# Patient Record
Sex: FEMALE | Race: WHITE | URBAN - METROPOLITAN AREA
[De-identification: names, ages, dates, MRNs, and addresses within clinical notes are randomized per-mention and may not be internally consistent; named-entity substitution may affect disease eponyms.]

---

## 2018-01-21 ENCOUNTER — INPATIENT (INPATIENT)
Facility: HOSPITAL | Age: 73
LOS: 8 days | Discharge: HOME HEALTH | End: 2018-01-30
Attending: THORACIC SURGERY (CARDIOTHORACIC VASCULAR SURGERY)

## 2018-01-21 DIAGNOSIS — E87.70 FLUID OVERLOAD, UNSPECIFIED: ICD-10-CM

## 2018-01-21 DIAGNOSIS — D62 ACUTE POSTHEMORRHAGIC ANEMIA: ICD-10-CM

## 2018-01-21 DIAGNOSIS — Z53.8 PROCEDURE AND TREATMENT NOT CARRIED OUT FOR OTHER REASONS: ICD-10-CM

## 2018-01-21 DIAGNOSIS — E83.42 HYPOMAGNESEMIA: ICD-10-CM

## 2018-01-21 DIAGNOSIS — T36.8X5A ADVERSE EFFECT OF OTHER SYSTEMIC ANTIBIOTICS, INITIAL ENCOUNTER: ICD-10-CM

## 2018-01-21 DIAGNOSIS — D72.829 ELEVATED WHITE BLOOD CELL COUNT, UNSPECIFIED: ICD-10-CM

## 2018-02-02 ENCOUNTER — OUTPATIENT (OUTPATIENT)
Dept: OUTPATIENT SERVICES | Facility: HOSPITAL | Age: 73
LOS: 1 days | Discharge: HOME | End: 2018-02-02

## 2018-02-02 DIAGNOSIS — I10 ESSENTIAL (PRIMARY) HYPERTENSION: ICD-10-CM

## 2018-02-02 DIAGNOSIS — A49.9 BACTERIAL INFECTION, UNSPECIFIED: ICD-10-CM

## 2018-02-02 DIAGNOSIS — K21.9 GASTRO-ESOPHAGEAL REFLUX DISEASE WITHOUT ESOPHAGITIS: ICD-10-CM

## 2018-02-02 DIAGNOSIS — Z53.32 THORACOSCOPIC SURGICAL PROCEDURE CONVERTED TO OPEN PROCEDURE: ICD-10-CM

## 2018-02-02 DIAGNOSIS — A41.9 SEPSIS, UNSPECIFIED ORGANISM: ICD-10-CM

## 2018-02-02 DIAGNOSIS — J86.9 PYOTHORAX WITHOUT FISTULA: ICD-10-CM

## 2018-02-02 DIAGNOSIS — J90 PLEURAL EFFUSION, NOT ELSEWHERE CLASSIFIED: ICD-10-CM

## 2018-02-02 DIAGNOSIS — R19.7 DIARRHEA, UNSPECIFIED: ICD-10-CM

## 2018-02-02 DIAGNOSIS — M19.90 UNSPECIFIED OSTEOARTHRITIS, UNSPECIFIED SITE: ICD-10-CM

## 2018-02-02 DIAGNOSIS — J15.9 UNSPECIFIED BACTERIAL PNEUMONIA: ICD-10-CM

## 2018-02-02 DIAGNOSIS — E87.6 HYPOKALEMIA: ICD-10-CM

## 2018-02-04 DIAGNOSIS — J90 PLEURAL EFFUSION, NOT ELSEWHERE CLASSIFIED: ICD-10-CM

## 2018-02-06 ENCOUNTER — OUTPATIENT (OUTPATIENT)
Dept: OUTPATIENT SERVICES | Facility: HOSPITAL | Age: 73
LOS: 1 days | Discharge: HOME | End: 2018-02-06

## 2018-02-06 DIAGNOSIS — J90 PLEURAL EFFUSION, NOT ELSEWHERE CLASSIFIED: ICD-10-CM

## 2018-02-13 ENCOUNTER — OUTPATIENT (OUTPATIENT)
Dept: OUTPATIENT SERVICES | Facility: HOSPITAL | Age: 73
LOS: 1 days | Discharge: HOME | End: 2018-02-13

## 2018-02-13 DIAGNOSIS — D64.9 ANEMIA, UNSPECIFIED: ICD-10-CM

## 2018-02-13 PROBLEM — Z00.00 ENCOUNTER FOR PREVENTIVE HEALTH EXAMINATION: Status: ACTIVE | Noted: 2018-02-13

## 2018-03-01 ENCOUNTER — APPOINTMENT (OUTPATIENT)
Dept: CARDIOLOGY | Facility: CLINIC | Age: 73
End: 2018-03-01

## 2018-03-01 VITALS — DIASTOLIC BLOOD PRESSURE: 84 MMHG | HEART RATE: 80 BPM | RESPIRATION RATE: 18 BRPM | SYSTOLIC BLOOD PRESSURE: 136 MMHG

## 2018-03-01 VITALS — BODY MASS INDEX: 37.09 KG/M2 | WEIGHT: 184 LBS | HEIGHT: 59 IN

## 2018-03-01 DIAGNOSIS — Z82.49 FAMILY HISTORY OF ISCHEMIC HEART DISEASE AND OTHER DISEASES OF THE CIRCULATORY SYSTEM: ICD-10-CM

## 2018-03-01 DIAGNOSIS — F15.90 OTHER STIMULANT USE, UNSPECIFIED, UNCOMPLICATED: ICD-10-CM

## 2018-03-01 DIAGNOSIS — K21.9 GASTRO-ESOPHAGEAL REFLUX DISEASE W/OUT ESOPHAGITIS: ICD-10-CM

## 2018-03-01 DIAGNOSIS — Z78.9 OTHER SPECIFIED HEALTH STATUS: ICD-10-CM

## 2018-03-01 DIAGNOSIS — I10 ESSENTIAL (PRIMARY) HYPERTENSION: ICD-10-CM

## 2018-03-01 DIAGNOSIS — I31.3 PERICARDIAL EFFUSION (NONINFLAMMATORY): ICD-10-CM

## 2018-03-01 RX ORDER — LOSARTAN POTASSIUM AND HYDROCHLOROTHIAZIDE 25; 100 MG/1; MG/1
100-25 TABLET ORAL DAILY
Qty: 90 | Refills: 3 | Status: ACTIVE | COMMUNITY
Start: 1900-01-01 | End: 1900-01-01

## 2018-03-01 RX ORDER — OMEPRAZOLE 40 MG/1
40 CAPSULE, DELAYED RELEASE ORAL
Qty: 90 | Refills: 0 | Status: ACTIVE | COMMUNITY
Start: 1900-01-01 | End: 1900-01-01

## 2018-03-02 ENCOUNTER — MEDICATION RENEWAL (OUTPATIENT)
Age: 73
End: 2018-03-02

## 2018-03-02 RX ORDER — METOPROLOL TARTRATE 50 MG/1
50 TABLET, FILM COATED ORAL
Qty: 180 | Refills: 3 | Status: ACTIVE | COMMUNITY
Start: 2018-03-02 | End: 1900-01-01

## 2018-04-02 ENCOUNTER — OUTPATIENT (OUTPATIENT)
Dept: OUTPATIENT SERVICES | Facility: HOSPITAL | Age: 73
LOS: 1 days | Discharge: HOME | End: 2018-04-02

## 2018-04-02 DIAGNOSIS — R07.9 CHEST PAIN, UNSPECIFIED: ICD-10-CM

## 2018-04-02 DIAGNOSIS — J86.9 PYOTHORAX WITHOUT FISTULA: ICD-10-CM

## 2018-05-04 ENCOUNTER — APPOINTMENT (OUTPATIENT)
Dept: CARDIOLOGY | Facility: CLINIC | Age: 73
End: 2018-05-04

## 2018-05-29 ENCOUNTER — RX RENEWAL (OUTPATIENT)
Age: 73
End: 2018-05-29

## 2018-06-01 ENCOUNTER — RX RENEWAL (OUTPATIENT)
Age: 73
End: 2018-06-01

## 2018-06-27 ENCOUNTER — APPOINTMENT (OUTPATIENT)
Dept: CARDIOLOGY | Facility: CLINIC | Age: 73
End: 2018-06-27

## 2018-07-18 VITALS
OXYGEN SATURATION: 97 % | TEMPERATURE: 98 F | RESPIRATION RATE: 16 BRPM | HEIGHT: 59 IN | DIASTOLIC BLOOD PRESSURE: 74 MMHG | HEART RATE: 72 BPM | SYSTOLIC BLOOD PRESSURE: 165 MMHG | WEIGHT: 190.04 LBS

## 2018-07-18 RX ORDER — CHLORHEXIDINE GLUCONATE 213 G/1000ML
1 SOLUTION TOPICAL ONCE
Qty: 0 | Refills: 0 | Status: DISCONTINUED | OUTPATIENT
Start: 2018-07-19 | End: 2018-07-19

## 2018-07-18 NOTE — H&P ADULT - HISTORY OF PRESENT ILLNESS
73 y.o Female with PMHx of HTN, GERD, who presented to her cardiologist  Dr. Perkins c/o progressively worsening  dyspnea on exertion upon ambulation of 1/2 block over the last several weeks, resolving within minutes of rest.   Denies CP,  recent PND/orthopnea, Le edema, palpitations, dizziness,  or syncope.  Pt subsequently was referred for a Nuclear Stress test on   which was reportedly "abnormal" (results pending).      In light of pt's risk factors, above CCS Anginal Class 3 Equivalent Symptoms, and an abnormal Stress test, pt is now referred to [facility] for recommended Cardiac Cath with possible intervention if clinically indicated to  r/o suspected underlying CAD. 73 y.o Female with PMHx of HTN, GIB ~ 6-7 yrs ago while on low dose ASA s/p 4 units PRBC's  w/ negative subsequent GI workup (ASA never reintroduced since), GERD, who presented to her cardiologist  Dr. Perkins c/o progressively worsening  dyspnea on exertion upon ambulation of 1/2 block over the last several weeks, resolving within minutes of rest.   Denies CP,  recent PND/orthopnea, LE edema, palpitations, dizziness,  or syncope.  Pt subsequently was referred for a Coronary CTA study on 06/29/18 which revealed  a total calcium score of 2277, moderate stenosis secondary to mixed plaque  of the proximal to mid LAD, RCA, and LCx, LVEF of 85%.  Extracardiac findings: Stable focal cystic bronchiectasis with mucoid impactions within the proximal linear segment of the lung.  Stable moderately sized hiatal hernia.      In light of pt's risk factors, above CCS Anginal Class 3 Equivalent Symptoms, and an abnormal CTA study, pt is now referred to [facility] for recommended Cardiac Cath with possible intervention if clinically indicated to  r/o suspected underlying CAD.

## 2018-07-18 NOTE — H&P ADULT - ASSESSMENT
73 y.o Female with PMHx of HTN, GERD, who presents for recommended LHC with possible intervention if clinically indicated secondary to CCS Anginal Class 3 Equivalent Symptoms in the setting of an abnormal stress test.      ASA: III and Mallampati: III    -Precath/Consented  - IVF Hydration NS @ 75cc/hr.  -Loaded with ASA 325mg PO X 1 and Plavix 600mg PO X 1 73 y.o Female with PMHx of HTN, GIB ~ 6-7 yrs ago while on low dose ASA s/p 4 units PRBC's  w/ negative subsequent GI workup (ASA never reintroduced since), GERD, who presents for recommended LHC with possible intervention if clinically indicated secondary to CCS Anginal Class 3 Equivalent Symptoms in the setting of an abnormal stress test.      ASA: III and Mallampati: III    -Precath/Consented  - IVF Hydration NS @ 75cc/hr.  -GIB ~ 6-7 yrs ago while on low dose ASA s/p 4 units PRBC's  w/ negative subsequent GI workup.  ASA has never  been reintroduced since.  H/H remains____ 73 y.o Female with PMHx of HTN, GIB ~ 6-7 yrs ago while on low dose ASA s/p 4 units PRBC's  w/ negative subsequent GI workup (ASA never reintroduced since), GERD, who presents for recommended LHC with possible intervention if clinically indicated secondary to CCS Anginal Class 3 Equivalent Symptoms in the setting of an abnormal stress test.      ASA: III and Mallampati: III    -Precath/Consented  - IVF Hydration NS @ 75cc/hr.  -GIB ~ 6-7 yrs ago while on low dose ASA s/p 4 units PRBC's  w/ negative subsequent GI workup.  ASA has never  been reintroduced since.  H/H 12.7/41.6 remains stable today.  Pt reports no further signs or symptoms of  bleeding. AS d./w Stephanie, will proceed with loading pt with ASA 325mg PO X 1 and Plavix 600mg PO X 1.

## 2018-07-19 ENCOUNTER — OUTPATIENT (OUTPATIENT)
Dept: OUTPATIENT SERVICES | Facility: HOSPITAL | Age: 73
LOS: 1 days | Discharge: NURSING, REHAB OR LONG-TERM CARE | End: 2018-07-19
Payer: MEDICARE

## 2018-07-19 LAB
ALBUMIN SERPL ELPH-MCNC: 4.5 G/DL — SIGNIFICANT CHANGE UP (ref 3.3–5)
ALP SERPL-CCNC: 118 U/L — SIGNIFICANT CHANGE UP (ref 40–120)
ALT FLD-CCNC: 19 U/L — SIGNIFICANT CHANGE UP (ref 10–45)
ANION GAP SERPL CALC-SCNC: 15 MMOL/L — SIGNIFICANT CHANGE UP (ref 5–17)
APTT BLD: 28 SEC — SIGNIFICANT CHANGE UP (ref 27.5–37.4)
AST SERPL-CCNC: 21 U/L — SIGNIFICANT CHANGE UP (ref 10–40)
BASOPHILS NFR BLD AUTO: 0.3 % — SIGNIFICANT CHANGE UP (ref 0–2)
BILIRUB SERPL-MCNC: 0.5 MG/DL — SIGNIFICANT CHANGE UP (ref 0.2–1.2)
BUN SERPL-MCNC: 23 MG/DL — SIGNIFICANT CHANGE UP (ref 7–23)
CALCIUM SERPL-MCNC: 10 MG/DL — SIGNIFICANT CHANGE UP (ref 8.4–10.5)
CHLORIDE SERPL-SCNC: 97 MMOL/L — SIGNIFICANT CHANGE UP (ref 96–108)
CHOLEST SERPL-MCNC: 213 MG/DL — HIGH (ref 10–199)
CK MB CFR SERPL CALC: 2.7 NG/ML — SIGNIFICANT CHANGE UP (ref 0–6.7)
CK SERPL-CCNC: 107 U/L — SIGNIFICANT CHANGE UP (ref 25–170)
CO2 SERPL-SCNC: 27 MMOL/L — SIGNIFICANT CHANGE UP (ref 22–31)
CREAT SERPL-MCNC: 1.04 MG/DL — SIGNIFICANT CHANGE UP (ref 0.5–1.3)
CRP SERPL-MCNC: 0.68 MG/DL — HIGH (ref 0–0.4)
EOSINOPHIL NFR BLD AUTO: 2.5 % — SIGNIFICANT CHANGE UP (ref 0–6)
GLUCOSE SERPL-MCNC: 113 MG/DL — HIGH (ref 70–99)
HBA1C BLD-MCNC: 5.6 % — SIGNIFICANT CHANGE UP (ref 4–5.6)
HCT VFR BLD CALC: 41.6 % — SIGNIFICANT CHANGE UP (ref 34.5–45)
HDLC SERPL-MCNC: 71 MG/DL — SIGNIFICANT CHANGE UP (ref 40–125)
HGB BLD-MCNC: 12.7 G/DL — SIGNIFICANT CHANGE UP (ref 11.5–15.5)
INR BLD: 1.04 — SIGNIFICANT CHANGE UP (ref 0.88–1.16)
LIPID PNL WITH DIRECT LDL SERPL: 104 MG/DL — SIGNIFICANT CHANGE UP
LYMPHOCYTES # BLD AUTO: 26.3 % — SIGNIFICANT CHANGE UP (ref 13–44)
MCHC RBC-ENTMCNC: 24.7 PG — LOW (ref 27–34)
MCHC RBC-ENTMCNC: 30.5 G/DL — LOW (ref 32–36)
MCV RBC AUTO: 80.8 FL — SIGNIFICANT CHANGE UP (ref 80–100)
MONOCYTES NFR BLD AUTO: 4.4 % — SIGNIFICANT CHANGE UP (ref 2–14)
NEUTROPHILS NFR BLD AUTO: 66.5 % — SIGNIFICANT CHANGE UP (ref 43–77)
PLATELET # BLD AUTO: 328 K/UL — SIGNIFICANT CHANGE UP (ref 150–400)
POTASSIUM SERPL-MCNC: 3.7 MMOL/L — SIGNIFICANT CHANGE UP (ref 3.5–5.3)
POTASSIUM SERPL-SCNC: 3.7 MMOL/L — SIGNIFICANT CHANGE UP (ref 3.5–5.3)
PROT SERPL-MCNC: 8.3 G/DL — SIGNIFICANT CHANGE UP (ref 6–8.3)
PROTHROM AB SERPL-ACNC: 11.6 SEC — SIGNIFICANT CHANGE UP (ref 9.8–12.7)
RBC # BLD: 5.15 M/UL — SIGNIFICANT CHANGE UP (ref 3.8–5.2)
RBC # FLD: 15.5 % — SIGNIFICANT CHANGE UP (ref 10.3–16.9)
SODIUM SERPL-SCNC: 139 MMOL/L — SIGNIFICANT CHANGE UP (ref 135–145)
TOTAL CHOLESTEROL/HDL RATIO MEASUREMENT: 3 RATIO — LOW (ref 3.3–7.1)
TRIGL SERPL-MCNC: 192 MG/DL — HIGH (ref 10–149)
WBC # BLD: 11.7 K/UL — HIGH (ref 3.8–10.5)
WBC # FLD AUTO: 11.7 K/UL — HIGH (ref 3.8–10.5)

## 2018-07-19 PROCEDURE — 93458 L HRT ARTERY/VENTRICLE ANGIO: CPT | Mod: 26

## 2018-07-19 PROCEDURE — 82553 CREATINE MB FRACTION: CPT

## 2018-07-19 PROCEDURE — 93005 ELECTROCARDIOGRAM TRACING: CPT

## 2018-07-19 PROCEDURE — 80053 COMPREHEN METABOLIC PANEL: CPT

## 2018-07-19 PROCEDURE — 85610 PROTHROMBIN TIME: CPT

## 2018-07-19 PROCEDURE — 85025 COMPLETE CBC W/AUTO DIFF WBC: CPT

## 2018-07-19 PROCEDURE — 82550 ASSAY OF CK (CPK): CPT

## 2018-07-19 PROCEDURE — 83036 HEMOGLOBIN GLYCOSYLATED A1C: CPT

## 2018-07-19 PROCEDURE — 93010 ELECTROCARDIOGRAM REPORT: CPT

## 2018-07-19 PROCEDURE — C1769: CPT

## 2018-07-19 PROCEDURE — C1887: CPT

## 2018-07-19 PROCEDURE — 86140 C-REACTIVE PROTEIN: CPT

## 2018-07-19 PROCEDURE — 80061 LIPID PANEL: CPT

## 2018-07-19 PROCEDURE — 85730 THROMBOPLASTIN TIME PARTIAL: CPT

## 2018-07-19 PROCEDURE — 93458 L HRT ARTERY/VENTRICLE ANGIO: CPT

## 2018-07-19 RX ORDER — SODIUM CHLORIDE 9 MG/ML
500 INJECTION INTRAMUSCULAR; INTRAVENOUS; SUBCUTANEOUS
Qty: 0 | Refills: 0 | Status: DISCONTINUED | OUTPATIENT
Start: 2018-07-19 | End: 2018-07-19

## 2018-07-19 RX ORDER — CLOPIDOGREL BISULFATE 75 MG/1
600 TABLET, FILM COATED ORAL ONCE
Qty: 0 | Refills: 0 | Status: COMPLETED | OUTPATIENT
Start: 2018-07-19 | End: 2018-07-19

## 2018-07-19 RX ORDER — ASPIRIN/CALCIUM CARB/MAGNESIUM 324 MG
325 TABLET ORAL ONCE
Qty: 0 | Refills: 0 | Status: COMPLETED | OUTPATIENT
Start: 2018-07-19 | End: 2018-07-19

## 2018-07-19 RX ADMIN — SODIUM CHLORIDE 75 MILLILITER(S): 9 INJECTION INTRAMUSCULAR; INTRAVENOUS; SUBCUTANEOUS at 08:03

## 2018-07-19 RX ADMIN — Medication 325 MILLIGRAM(S): at 08:03

## 2018-07-19 RX ADMIN — CLOPIDOGREL BISULFATE 600 MILLIGRAM(S): 75 TABLET, FILM COATED ORAL at 08:03

## 2018-07-19 NOTE — PROGRESS NOTE ADULT - SUBJECTIVE AND OBJECTIVE BOX
Interventional Cardiology PA SDA Discharge Note    Patient without complaints. Ambulated and voided without difficulties    Afebrile, VSS    Ext:    		  		Right   Radial :   NO hematoma,  NO   bleeding, dressing; C/D/I      Pulses:    intact RAD to baseline     A/P:      73 y.o Female with PMHx of HTN, GIB ~ 6-7 yrs ago while on low dose ASA s/p 4 units PRBC's  w/ negative subsequent GI workup (ASA never reintroduced since), GERD, who presented to her cardiologist  Dr. Perkins c/o CCS Anginal Class 3 Equivalent Symptoms, and an abnormal CTA study, pt is now referred to [facility] for recommended Cardiac Cath with possible intervention if clinically indicated to  r/o suspected underlying CAD.  She is now s/p diagnostic cardiac catheterization revealing non obstructed CAD.  patient deemed stable for discharge home as per. Dr. Brown.       1.	Stable for discharge as per attending  __Stephanie_______ after bed rest, pt voids, groin/wrist stable and 30 minutes of ambulation.  2.	Follow-up with PMD/Cardiologist _Gregorio__________ in 1-2 weeks  3.	Discharged forms signed and copies in chart

## 2019-03-08 ENCOUNTER — RX RENEWAL (OUTPATIENT)
Age: 74
End: 2019-03-08

## 2020-02-27 PROBLEM — K21.9 GASTRO-ESOPHAGEAL REFLUX DISEASE WITHOUT ESOPHAGITIS: Chronic | Status: ACTIVE | Noted: 2018-07-18

## 2020-02-27 PROBLEM — I10 ESSENTIAL (PRIMARY) HYPERTENSION: Chronic | Status: ACTIVE | Noted: 2018-07-18

## 2020-03-11 ENCOUNTER — APPOINTMENT (OUTPATIENT)
Dept: CARDIOTHORACIC SURGERY | Facility: CLINIC | Age: 75
End: 2020-03-11
Payer: MEDICARE

## 2020-03-11 VITALS
RESPIRATION RATE: 13 BRPM | HEART RATE: 89 BPM | DIASTOLIC BLOOD PRESSURE: 106 MMHG | HEIGHT: 59 IN | TEMPERATURE: 97.6 F | WEIGHT: 190 LBS | SYSTOLIC BLOOD PRESSURE: 200 MMHG | OXYGEN SATURATION: 95 % | BODY MASS INDEX: 38.3 KG/M2

## 2020-03-11 DIAGNOSIS — J86.9 PYOTHORAX W/OUT FISTULA: ICD-10-CM

## 2020-03-11 DIAGNOSIS — Z86.79 PERSONAL HISTORY OF OTHER DISEASES OF THE CIRCULATORY SYSTEM: ICD-10-CM

## 2020-03-11 DIAGNOSIS — Z87.19 PERSONAL HISTORY OF OTHER DISEASES OF THE DIGESTIVE SYSTEM: ICD-10-CM

## 2020-03-11 PROCEDURE — 99204 OFFICE O/P NEW MOD 45 MIN: CPT

## 2020-03-13 RX ORDER — ERGOCALCIFEROL (VITAMIN D2) 1250 MCG
50000 CAPSULE ORAL
Refills: 0 | Status: ACTIVE | COMMUNITY

## 2020-03-13 RX ORDER — METHOTREXATE 2.5 MG/1
2.5 TABLET ORAL
Refills: 0 | Status: ACTIVE | COMMUNITY

## 2020-03-13 RX ORDER — FOLIC ACID 1 MG/1
1 TABLET ORAL
Refills: 0 | Status: ACTIVE | COMMUNITY

## 2020-03-13 NOTE — DATA REVIEWED
[FreeTextEntry1] : MRI Abdomen w/ w/o - 1/7/20 (done at [facility])\par Impression:\par 1. Limited study\par 2. Large Hiatal Hernia\par 3. Severe hepatic steatosis\par 4. Adenomatous left adrenal gland\par 5. Additional findings
no chest pain, no cough, and no shortness of breath.

## 2020-03-13 NOTE — CONSULT LETTER
[Dear  ___] : Dear  [unfilled], [Consult Letter:] : I had the pleasure of evaluating your patient, [unfilled]. [Consult Closing:] : Thank you very much for allowing me to participate in the care of this patient.  If you have any questions, please do not hesitate to contact me. [Sincerely,] : Sincerely, [DrGage  ___] : Dr. BOSTON [FreeTextEntry2] : Ryann Perkins [FreeTextEntry1] : This is a 74 year old female with apparently a previous hiatal hernia repair and now with recurrence.  On review of her imaging I appreciate a few metallic objects along the greater curve that may represent clips.  I appreciate no evidence on imaging of a wrap, nor do I appreciate any post surgical appearing changes at the hiatus or mediastinum.  There is no obvious residual sack in the mediastinum.  The family on questioning note that they were told her stomach had been "clipped" or "stapled" in place but no mention of a wrap.  My overall impression is that this patient may have just had a gastropexy.  However they note that this was an elective repair and generally would not perform isolated gastropexy in that situation.\par \par We discussed the conduct of a hiatal hernia repair, including the use of mesh, Danilo gastroplasty, and gastropexy.  We discussed the risks and benefits of this procedure as well, in particular on reoperative cases.  We will proceed with further testing including esophagram and CT chest, cardiac clearance, and will attempt to obtain her MRI disk and any operative notes from her previous surgery.  Surgical planning to follow after that.  I would prefer, given apparent previous foregut procedure, to perform an EGD for the purposes of identifying anatomy prior to the OR. [FreeTextEntry3] : Bennie Quiles M.D.\par Chief, Division of Thoracic Surgery\par Department of Cardiothoracic Surgery\par

## 2020-03-13 NOTE — HISTORY OF PRESENT ILLNESS
[Hypertension] : Hypertension [FreeTextEntry1] : Ms. DARIEN BARRERA 74 year F PMH HTN, empyema s/p decortication in 2108, s/p hiatal hernia repair at [facility] 7 years ago arrives today for a consultation for hiatal hernia. She reports having had CP in 2020 for which she had an MRI abdomen w/ w/o ordered by her PMD and a large hiatal hernia was found.  She reports not following up with her cardiologist in 2 years. Her symptoms include reflux, CP, occasional vomiting, belching, bloating and noting that certain foods "get stuck" when eating. She endorses drinking fluids after her meals only. \par She lives in the same house as her sister, but independently in a separate apartment and has no children or pets and is a retired  for the hospital.\par \par ECOG/WHO/Zubrod - 1 - Restricted in physically strenuous activity but ambulatory and able to carry out work of a light or sedentary nature, e.g., light house work, office work\par \par Her healthcare team is as follows:\par PMD: Ryann Perkins [phone]\par Cardio: Horace Giron\par GI: Timothy Varela\par \par \par  [Diabetes Mellitus] : no Diabetes Melllitus [Dyslipidemia] : no dyslipidemia [Dialysis] : no dialysis [Infectious Endocarditis] : no infectious endocarditis [Home Oxygen] : no home oxygen use [Inhaled Medication Therapy] : no inhaled medication therapy [Sleep Apnea] : no sleep apnea [Liver Disease] : no liver disease [Immunocompromise Present] : not immunocompromised [Peripheral Artery Disease] : no peripheral artery disease [Unresponsive Neurologic State] : not in a unresponsive neurologic state [Syncope] : no syncope [Cerebrovascular Disease] : no cerebrovascular disease [Prior CVA] : with no prior CVA [CVD TIA] : no CVD Tia [Prior Myocardial Infarction] : No prior myocardial infarction [Prior Heart Failure] : no prior heart failure

## 2020-03-13 NOTE — ASSESSMENT
[FreeTextEntry1] : This is a 74 year old female with apparently a previous hiatal hernia repair and now with recurrence.  On review of her imaging I appreciate a few metallic objects along the greater curve that may represent clips.  I appreciate no evidence on imaging of a wrap, nor do I appreciate any post surgical appearing changes at the hiatus or mediastinum.  There is no obvious residual sack in the mediastinum.  The family on questioning note that they were told her stomach had been "clipped" or "stapled" in place but no mention of a wrap.  My overall impression is that this patient may have just had a gastropexy.  However they note that this was an elective repair and generally would not perform isolated gastropexy in that situation.\par \par We discussed the conduct of a hiatal hernia repair, including the use of mesh, Danilo gastroplasty, and gastropexy.  We discussed the risks and benefits of this procedure as well, in particular on reoperative cases.  We will proceed with further testing including esophagram and CT chest, cardiac clearance, and will attempt to obtain her MRI disk and any operative notes from her previous surgery.  Surgical planning to follow after that.  I would prefer, given apparent previous foregut procedure, to perform an EGD for the purposes of identifying anatomy prior to the OR.\par I personally performed the services described in the documentation, reviewed the documentation recorded by the scribe in my presence and it accurately and completely records my words and actions.\par

## 2020-09-24 ENCOUNTER — APPOINTMENT (OUTPATIENT)
Dept: SURGERY | Facility: CLINIC | Age: 75
End: 2020-09-24

## 2020-09-30 ENCOUNTER — INPATIENT (INPATIENT)
Facility: HOSPITAL | Age: 75
LOS: 1 days | Discharge: HOME HEALTH | End: 2020-10-02
Attending: INTERNAL MEDICINE | Admitting: INTERNAL MEDICINE
Payer: MEDICARE

## 2020-09-30 VITALS
OXYGEN SATURATION: 96 % | HEIGHT: 59 IN | SYSTOLIC BLOOD PRESSURE: 206 MMHG | TEMPERATURE: 98 F | DIASTOLIC BLOOD PRESSURE: 83 MMHG | HEART RATE: 87 BPM | RESPIRATION RATE: 22 BRPM | WEIGHT: 203.71 LBS

## 2020-09-30 DIAGNOSIS — Z98.890 OTHER SPECIFIED POSTPROCEDURAL STATES: Chronic | ICD-10-CM

## 2020-09-30 DIAGNOSIS — Z87.09 PERSONAL HISTORY OF OTHER DISEASES OF THE RESPIRATORY SYSTEM: Chronic | ICD-10-CM

## 2020-09-30 LAB
ALBUMIN SERPL ELPH-MCNC: 4.4 G/DL — SIGNIFICANT CHANGE UP (ref 3.5–5.2)
ALP SERPL-CCNC: 109 U/L — SIGNIFICANT CHANGE UP (ref 30–115)
ALT FLD-CCNC: 109 U/L — HIGH (ref 0–41)
ANION GAP SERPL CALC-SCNC: 13 MMOL/L — SIGNIFICANT CHANGE UP (ref 7–14)
AST SERPL-CCNC: 90 U/L — HIGH (ref 0–41)
BASOPHILS # BLD AUTO: 0.04 K/UL — SIGNIFICANT CHANGE UP (ref 0–0.2)
BASOPHILS NFR BLD AUTO: 0.5 % — SIGNIFICANT CHANGE UP (ref 0–1)
BILIRUB SERPL-MCNC: 0.6 MG/DL — SIGNIFICANT CHANGE UP (ref 0.2–1.2)
BUN SERPL-MCNC: 15 MG/DL — SIGNIFICANT CHANGE UP (ref 10–20)
CALCIUM SERPL-MCNC: 9.5 MG/DL — SIGNIFICANT CHANGE UP (ref 8.5–10.1)
CHLORIDE SERPL-SCNC: 103 MMOL/L — SIGNIFICANT CHANGE UP (ref 98–110)
CO2 SERPL-SCNC: 25 MMOL/L — SIGNIFICANT CHANGE UP (ref 17–32)
CREAT SERPL-MCNC: 1 MG/DL — SIGNIFICANT CHANGE UP (ref 0.7–1.5)
EOSINOPHIL # BLD AUTO: 0.11 K/UL — SIGNIFICANT CHANGE UP (ref 0–0.7)
EOSINOPHIL NFR BLD AUTO: 1.4 % — SIGNIFICANT CHANGE UP (ref 0–8)
GLUCOSE SERPL-MCNC: 103 MG/DL — HIGH (ref 70–99)
HCT VFR BLD CALC: 37.2 % — SIGNIFICANT CHANGE UP (ref 37–47)
HGB BLD-MCNC: 11.8 G/DL — LOW (ref 12–16)
IMM GRANULOCYTES NFR BLD AUTO: 0.4 % — HIGH (ref 0.1–0.3)
LYMPHOCYTES # BLD AUTO: 2.08 K/UL — SIGNIFICANT CHANGE UP (ref 1.2–3.4)
LYMPHOCYTES # BLD AUTO: 27 % — SIGNIFICANT CHANGE UP (ref 20.5–51.1)
MCHC RBC-ENTMCNC: 28 PG — SIGNIFICANT CHANGE UP (ref 27–31)
MCHC RBC-ENTMCNC: 31.7 G/DL — LOW (ref 32–37)
MCV RBC AUTO: 88.4 FL — SIGNIFICANT CHANGE UP (ref 81–99)
MONOCYTES # BLD AUTO: 0.47 K/UL — SIGNIFICANT CHANGE UP (ref 0.1–0.6)
MONOCYTES NFR BLD AUTO: 6.1 % — SIGNIFICANT CHANGE UP (ref 1.7–9.3)
NEUTROPHILS # BLD AUTO: 4.98 K/UL — SIGNIFICANT CHANGE UP (ref 1.4–6.5)
NEUTROPHILS NFR BLD AUTO: 64.6 % — SIGNIFICANT CHANGE UP (ref 42.2–75.2)
NRBC # BLD: 0 /100 WBCS — SIGNIFICANT CHANGE UP (ref 0–0)
NT-PROBNP SERPL-SCNC: 1200 PG/ML — HIGH (ref 0–300)
PLATELET # BLD AUTO: 245 K/UL — SIGNIFICANT CHANGE UP (ref 130–400)
POTASSIUM SERPL-MCNC: 4.3 MMOL/L — SIGNIFICANT CHANGE UP (ref 3.5–5)
POTASSIUM SERPL-SCNC: 4.3 MMOL/L — SIGNIFICANT CHANGE UP (ref 3.5–5)
PROT SERPL-MCNC: 6.6 G/DL — SIGNIFICANT CHANGE UP (ref 6–8)
RBC # BLD: 4.21 M/UL — SIGNIFICANT CHANGE UP (ref 4.2–5.4)
RBC # FLD: 13.2 % — SIGNIFICANT CHANGE UP (ref 11.5–14.5)
SODIUM SERPL-SCNC: 141 MMOL/L — SIGNIFICANT CHANGE UP (ref 135–146)
TROPONIN T SERPL-MCNC: <0.01 NG/ML — SIGNIFICANT CHANGE UP
TROPONIN T SERPL-MCNC: <0.01 NG/ML — SIGNIFICANT CHANGE UP
WBC # BLD: 7.71 K/UL — SIGNIFICANT CHANGE UP (ref 4.8–10.8)
WBC # FLD AUTO: 7.71 K/UL — SIGNIFICANT CHANGE UP (ref 4.8–10.8)

## 2020-09-30 PROCEDURE — 93010 ELECTROCARDIOGRAM REPORT: CPT

## 2020-09-30 PROCEDURE — 99223 1ST HOSP IP/OBS HIGH 75: CPT

## 2020-09-30 PROCEDURE — 71045 X-RAY EXAM CHEST 1 VIEW: CPT | Mod: 26

## 2020-09-30 PROCEDURE — 99285 EMERGENCY DEPT VISIT HI MDM: CPT

## 2020-09-30 PROCEDURE — 93970 EXTREMITY STUDY: CPT | Mod: 26

## 2020-09-30 RX ORDER — RANOLAZINE 500 MG/1
1 TABLET, FILM COATED, EXTENDED RELEASE ORAL
Qty: 0 | Refills: 0 | DISCHARGE

## 2020-09-30 RX ORDER — METOPROLOL TARTRATE 50 MG
100 TABLET ORAL DAILY
Refills: 0 | Status: DISCONTINUED | OUTPATIENT
Start: 2020-09-30 | End: 2020-10-02

## 2020-09-30 RX ORDER — METOPROLOL TARTRATE 50 MG
1 TABLET ORAL
Qty: 0 | Refills: 0 | DISCHARGE

## 2020-09-30 RX ORDER — FUROSEMIDE 40 MG
40 TABLET ORAL DAILY
Refills: 0 | Status: DISCONTINUED | OUTPATIENT
Start: 2020-09-30 | End: 2020-10-01

## 2020-09-30 RX ORDER — LABETALOL HCL 100 MG
10 TABLET ORAL ONCE
Refills: 0 | Status: COMPLETED | OUTPATIENT
Start: 2020-09-30 | End: 2020-09-30

## 2020-09-30 RX ORDER — PANTOPRAZOLE SODIUM 20 MG/1
40 TABLET, DELAYED RELEASE ORAL
Refills: 0 | Status: DISCONTINUED | OUTPATIENT
Start: 2020-09-30 | End: 2020-10-02

## 2020-09-30 RX ORDER — LOSARTAN/HYDROCHLOROTHIAZIDE 100MG-25MG
1 TABLET ORAL
Qty: 0 | Refills: 0 | DISCHARGE

## 2020-09-30 RX ORDER — FUROSEMIDE 40 MG
40 TABLET ORAL ONCE
Refills: 0 | Status: COMPLETED | OUTPATIENT
Start: 2020-09-30 | End: 2020-09-30

## 2020-09-30 RX ADMIN — Medication 40 MILLIGRAM(S): at 14:10

## 2020-09-30 RX ADMIN — Medication 0.1 MILLIGRAM(S): at 21:49

## 2020-09-30 NOTE — H&P ADULT - ATTENDING COMMENTS
I saw and evaluated the patient on 09/30/2020. I have reviewed and agree with the findings and plan of care as documented above in the resident’s note (unless indicated differently below). Any necessary changes were made in the body of the text.    Acute on chronic CHF exacerbation (likely preserved ejection fraction)  Hypertensive urgency    Plan:  I&O  Daily weights  Diuresis (goal net negative 1 to 1.5 L daily)  TTE  Venous duplex b/l LE's  If no DVT, will benefit from compression therapy  Medications as dosed  Supportive care I saw and evaluated the patient on 09/30/2020. I have reviewed and agree with the findings and plan of care as documented above in the resident’s note (unless indicated differently below). Any necessary changes were made in the body of the text.    74 yo F pt p/w b/l LE swelling and discomfort x 3 weeks. States that symptoms began after she saw her Cardiologist for a concern of elevated BP (SBP > 220 mmHg) - meds were changed (nifedipine 30 mg bid was stopped and clonidine 0.1 mg tid was started; metoprolol succinate dose was increased to 100 mg bid from 50 bid). Since then, the swelling has been progressively worsening. Reports a hx of chronic swelling but says that it has never been this severe in the past and the swelling now extends to above her knees b/l. Associated w/ difficulty ambulating and dyspnea on exertion (pt attributes the dyspnea to her worsening swelling - feels that the extra effort leaves her fatigued). Was given furosemide 40 mg daily by her Cardiologist (pt took this for a few days but did not see an improvement). ROS negative for chest pain, SOB at rest, coughing, palpitations, orthopnea or PND.     ROS:  Constitutional: no fever; +generalized weakness  Eyes: no new visual changes  ENT: no dysphagia; no odynophagia  CVS: no PND; no orthopnea; no chest pain; +b/l LE swelling (acute on chronic)  Resp: no SOB at rest; +JOHNSON; no coughing  GI: no vomiting; no diarrhea; no abd pain  : no dysuria; no hematuria  MSK: +b/l LE aches/pains/stiffness  Skin: no rash  Neuro: no headache  All other systems reviewed and are negative    PMHx, home medications, SurHx, FHx and Social history as above in the corresponding sections of the note - reviewed and edited where appropriate    Exam:  Vitals: BP = 138/61; P = 55; T = 98.4; RR = 18; SpO2 > 95 on room air  General: appears stated age; cooperative  Eyes: anicteric sclera; moist conjunctiva; PERRL; EOMI  HENT: NC/AT; clear oropharynx; MMM; nL hard/soft palate  Neck: supple w/ FROM; trachea midline  Lungs: no tachypnea, accessory muscle usage, wheezing, rhonci or rales  CVS: RRR; S1 and S2 w/o MRGs  Abd: BS+; soft; non-tender to palpation x 4; no masses or HSM  Ext: +b/l LE edema (pitting up to above the knees b/l - > pitting on the right); peripheral pulses palpable distally b/l  Skin: normal temp, turgor and texture; no rashes, ulcers or nodules  Neuro: CN II-XII intact; str and sensation grossly intact  Psych: appropriate affect; alert and oriented to person, place and situation    Labs significant for H&H 11.8/37.2, AST/ALT 90/109, proBNP 1,200  CXR (images reviewed): trace left effusion/atelectasis  EKG: sinus joyce; qTC 431    Assessment:  (1) Acute on chronic CHF exacerbation (given BP, suspect w/ preserved EF)  (2) Hypertensive urgency  (3) Dyslipidemia  (4) Abnormal LFT's - likely ABBOTT  (5) Coronary artery disease - non-obstructive: stable  (6) GERD w/ hx of a large hiatal hernia - stable  (7) ?Hx of RA: surescripts reveals prescriptions for methotrexate and leflunomide    Plan:  (1) I&O, daily wt, diuresis (goal net neg 1.5 L daily), TTE  (2) Venous duplex b/l LE's; if neg, compression tx (stockings/ACE wrap), LE elevation  (3) C/w metoprolol succinate 100 mg bid (home dose), clonidine 0.1 mg tid  (4) Check LFT's, A1C  (5) Medications as dosed  (6) Supportive care  (7) Dispo: anticipate d/c in 48 hrs (pending aforementioned)    Full code

## 2020-09-30 NOTE — ED PROVIDER NOTE - PHYSICAL EXAMINATION
VITALS:  I have reviewed the initial vital signs.  GENERAL: Well-developed, well-nourished, in no acute distress. Nontoxic.  HEENT: Sclera clear. No conjunctival pallor. EOMI, PERRLA. MMM.  NECK: supple w FROM. No JVD.   CARDIO: RRR, nl S1 and S2. No murmurs, rubs, or gallops. 2+ radial and pedal pulses bilaterally.  PULM: Normal effort. No tachypnea or retractions. No stridor. CTA b/l without wheezes, rales, or rhonchi.  MSK: Normal, steady gait. 2+ pitting edema to b/l lower extremities up to knees. No erythema, warmth, or calf ttp.  GI: Normal bowel sounds. Abdomen soft and non-distended. Nontender.  SKIN: Warm, dry. Capillary refill <2 seconds. No pallor. No rash.   NEURO: A&Ox3. Speech clear. No focal deficits.  PSYCH: Calm and cooperative.

## 2020-09-30 NOTE — H&P ADULT - NSICDXPASTMEDICALHX_GEN_ALL_CORE_FT
PAST MEDICAL HISTORY:  GERD (gastroesophageal reflux disease)     Hypertension      PAST MEDICAL HISTORY:  GERD (gastroesophageal reflux disease)     Hiatal hernia with GERD     Hypertension      PAST MEDICAL HISTORY:  Empyema lung     GERD (gastroesophageal reflux disease)     Hiatal hernia with GERD     Hypertension

## 2020-09-30 NOTE — H&P ADULT - NSHPLABSRESULTS_GEN_ALL_CORE
LABS:                        11.8   7.71  )-----------( 245      ( 30 Sep 2020 12:55 )             37.2     09-30    141  |  103  |  15  ----------------------------<  103<H>  4.3   |  25  |  1.0    Ca    9.5      30 Sep 2020 12:55    TPro  6.6  /  Alb  4.4  /  TBili  0.6  /  DBili  x   /  AST  90<H>  /  ALT  109<H>  /  AlkPhos  109  09-30      Troponin T, Serum: <0.01 ng/mL (09-30-20 @ 12:55)    CARDIAC MARKERS ( 30 Sep 2020 12:55 )  x     / <0.01 ng/mL / x     / x     / x            Troponin trend:    Serum Pro-Brain Natriuretic Peptide: 1200 pg/mL (09-30-20 @ 12:55)    RADIOLOGY:  -CXR:   < from: Xray Chest 1 View-PORTABLE IMMEDIATE (09.30.20 @ 13:43) >    Impression:    No large focal opacity. Trace left effusion/atelectasis.    -TTE:  -CCTA:  Coronary CTA study on 06/29/18 which revealed  a total calcium score of 2277, moderate stenosis secondary to mixed plaque  of the proximal to mid LAD, RCA, and LCx, LVEF of 85%.  Extracardiac findings: Stable focal cystic bronchiectasis with mucoid impactions within the proximal linear segment of the lung.  Stable moderately sized hiatal hernia.      -STRESS TEST: abnl  -CATHETERIZATION:    ECG:   < from: 12 Lead ECG (07.19.18 @ 07:21) >      Diagnosis Line Normal sinus rhythm  Nonspecific ST abnormality    TELEMETRY EVENTS: LABS:                        11.8   7.71  )-----------( 245      ( 30 Sep 2020 12:55 )             37.2     09-30    141  |  103  |  15  ----------------------------<  103<H>  4.3   |  25  |  1.0    Ca    9.5      30 Sep 2020 12:55    TPro  6.6  /  Alb  4.4  /  TBili  0.6  /  DBili  x   /  AST  90<H>  /  ALT  109<H>  /  AlkPhos  109  09-30      Troponin T, Serum: <0.01 ng/mL (09-30-20 @ 12:55)    CARDIAC MARKERS ( 30 Sep 2020 12:55 )  x     / <0.01 ng/mL / x     / x     / x            Troponin trend:    Serum Pro-Brain Natriuretic Peptide: 1200 pg/mL (09-30-20 @ 12:55)    RADIOLOGY:  -CXR:   < from: Xray Chest 1 View-PORTABLE IMMEDIATE (09.30.20 @ 13:43) >    Impression:    No large focal opacity. Trace left effusion/atelectasis.    -TTE:  Last TTE from u/k date, EF 65%, small pericardial effusion, mild MR, mild TR  -CCTA:  Coronary CTA study on 06/29/18 which revealed  a total calcium score of 2277, moderate stenosis secondary to mixed plaque  of the proximal to mid LAD, RCA, and LCx, LVEF of 85%.  Extracardiac findings: Stable focal cystic bronchiectasis with mucoid impactions within the proximal linear segment of the lung.  Stable moderately sized hiatal hernia.      -STRESS TEST: abnl  -CATHETERIZATION:    ECG:   < from: 12 Lead ECG (07.19.18 @ 07:21) >      Diagnosis Line Normal sinus rhythm  Nonspecific ST abnormality    TELEMETRY EVENTS:

## 2020-09-30 NOTE — H&P ADULT - NSICDXFAMILYHX_GEN_ALL_CORE_FT
FAMILY HISTORY:  No pertinent family history in first degree relatives     FAMILY HISTORY:  Family history of cardiac pacemaker, 3 siblings     FAMILY HISTORY:  Family history of cardiac pacemaker, 3 siblings  Family history of hypertrophic cardiomyopathy, sister

## 2020-09-30 NOTE — H&P ADULT - NSHPSOCIALHISTORY_GEN_ALL_CORE
Denies TOB, ETOH, OR ILLICIT DRUG USE Denies smoking hx or ILLICIT DRUG USE  social drinker   lives home with sister

## 2020-09-30 NOTE — H&P ADULT - ASSESSMENT
75 F w/non-obstructive CAD, CHF, HTN, GERD p/w c/o LE edema secondary to acute on chronic CHF exacerbation    # Acute on chronic CHF exacberation   - BNP elevated   - CXR unremarkable  - strict Is and Os   - daily wt  - Keep Mg>2, K>4    # HTN, uncontrolled   - /83 mmHg  - c/w home BP meds   - c/w diuresis     # non-obstructive CAD   - c/w ASA and plavix   - BB, statin     # GERD  - c/w ptx       DVT px:  hep SQ   GI ppx: ptx 40 mg   Full Code   Dispo: diurese and d/c within 48 hours 75 F w/non-obstructive CAD, HTN, GERD p/w c/o LE edema secondary to acute on chronic CHF exacerbation    # LE edema with associated mild dyspnea on exertion  - BNP elevated   - CXR unremarkable  - strict Is and Os   - daily wt  - Keep Mg>2, K>4  - check TTE   - Check ECG   - Check trops     # HTN, uncontrolled   - /83 mmHg  - Give IV dose labetolol 10 mg   - c/w home BP meds   - c/w diuresis     # non-obstructive CAD   - not on ASA or plavix or statin  - BB  - check lipid profile   - hba1c     # hiatal hernia with GERD  - c/w ptx       DVT px:  hep SQ   GI ppx: ptx 40 mg   Full Code   Dispo: diurese and d/c within 48 hours 75 F w/non-obstructive CAD, HTN, GERD p/w c/o LE edema secondary to acute on chronic CHF exacerbation    # LE edema with associated mild dyspnea on exertion  - BNP elevated   - CXR unremarkable  - strict Is and Os   - daily wt  - Keep Mg>2, K>4  - check TTE   - Check ECG   - Check trops   - Check LE duplex   - s/p IV lasix 40 mg in ED   - c/w IV lasix 40 mg qd, if no improvement increase to IV 60 mg qd     # HTN, uncontrolled   - /83 mmHg  - Give IV dose labetolol 10 mg   - c/w home BP meds   - c/w diuresis     # non-obstructive CAD   - not on ASA or plavix or statin  - BB  - check lipid profile   - hba1c     # Severe hepatic steatosis    # large hiatal hernia with GERD s/p esophagogastric fundoplasty fundoplication  - c/w ptx     # cystic bronchiectasis   - stable     # h/o empyema s/p decortication  - stable     DVT px:  hep SQ   GI ppx: ptx 40 mg   Full Code   Dispo: diurese and d/c within 48 hours

## 2020-09-30 NOTE — H&P ADULT - NSICDXPASTSURGICALHX_GEN_ALL_CORE_FT
PAST SURGICAL HISTORY:  No significant past surgical history      PAST SURGICAL HISTORY:  History of repair of hiatal hernia      PAST SURGICAL HISTORY:  H/O pleural empyema s/p decortication    History of repair of hiatal hernia

## 2020-09-30 NOTE — ED PROVIDER NOTE - PROGRESS NOTE DETAILS
CHELSEA: Patient currently resting comfortably. Discussed labs/xr/ekg results. Recommended admission for continued monitoring and assessment. Patient agreeable. MAR aware.

## 2020-09-30 NOTE — ED ADULT NURSE REASSESSMENT NOTE - NS ED NURSE REASSESS COMMENT FT1
Received pt from previous shift nurse Bree c/o bilateral lower extremity edema , pt AO  x4 , denies chest pain , denies headache , denies dizziness , no labored breathing , denies nausea , no vomiting noted , ambulates to bathroom with steady gait , IVL intact , ambulates with steady gait , no SOB, pt on continuos cardiac monitor , no SOB on exertion this time

## 2020-09-30 NOTE — ED ADULT TRIAGE NOTE - CHIEF COMPLAINT QUOTE
"The swelling in my legs got worse in the last 3 weeks. I get short or breath when I walk." Denies chest pain. Denies fever.

## 2020-09-30 NOTE — ED PROVIDER NOTE - CLINICAL SUMMARY MEDICAL DECISION MAKING FREE TEXT BOX
75 yr old f that presents with lower extremity edema, SOB and medical non compliant. Pt noted to have an elevated pro bnp. Will give pt lasix, clonidine (recommended by cardiologist) and admit to medicine.

## 2020-09-30 NOTE — H&P ADULT - HISTORY OF PRESENT ILLNESS
75 F with PMHx of HTN, GIB, GERD, non-obstructive CAD, who presented c/o progressively worsening dyspnea on exertion upon ambulation of 1/2 block over the last 3 weeks, resolving within minutes of rest. for evaluation of gradual onset, constant, progressive b/l lower leg swelling and pain x 3 weeks. Pt saw her cardiologist for this 3 weeks ago, was prescribed Lasix but has been noncompliant with the medication.     Denies CP,  recent PND/orthopnea, LE edema, palpitations, dizziness,  or syncope.      In the ED T 36.5C, /83, HR 87     75 F with PMHx of HTN, GIB, GERD, non-obstructive CAD, who presented c/o of gradual onset, constant, progressive b/l lower leg swelling and pain x 3 weeks. Three 3 weeks prior the pt saw her cardiologist for elevated BP with SBP>220. Her Nifedipine 30 mg bid was changed to Clonidine 0.1 mg tid. She states since then she has noticed LE edema. She states typically she has chronic swelling in her feet secondary to arthritis sx, however the edema now extends beyond hr knees. He LE feel heavy and stiff, preventing her from ambulating or sleeping. She admit to J[place] walking 1/2 block however she attributes this to weakness from lifting her LE. Pt saw her cardiologist last week and was prescribed Lasix 40 mg qd. The pt took it for a few days, did not see an improvement and stopped taking he medication.     Denies HA, blurred vision, lightheadedness, CP, recent PND/orthopnea, palpitations, dizziness,  syncope, dysuria    In the ED T 36.5C, /83, HR 87     75 F with PMHx of HTN, GIB, GERD, empyema s/p decortication, non-obstructive CAD, who presented c/o of gradual onset, constant, progressive b/l lower leg swelling and pain x 3 weeks. Three 3 weeks prior the pt saw her cardiologist for elevated BP with SBP>220. Her Nifedipine 30 mg bid was changed to Clonidine 0.1 mg tid. She states since then she has noticed LE edema. She states typically she has chronic swelling in her feet secondary to arthritis sx, however the edema now extends beyond hr knees. He LE feel heavy and stiff, preventing her from ambulating or sleeping. She admit to JOHNSON walking 1/2 block however she attributes this to weakness from lifting her LE. Pt saw her cardiologist last week and was prescribed Lasix 40 mg qd. The pt took it for a few days, did not see an improvement and stopped taking he medication.     Denies HA, blurred vision, lightheadedness, CP, recent PND/orthopnea, palpitations, dizziness,  syncope, dysuria    In the ED T 36.5C, /83, HR 87

## 2020-09-30 NOTE — ED PROVIDER NOTE - OBJECTIVE STATEMENT
75 year old female w hx of HTN, non-obstructive CAD presents to the ED for evaluation of gradual onset, constant, progressive b/l lower leg swelling and pain x 3 weeks. Pt saw her cardiologist for this 3 weeks ago, was prescribed Lasix but has been noncompliant with it. Here today because she is now experiencing dyspnea on exertion. Denies fevers/chills, URI sx, cough, orthopnea, wheezing, palpitations, syncope, diaphoresis, n/v, abd pain, recent travel/surgeries/immobilizations/trauma. Denies history or fhx of DVT/PE. Had normal cath 9 months ago.

## 2020-09-30 NOTE — ED ADULT NURSE NOTE - NSIMPLEMENTINTERV_GEN_ALL_ED
Implemented All Fall with Harm Risk Interventions:  [place] to call system. Call bell, personal items and telephone within reach. Instruct patient to call for assistance. Room bathroom lighting operational. Non-slip footwear when patient is off stretcher. Physically safe environment: no spills, clutter or unnecessary equipment. Stretcher in lowest position, wheels locked, appropriate side rails in place. Provide visual cue, wrist band, yellow gown, etc. Monitor gait and stability. Monitor for mental status changes and reorient to person, place, and time. Review medications for side effects contributing to fall risk. Reinforce activity limits and safety measures with patient and family. Provide visual clues: red socks.

## 2020-09-30 NOTE — ED PROVIDER NOTE - ATTENDING CONTRIBUTION TO CARE
75 yr old f w/ a pmh significant for CAD, HTN who presents today with lower extremity swelling. Pt states that for the past couple of weeks, she has been having worsening lower extremity weakness. Pt also reports that she has been more SOB during this time. Pt denies any chest pain, nausea, vomiting, fevers, chills or any other complaints.     Review of Systems    Constitutional: (-) fever  Cardiovascular: (-) chest pain, (-) syncope  Respiratory: (-) cough, (+) shortness of breath  Gastrointestinal: (-) vomiting, (-) diarrhea, (-) abdominal pain  Musculoskeletal: (-) neck pain, (-) back pain, (-) joint pain  Integumentary: (-) rash, (-) edema  Neurological: (-) headache, (-) altered mental status    Except as documented in the HPI, all other systems are negative.    VITAL SIGNS: I have reviewed nursing notes and confirm.  CONSTITUTIONAL: non-toxic, well appearing  SKIN: no rash, no petechiae.  EYES: PERRL, EOMI, pink conjunctiva, anicteric  ENT: tongue midline, no exudates, MMM  NECK: Supple; no meningismus, no JVD  CARD: RRR, no murmurs, equal radial pulses bilaterally 2+  RESP: CTAB, no respiratory distress  ABD: Soft, non-tender, non-distended, no peritoneal signs, no HSM, no CVA tenderness  EXT: moderate lower extremity edema noted   NEURO: Alert, oriented. CN2-12 intact, equal strength bilaterally, nl gait.  PSYCH: Cooperative, appropriate.    a/p  75 yr old f that presents with worsening lower extremity edema, and SOB, of note, pt reports that she stopped taking her "water pill" and we were informed by pts cardiologist Dr. Perkins that pt has been non complaint with medication  -labs  -ekg  -cxr  -consider admission for medical management

## 2020-09-30 NOTE — ED PROVIDER NOTE - NS ED ROS FT
CONSTITUTIONAL: (-) fevers, (-) chills  ENT: (-) congestion, (-) rhinorrhea, (-) sore throat  NECK: (-) neck pain, (-) neck stiffness  CARDIO: (-) chest pain, (-) palpitations, (+) edema  PULM: (-) cough, (-) sputum, (-) chest tightness, (+) shortness of breath, (-) wheezing, (-) hemoptysis, (-) stridor  GI: (-) nausea, (-) vomiting, (-) diarrhea, (-) abdominal pain, (-) melena, (-) hematochezia, (-) rectal bleeding  : (-) dysuria, (-) hematuria, (-) frequency, (-) urgency, (-) flank pain  MSK: (-) back pain, (-) myalgias, (-) gait difficulty  SKIN: (-) rashes, (-) pallor  NEURO: (-) headache,(-) dizziness, (-) lightheadedness, (-) weakness, (-) syncope    *all other systems negative except as documented above and in the HPI*

## 2020-09-30 NOTE — H&P ADULT - NSHPPHYSICALEXAM_GEN_ALL_CORE
GENERAL: No acute distress, well-developed  HEAD:  Atraumatic, Normocephalic  EYES: EOMI, PERRLA, conjunctiva and sclera clear  NECK: Supple, no lymphadenopathy, no JVD  CHEST/LUNG: CTAB; No wheezes, rales, or rhonchi  HEART: Regular rate and rhythm; No murmurs, rubs, or gallops  ABDOMEN: Soft, non-tender, non-distended; normal bowel sounds, no organomegaly  EXTREMITIES:  2+ peripheral pulses b/l, No clubbing, cyanosis, 2+ pitting edema extending from feet to above knees b/l, tenderness to palpation  NEUROLOGY: A&O x 3, no focal deficits  SKIN: No rashes or lesions

## 2020-10-01 LAB
A1C WITH ESTIMATED AVERAGE GLUCOSE RESULT: 5.4 % — SIGNIFICANT CHANGE UP (ref 4–5.6)
ALBUMIN SERPL ELPH-MCNC: 4.5 G/DL — SIGNIFICANT CHANGE UP (ref 3.5–5.2)
ALP SERPL-CCNC: 122 U/L — HIGH (ref 30–115)
ALT FLD-CCNC: 108 U/L — HIGH (ref 0–41)
ANION GAP SERPL CALC-SCNC: 13 MMOL/L — SIGNIFICANT CHANGE UP (ref 7–14)
AST SERPL-CCNC: 71 U/L — HIGH (ref 0–41)
BASOPHILS # BLD AUTO: 0.05 K/UL — SIGNIFICANT CHANGE UP (ref 0–0.2)
BASOPHILS NFR BLD AUTO: 0.6 % — SIGNIFICANT CHANGE UP (ref 0–1)
BILIRUB SERPL-MCNC: 0.5 MG/DL — SIGNIFICANT CHANGE UP (ref 0.2–1.2)
BUN SERPL-MCNC: 17 MG/DL — SIGNIFICANT CHANGE UP (ref 10–20)
CALCIUM SERPL-MCNC: 9.9 MG/DL — SIGNIFICANT CHANGE UP (ref 8.5–10.1)
CHLORIDE SERPL-SCNC: 98 MMOL/L — SIGNIFICANT CHANGE UP (ref 98–110)
CHOLEST SERPL-MCNC: 192 MG/DL — SIGNIFICANT CHANGE UP (ref 100–200)
CO2 SERPL-SCNC: 30 MMOL/L — SIGNIFICANT CHANGE UP (ref 17–32)
CREAT SERPL-MCNC: 1 MG/DL — SIGNIFICANT CHANGE UP (ref 0.7–1.5)
EOSINOPHIL # BLD AUTO: 0.27 K/UL — SIGNIFICANT CHANGE UP (ref 0–0.7)
EOSINOPHIL NFR BLD AUTO: 3.3 % — SIGNIFICANT CHANGE UP (ref 0–8)
ESTIMATED AVERAGE GLUCOSE: 108 MG/DL — SIGNIFICANT CHANGE UP (ref 68–114)
GLUCOSE SERPL-MCNC: 89 MG/DL — SIGNIFICANT CHANGE UP (ref 70–99)
HCT VFR BLD CALC: 39.5 % — SIGNIFICANT CHANGE UP (ref 37–47)
HDLC SERPL-MCNC: 82 MG/DL — SIGNIFICANT CHANGE UP
HGB BLD-MCNC: 12.6 G/DL — SIGNIFICANT CHANGE UP (ref 12–16)
IMM GRANULOCYTES NFR BLD AUTO: 0.2 % — SIGNIFICANT CHANGE UP (ref 0.1–0.3)
LIPID PNL WITH DIRECT LDL SERPL: 94 MG/DL — SIGNIFICANT CHANGE UP (ref 4–129)
LYMPHOCYTES # BLD AUTO: 2.34 K/UL — SIGNIFICANT CHANGE UP (ref 1.2–3.4)
LYMPHOCYTES # BLD AUTO: 28.4 % — SIGNIFICANT CHANGE UP (ref 20.5–51.1)
MAGNESIUM SERPL-MCNC: 1.7 MG/DL — LOW (ref 1.8–2.4)
MCHC RBC-ENTMCNC: 27.8 PG — SIGNIFICANT CHANGE UP (ref 27–31)
MCHC RBC-ENTMCNC: 31.9 G/DL — LOW (ref 32–37)
MCV RBC AUTO: 87.2 FL — SIGNIFICANT CHANGE UP (ref 81–99)
MONOCYTES # BLD AUTO: 0.49 K/UL — SIGNIFICANT CHANGE UP (ref 0.1–0.6)
MONOCYTES NFR BLD AUTO: 5.9 % — SIGNIFICANT CHANGE UP (ref 1.7–9.3)
NEUTROPHILS # BLD AUTO: 5.07 K/UL — SIGNIFICANT CHANGE UP (ref 1.4–6.5)
NEUTROPHILS NFR BLD AUTO: 61.6 % — SIGNIFICANT CHANGE UP (ref 42.2–75.2)
NRBC # BLD: 0 /100 WBCS — SIGNIFICANT CHANGE UP (ref 0–0)
PHOSPHATE SERPL-MCNC: 3.5 MG/DL — SIGNIFICANT CHANGE UP (ref 2.1–4.9)
PLATELET # BLD AUTO: 232 K/UL — SIGNIFICANT CHANGE UP (ref 130–400)
POTASSIUM SERPL-MCNC: 3.9 MMOL/L — SIGNIFICANT CHANGE UP (ref 3.5–5)
POTASSIUM SERPL-SCNC: 3.9 MMOL/L — SIGNIFICANT CHANGE UP (ref 3.5–5)
PROT SERPL-MCNC: 7 G/DL — SIGNIFICANT CHANGE UP (ref 6–8)
RBC # BLD: 4.53 M/UL — SIGNIFICANT CHANGE UP (ref 4.2–5.4)
RBC # FLD: 13.1 % — SIGNIFICANT CHANGE UP (ref 11.5–14.5)
SARS-COV-2 RNA SPEC QL NAA+PROBE: SIGNIFICANT CHANGE UP
SODIUM SERPL-SCNC: 141 MMOL/L — SIGNIFICANT CHANGE UP (ref 135–146)
TOTAL CHOLESTEROL/HDL RATIO MEASUREMENT: 2.3 RATIO — LOW (ref 4–5.5)
TRIGL SERPL-MCNC: 111 MG/DL — SIGNIFICANT CHANGE UP (ref 10–149)
TROPONIN T SERPL-MCNC: <0.01 NG/ML — SIGNIFICANT CHANGE UP
WBC # BLD: 8.24 K/UL — SIGNIFICANT CHANGE UP (ref 4.8–10.8)
WBC # FLD AUTO: 8.24 K/UL — SIGNIFICANT CHANGE UP (ref 4.8–10.8)

## 2020-10-01 PROCEDURE — 93306 TTE W/DOPPLER COMPLETE: CPT | Mod: 26

## 2020-10-01 PROCEDURE — 99233 SBSQ HOSP IP/OBS HIGH 50: CPT

## 2020-10-01 PROCEDURE — 93979 VASCULAR STUDY: CPT | Mod: 26

## 2020-10-01 RX ORDER — LISINOPRIL 2.5 MG/1
40 TABLET ORAL DAILY
Refills: 0 | Status: DISCONTINUED | OUTPATIENT
Start: 2020-10-01 | End: 2020-10-02

## 2020-10-01 RX ORDER — FUROSEMIDE 40 MG
40 TABLET ORAL DAILY
Refills: 0 | Status: DISCONTINUED | OUTPATIENT
Start: 2020-10-01 | End: 2020-10-02

## 2020-10-01 RX ORDER — HEPARIN SODIUM 5000 [USP'U]/ML
5000 INJECTION INTRAVENOUS; SUBCUTANEOUS EVERY 8 HOURS
Refills: 0 | Status: DISCONTINUED | OUTPATIENT
Start: 2020-10-01 | End: 2020-10-02

## 2020-10-01 RX ORDER — FUROSEMIDE 40 MG
40 TABLET ORAL EVERY 12 HOURS
Refills: 0 | Status: DISCONTINUED | OUTPATIENT
Start: 2020-10-01 | End: 2020-10-01

## 2020-10-01 RX ORDER — MAGNESIUM SULFATE 500 MG/ML
2 VIAL (ML) INJECTION ONCE
Refills: 0 | Status: COMPLETED | OUTPATIENT
Start: 2020-10-01 | End: 2020-10-01

## 2020-10-01 RX ORDER — INFLUENZA VIRUS VACCINE 15; 15; 15; 15 UG/.5ML; UG/.5ML; UG/.5ML; UG/.5ML
0.5 SUSPENSION INTRAMUSCULAR ONCE
Refills: 0 | Status: COMPLETED | OUTPATIENT
Start: 2020-10-01 | End: 2020-10-01

## 2020-10-01 RX ADMIN — LISINOPRIL 40 MILLIGRAM(S): 2.5 TABLET ORAL at 15:59

## 2020-10-01 RX ADMIN — Medication 40 MILLIGRAM(S): at 05:25

## 2020-10-01 RX ADMIN — HEPARIN SODIUM 5000 UNIT(S): 5000 INJECTION INTRAVENOUS; SUBCUTANEOUS at 21:45

## 2020-10-01 RX ADMIN — Medication 0.1 MILLIGRAM(S): at 14:02

## 2020-10-01 RX ADMIN — Medication 50 GRAM(S): at 11:37

## 2020-10-01 RX ADMIN — Medication 100 MILLIGRAM(S): at 05:24

## 2020-10-01 RX ADMIN — Medication 0.1 MILLIGRAM(S): at 05:24

## 2020-10-01 RX ADMIN — PANTOPRAZOLE SODIUM 40 MILLIGRAM(S): 20 TABLET, DELAYED RELEASE ORAL at 05:23

## 2020-10-01 RX ADMIN — HEPARIN SODIUM 5000 UNIT(S): 5000 INJECTION INTRAVENOUS; SUBCUTANEOUS at 14:02

## 2020-10-01 RX ADMIN — Medication 0.1 MILLIGRAM(S): at 21:45

## 2020-10-01 NOTE — PROGRESS NOTE ADULT - ASSESSMENT
75 F w/non-obstructive CAD, HTN, GERD p/w c/o LE edema secondary to acute on chronic CHF exacerbation    # LE edema with associated mild dyspnea on exertion  - started 3 weeks ago, pt was on Nifedpine 30 BID, switched to Clonidine 0.1 TID  - swelling extended to above the knees, today mildly improved to below the knees  - lungs CTA on exam, no crackles  - s/p IV Lasix 40 and Clonidine 0.2 in ED   - BNP 1200  - CXR trace L effusion/atelectasis   - Echo 2018: EF 65%, mild MR & TR, small pericardial effusion   - CCTA 6/2018: total calcium score 2277, mild stenosis in mid LAD, RCA, LCX, LVEF 85%, focal cystic bronchiectasis   - strict I&Os, monitor daily weights   - Mg 1.7, repleted with 2g, will f/u repeat labs   - Keep Mg>2, K>4  - c/w IV Lasix diuresis, will monitor fluid status   - DUS: neg for DVT/SVT, L jacobs's cyst  - f/u Echo   - Trops neg x3  - serial CE neg   - mild elevation of ALP to 122 (possible congestive hepatopathy)       # HTN, uncontrolled   - /83 in ED  - given Clonidine 0.2 --> improved to SBP 140s   - c/w home BP meds (Clonidine 0.1 TID, Metoprolol 100 qd)   - c/w diuresis     # non-obstructive CAD   - not on ASA or plavix or statin  - on metoprolol 100 qd   - Lipid panel wnl   - A1C 5.4    # Severe hepatic steatosis  - stable     # large hiatal hernia with GERD s/p esophagogastric fundoplasty fundoplication  - c/w ptx     # cystic bronchiectasis   - stable     # h/o empyema s/p decortication  - stable     DVT px:  Hep SQ   GI ppx: PTX 40 mg   Code: Full   Dispo: acute, poss. d/c within 48 hours 75 F w/non-obstructive CAD, HTN, GERD p/w c/o LE edema secondary to acute on chronic CHF exacerbation    # LE edema with associated mild dyspnea on exertion  - started 3 weeks ago, pt was on Nifedpine 30 BID, switched to Clonidine 0.1 TID  - swelling extended to above the knees, today mildly improved to below the knees  - lungs CTA on exam, no crackles  - s/p IV Lasix 40 and Clonidine 0.2 in ED   - BNP 1200  - CXR trace L effusion/atelectasis   - Echo 2018: EF 65%, mild MR & TR, small pericardial effusion   - CCTA 6/2018: total calcium score 2277, mild stenosis in mid LAD, RCA, LCX, LVEF 85%, focal cystic bronchiectasis   - strict I&Os, monitor daily weights   - Mg 1.7, repleted with 2g, will f/u repeat labs   - Keep Mg>2, K>4  - c/w IV Lasix diuresis, will monitor fluid status. switched to 40 IV lasix daily.  - DUS: neg for DVT/SVT, L jacobs's cyst  - f/u Echo   - Trops neg x3  - serial CE neg   - mild elevation of ALP to 122 (possible congestive hepatopathy)       # HTN, uncontrolled   - /83 in ED  - given Clonidine 0.2 --> improved to SBP 140s   - c/w home BP meds (Clonidine 0.1 TID, Metoprolol 100 qd)   - c/w diuresis   - added lisinopril 40mg qd for better HTN control. pt still sys to 190s.   - renal doppler to evaluate for renal artery stenosis    # non-obstructive CAD   - not on ASA or plavix or statin  - on metoprolol 100 qd   - Lipid panel wnl   - A1C 5.4    # Severe hepatic steatosis  - stable     # large hiatal hernia with GERD s/p esophagogastric fundoplasty fundoplication  - c/w ptx     # cystic bronchiectasis   - stable     # h/o empyema s/p decortication  - stable     DVT px:  Hep SQ   GI ppx: PTX 40 mg   Code: Full   Dispo: acute

## 2020-10-01 NOTE — ED ADULT NURSE REASSESSMENT NOTE - NS ED NURSE REASSESS COMMENT FT1
transported to 3 C , HR 70s , AO x 4 , no SOB , denies chest pain , denies pain , no SOB , to  with 3 C RN

## 2020-10-01 NOTE — PROGRESS NOTE ADULT - ASSESSMENT
#Acute CHfpef   Echo 2018 showed normal EF   c/w IV lasix today   LE swelling improving   echo pending   patient said she had cath 1 year ago will try to get records from her cardiologist   troponins neg ruled out for MI   Sinus joyce on EKG   no events on tele   check TSH      #HTN: better controlled recheck BP   on lasix, toprol 100 and clonidine 0.1 TID     on hep subc for dvt ppx

## 2020-10-02 ENCOUNTER — TRANSCRIPTION ENCOUNTER (OUTPATIENT)
Age: 75
End: 2020-10-02

## 2020-10-02 VITALS
RESPIRATION RATE: 18 BRPM | TEMPERATURE: 96 F | DIASTOLIC BLOOD PRESSURE: 67 MMHG | HEART RATE: 62 BPM | SYSTOLIC BLOOD PRESSURE: 151 MMHG

## 2020-10-02 LAB
ALBUMIN SERPL ELPH-MCNC: 4.2 G/DL — SIGNIFICANT CHANGE UP (ref 3.5–5.2)
ALP SERPL-CCNC: 113 U/L — SIGNIFICANT CHANGE UP (ref 30–115)
ALT FLD-CCNC: 72 U/L — HIGH (ref 0–41)
ANION GAP SERPL CALC-SCNC: 13 MMOL/L — SIGNIFICANT CHANGE UP (ref 7–14)
AST SERPL-CCNC: 33 U/L — SIGNIFICANT CHANGE UP (ref 0–41)
BILIRUB SERPL-MCNC: 0.5 MG/DL — SIGNIFICANT CHANGE UP (ref 0.2–1.2)
BUN SERPL-MCNC: 21 MG/DL — HIGH (ref 10–20)
CALCIUM SERPL-MCNC: 9.9 MG/DL — SIGNIFICANT CHANGE UP (ref 8.5–10.1)
CHLORIDE SERPL-SCNC: 96 MMOL/L — LOW (ref 98–110)
CO2 SERPL-SCNC: 32 MMOL/L — SIGNIFICANT CHANGE UP (ref 17–32)
CREAT SERPL-MCNC: 1 MG/DL — SIGNIFICANT CHANGE UP (ref 0.7–1.5)
GLUCOSE SERPL-MCNC: 112 MG/DL — HIGH (ref 70–99)
MAGNESIUM SERPL-MCNC: 1.9 MG/DL — SIGNIFICANT CHANGE UP (ref 1.8–2.4)
POTASSIUM SERPL-MCNC: 4.4 MMOL/L — SIGNIFICANT CHANGE UP (ref 3.5–5)
POTASSIUM SERPL-SCNC: 4.4 MMOL/L — SIGNIFICANT CHANGE UP (ref 3.5–5)
PROT SERPL-MCNC: 6.6 G/DL — SIGNIFICANT CHANGE UP (ref 6–8)
SODIUM SERPL-SCNC: 141 MMOL/L — SIGNIFICANT CHANGE UP (ref 135–146)
TSH SERPL-MCNC: 1.7 UIU/ML — SIGNIFICANT CHANGE UP (ref 0.27–4.2)

## 2020-10-02 PROCEDURE — 99239 HOSP IP/OBS DSCHRG MGMT >30: CPT

## 2020-10-02 RX ORDER — MINOXIDIL 10 MG
1 TABLET ORAL
Qty: 0 | Refills: 0 | DISCHARGE

## 2020-10-02 RX ORDER — SPIRONOLACTONE 25 MG/1
1 TABLET, FILM COATED ORAL
Qty: 0 | Refills: 0 | DISCHARGE

## 2020-10-02 RX ORDER — LISINOPRIL 2.5 MG/1
1 TABLET ORAL
Qty: 30 | Refills: 0
Start: 2020-10-02 | End: 2020-10-31

## 2020-10-02 RX ORDER — MELOXICAM 15 MG/1
1 TABLET ORAL
Qty: 0 | Refills: 0 | DISCHARGE

## 2020-10-02 RX ORDER — LISINOPRIL 2.5 MG/1
1 TABLET ORAL
Qty: 0 | Refills: 0 | DISCHARGE
Start: 2020-10-02

## 2020-10-02 RX ORDER — TRIAMTERENE/HYDROCHLOROTHIAZID 75 MG-50MG
1 TABLET ORAL
Qty: 0 | Refills: 0 | DISCHARGE

## 2020-10-02 RX ADMIN — Medication 0.1 MILLIGRAM(S): at 13:15

## 2020-10-02 RX ADMIN — LISINOPRIL 40 MILLIGRAM(S): 2.5 TABLET ORAL at 05:18

## 2020-10-02 RX ADMIN — HEPARIN SODIUM 5000 UNIT(S): 5000 INJECTION INTRAVENOUS; SUBCUTANEOUS at 05:19

## 2020-10-02 RX ADMIN — Medication 40 MILLIGRAM(S): at 05:19

## 2020-10-02 RX ADMIN — Medication 0.1 MILLIGRAM(S): at 05:19

## 2020-10-02 RX ADMIN — Medication 100 MILLIGRAM(S): at 05:18

## 2020-10-02 RX ADMIN — HEPARIN SODIUM 5000 UNIT(S): 5000 INJECTION INTRAVENOUS; SUBCUTANEOUS at 13:15

## 2020-10-02 RX ADMIN — PANTOPRAZOLE SODIUM 40 MILLIGRAM(S): 20 TABLET, DELAYED RELEASE ORAL at 05:18

## 2020-10-02 NOTE — DISCHARGE NOTE PROVIDER - NSDCCPCAREPLAN_GEN_ALL_CORE_FT
PRINCIPAL DISCHARGE DIAGNOSIS  Diagnosis: CHF exacerbation  Assessment and Plan of Treatment: You were admitted for leg swelling, shortness of breath. We did an EKG which showed no signs of heart attack, we checked cardiac enzymes which were negative which also show no heart attack. We did an echocardiogram of your heart which showed some changes that can be from high blood pressure, causing some fluid overload in the body. For this, we gave you blood pressure medications and IV lasix to help bring fluid out of the body. Your symptoms improved, and we spoke w/ dr rasheed who confirmed you had a cardiac cath done last year which showed non obstructive coronary artery disease (no significant blockage of arteries in the heart).   You should follow-up with your cardiologist within 1 week.  Congestive Heart Failure (CHF)  Congestive heart failure is a chronic condition in which the heart has trouble pumping blood. In some cases of heart failure, fluid may back up into your lungs or you may have swelling (edema) in your lower legs. There are many causes of heart failure including high blood pressure, coronary artery disease, abnormal heart valves, heart muscle disease, lung disease, diabetes, etc. Symptoms include shortness of breath with activity or when lying flat, cough, swelling of the legs, fatigue, or increased urination during the night.   Treatment is aimed at managing the symptoms of heart failure and may include lifestyle changes, medications, or surgical procedures. Take medicines only as directed by your health care provider and do not stop unless instructed to do so. Eat heart-healthy foods with low or no trans/saturated fats, cholesterol and salt. Weigh yourself every day for early recognition of fluid accumulation.  SEEK IMMEDIATE MEDICAL CARE IF YOU HAVE ANY OF THE FOLLOWING SYMPTOMS: shortness of breath, change in mental status, chest pain, lightheadedness/dizziness/fainting, or worsening of symptoms including not being able to conduct normal physical activity.        SECONDARY DISCHARGE DIAGNOSES  Diagnosis: Hypertensive emergency  Assessment and Plan of Treatment: You came in with high blood pressure which was likely the cause for the exacerbation of heart failure. we gave you medicatiosn to help control the blood pressure. you should continue to alter your diet, exercise, and follow with your cardiologist and primary care doctor to further manage the blood pressure. We started workup for other causes of high blood prsesure, like thyroid or kidney artery issues however these were normal.   Return to the hospital immediately if you get short of breath, chest pain, severe headaches, nausea, vomiting, signs of stroke.

## 2020-10-02 NOTE — PROGRESS NOTE ADULT - ASSESSMENT
#Acute CHfpef   much improved   LE swelling improving   change to po lasix 40 qday   echo normal EF grade 2 diastolic chf   patient said she had cath 1 year ago with Dr Perkins. I spoke to Dr Perkins today and he said that her cath was done a year ago and had no significant CAD   troponins neg ruled out for MI   Sinus joyce on EKG   no events on tele   OPT follow with cardiology     #transaminitis asymptomatic improved likely congestive hepatopathy OPT follow up     #HTN: better controlled recheck BP   better controlled now SBP 140s-150s   added lisinopril 40 mg qday   on toprol and clonidine   pending renal duplex read     discharge today pending renal duplex read   spent 35 min on discharge

## 2020-10-02 NOTE — PROGRESS NOTE ADULT - SUBJECTIVE AND OBJECTIVE BOX
DAILY PROGRESS NOTE  ===========================================================    Patient Information:  DARIEN BARRERA  /  75y  /  Female  /  MRN#: [id number]    Hospital Day: 1d     |:::::::::::::::::::::::::::| SUBJECTIVE |:::::::::::::::::::::::::::|    OVERNIGHT EVENTS: Pt has been hypertensive 190s SBP. Improves with Clonidine to 140s.   TODAY: Patient was seen today at bedside, doing well today, reports she becomes anxious about her admission as her reason for increased BP. B/L LE edema has mildly improved today and will c/w diuresis.     |:::::::::::::::::::::::::::| OBJECTIVE |:::::::::::::::::::::::::::|    VITAL SIGNS: Last 24 Hours  T(C): 36.7 (01 Oct 2020 05:34), Max: 36.9 (30 Sep 2020 23:10)  T(F): 98.1 (01 Oct 2020 05:34), Max: 98.4 (30 Sep 2020 23:10)  HR: 56 (01 Oct 2020 05:34) (55 - 87)  BP: 149/83 (01 Oct 2020 06:30) (138/61 - 206/83)  BP(mean): --  RR: 18 (01 Oct 2020 05:34) (18 - 22)  SpO2: 97% (01 Oct 2020 08:00) (95% - 98%)    09-30-20 @ 07:01  -  10-01-20 @ 07:00  --------------------------------------------------------  IN: 120 mL / OUT: 600 mL / NET: -480 mL    10-01-20 @ 07:01  -  10-01-20 @ 10:56  --------------------------------------------------------  IN: 0 mL / OUT: 800 mL / NET: -800 mL      PHYSICAL EXAM:  GENERAL:   Awake, alert; NAD.  HEENT:  Head NC/AT; Conjunctivae pink, Sclera anicteric; Oral mucosa moist.  CARDIO:   Regular rate; Regular rhythm; S1 & S2.  RESP:   No rales, wheezing, or rhonchi appreciated.  GI:   Soft; NT/ND; BS; No guarding; No rebound tenderness.  EXT:   Strength UE 5/5; Strength LE 5/5; 2+ pitting edema B/L in LE.   SKIN:   Intact.    LAB RESULTS:                        12.6   8.24  )-----------( 232      ( 01 Oct 2020 06:05 )             39.5     10-01    141  |  98  |  17  ----------------------------<  89  3.9   |  30  |  1.0    Ca    9.9      01 Oct 2020 06:05  Phos  3.5     10-01  Mg     1.7     10-01    TPro  7.0  /  Alb  4.5  /  TBili  0.5  /  DBili  x   /  AST  71<H>  /  ALT  108<H>  /  AlkPhos  122<H>  10-01    Troponin T, Serum: <0.01 ng/mL (10-01-20 @ 06:05)  Troponin T, Serum: <0.01 ng/mL (09-30-20 @ 20:16)  Troponin T, Serum: <0.01 ng/mL (09-30-20 @ 12:55)    CARDIAC MARKERS ( 01 Oct 2020 06:05 )  x     / <0.01 ng/mL / x     / x     / x      CARDIAC MARKERS ( 30 Sep 2020 20:16 )  x     / <0.01 ng/mL / x     / x     / x      CARDIAC MARKERS ( 30 Sep 2020 12:55 )  x     / <0.01 ng/mL / x     / x     / x          MICROBIOLOGY:  COVID-19 PCR . (09.30.20 @ 14:20)   COVID-19 PCR: NotDetec:    RADIOLOGY:  < from: [facility] Duplex Lower Ext Vein Scan, Bilat (09.30.20 @ 17:09) >  Impression:    No evidence of deep venous thrombosis in the bilateral lower extremities.  left bakers cyst 3.3x0.93cm    < from: Xray Chest 1 View-PORTABLE IMMEDIATE (09.30.20 @ 13:43) >    Impression:    No large focal opacity. Trace left effusion/atelectasis.    ALLERGIES: No Known Allergies      ===========================================================  
no chest pain   no SOB   swelling in legs much better     ROS otherwise negative     Vital Signs Last 24 Hrs  T(C): 36.7 (01 Oct 2020 05:34), Max: 36.9 (30 Sep 2020 23:10)  T(F): 98.1 (01 Oct 2020 05:34), Max: 98.4 (30 Sep 2020 23:10)  HR: 56 (01 Oct 2020 05:34) (55 - 73)  BP: 149/83 (01 Oct 2020 06:30) (138/61 - 191/90)  BP(mean): --  RR: 18 (01 Oct 2020 05:34) (18 - 19)  SpO2: 97% (01 Oct 2020 08:00) (95% - 98%)    PHYSICAL EXAM:  GENERAL: NAD,   Pulm: Clear to auscultation bilaterally; No wheeze  CV: Regular rate and rhythm; No murmurs, rubs, or gallops  GI: Soft, Nontender, Nondistended; Bowel sounds present  EXTREMITIES:  +edema  PSYCH: AAOx3  NEUROLOGY: non-focal  SKIN: No rashes or lesions                          12.6   8.24  )-----------( 232      ( 01 Oct 2020 06:05 )             39.5     10-01    141  |  98  |  17  ----------------------------<  89  3.9   |  30  |  1.0    Ca    9.9      01 Oct 2020 06:05  Phos  3.5     10-01  Mg     1.7     10-01    TPro  7.0  /  Alb  4.5  /  TBili  0.5  /  DBili  x   /  AST  71<H>  /  ALT  108<H>  /  AlkPhos  122<H>  10-01    LIVER FUNCTIONS - ( 01 Oct 2020 06:05 )  Alb: 4.5 g/dL / Pro: 7.0 g/dL / ALK PHOS: 122 U/L / ALT: 108 U/L / AST: 71 U/L / GGT: x             CARDIAC MARKERS ( 01 Oct 2020 06:05 )  x     / <0.01 ng/mL / x     / x     / x      CARDIAC MARKERS ( 30 Sep 2020 20:16 )  x     / <0.01 ng/mL / x     / x     / x      CARDIAC MARKERS ( 30 Sep 2020 12:55 )  x     / <0.01 ng/mL / x     / x     / x          MEDICATIONS  (STANDING):  cloNIDine 0.1 milliGRAM(s) Oral three times a day  furosemide   Injectable 40 milliGRAM(s) IV Push every 12 hours  heparin   Injectable 5000 Unit(s) SubCutaneous every 8 hours  influenza   Vaccine 0.5 milliLiter(s) IntraMuscular once  metoprolol succinate  milliGRAM(s) Oral daily  pantoprazole    Tablet 40 milliGRAM(s) Oral before breakfast    MEDICATIONS  (PRN):  
no chest pain   no sob   leg swelling much improved     Vital Signs Last 24 Hrs  T(C): 36.3 (02 Oct 2020 05:05), Max: 36.3 (01 Oct 2020 14:00)  T(F): 97.4 (02 Oct 2020 05:05), Max: 97.4 (01 Oct 2020 14:00)  HR: 66 (02 Oct 2020 08:32) (55 - 72)  BP: 155/67 (02 Oct 2020 08:32) (147/67 - 203/88)  BP(mean): --  RR: 18 (02 Oct 2020 08:32) (18 - 18)  SpO2: 99% (02 Oct 2020 08:00) (99% - 99%)    PHYSICAL EXAM:  GENERAL: NAD, well-developed  HEAD:  Atraumatic, Normocephalic  EYES: EOMI, PERRLA, conjunctiva and sclera clear  NECK: Supple, No JVD  CHEST/LUNG: Clear to auscultation bilaterally; No wheeze  HEART: Regular rate and rhythm; No murmurs, rubs, or gallops  ABDOMEN: Soft, Nontender, Nondistended; Bowel sounds present  EXTREMITIES: +1 edema B/L   PSYCH: AAOx3  NEUROLOGY: non-focal  SKIN: No rashes or lesions                          12.5   7.25  )-----------( 209      ( 02 Oct 2020 06:46 )             38.3     10-02    141  |  96<L>  |  21<H>  ----------------------------<  112<H>  4.4   |  32  |  1.0    Ca    9.9      02 Oct 2020 06:46  Phos  3.5     10-01  Mg     1.9     10-02    TPro  6.6  /  Alb  4.2  /  TBili  0.5  /  DBili  x   /  AST  33  /  ALT  72<H>  /  AlkPhos  113  10-02    LIVER FUNCTIONS - ( 02 Oct 2020 06:46 )  Alb: 4.2 g/dL / Pro: 6.6 g/dL / ALK PHOS: 113 U/L / ALT: 72 U/L / AST: 33 U/L / GGT: x             CARDIAC MARKERS ( 01 Oct 2020 06:05 )  x     / <0.01 ng/mL / x     / x     / x      CARDIAC MARKERS ( 30 Sep 2020 20:16 )  x     / <0.01 ng/mL / x     / x     / x      CARDIAC MARKERS ( 30 Sep 2020 12:55 )  x     / <0.01 ng/mL / x     / x     / x          MEDICATIONS  (STANDING):  cloNIDine 0.1 milliGRAM(s) Oral three times a day  furosemide   Injectable 40 milliGRAM(s) IV Push daily  heparin   Injectable 5000 Unit(s) SubCutaneous every 8 hours  influenza   Vaccine 0.5 milliLiter(s) IntraMuscular once  lisinopril 40 milliGRAM(s) Oral daily  metoprolol succinate  milliGRAM(s) Oral daily  pantoprazole    Tablet 40 milliGRAM(s) Oral before breakfast    MEDICATIONS  (PRN):  
  DAILY PROGRESS NOTE  ===========================================================    Patient Information:  DARIEN BARRERA  /  75y  /  Female  /  MRN#: [id number]    Hospital Day: 2d     |:::::::::::::::::::::::::::| SUBJECTIVE |:::::::::::::::::::::::::::|    OVERNIGHT EVENTS: BP elevated to 200-190s SBP. Improved after meds to 140s.   TODAY: Patient was seen today at bedside. B/L LE swelling improved from knees to mid-calf. Will cont diuresis today and possible D/C on Lisinopril and Labetalol. Otherwise, doing well.     |:::::::::::::::::::::::::::| OBJECTIVE |:::::::::::::::::::::::::::|    VITAL SIGNS: Last 24 Hours  T(C): 36.3 (02 Oct 2020 05:05), Max: 36.3 (01 Oct 2020 14:00)  T(F): 97.4 (02 Oct 2020 05:05), Max: 97.4 (01 Oct 2020 14:00)  HR: 66 (02 Oct 2020 08:32) (55 - 72)  BP: 155/67 (02 Oct 2020 08:32) (147/67 - 203/88)  BP(mean): --  RR: 18 (02 Oct 2020 08:32) (18 - 18)  SpO2: 99% (02 Oct 2020 08:00) (99% - 99%)    10-01-20 @ 07:01  -  10-02-20 @ 07:00  --------------------------------------------------------  IN: 680 mL / OUT: 3600 mL / NET: -2920 mL    10-02-20 @ 07:01  -  10-02-20 @ 10:41  --------------------------------------------------------  IN: 0 mL / OUT: 800 mL / NET: -800 mL      PHYSICAL EXAM:  GENERAL:   Awake, alert; NAD.  HEENT:  Head NC/AT; Conjunctivae pink, Sclera anicteric; Oral mucosa moist.  CARDIO:   Regular rate; Regular rhythm; S1 & S2.  RESP:   No rales, wheezing, or rhonchi appreciated.  GI:   Soft; NT/ND; BS; No guarding; No rebound tenderness.  EXT:   Strength UE 5/5; Strength LE 5/5; 2+ B/L LE swelling from mid-calf, improved  SKIN:   Intact.    LAB RESULTS:                        12.5   7.25  )-----------( 209      ( 02 Oct 2020 06:46 )             38.3     10-02    141  |  96<L>  |  21<H>  ----------------------------<  112<H>  4.4   |  32  |  1.0    Ca    9.9      02 Oct 2020 06:46  Phos  3.5     10-01  Mg     1.9     10-02    TPro  6.6  /  Alb  4.2  /  TBili  0.5  /  DBili  x   /  AST  33  /  ALT  72<H>  /  AlkPhos  113  10-02            CARDIAC MARKERS ( 01 Oct 2020 06:05 )  x     / <0.01 ng/mL / x     / x     / x      CARDIAC MARKERS ( 30 Sep 2020 20:16 )  x     / <0.01 ng/mL / x     / x     / x      CARDIAC MARKERS ( 30 Sep 2020 12:55 )  x     / <0.01 ng/mL / x     / x     / x          MICROBIOLOGY:    RADIOLOGY:    ALLERGIES: No Known Allergies      ===========================================================

## 2020-10-02 NOTE — DISCHARGE NOTE PROVIDER - NSDCMRMEDTOKEN_GEN_ALL_CORE_FT
cloNIDine 0.1 mg oral tablet: 1 tab(s) orally 3 times a day  Lasix 40 mg oral tablet: 1 tab(s) orally once a day  metoprolol succinate 100 mg oral tablet, extended release: 1 tab(s) orally once a day  omeprazole 40 mg oral delayed release capsule: 1 cap(s) orally once a day   cloNIDine 0.1 mg oral tablet: 1 tab(s) orally 3 times a day  Lasix 40 mg oral tablet: 1 tab(s) orally once a day  lisinopril 40 mg oral tablet: 1 tab(s) orally once a day  metoprolol succinate 100 mg oral tablet, extended release: 1 tab(s) orally once a day  omeprazole 40 mg oral delayed release capsule: 1 cap(s) orally once a day   cloNIDine 0.1 mg oral tablet: 1 tab(s) orally 3 times a day  Lasix 40 mg oral tablet: 1 tab(s) orally once a day  methotrexate 5 mg oral tablet: 1 tab(s) orally once a week  metoprolol succinate 100 mg oral tablet, extended release: 1 tab(s) orally once a day  omeprazole 40 mg oral delayed release capsule: 1 cap(s) orally once a day

## 2020-10-02 NOTE — DISCHARGE NOTE PROVIDER - NSDCFUADDINST_GEN_ALL_CORE_FT
Take your medications as directed.  We added a medication to your regimen to better control your blood pressure.     Please make an appointment with your primary care provider in 1 week to follow-up.     Also make an appointment with your cardiologist in 1 week for follow-up. Take your medications as directed.  We added a medication to your regimen to better control your blood pressure.     Please make an appointment with your primary care provider in 5 days to follow-up.     Also make an appointment with your cardiologist in 1 week for follow-up.

## 2020-10-02 NOTE — DISCHARGE NOTE NURSING/CASE MANAGEMENT/SOCIAL WORK - PATIENT PORTAL LINK FT
You can access the FollowMyHealth Patient Portal offered by [facility] by registering at the following website: [email or web address]. By joining [employer]’s FollowMyHealth portal, you will also be able to view your health information using other applications (apps) compatible with our system.

## 2020-10-02 NOTE — DISCHARGE NOTE PROVIDER - CARE PROVIDER_API CALL
Liliam Perkins  CARDIOVASCULAR DISEASE  [address] [place] [address]  Phone: [phone]  Fax: [phone]  Follow Up Time: 1 week

## 2020-10-02 NOTE — DISCHARGE NOTE PROVIDER - HOSPITAL COURSE
75 F with PMHx of HTN, GIB, GERD, empyema s/p decortication, non-obstructive CAD, who presented c/o of gradual onset, constant, progressive b/l lower leg swelling and pain x 3 weeks. Three 3 weeks prior the pt saw her cardiologist for elevated BP with SBP>220. Her Nifedipine 30 mg bid was changed to Clonidine 0.1 mg tid. She states since then she has noticed LE edema. She states typically she has chronic swelling in her feet secondary to arthritis sx, however the edema now extends beyond hr knees. He LE feel heavy and stiff, preventing her from ambulating or sleeping. She admit to JOHNSON walking 1/2 block however she attributes this to weakness from lifting her LE. Pt saw her cardiologist last week and was prescribed Lasix 40 mg qd. The pt took it for a few days, did not see an improvement and stopped taking he medication.     Denies HA, blurred vision, lightheadedness, CP, recent PND/orthopnea, palpitations, dizziness,  syncope, dysuria    In the ED T 36.5C, /83, HR 87        Patient was given IV labetalol for BP control and blood pressure medications given. clonidine TID, toprol 100mg, added lisinopril for better HTN control.  Renal vascular study done to assess for renal artery stenosis which was negative.   EKG was unremarkable.  Patient's cbc/cmp unremarkable.    patient was admitted for heart failure exacerbation 2/2 hypertensive emergency. Blood pressure was controlled, workup for secondary hypertension was started, and patient monitored on telemetry.   patient's LE swelling reduced with IV lasix and echocardiogram was performed, as below, showing G2DD, mild concentric LVH.   Confirmed w/ patient's cardiologist dr. rasheed - patient had cardiac cath last year showing non obstructive CAD.   Patient's symptoms improved and was cleared for discharge.

## 2020-10-02 NOTE — PROGRESS NOTE ADULT - ASSESSMENT
75 F w/non-obstructive CAD, HTN, GERD p/w c/o LE edema secondary to acute on chronic CHF exacerbation    # LE edema with associated mild dyspnea on exertion  - started 3 weeks ago, pt was on Nifedpine 30 BID, switched to Clonidine 0.1 TID  - swelling extended to above the knees, today mildly improved to below the knees  - lungs CTA on exam, no crackles  - s/p IV Lasix 40 and Clonidine 0.2 in ED   - BNP 1200  - CXR trace L effusion/atelectasis   - Echo 2018: EF 65%, mild MR & TR, small pericardial effusion   - CCTA 6/2018: total calcium score 2277, mild stenosis in mid LAD, RCA, LCX, LVEF 85%, focal cystic bronchiectasis   - strict I&Os, monitor daily weights   - Mg 1.7, repleted with 2g, will f/u repeat labs   - Keep Mg>2, K>4  - c/w IV Lasix 40 diuresis, will monitor fluid status  - DUS: neg for DVT/SVT, L jacobs's cyst  - Trops neg x3  - serial CE neg   - mild ALP elevation, resolved  - Echo 10/1: EF 60-65, mild LA enlargement, mild LVH, trivial pericardial effusion, grade 2 DD  - records received from Dr. Perkins, in physical chart        # HTN, uncontrolled   - /83 in ED  - given Clonidine 0.2 --> improved to SBP 140s   - c/w home BP meds (Clonidine 0.1 TID, Metoprolol 100 qd)   - c/w diuresis   - added lisinopril 40mg qd for better HTN control. pt still sys to 190s.   - f/u duplex aorta/IVC/iliacs to r/o renal artery stenosis   - will f/u with [facility] on [address] [phone] for meds   - f/u cardio C/L   - possible D/C in PM on Labetalol and Lisinopril     # non-obstructive CAD   - not on ASA or plavix or statin  - on metoprolol 100 qd   - Lipid panel wnl   - A1C 5.4    # Severe hepatic steatosis  - stable     # large hiatal hernia with GERD s/p esophagogastric fundoplasty fundoplication  - c/w ptx     # cystic bronchiectasis   - stable     # h/o empyema s/p decortication  - stable     DVT px:  Hep SQ   GI ppx: PTX 40 mg   Code: Full   Dispo: possible D/C in PM

## 2020-10-08 DIAGNOSIS — K21.9 GASTRO-ESOPHAGEAL REFLUX DISEASE WITHOUT ESOPHAGITIS: ICD-10-CM

## 2020-10-08 DIAGNOSIS — J47.9 BRONCHIECTASIS, UNCOMPLICATED: ICD-10-CM

## 2020-10-08 DIAGNOSIS — R06.02 SHORTNESS OF BREATH: ICD-10-CM

## 2020-10-08 DIAGNOSIS — E78.5 HYPERLIPIDEMIA, UNSPECIFIED: ICD-10-CM

## 2020-10-08 DIAGNOSIS — K75.81 NONALCOHOLIC STEATOHEPATITIS (NASH): ICD-10-CM

## 2020-10-08 DIAGNOSIS — I11.0 HYPERTENSIVE HEART DISEASE WITH HEART FAILURE: ICD-10-CM

## 2020-10-08 DIAGNOSIS — I16.0 HYPERTENSIVE URGENCY: ICD-10-CM

## 2020-10-08 DIAGNOSIS — I50.33 ACUTE ON CHRONIC DIASTOLIC (CONGESTIVE) HEART FAILURE: ICD-10-CM

## 2020-10-08 DIAGNOSIS — R74.01 ELEVATION OF LEVELS OF LIVER TRANSAMINASE LEVELS: ICD-10-CM

## 2020-10-08 DIAGNOSIS — M06.9 RHEUMATOID ARTHRITIS, UNSPECIFIED: ICD-10-CM

## 2020-10-08 DIAGNOSIS — K44.9 DIAPHRAGMATIC HERNIA WITHOUT OBSTRUCTION OR GANGRENE: ICD-10-CM

## 2022-01-24 PROBLEM — K21.9 GASTRO-ESOPHAGEAL REFLUX DISEASE WITHOUT ESOPHAGITIS: Chronic | Status: ACTIVE | Noted: 2020-09-30

## 2022-01-24 PROBLEM — J86.9 PYOTHORAX WITHOUT FISTULA: Chronic | Status: ACTIVE | Noted: 2020-09-30

## 2022-03-09 ENCOUNTER — RESULT REVIEW (OUTPATIENT)
Age: 77
End: 2022-03-09

## 2022-03-09 ENCOUNTER — OUTPATIENT (OUTPATIENT)
Dept: OUTPATIENT SERVICES | Facility: HOSPITAL | Age: 77
LOS: 1 days | Discharge: HOME | End: 2022-03-09
Payer: MEDICARE

## 2022-03-09 VITALS
WEIGHT: 179.9 LBS | DIASTOLIC BLOOD PRESSURE: 76 MMHG | TEMPERATURE: 96 F | HEIGHT: 59 IN | HEART RATE: 90 BPM | RESPIRATION RATE: 16 BRPM | SYSTOLIC BLOOD PRESSURE: 150 MMHG | OXYGEN SATURATION: 97 %

## 2022-03-09 DIAGNOSIS — Z01.818 ENCOUNTER FOR OTHER PREPROCEDURAL EXAMINATION: ICD-10-CM

## 2022-03-09 DIAGNOSIS — Z87.09 PERSONAL HISTORY OF OTHER DISEASES OF THE RESPIRATORY SYSTEM: Chronic | ICD-10-CM

## 2022-03-09 DIAGNOSIS — Z98.890 OTHER SPECIFIED POSTPROCEDURAL STATES: Chronic | ICD-10-CM

## 2022-03-09 DIAGNOSIS — R22.31 LOCALIZED SWELLING, MASS AND LUMP, RIGHT UPPER LIMB: ICD-10-CM

## 2022-03-09 LAB
ALBUMIN SERPL ELPH-MCNC: 4.8 G/DL — SIGNIFICANT CHANGE UP (ref 3.5–5.2)
ALP SERPL-CCNC: 130 U/L — HIGH (ref 30–115)
ALT FLD-CCNC: 12 U/L — SIGNIFICANT CHANGE UP (ref 0–41)
ANION GAP SERPL CALC-SCNC: 14 MMOL/L — SIGNIFICANT CHANGE UP (ref 7–14)
AST SERPL-CCNC: 17 U/L — SIGNIFICANT CHANGE UP (ref 0–41)
BASOPHILS # BLD AUTO: 0.05 K/UL — SIGNIFICANT CHANGE UP (ref 0–0.2)
BASOPHILS NFR BLD AUTO: 0.6 % — SIGNIFICANT CHANGE UP (ref 0–1)
BILIRUB SERPL-MCNC: 0.3 MG/DL — SIGNIFICANT CHANGE UP (ref 0.2–1.2)
BUN SERPL-MCNC: 27 MG/DL — HIGH (ref 10–20)
CALCIUM SERPL-MCNC: 10.4 MG/DL — HIGH (ref 8.5–10.1)
CHLORIDE SERPL-SCNC: 100 MMOL/L — SIGNIFICANT CHANGE UP (ref 98–110)
CO2 SERPL-SCNC: 25 MMOL/L — SIGNIFICANT CHANGE UP (ref 17–32)
CREAT SERPL-MCNC: 1.2 MG/DL — SIGNIFICANT CHANGE UP (ref 0.7–1.5)
EGFR: 47 ML/MIN/1.73M2 — LOW
EOSINOPHIL # BLD AUTO: 0.21 K/UL — SIGNIFICANT CHANGE UP (ref 0–0.7)
EOSINOPHIL NFR BLD AUTO: 2.7 % — SIGNIFICANT CHANGE UP (ref 0–8)
GLUCOSE SERPL-MCNC: 88 MG/DL — SIGNIFICANT CHANGE UP (ref 70–99)
HCT VFR BLD CALC: 38.7 % — SIGNIFICANT CHANGE UP (ref 37–47)
HGB BLD-MCNC: 12.3 G/DL — SIGNIFICANT CHANGE UP (ref 12–16)
IMM GRANULOCYTES NFR BLD AUTO: 0.4 % — HIGH (ref 0.1–0.3)
LYMPHOCYTES # BLD AUTO: 2.12 K/UL — SIGNIFICANT CHANGE UP (ref 1.2–3.4)
LYMPHOCYTES # BLD AUTO: 27 % — SIGNIFICANT CHANGE UP (ref 20.5–51.1)
MCHC RBC-ENTMCNC: 26.1 PG — LOW (ref 27–31)
MCHC RBC-ENTMCNC: 31.8 G/DL — LOW (ref 32–37)
MCV RBC AUTO: 82 FL — SIGNIFICANT CHANGE UP (ref 81–99)
MONOCYTES # BLD AUTO: 0.44 K/UL — SIGNIFICANT CHANGE UP (ref 0.1–0.6)
MONOCYTES NFR BLD AUTO: 5.6 % — SIGNIFICANT CHANGE UP (ref 1.7–9.3)
NEUTROPHILS # BLD AUTO: 4.99 K/UL — SIGNIFICANT CHANGE UP (ref 1.4–6.5)
NEUTROPHILS NFR BLD AUTO: 63.7 % — SIGNIFICANT CHANGE UP (ref 42.2–75.2)
NRBC # BLD: 0 /100 WBCS — SIGNIFICANT CHANGE UP (ref 0–0)
PLATELET # BLD AUTO: 279 K/UL — SIGNIFICANT CHANGE UP (ref 130–400)
POTASSIUM SERPL-MCNC: 4.7 MMOL/L — SIGNIFICANT CHANGE UP (ref 3.5–5)
POTASSIUM SERPL-SCNC: 4.7 MMOL/L — SIGNIFICANT CHANGE UP (ref 3.5–5)
PROT SERPL-MCNC: 7.4 G/DL — SIGNIFICANT CHANGE UP (ref 6–8)
RBC # BLD: 4.72 M/UL — SIGNIFICANT CHANGE UP (ref 4.2–5.4)
RBC # FLD: 13.1 % — SIGNIFICANT CHANGE UP (ref 11.5–14.5)
SODIUM SERPL-SCNC: 139 MMOL/L — SIGNIFICANT CHANGE UP (ref 135–146)
WBC # BLD: 7.84 K/UL — SIGNIFICANT CHANGE UP (ref 4.8–10.8)
WBC # FLD AUTO: 7.84 K/UL — SIGNIFICANT CHANGE UP (ref 4.8–10.8)

## 2022-03-09 PROCEDURE — 71046 X-RAY EXAM CHEST 2 VIEWS: CPT | Mod: 26

## 2022-03-09 PROCEDURE — 93010 ELECTROCARDIOGRAM REPORT: CPT

## 2022-03-09 NOTE — H&P PST ADULT - HISTORY OF PRESENT ILLNESS
76YR old female states " this lump is here for about 2-3yrs, looks ugly" is scheduled for EXCISION MASS RIGHT HAND THIRD DIGIT. Denies COVID S/S. Recd 3 doses vaccine. Verbalized understanding of COVI D prevention measures. Exercise alexus 1-2 flat blocks slowly LTD by pain knees. Unable to reconcile meds d/t  glitch- takes lisinopril 40mg daily , Labetolol 200mg daily and Nifedipine 60mg and Lasix prn.  Anesthesia Alert  YES-Difficult Airway  NO--History of neck surgery or radiation  NO--Limited ROM of neck  NO--History of Malignant hyperthermia  NO--Personal or family history of Pseudocholinesterase deficiency  NO--Prior Anesthesia Complication  NO--Latex Allergy  NO--Loose teeth  NO--History of Rheumatoid Arthritis  NO--TUCKER  No Bleeding risk  YES Habematolel RT hearing aid____

## 2022-03-09 NOTE — H&P PST ADULT - NSANTHOSAYNRD_GEN_A_CORE
No. TUCKER screening performed.  STOP BANG Legend: 0-2 = LOW Risk; 3-4 = INTERMEDIATE Risk; 5-8 = HIGH Risk

## 2022-03-09 NOTE — H&P PST ADULT - NSICDXPASTSURGICALHX_GEN_ALL_CORE_FT
PAST SURGICAL HISTORY:  H/O pleural empyema s/p decortication    History of repair of hiatal hernia

## 2022-03-09 NOTE — H&P PST ADULT - NSICDXFAMILYHX_GEN_ALL_CORE_FT
FAMILY HISTORY:  Family history of cardiac pacemaker, 3 siblings  Family history of hypertrophic cardiomyopathy, sister

## 2022-03-09 NOTE — H&P PST ADULT - NSICDXPASTMEDICALHX_GEN_ALL_CORE_FT
PAST MEDICAL HISTORY:  Empyema lung     GERD (gastroesophageal reflux disease)     H/O CHF     Hiatal hernia with GERD     Hypertension

## 2022-03-14 ENCOUNTER — LABORATORY RESULT (OUTPATIENT)
Age: 77
End: 2022-03-14

## 2022-03-16 ENCOUNTER — APPOINTMENT (OUTPATIENT)
Age: 77
End: 2022-03-16

## 2022-03-18 PROBLEM — Z86.79 PERSONAL HISTORY OF OTHER DISEASES OF THE CIRCULATORY SYSTEM: Chronic | Status: ACTIVE | Noted: 2022-03-09

## 2022-03-18 NOTE — ASU PATIENT PROFILE, ADULT - FALL HARM RISK - FALL HARM RISK
Genetics  [facility]     Contact our nurse care coordinator Ayanna WILSONN, RN, PHN at [phone] or send a OOTU message for any non-urgent general or medical questions.     If you had genetic testing and have further questions, please contact the genetic counselor:    Glenna Sawyer  Ph: [phone]    To schedule appointments:  Pediatric Call Center for [facility]: [phone]  Neuropsychology [phone]  Radiology/ Imaging/Echocardiogram: [phone]   Services:   [phone]     You should receive a phone call about your next appointment. If you do not receive this within two weeks of your visit, please call [phone].     If you have not already done so consider signing up for ELENZA by speaking with the person at the  on your way out or go to [email or web address] to sign up online.     ELENZA enables easy and confidential communication with your care team.        No indicators present

## 2022-03-19 ENCOUNTER — LABORATORY RESULT (OUTPATIENT)
Age: 77
End: 2022-03-19

## 2022-03-21 ENCOUNTER — RESULT REVIEW (OUTPATIENT)
Age: 77
End: 2022-03-21

## 2022-03-21 ENCOUNTER — OUTPATIENT (OUTPATIENT)
Dept: OUTPATIENT SERVICES | Facility: HOSPITAL | Age: 77
LOS: 1 days | Discharge: HOME | End: 2022-03-21
Payer: MEDICARE

## 2022-03-21 VITALS
TEMPERATURE: 97 F | SYSTOLIC BLOOD PRESSURE: 99 MMHG | DIASTOLIC BLOOD PRESSURE: 50 MMHG | HEART RATE: 64 BPM | OXYGEN SATURATION: 96 % | RESPIRATION RATE: 20 BRPM

## 2022-03-21 VITALS
DIASTOLIC BLOOD PRESSURE: 58 MMHG | RESPIRATION RATE: 20 BRPM | HEIGHT: 59 IN | OXYGEN SATURATION: 98 % | TEMPERATURE: 98 F | SYSTOLIC BLOOD PRESSURE: 117 MMHG | WEIGHT: 179.9 LBS | HEART RATE: 70 BPM

## 2022-03-21 DIAGNOSIS — Z98.890 OTHER SPECIFIED POSTPROCEDURAL STATES: Chronic | ICD-10-CM

## 2022-03-21 DIAGNOSIS — Z87.09 PERSONAL HISTORY OF OTHER DISEASES OF THE RESPIRATORY SYSTEM: Chronic | ICD-10-CM

## 2022-03-21 PROCEDURE — 88304 TISSUE EXAM BY PATHOLOGIST: CPT | Mod: 26

## 2022-03-21 RX ORDER — HYDROMORPHONE HYDROCHLORIDE 2 MG/ML
0.5 INJECTION INTRAMUSCULAR; INTRAVENOUS; SUBCUTANEOUS ONCE
Refills: 0 | Status: DISCONTINUED | OUTPATIENT
Start: 2022-03-21 | End: 2022-03-21

## 2022-03-21 RX ORDER — METOPROLOL TARTRATE 50 MG
1 TABLET ORAL
Qty: 0 | Refills: 0 | DISCHARGE

## 2022-03-21 RX ORDER — SODIUM CHLORIDE 9 MG/ML
1000 INJECTION, SOLUTION INTRAVENOUS
Refills: 0 | Status: DISCONTINUED | OUTPATIENT
Start: 2022-03-21 | End: 2022-04-04

## 2022-03-21 RX ORDER — ACETAMINOPHEN 500 MG
650 TABLET ORAL ONCE
Refills: 0 | Status: DISCONTINUED | OUTPATIENT
Start: 2022-03-21 | End: 2022-04-04

## 2022-03-21 NOTE — ASU DISCHARGE PLAN (ADULT/PEDIATRIC) - ASU DC SPECIAL INSTRUCTIONSFT
DIET:    Resume normal diet  No alcoholic  beverages for 24 hours or if on prescribed narcotic pain medications.    MEDICATION:    Resume your preoperative oral medications.  Check with your physician before starting aspirin, Coumadin, or other blood thinners.  Prescriptions given to you - take as directed.      ACTIVITY:    Rest today and limit your activities for 24 hours.  Do not drive or operate machinery for 24 hours - if you received anesthesia.  When taking pain medication, be careful as you walk or climb stairs.  It is not advisable to drive while taking prescribed pain medication.    SPECIAL INSTRUCTIONS:    __x___ Elevate operative site above heart level or as directed.  _____ Apply ice to operative site as directed.  _____ Use  sling as directed.  _x____ Exercise fingers.    DRESSING CARE:    __x___ You may change the dressing 4 days. Keep wound covered with band-aids.  _____ Do not change the dressing until your doctor see you.  __x___ You can loosen or rewrap the dressing.  __x___  Keep dressing clean and dry.  __x___ You may shower in _1____ day(s) with the extremity covered by a plastic bag.  _____ OK to wash hand , including showers, in _____ day(s).    ADDITIONAL  INFORMATION:    Post operative visit should be scheduled for next week.  If you are not aware of visit please contact office.  If you have any questions or concerns call office at      Notify your doctor if you develop   Fever  Excessive Swelling  Chills   Drainage   Pain not controlled by medication  Persistent numbness in hand or fingers    If an Emergency arises call 911 and/or go to the Emergency Room

## 2022-03-21 NOTE — ASU PREOP CHECKLIST - SURGICAL CONSENT
----- Message from Rosario Gerber RN sent at 4/20/2020  3:58 PM CDT -----  See visit 4/20/2020. Check to see if patient has had EKG done. If not and ok to do so, call pt as reminder to have EKG done.      
See 5/20 phone encounter  
done

## 2022-03-21 NOTE — BRIEF OPERATIVE NOTE - NSICDXBRIEFPROCEDURE_GEN_ALL_CORE_FT
PROCEDURES:  Excision, subcutaneous tumor, finger, 1.5 cm or greater 21-Mar-2022 10:01:50 Chelsi Del Cid

## 2022-03-21 NOTE — ASU DISCHARGE PLAN (ADULT/PEDIATRIC) - NS MD DC FALL RISK RISK
For information on Fall & Injury Prevention, visit: [email or web address] OR  [email or web address] OR  https://www.cdc.gov/steadi/patient.html

## 2022-03-21 NOTE — CHART NOTE - NSCHARTNOTEFT_GEN_A_CORE
PACU ANESTHESIA ADMISSION NOTE      Procedure: Excision, subcutaneous tumor, finger, 1.5 cm or greater  r lf      Post op diagnosis:  Finger mass, right        ____  Intubated  TV:______       Rate: ______      FiO2: ______    __x__  Patent Airway    __x__  Full return of protective reflexes    __x__  Full recovery from anesthesia / back to baseline         Mental Status:  _x___ Awake   __x___ Alert   _____ Drowsy   _____ Sedated    Nausea/Vomiting:  _x___ NO  ______Yes,   __x__ See Post - Op Orders          Pain Scale (0-10):  __0___    Treatment: ____ None    __x__ See Post - Op/PCA Orders    Post - Operative Fluids:   ____ Oral   __x__ See Post - Op Orders    Plan: Discharge:   _x___Home       _____Floor     _____Critical Care    _____  Other:_________________    Comments: When parameters met.

## 2022-03-22 LAB — SURGICAL PATHOLOGY STUDY: SIGNIFICANT CHANGE UP

## 2022-03-24 DIAGNOSIS — M67.441 GANGLION, RIGHT HAND: ICD-10-CM

## 2022-03-24 DIAGNOSIS — I10 ESSENTIAL (PRIMARY) HYPERTENSION: ICD-10-CM

## 2022-03-24 DIAGNOSIS — K21.9 GASTRO-ESOPHAGEAL REFLUX DISEASE WITHOUT ESOPHAGITIS: ICD-10-CM

## 2022-05-02 ENCOUNTER — APPOINTMENT (OUTPATIENT)
Age: 77
End: 2022-05-02
Payer: MEDICARE

## 2022-05-02 VITALS
HEIGHT: 59 IN | DIASTOLIC BLOOD PRESSURE: 80 MMHG | SYSTOLIC BLOOD PRESSURE: 140 MMHG | WEIGHT: 190 LBS | RESPIRATION RATE: 12 BRPM | BODY MASS INDEX: 38.3 KG/M2 | OXYGEN SATURATION: 95 %

## 2022-05-02 PROCEDURE — 99214 OFFICE O/P EST MOD 30 MIN: CPT

## 2022-05-02 RX ORDER — IPRATROPIUM BROMIDE AND ALBUTEROL SULFATE 2.5; .5 MG/3ML; MG/3ML
0.5-2.5 (3) SOLUTION RESPIRATORY (INHALATION)
Qty: 2 | Refills: 3 | Status: ACTIVE | COMMUNITY
Start: 2022-05-02 | End: 1900-01-01

## 2022-05-02 NOTE — ASSESSMENT
[FreeTextEntry1] : Bronchiectasis / SELNEE atelectasis \par Large HH \par Was a patient of Dr. Handy

## 2022-05-02 NOTE — REASON FOR VISIT
[Follow-Up] : a follow-up visit [Abnormal CXR/ Chest CT] : an abnormal CXR/ chest CT [Bronchiectasis] : bronchiectasis

## 2022-05-02 NOTE — HISTORY OF PRESENT ILLNESS
[Follow-Up - Routine Clinic] : a routine clinic follow-up of [Cough] : cough [Productive Cough] : productive cough [Currently Experiencing] : The patient is currently experiencing symptoms. [None] : No associated symptoms are reported

## 2022-05-05 ENCOUNTER — NON-APPOINTMENT (OUTPATIENT)
Age: 77
End: 2022-05-05

## 2022-05-29 ENCOUNTER — LABORATORY RESULT (OUTPATIENT)
Age: 77
End: 2022-05-29

## 2022-06-01 ENCOUNTER — TRANSCRIPTION ENCOUNTER (OUTPATIENT)
Age: 77
End: 2022-06-01

## 2022-06-01 ENCOUNTER — RESULT REVIEW (OUTPATIENT)
Age: 77
End: 2022-06-01

## 2022-06-01 ENCOUNTER — OUTPATIENT (OUTPATIENT)
Dept: OUTPATIENT SERVICES | Facility: HOSPITAL | Age: 77
LOS: 1 days | Discharge: HOME | End: 2022-06-01
Payer: MEDICARE

## 2022-06-01 VITALS
OXYGEN SATURATION: 97 % | SYSTOLIC BLOOD PRESSURE: 134 MMHG | HEART RATE: 77 BPM | DIASTOLIC BLOOD PRESSURE: 68 MMHG | RESPIRATION RATE: 20 BRPM

## 2022-06-01 VITALS — WEIGHT: 189.6 LBS | HEIGHT: 59 IN

## 2022-06-01 DIAGNOSIS — Z87.09 PERSONAL HISTORY OF OTHER DISEASES OF THE RESPIRATORY SYSTEM: Chronic | ICD-10-CM

## 2022-06-01 DIAGNOSIS — Z98.890 OTHER SPECIFIED POSTPROCEDURAL STATES: Chronic | ICD-10-CM

## 2022-06-01 LAB
GRAM STN FLD: SIGNIFICANT CHANGE UP
GRAM STN FLD: SIGNIFICANT CHANGE UP
SPECIMEN SOURCE: SIGNIFICANT CHANGE UP
SPECIMEN SOURCE: SIGNIFICANT CHANGE UP

## 2022-06-01 PROCEDURE — 88305 TISSUE EXAM BY PATHOLOGIST: CPT | Mod: 26

## 2022-06-01 PROCEDURE — 88112 CYTOPATH CELL ENHANCE TECH: CPT | Mod: 26

## 2022-06-01 PROCEDURE — 31624 DX BRONCHOSCOPE/LAVAGE: CPT | Mod: 1L

## 2022-06-01 PROCEDURE — 88312 SPECIAL STAINS GROUP 1: CPT | Mod: 26

## 2022-06-01 NOTE — H&P PST ADULT - HISTORY OF PRESENT ILLNESS
72 yo female with history of large hiatal hernia, left empyema s/p decortication and left lower lobe bronchiectasis here for bronchoscopy with BAL to r/o MAC.

## 2022-06-01 NOTE — ASU PATIENT PROFILE, ADULT - TEACHING/LEARNING FACTORS INFLUENCE READINESS TO LEARN
49yo M with PMH of alcohol dependence, liver cirrhosis, hx of esophageal varices s/p banding in ~2017, ascites s/p paracentesis ~1 year ago, IBS, who p/w jaundice and alcohol withdrawal a/w decompensated liver failure with acute alcohol hepatitis, course c/b delirium tremens . Pt being admitted for alcohol detox, elevated bilirubin and elevated INR in the setting likely 2/2  acute on chronic liver failure and anemia. none 49yo M with PMH of alcohol dependence, liver cirrhosis, hx of esophageal varices s/p banding in ~2017, ascites s/p paracentesis ~1 year ago, IBS, who p/w jaundice and alcohol withdrawal a/w decompensated liver failure with acute alcoholic hepatitis, course c/b delirium tremens. 51yo M with PMH of alcohol dependence, liver cirrhosis, hx of esophageal varices s/p banding in ~2017, ascites s/p paracentesis ~1 year ago, IBS, who p/w jaundice and alcohol withdrawal a/w decompensated liver failure with acute alcoholic hepatitis, course c/b delirium tremens, acute hepatic encephalopathy.

## 2022-06-01 NOTE — ASU DISCHARGE PLAN (ADULT/PEDIATRIC) - NS MD DC FALL RISK RISK
For information on Fall & Injury Prevention, visit: [email or web address] OR  [email or web address] OR  https://www.cdc.gov/steadi/patient.html

## 2022-06-01 NOTE — CHART NOTE - NSCHARTNOTEFT_GEN_A_CORE
PACU ANESTHESIA ADMISSION NOTE      Procedure:   Post op diagnosis:      ____  Intubated  TV:______       Rate: ______      FiO2: ______    __x__  Patent Airway    __x__  Full return of protective reflexes    __x__  Full recovery from anesthesia / back to baseline     Vitals:   See Anesthesia record  T- 97.9 P-67 R-18 B/P- 129/51 SPO2- 97% on Ra    Mental Status:  __x__ Awake   ___x__ Alert and verbalizing   _____ Drowsy   _____ Sedated    Nausea/Vomiting:  __x__ NO  ______Yes,   See Post - Op Orders          Pain Scale (0-10):  __0___    Treatment: ____ None    ____ See Post - Op/PCA Orders    Post - Operative Fluids:   ____ Oral   __x__ See Post - Op Orders    Plan: Discharge:   __x__Home       _____Floor     _____Critical Care    _____  Other:_________________    Comments: No anesthesia complications/issues noted. Discharge to HOME when PACU criteria met.

## 2022-06-01 NOTE — ASU DISCHARGE PLAN (ADULT/PEDIATRIC) - CARE PROVIDER_API CALL
Miguel Hartley)  Critical Care Medicine; Pulmonary Disease; Sleep Medicine  [address] [place] [address]  Phone: [phone]  Fax: [phone]  Follow Up Time: 2 weeks

## 2022-06-01 NOTE — ASU PATIENT PROFILE, ADULT - FALL HARM RISK - HARM RISK INTERVENTIONS

## 2022-06-03 DIAGNOSIS — J47.9 BRONCHIECTASIS, UNCOMPLICATED: ICD-10-CM

## 2022-06-03 DIAGNOSIS — K21.9 GASTRO-ESOPHAGEAL REFLUX DISEASE WITHOUT ESOPHAGITIS: ICD-10-CM

## 2022-06-03 DIAGNOSIS — I50.9 HEART FAILURE, UNSPECIFIED: ICD-10-CM

## 2022-06-03 DIAGNOSIS — J85.2 ABSCESS OF LUNG WITHOUT PNEUMONIA: ICD-10-CM

## 2022-06-03 DIAGNOSIS — I11.0 HYPERTENSIVE HEART DISEASE WITH HEART FAILURE: ICD-10-CM

## 2022-06-03 LAB
CULTURE RESULTS: SIGNIFICANT CHANGE UP
CULTURE RESULTS: SIGNIFICANT CHANGE UP
NON-GYNECOLOGICAL CYTOLOGY STUDY: SIGNIFICANT CHANGE UP
NON-GYNECOLOGICAL CYTOLOGY STUDY: SIGNIFICANT CHANGE UP
SPECIMEN SOURCE: SIGNIFICANT CHANGE UP
SPECIMEN SOURCE: SIGNIFICANT CHANGE UP

## 2022-06-15 ENCOUNTER — APPOINTMENT (OUTPATIENT)
Age: 77
End: 2022-06-15
Payer: MEDICARE

## 2022-06-15 VITALS
RESPIRATION RATE: 14 BRPM | WEIGHT: 174 LBS | SYSTOLIC BLOOD PRESSURE: 130 MMHG | HEART RATE: 90 BPM | OXYGEN SATURATION: 93 % | HEIGHT: 59 IN | DIASTOLIC BLOOD PRESSURE: 76 MMHG | BODY MASS INDEX: 35.08 KG/M2

## 2022-06-15 PROCEDURE — 99214 OFFICE O/P EST MOD 30 MIN: CPT

## 2022-06-15 RX ORDER — IPRATROPIUM BROMIDE AND ALBUTEROL 20; 100 UG/1; UG/1
20-100 SPRAY, METERED RESPIRATORY (INHALATION)
Qty: 1 | Refills: 3 | Status: ACTIVE | COMMUNITY
Start: 2022-06-15 | End: 1900-01-01

## 2022-06-15 NOTE — ASSESSMENT
[FreeTextEntry1] : Bronchiectasis / SELENE atelectasis \par SP Bronchoscopy and BAL.  results negative to date \par Large HH \par Was a patient of Dr. Handy

## 2022-08-10 ENCOUNTER — APPOINTMENT (OUTPATIENT)
Age: 77
End: 2022-08-10

## 2022-08-10 VITALS
DIASTOLIC BLOOD PRESSURE: 80 MMHG | RESPIRATION RATE: 14 BRPM | HEART RATE: 99 BPM | SYSTOLIC BLOOD PRESSURE: 150 MMHG | WEIGHT: 190 LBS | BODY MASS INDEX: 38.3 KG/M2 | HEIGHT: 59 IN | OXYGEN SATURATION: 98 %

## 2022-08-10 DIAGNOSIS — K44.9 DIAPHRAGMATIC HERNIA W/OUT OBSTRUCTION OR GANGRENE: ICD-10-CM

## 2022-08-10 PROCEDURE — 99214 OFFICE O/P EST MOD 30 MIN: CPT

## 2022-08-10 NOTE — ASSESSMENT
[FreeTextEntry1] : Bronchiectasis / SELENE atelectasis \par SP Bronchoscopy and BAL.  results negative for opportunistic infection \par Large HH. Symptomatic on PPI Q 24. Initially refused intervention.  Now agrees for evaluation by CTS \par Was a patient of Dr. Handy

## 2022-10-18 ENCOUNTER — APPOINTMENT (OUTPATIENT)
Age: 77
End: 2022-10-18

## 2022-10-18 VITALS
DIASTOLIC BLOOD PRESSURE: 70 MMHG | HEIGHT: 59 IN | HEART RATE: 107 BPM | BODY MASS INDEX: 35.88 KG/M2 | RESPIRATION RATE: 14 BRPM | WEIGHT: 178 LBS | OXYGEN SATURATION: 98 % | SYSTOLIC BLOOD PRESSURE: 144 MMHG

## 2022-10-18 PROCEDURE — 99214 OFFICE O/P EST MOD 30 MIN: CPT

## 2022-10-18 RX ORDER — AZITHROMYCIN 250 MG/1
250 TABLET, FILM COATED ORAL
Qty: 1 | Refills: 0 | Status: ACTIVE | COMMUNITY
Start: 2022-10-18 | End: 1900-01-01

## 2022-10-18 RX ORDER — PREDNISONE 20 MG/1
20 TABLET ORAL
Qty: 6 | Refills: 0 | Status: ACTIVE | COMMUNITY
Start: 2022-10-18 | End: 1900-01-01

## 2022-10-18 NOTE — ASSESSMENT
[FreeTextEntry1] : Bronchiectasis with mild exacerbation\par SELENE atelectasis \par SP Bronchoscopy and BAL.  results negative for opportunistic infection \par Large HH. Symptomatic on PPI Q 24. Initially refused intervention.  Now agrees for evaluation by CTS \par Was a patient of Dr. Handy

## 2023-03-06 ENCOUNTER — APPOINTMENT (OUTPATIENT)
Dept: PULMONOLOGY | Facility: CLINIC | Age: 78
End: 2023-03-06
Payer: MEDICARE

## 2023-03-06 VITALS
BODY MASS INDEX: 34.68 KG/M2 | DIASTOLIC BLOOD PRESSURE: 78 MMHG | WEIGHT: 172 LBS | RESPIRATION RATE: 14 BRPM | HEIGHT: 59 IN | SYSTOLIC BLOOD PRESSURE: 148 MMHG | OXYGEN SATURATION: 96 % | HEART RATE: 101 BPM

## 2023-03-06 DIAGNOSIS — J47.9 BRONCHIECTASIS, UNCOMPLICATED: ICD-10-CM

## 2023-03-06 DIAGNOSIS — J45.909 UNSPECIFIED ASTHMA, UNCOMPLICATED: ICD-10-CM

## 2023-03-06 DIAGNOSIS — R93.89 ABNORMAL FINDINGS ON DIAGNOSTIC IMAGING OF OTHER SPECIFIED BODY STRUCTURES: ICD-10-CM

## 2023-03-06 PROCEDURE — 99214 OFFICE O/P EST MOD 30 MIN: CPT

## 2023-03-06 RX ORDER — BUDESONIDE AND FORMOTEROL FUMARATE DIHYDRATE 160; 4.5 UG/1; UG/1
160-4.5 AEROSOL RESPIRATORY (INHALATION) TWICE DAILY
Qty: 1 | Refills: 5 | Status: ACTIVE | COMMUNITY
Start: 2023-03-06 | End: 1900-01-01

## 2023-03-06 NOTE — HISTORY OF PRESENT ILLNESS
[Mild Persistent] : mild persistent [Stable] : stable [Wheezing] : stable wheezing [Cough] : stable coughing [Shortness Of Breath] : denies shortness of breath [Chest Tightness Or Heavy Pressure] : denies chest tightness [Feelings Of Weakness On Exertion] : denies reduced exercise tolerance [Cough at Night] : awakened at night because of cough [Wheezing at Night] : awakened at night because of wheezing or trouble breathing [Follow-Up - Routine Clinic] : a routine clinic follow-up of [Wheezing] : wheezing [None] : The patient is currently asymptomatic

## 2023-03-06 NOTE — REASON FOR VISIT
[Follow-Up] : a follow-up visit [Abnormal CXR/ Chest CT] : an abnormal CXR/ chest CT [Asthma] : asthma [Bronchiectasis] : bronchiectasis

## 2023-03-06 NOTE — ASSESSMENT
[FreeTextEntry1] : Bronchiectasis\par SELENE atelectasis \par SP Bronchoscopy and BAL.  results negative for opportunistic infection \par Large HH. Symptomatic on PPI Q 24. Initially refused intervention.  Now agrees for evaluation by CTS\par Mild persistent Asthma\par Was a patient of Dr. Galicia

## 2023-03-14 ENCOUNTER — APPOINTMENT (OUTPATIENT)
Dept: CARDIOTHORACIC SURGERY | Facility: CLINIC | Age: 78
End: 2023-03-14

## 2023-03-16 NOTE — HISTORY OF PRESENT ILLNESS
[FreeTextEntry1] : Ms. DARIEN BARRERA is a 77 year F, __ smoker, that arrives today for a consultation for a potential Re-Do Hiatal Hernia Repair.  History of Hiatal Hernia Repair 2013 ([facility]), Empyema S/P Left Decortication 2018, HTN, Asthma/Bronchiectasis.  Her symptoms include_________________.  Here for discussion of potential surgery.\par \par ECOG, Independent, Partially Independent, Fully Dependent\par Lives with\par ______ smoker- pack years\par COVID History- \par Denies major psychiatric history\par \par Their Healthcare team is as follows:\par \par PMD:\par Cardiologist: Dr. Giron\par Pulmonologist: Dr. Hartley\par Gastroenterologist: Dr. Timothy Varela\par

## 2023-03-21 ENCOUNTER — APPOINTMENT (OUTPATIENT)
Dept: CARDIOTHORACIC SURGERY | Facility: CLINIC | Age: 78
End: 2023-03-21

## 2023-06-05 ENCOUNTER — APPOINTMENT (OUTPATIENT)
Dept: PULMONOLOGY | Facility: CLINIC | Age: 78
End: 2023-06-05

## 2023-06-27 ENCOUNTER — APPOINTMENT (OUTPATIENT)
Dept: PULMONOLOGY | Facility: CLINIC | Age: 78
End: 2023-06-27

## 2023-08-27 ENCOUNTER — EMERGENCY (EMERGENCY)
Facility: HOSPITAL | Age: 78
LOS: 0 days | Discharge: HOME | End: 2023-08-27
Attending: STUDENT IN AN ORGANIZED HEALTH CARE EDUCATION/TRAINING PROGRAM
Payer: MEDICARE

## 2023-08-27 VITALS
RESPIRATION RATE: 17 BRPM | DIASTOLIC BLOOD PRESSURE: 82 MMHG | WEIGHT: 190.04 LBS | TEMPERATURE: 97 F | HEART RATE: 86 BPM | OXYGEN SATURATION: 99 % | SYSTOLIC BLOOD PRESSURE: 161 MMHG

## 2023-08-27 VITALS
DIASTOLIC BLOOD PRESSURE: 73 MMHG | TEMPERATURE: 97 F | OXYGEN SATURATION: 99 % | SYSTOLIC BLOOD PRESSURE: 146 MMHG | HEART RATE: 79 BPM | RESPIRATION RATE: 17 BRPM

## 2023-08-27 DIAGNOSIS — F41.9 ANXIETY DISORDER, UNSPECIFIED: ICD-10-CM

## 2023-08-27 DIAGNOSIS — Z98.890 OTHER SPECIFIED POSTPROCEDURAL STATES: Chronic | ICD-10-CM

## 2023-08-27 DIAGNOSIS — Z87.09 PERSONAL HISTORY OF OTHER DISEASES OF THE RESPIRATORY SYSTEM: Chronic | ICD-10-CM

## 2023-08-27 DIAGNOSIS — I10 ESSENTIAL (PRIMARY) HYPERTENSION: ICD-10-CM

## 2023-08-27 DIAGNOSIS — Z00.00 ENCOUNTER FOR GENERAL ADULT MEDICAL EXAMINATION WITHOUT ABNORMAL FINDINGS: ICD-10-CM

## 2023-08-27 DIAGNOSIS — E78.5 HYPERLIPIDEMIA, UNSPECIFIED: ICD-10-CM

## 2023-08-27 DIAGNOSIS — K21.9 GASTRO-ESOPHAGEAL REFLUX DISEASE WITHOUT ESOPHAGITIS: ICD-10-CM

## 2023-08-27 DIAGNOSIS — F03.90 UNSPECIFIED DEMENTIA WITHOUT BEHAVIORAL DISTURBANCE: ICD-10-CM

## 2023-08-27 PROCEDURE — 74018 RADEX ABDOMEN 1 VIEW: CPT

## 2023-08-27 PROCEDURE — 99284 EMERGENCY DEPT VISIT MOD MDM: CPT

## 2023-08-27 PROCEDURE — 71045 X-RAY EXAM CHEST 1 VIEW: CPT | Mod: 26

## 2023-08-27 PROCEDURE — 71045 X-RAY EXAM CHEST 1 VIEW: CPT

## 2023-08-27 PROCEDURE — 99283 EMERGENCY DEPT VISIT LOW MDM: CPT | Mod: 25

## 2023-08-27 PROCEDURE — 74018 RADEX ABDOMEN 1 VIEW: CPT | Mod: 26

## 2023-08-27 NOTE — ED PROVIDER NOTE - OBJECTIVE STATEMENT
79 yo female with a pmh of htn, hld, gerd, anxiety, dementia presents because she believes she swallowed her dental bridge 4 days prior. pt states she was seen at a urgent care who performed xrays that did not have any evidence of her bridge. pt has been tolerating PO but states to force herself to vomit because she believes she swallowed her bridge. pt denies any other symptoms including fevers, chill, headache, recent illness/travel, cough, abdominal pain, chest pain, or SOB.

## 2023-08-27 NOTE — ED PROVIDER NOTE - ATTENDING APP SHARED VISIT CONTRIBUTION OF CARE
77 yo F PMHx dementia, HTN, GERD presents to ED for eval of possibly swallowing dental bridge. Pt accompanied by her sister who lives with her, sister reports pt has recently been diagnosed with dementia and has been losing and forgetting things for a few months. Pt reports she in unsure if she lost or swallowed her dental bridge. Pt reports she drank her coffee this morning but has been nervous to eat. Patient denies abdominal pain. No fevers, chest pain, choking, SOB.     CONSTITUTIONAL: NAD.   SKIN: warm, dry  HEAD: Normocephalic; atraumatic.  EYES: no conjunctival injection.   ENT: MMM. no pharyngeal erythema exudates or FB.   NECK: Supple. Full ROM.   CARD: RRR.   RESP: No wheezes, rales or rhonchi.  ABD: soft ntnd. no guarding no CVA TTP.   EXT: Normal ROM.  No lower extremity edema.   NEURO: Alert, oriented, grossly unremarkable.  PSYCH: Cooperative, appropriate.

## 2023-08-27 NOTE — ED PROVIDER NOTE - CLINICAL SUMMARY MEDICAL DECISION MAKING FREE TEXT BOX
77 yo F presented for eval of possible swallowed foreign body. L Imaging was ordered and reviewed by me. No FB on XR.  Patient's records (prior hospital, ED visit, and/or nursing home notes if available) were reviewed.  Additional history was obtained from EMS, family, and/or PCP (where available).  Escalation to admission/observation was considered. However patient feels much better, relieved there is no FB on xr, tolerating PO and is comfortable with discharge. Pts sister comfortable with discharge plan and will take pt to PCP follow up. Appropriate follow-up was arranged. Return precautions discussed in detail.

## 2023-08-27 NOTE — ED PROVIDER NOTE - CARE PROVIDER_API CALL
Liliam Perkins  Cardiovascular Disease  [address] [place] [address]  Phone: [phone]  Fax: [phone]  Follow Up Time: 7-10 Days

## 2023-08-27 NOTE — ED ADULT NURSE NOTE - NSFALLUNIVINTERV_ED_ALL_ED
Bed/Stretcher in lowest position, wheels locked, appropriate side rails in place/Call bell, personal items and telephone in reach/Instruct patient to call for assistance before getting out of bed/chair/stretcher/Non-slip footwear applied when patient is off stretcher/[place] to call system/Physically safe environment - no spills, clutter or unnecessary equipment/Purposeful proactive rounding/Room/bathroom lighting operational, light cord in reach

## 2023-08-27 NOTE — ED PROVIDER NOTE - CARE PLAN
1 Principal Discharge DX:	Evaluation by medical service required  Assessment and plan of treatment:	Plan - xr

## 2023-08-27 NOTE — ED PROVIDER NOTE - PATIENT PORTAL LINK FT
You can access the FollowMyHealth Patient Portal offered by [facility] by registering at the following website: [email or web address]. By joining [facility]’s FollowMyHealth portal, you will also be able to view your health information using other applications (apps) compatible with our system.

## 2023-08-28 ENCOUNTER — APPOINTMENT (OUTPATIENT)
Dept: PULMONOLOGY | Facility: CLINIC | Age: 78
End: 2023-08-28

## 2023-09-07 ENCOUNTER — APPOINTMENT (OUTPATIENT)
Dept: PULMONOLOGY | Facility: CLINIC | Age: 78
End: 2023-09-07

## 2024-01-08 ENCOUNTER — INPATIENT (INPATIENT)
Facility: HOSPITAL | Age: 79
LOS: 23 days | Discharge: HOSPICE | DRG: 326 | End: 2024-02-01
Attending: SURGERY | Admitting: INTERNAL MEDICINE
Payer: MEDICARE

## 2024-01-08 VITALS
RESPIRATION RATE: 19 BRPM | TEMPERATURE: 98 F | DIASTOLIC BLOOD PRESSURE: 67 MMHG | HEART RATE: 100 BPM | OXYGEN SATURATION: 98 % | SYSTOLIC BLOOD PRESSURE: 143 MMHG | WEIGHT: 134.04 LBS

## 2024-01-08 DIAGNOSIS — Z87.09 PERSONAL HISTORY OF OTHER DISEASES OF THE RESPIRATORY SYSTEM: Chronic | ICD-10-CM

## 2024-01-08 DIAGNOSIS — Z98.890 OTHER SPECIFIED POSTPROCEDURAL STATES: Chronic | ICD-10-CM

## 2024-01-08 DIAGNOSIS — R53.1 WEAKNESS: ICD-10-CM

## 2024-01-08 LAB
ALBUMIN SERPL ELPH-MCNC: 3.8 G/DL — SIGNIFICANT CHANGE UP (ref 3.5–5.2)
ALBUMIN SERPL ELPH-MCNC: 3.8 G/DL — SIGNIFICANT CHANGE UP (ref 3.5–5.2)
ALP SERPL-CCNC: 97 U/L — SIGNIFICANT CHANGE UP (ref 30–115)
ALP SERPL-CCNC: 97 U/L — SIGNIFICANT CHANGE UP (ref 30–115)
ALT FLD-CCNC: 8 U/L — SIGNIFICANT CHANGE UP (ref 0–41)
ALT FLD-CCNC: 8 U/L — SIGNIFICANT CHANGE UP (ref 0–41)
ANION GAP SERPL CALC-SCNC: 15 MMOL/L — HIGH (ref 7–14)
ANION GAP SERPL CALC-SCNC: 15 MMOL/L — HIGH (ref 7–14)
ANISOCYTOSIS BLD QL: SIGNIFICANT CHANGE UP
ANISOCYTOSIS BLD QL: SIGNIFICANT CHANGE UP
APTT BLD: 26.9 SEC — LOW (ref 27–39.2)
APTT BLD: 26.9 SEC — LOW (ref 27–39.2)
AST SERPL-CCNC: 14 U/L — SIGNIFICANT CHANGE UP (ref 0–41)
AST SERPL-CCNC: 14 U/L — SIGNIFICANT CHANGE UP (ref 0–41)
BASOPHILS # BLD AUTO: 0.27 K/UL — HIGH (ref 0–0.2)
BASOPHILS # BLD AUTO: 0.27 K/UL — HIGH (ref 0–0.2)
BASOPHILS NFR BLD AUTO: 2.7 % — HIGH (ref 0–1)
BASOPHILS NFR BLD AUTO: 2.7 % — HIGH (ref 0–1)
BILIRUB SERPL-MCNC: 0.5 MG/DL — SIGNIFICANT CHANGE UP (ref 0.2–1.2)
BILIRUB SERPL-MCNC: 0.5 MG/DL — SIGNIFICANT CHANGE UP (ref 0.2–1.2)
BLD GP AB SCN SERPL QL: SIGNIFICANT CHANGE UP
BLD GP AB SCN SERPL QL: SIGNIFICANT CHANGE UP
BUN SERPL-MCNC: 27 MG/DL — HIGH (ref 10–20)
BUN SERPL-MCNC: 27 MG/DL — HIGH (ref 10–20)
CALCIUM SERPL-MCNC: 9.8 MG/DL — SIGNIFICANT CHANGE UP (ref 8.4–10.5)
CALCIUM SERPL-MCNC: 9.8 MG/DL — SIGNIFICANT CHANGE UP (ref 8.4–10.5)
CHLORIDE SERPL-SCNC: 94 MMOL/L — LOW (ref 98–110)
CHLORIDE SERPL-SCNC: 94 MMOL/L — LOW (ref 98–110)
CO2 SERPL-SCNC: 29 MMOL/L — SIGNIFICANT CHANGE UP (ref 17–32)
CO2 SERPL-SCNC: 29 MMOL/L — SIGNIFICANT CHANGE UP (ref 17–32)
CREAT SERPL-MCNC: 1.6 MG/DL — HIGH (ref 0.7–1.5)
CREAT SERPL-MCNC: 1.6 MG/DL — HIGH (ref 0.7–1.5)
EGFR: 33 ML/MIN/1.73M2 — LOW
EGFR: 33 ML/MIN/1.73M2 — LOW
ELLIPTOCYTES BLD QL SMEAR: SLIGHT — SIGNIFICANT CHANGE UP
ELLIPTOCYTES BLD QL SMEAR: SLIGHT — SIGNIFICANT CHANGE UP
EOSINOPHIL # BLD AUTO: 0.09 K/UL — SIGNIFICANT CHANGE UP (ref 0–0.7)
EOSINOPHIL # BLD AUTO: 0.09 K/UL — SIGNIFICANT CHANGE UP (ref 0–0.7)
EOSINOPHIL NFR BLD AUTO: 0.9 % — SIGNIFICANT CHANGE UP (ref 0–8)
EOSINOPHIL NFR BLD AUTO: 0.9 % — SIGNIFICANT CHANGE UP (ref 0–8)
GIANT PLATELETS BLD QL SMEAR: PRESENT — SIGNIFICANT CHANGE UP
GIANT PLATELETS BLD QL SMEAR: PRESENT — SIGNIFICANT CHANGE UP
GLUCOSE SERPL-MCNC: 98 MG/DL — SIGNIFICANT CHANGE UP (ref 70–99)
GLUCOSE SERPL-MCNC: 98 MG/DL — SIGNIFICANT CHANGE UP (ref 70–99)
HCT VFR BLD CALC: 26.4 % — LOW (ref 37–47)
HCT VFR BLD CALC: 26.4 % — LOW (ref 37–47)
HGB BLD-MCNC: 7.5 G/DL — LOW (ref 12–16)
HGB BLD-MCNC: 7.5 G/DL — LOW (ref 12–16)
HYPOCHROMIA BLD QL: SIGNIFICANT CHANGE UP
HYPOCHROMIA BLD QL: SIGNIFICANT CHANGE UP
INR BLD: 1.09 RATIO — SIGNIFICANT CHANGE UP (ref 0.65–1.3)
INR BLD: 1.09 RATIO — SIGNIFICANT CHANGE UP (ref 0.65–1.3)
LYMPHOCYTES # BLD AUTO: 1.52 K/UL — SIGNIFICANT CHANGE UP (ref 1.2–3.4)
LYMPHOCYTES # BLD AUTO: 1.52 K/UL — SIGNIFICANT CHANGE UP (ref 1.2–3.4)
LYMPHOCYTES # BLD AUTO: 15.3 % — LOW (ref 20.5–51.1)
LYMPHOCYTES # BLD AUTO: 15.3 % — LOW (ref 20.5–51.1)
MANUAL SMEAR VERIFICATION: SIGNIFICANT CHANGE UP
MANUAL SMEAR VERIFICATION: SIGNIFICANT CHANGE UP
MCHC RBC-ENTMCNC: 19 PG — LOW (ref 27–31)
MCHC RBC-ENTMCNC: 19 PG — LOW (ref 27–31)
MCHC RBC-ENTMCNC: 28.4 G/DL — LOW (ref 32–37)
MCHC RBC-ENTMCNC: 28.4 G/DL — LOW (ref 32–37)
MCV RBC AUTO: 66.8 FL — LOW (ref 81–99)
MCV RBC AUTO: 66.8 FL — LOW (ref 81–99)
METAMYELOCYTES # FLD: 0.9 % — HIGH (ref 0–0)
METAMYELOCYTES # FLD: 0.9 % — HIGH (ref 0–0)
MICROCYTES BLD QL: SIGNIFICANT CHANGE UP
MICROCYTES BLD QL: SIGNIFICANT CHANGE UP
MONOCYTES # BLD AUTO: 0.18 K/UL — SIGNIFICANT CHANGE UP (ref 0.1–0.6)
MONOCYTES # BLD AUTO: 0.18 K/UL — SIGNIFICANT CHANGE UP (ref 0.1–0.6)
MONOCYTES NFR BLD AUTO: 1.8 % — SIGNIFICANT CHANGE UP (ref 1.7–9.3)
MONOCYTES NFR BLD AUTO: 1.8 % — SIGNIFICANT CHANGE UP (ref 1.7–9.3)
NEUTROPHILS # BLD AUTO: 7.62 K/UL — HIGH (ref 1.4–6.5)
NEUTROPHILS # BLD AUTO: 7.62 K/UL — HIGH (ref 1.4–6.5)
NEUTROPHILS NFR BLD AUTO: 75.7 % — HIGH (ref 42.2–75.2)
NEUTROPHILS NFR BLD AUTO: 75.7 % — HIGH (ref 42.2–75.2)
NEUTS BAND # BLD: 0.9 % — SIGNIFICANT CHANGE UP (ref 0–6)
NEUTS BAND # BLD: 0.9 % — SIGNIFICANT CHANGE UP (ref 0–6)
OVALOCYTES BLD QL SMEAR: SLIGHT — SIGNIFICANT CHANGE UP
OVALOCYTES BLD QL SMEAR: SLIGHT — SIGNIFICANT CHANGE UP
PLAT MORPH BLD: NORMAL — SIGNIFICANT CHANGE UP
PLAT MORPH BLD: NORMAL — SIGNIFICANT CHANGE UP
PLATELET # BLD AUTO: 595 K/UL — HIGH (ref 130–400)
PLATELET # BLD AUTO: 595 K/UL — HIGH (ref 130–400)
PMV BLD: 9 FL — SIGNIFICANT CHANGE UP (ref 7.4–10.4)
PMV BLD: 9 FL — SIGNIFICANT CHANGE UP (ref 7.4–10.4)
POIKILOCYTOSIS BLD QL AUTO: SLIGHT — SIGNIFICANT CHANGE UP
POIKILOCYTOSIS BLD QL AUTO: SLIGHT — SIGNIFICANT CHANGE UP
POLYCHROMASIA BLD QL SMEAR: SLIGHT — SIGNIFICANT CHANGE UP
POLYCHROMASIA BLD QL SMEAR: SLIGHT — SIGNIFICANT CHANGE UP
POTASSIUM SERPL-MCNC: 3.4 MMOL/L — LOW (ref 3.5–5)
POTASSIUM SERPL-MCNC: 3.4 MMOL/L — LOW (ref 3.5–5)
POTASSIUM SERPL-SCNC: 3.4 MMOL/L — LOW (ref 3.5–5)
POTASSIUM SERPL-SCNC: 3.4 MMOL/L — LOW (ref 3.5–5)
PROT SERPL-MCNC: 6.7 G/DL — SIGNIFICANT CHANGE UP (ref 6–8)
PROT SERPL-MCNC: 6.7 G/DL — SIGNIFICANT CHANGE UP (ref 6–8)
PROTHROM AB SERPL-ACNC: 12.4 SEC — SIGNIFICANT CHANGE UP (ref 9.95–12.87)
PROTHROM AB SERPL-ACNC: 12.4 SEC — SIGNIFICANT CHANGE UP (ref 9.95–12.87)
RBC # BLD: 3.95 M/UL — LOW (ref 4.2–5.4)
RBC # BLD: 3.95 M/UL — LOW (ref 4.2–5.4)
RBC # FLD: 17.2 % — HIGH (ref 11.5–14.5)
RBC # FLD: 17.2 % — HIGH (ref 11.5–14.5)
RBC BLD AUTO: ABNORMAL
RBC BLD AUTO: ABNORMAL
SMUDGE CELLS # BLD: PRESENT — SIGNIFICANT CHANGE UP
SMUDGE CELLS # BLD: PRESENT — SIGNIFICANT CHANGE UP
SODIUM SERPL-SCNC: 138 MMOL/L — SIGNIFICANT CHANGE UP (ref 135–146)
SODIUM SERPL-SCNC: 138 MMOL/L — SIGNIFICANT CHANGE UP (ref 135–146)
STOMATOCYTES BLD QL SMEAR: SLIGHT — SIGNIFICANT CHANGE UP
STOMATOCYTES BLD QL SMEAR: SLIGHT — SIGNIFICANT CHANGE UP
VARIANT LYMPHS # BLD: 1.8 % — SIGNIFICANT CHANGE UP (ref 0–5)
VARIANT LYMPHS # BLD: 1.8 % — SIGNIFICANT CHANGE UP (ref 0–5)
WBC # BLD: 9.95 K/UL — SIGNIFICANT CHANGE UP (ref 4.8–10.8)
WBC # BLD: 9.95 K/UL — SIGNIFICANT CHANGE UP (ref 4.8–10.8)
WBC # FLD AUTO: 9.95 K/UL — SIGNIFICANT CHANGE UP (ref 4.8–10.8)
WBC # FLD AUTO: 9.95 K/UL — SIGNIFICANT CHANGE UP (ref 4.8–10.8)

## 2024-01-08 PROCEDURE — 80061 LIPID PANEL: CPT

## 2024-01-08 PROCEDURE — 92610 EVALUATE SWALLOWING FUNCTION: CPT | Mod: GN

## 2024-01-08 PROCEDURE — 74018 RADEX ABDOMEN 1 VIEW: CPT

## 2024-01-08 PROCEDURE — 88342 IMHCHEM/IMCYTCHM 1ST ANTB: CPT

## 2024-01-08 PROCEDURE — 84466 ASSAY OF TRANSFERRIN: CPT

## 2024-01-08 PROCEDURE — C1769: CPT

## 2024-01-08 PROCEDURE — C1751: CPT

## 2024-01-08 PROCEDURE — 86870 RBC ANTIBODY IDENTIFICATION: CPT

## 2024-01-08 PROCEDURE — 86850 RBC ANTIBODY SCREEN: CPT

## 2024-01-08 PROCEDURE — P9040: CPT

## 2024-01-08 PROCEDURE — 84484 ASSAY OF TROPONIN QUANT: CPT

## 2024-01-08 PROCEDURE — 86880 COOMBS TEST DIRECT: CPT

## 2024-01-08 PROCEDURE — 85045 AUTOMATED RETICULOCYTE COUNT: CPT

## 2024-01-08 PROCEDURE — C9113: CPT

## 2024-01-08 PROCEDURE — 94640 AIRWAY INHALATION TREATMENT: CPT

## 2024-01-08 PROCEDURE — 85025 COMPLETE CBC W/AUTO DIFF WBC: CPT

## 2024-01-08 PROCEDURE — 82803 BLOOD GASES ANY COMBINATION: CPT

## 2024-01-08 PROCEDURE — 86902 BLOOD TYPE ANTIGEN DONOR EA: CPT

## 2024-01-08 PROCEDURE — 99285 EMERGENCY DEPT VISIT HI MDM: CPT

## 2024-01-08 PROCEDURE — 83735 ASSAY OF MAGNESIUM: CPT

## 2024-01-08 PROCEDURE — 82746 ASSAY OF FOLIC ACID SERUM: CPT

## 2024-01-08 PROCEDURE — 84132 ASSAY OF SERUM POTASSIUM: CPT

## 2024-01-08 PROCEDURE — 86901 BLOOD TYPING SEROLOGIC RH(D): CPT

## 2024-01-08 PROCEDURE — 84100 ASSAY OF PHOSPHORUS: CPT

## 2024-01-08 PROCEDURE — 97164 PT RE-EVAL EST PLAN CARE: CPT | Mod: GP

## 2024-01-08 PROCEDURE — 85027 COMPLETE CBC AUTOMATED: CPT

## 2024-01-08 PROCEDURE — 86922 COMPATIBILITY TEST ANTIGLOB: CPT

## 2024-01-08 PROCEDURE — 85018 HEMOGLOBIN: CPT

## 2024-01-08 PROCEDURE — C1889: CPT

## 2024-01-08 PROCEDURE — 82962 GLUCOSE BLOOD TEST: CPT

## 2024-01-08 PROCEDURE — 88305 TISSUE EXAM BY PATHOLOGIST: CPT

## 2024-01-08 PROCEDURE — 84300 ASSAY OF URINE SODIUM: CPT

## 2024-01-08 PROCEDURE — 93971 EXTREMITY STUDY: CPT | Mod: RT

## 2024-01-08 PROCEDURE — 97530 THERAPEUTIC ACTIVITIES: CPT | Mod: GP

## 2024-01-08 PROCEDURE — 86301 IMMUNOASSAY TUMOR CA 19-9: CPT

## 2024-01-08 PROCEDURE — 82550 ASSAY OF CK (CPK): CPT

## 2024-01-08 PROCEDURE — 93005 ELECTROCARDIOGRAM TRACING: CPT

## 2024-01-08 PROCEDURE — 99223 1ST HOSP IP/OBS HIGH 75: CPT

## 2024-01-08 PROCEDURE — 97116 GAIT TRAINING THERAPY: CPT | Mod: GP

## 2024-01-08 PROCEDURE — 80048 BASIC METABOLIC PNL TOTAL CA: CPT

## 2024-01-08 PROCEDURE — 94660 CPAP INITIATION&MGMT: CPT

## 2024-01-08 PROCEDURE — 88312 SPECIAL STAINS GROUP 1: CPT

## 2024-01-08 PROCEDURE — 82553 CREATINE MB FRACTION: CPT

## 2024-01-08 PROCEDURE — 82330 ASSAY OF CALCIUM: CPT

## 2024-01-08 PROCEDURE — 82728 ASSAY OF FERRITIN: CPT

## 2024-01-08 PROCEDURE — 86900 BLOOD TYPING SEROLOGIC ABO: CPT

## 2024-01-08 PROCEDURE — 85014 HEMATOCRIT: CPT

## 2024-01-08 PROCEDURE — 82378 CARCINOEMBRYONIC ANTIGEN: CPT

## 2024-01-08 PROCEDURE — 74177 CT ABD & PELVIS W/CONTRAST: CPT | Mod: 26,MA

## 2024-01-08 PROCEDURE — 85730 THROMBOPLASTIN TIME PARTIAL: CPT

## 2024-01-08 PROCEDURE — 85610 PROTHROMBIN TIME: CPT

## 2024-01-08 PROCEDURE — 80076 HEPATIC FUNCTION PANEL: CPT

## 2024-01-08 PROCEDURE — 76775 US EXAM ABDO BACK WALL LIM: CPT

## 2024-01-08 PROCEDURE — L8699: CPT

## 2024-01-08 PROCEDURE — 0225U NFCT DS DNA&RNA 21 SARSCOV2: CPT

## 2024-01-08 PROCEDURE — 88341 IMHCHEM/IMCYTCHM EA ADD ANTB: CPT

## 2024-01-08 PROCEDURE — 83605 ASSAY OF LACTIC ACID: CPT

## 2024-01-08 PROCEDURE — 36415 COLL VENOUS BLD VENIPUNCTURE: CPT

## 2024-01-08 PROCEDURE — 83036 HEMOGLOBIN GLYCOSYLATED A1C: CPT

## 2024-01-08 PROCEDURE — 83540 ASSAY OF IRON: CPT

## 2024-01-08 PROCEDURE — 84295 ASSAY OF SERUM SODIUM: CPT

## 2024-01-08 PROCEDURE — 94760 N-INVAS EAR/PLS OXIMETRY 1: CPT

## 2024-01-08 PROCEDURE — 36430 TRANSFUSION BLD/BLD COMPNT: CPT

## 2024-01-08 PROCEDURE — 82607 VITAMIN B-12: CPT

## 2024-01-08 PROCEDURE — 71045 X-RAY EXAM CHEST 1 VIEW: CPT

## 2024-01-08 PROCEDURE — 71260 CT THORAX DX C+: CPT

## 2024-01-08 PROCEDURE — 80053 COMPREHEN METABOLIC PANEL: CPT

## 2024-01-08 PROCEDURE — 83550 IRON BINDING TEST: CPT

## 2024-01-08 PROCEDURE — 97161 PT EVAL LOW COMPLEX 20 MIN: CPT | Mod: GP

## 2024-01-08 PROCEDURE — 82570 ASSAY OF URINE CREATININE: CPT

## 2024-01-08 PROCEDURE — 93306 TTE W/DOPPLER COMPLETE: CPT

## 2024-01-08 PROCEDURE — 97110 THERAPEUTIC EXERCISES: CPT | Mod: GP

## 2024-01-08 RX ORDER — NIFEDIPINE 30 MG
60 TABLET, EXTENDED RELEASE 24 HR ORAL DAILY
Refills: 0 | Status: DISCONTINUED | OUTPATIENT
Start: 2024-01-08 | End: 2024-01-16

## 2024-01-08 RX ORDER — FUROSEMIDE 40 MG
1 TABLET ORAL
Qty: 0 | Refills: 0 | DISCHARGE

## 2024-01-08 RX ORDER — FUROSEMIDE 40 MG
1 TABLET ORAL
Refills: 0 | DISCHARGE

## 2024-01-08 RX ORDER — IOHEXOL 300 MG/ML
30 INJECTION, SOLUTION INTRAVENOUS ONCE
Refills: 0 | Status: COMPLETED | OUTPATIENT
Start: 2024-01-08 | End: 2024-01-08

## 2024-01-08 RX ORDER — SODIUM CHLORIDE 9 MG/ML
1000 INJECTION, SOLUTION INTRAVENOUS
Refills: 0 | Status: DISCONTINUED | OUTPATIENT
Start: 2024-01-08 | End: 2024-01-12

## 2024-01-08 RX ORDER — ONDANSETRON 8 MG/1
4 TABLET, FILM COATED ORAL ONCE
Refills: 0 | Status: COMPLETED | OUTPATIENT
Start: 2024-01-08 | End: 2024-01-08

## 2024-01-08 RX ORDER — SERTRALINE 25 MG/1
1 TABLET, FILM COATED ORAL
Refills: 0 | DISCHARGE

## 2024-01-08 RX ORDER — OMEPRAZOLE 10 MG/1
1 CAPSULE, DELAYED RELEASE ORAL
Qty: 0 | Refills: 0 | DISCHARGE

## 2024-01-08 RX ORDER — METOPROLOL TARTRATE 50 MG
50 TABLET ORAL DAILY
Refills: 0 | Status: DISCONTINUED | OUTPATIENT
Start: 2024-01-08 | End: 2024-01-26

## 2024-01-08 RX ORDER — ALPRAZOLAM 0.25 MG
0.25 TABLET ORAL AT BEDTIME
Refills: 0 | Status: DISCONTINUED | OUTPATIENT
Start: 2024-01-08 | End: 2024-01-15

## 2024-01-08 RX ORDER — NIFEDIPINE 30 MG
1 TABLET, EXTENDED RELEASE 24 HR ORAL
Refills: 0 | DISCHARGE

## 2024-01-08 RX ORDER — SERTRALINE 25 MG/1
100 TABLET, FILM COATED ORAL DAILY
Refills: 0 | Status: DISCONTINUED | OUTPATIENT
Start: 2024-01-08 | End: 2024-01-26

## 2024-01-08 RX ORDER — METOPROLOL TARTRATE 50 MG
1 TABLET ORAL
Refills: 0 | DISCHARGE

## 2024-01-08 RX ORDER — PANTOPRAZOLE SODIUM 20 MG/1
40 TABLET, DELAYED RELEASE ORAL
Refills: 0 | Status: DISCONTINUED | OUTPATIENT
Start: 2024-01-08 | End: 2024-01-12

## 2024-01-08 RX ORDER — ALPRAZOLAM 0.25 MG
1 TABLET ORAL
Refills: 0 | DISCHARGE

## 2024-01-08 RX ORDER — BUDESONIDE AND FORMOTEROL FUMARATE DIHYDRATE 160; 4.5 UG/1; UG/1
2 AEROSOL RESPIRATORY (INHALATION)
Refills: 0 | DISCHARGE

## 2024-01-08 RX ORDER — METHOTREXATE 2.5 MG/1
1 TABLET ORAL
Qty: 0 | Refills: 0 | DISCHARGE

## 2024-01-08 RX ORDER — LEFLUNOMIDE 10 MG/1
1 TABLET ORAL
Qty: 0 | Refills: 0 | DISCHARGE

## 2024-01-08 RX ADMIN — IOHEXOL 30 MILLILITER(S): 300 INJECTION, SOLUTION INTRAVENOUS at 22:52

## 2024-01-08 RX ADMIN — ONDANSETRON 4 MILLIGRAM(S): 8 TABLET, FILM COATED ORAL at 17:16

## 2024-01-08 NOTE — H&P ADULT - HISTORY OF PRESENT ILLNESS
The patient is a 78-year-old female with past medical history of hiatal hernia s/p repair >10 years back, hypertension, GERD, non obstructive CAD, depression, dementia, anemia presenting for evaluation of low hemoglobin, measured outpatient to be 7.4.  Patient has had multiple episodes of nonbloody nonbilious vomiting over the last several months with good appetite and almost 60 pound weight loss over the last few months.  Patient just started following up with hematologist Dr.Yumi Hussein and had recent blood work from 3 days ago that resulted today and showed a low hemoglobin of 7.4. Patient denies fevers, chills, chest pain, shortness of breath, hematuria, melena, hematochezia. The pt presented to the Ed for a blood transfusion that was recommended by the hematologist.    In the ED  Vital Signs Last 24 Hrs  T(C): 36.9 (08 Jan 2024 19:06), Max: 36.9 (08 Jan 2024 19:06)  T(F): 98.4 (08 Jan 2024 19:06), Max: 98.4 (08 Jan 2024 19:06)  HR: 81 (08 Jan 2024 19:06) (70 - 100)  BP: 155/74 (08 Jan 2024 19:06) (130/60 - 196/89)  BP(mean): --  RR: 18 (08 Jan 2024 19:06) (18 - 19)  SpO2: 98% (08 Jan 2024 19:06) (98% - 100%)    Parameters below as of 08 Jan 2024 19:06  Patient On (Oxygen Delivery Method): room air    In the ED the pt received 1 PRBC  Labs were significant for a Cr of 1.6 (unknown baseline)  and a lactate    Cat scan was significant for  Large hiatal hernia containing partially included stomach with apparent   organoaxial rotation    The pt was admitted to medicine for anemia 2/2 possible GIB

## 2024-01-08 NOTE — ED PROVIDER NOTE - PROGRESS NOTE DETAILS
MS–patient CT findings, general surgery consulted.  Spoke with patient and sister at bedside, patient does not want to be admitted.  Still agreeable for blood transfusion.  Agrees to have surgery evaluate her.

## 2024-01-08 NOTE — ED PROVIDER NOTE - CLINICAL SUMMARY MEDICAL DECISION MAKING FREE TEXT BOX
Patient here with symptomatic anemia.  Patient also with significant weight loss over the past few months and has had vomiting as per family.  Labs and imaging reviewed.  Patient admitted to medicine for further management.  Surgery was consulted.  I spoke to janett Brown who is aware of need for surgery consult for further evaluation.  Patient denies blood in stool.  Patient was sent in by hematologist.  Patient requires further workup and evaluation including surgery and likely GI evaluation.  Patient ordered for transfusion in the ED.

## 2024-01-08 NOTE — H&P ADULT - ASSESSMENT
Assessment  The patient is a 78-year-old female with past medical history of hiatal hernia s/p repair >10 years back, hypertension, GERD, non obstructive CAD, depression, dementia, anemia presenting for evaluation of low hemoglobin, measured outpatient to be 7.4. The pt was admitted to medicine for anemia due to a suspected GIB and large hiatal hernia. Emergency contact Sonali (sister) [phone]      Plan  #GIB?  #Microcytic anemia  - Hb 7.5 currently. Denied melena/hematochezia.   - s/p 1u PRBC transfusion.   - started on IV PPI BID  -C/W LR at 75cc/hr  - monitor CBC and transfuse if Hb < 8.   - active T and S.   - check retic count, Fe studies, B12 and folate.   - GI consulted     #hiatal hernia  #Vomiting   - CT abdomen shows Large hiatal hernia containing partially included stomach with apparent organoaxial rotation.  - PPI IV BID   - Pt has good appetite  -More vomitting with solids vs liquids  -Surgery consulted  -Recommend KUB with oral contrast to see passage and lactate to assess for ischemia  -If lactate increasing and abd pain emergency surgery in AM  -NPO after MN  -F/U preop labs     #DAWIT on CKD stage 3 vs. Progressive CKD   - serum Cr 1.6 currently. Baseline Cr 1.2 in March 2022.   - c/w IVF gentle hydration  - F/U renal US and urine studies  - monitor BMP    #HTN   -holding ACEI/ARB. If BP persistently elevated, may start on nifedipine XL.     #depression  C/W home meds    DVT ppx: SCD  GI ppx: PPI  Diet: clear liquid diet, NPO after MN  Activity: as tolerated. Assessment  The patient is a 78-year-old female with past medical history of hiatal hernia s/p repair >10 years back, hypertension, GERD, non obstructive CAD, depression, dementia, anemia presenting for evaluation of low hemoglobin, measured outpatient to be 7.4. The pt was admitted to medicine for anemia due to a suspected GIB and large hiatal hernia. Emergency contact Sonali (sister) [phone]      Plan  #GIB?  #Microcytic anemia  - Hb 7.5 currently. Denied melena/hematochezia.   - s/p 1u PRBC transfusion.   - started on IV PPI BID  -C/W LR at 75cc/hr  - monitor CBC and transfuse if Hb < 8.   - active T and S.   - check retic count, Fe studies, B12 and folate.   - GI consulted     #hiatal hernia  #Vomiting   - CT abdomen shows Large hiatal hernia containing partially included stomach with apparent organoaxial rotation.  - PPI IV BID   - Pt has good appetite  -More vomitting with solids vs liquids  -Surgery consulted  -Recommend KUB with oral contrast to see passage and lactate to assess for ischemia  -If lactate increasing and abd pain emergency surgery in AM  -NPO after MN  -F/U preop labs     #DAWIT on CKD stage 3 vs. Progressive CKD   - serum Cr 1.6 currently. Baseline Cr 1.2 in March 2022.   - c/w IVF gentle hydration  - F/U renal US and urine studies  - monitor BMP    #HTN   -holding ACEI/ARB. If BP persistently elevated, may start on nifedipine XL.     #depression  C/W home meds    DVT ppx: SCD  GI ppx: PPI  Diet: clear liquid diet, NPO after MN  Activity: as tolerated. Assessment  The patient is a 78-year-old female with past medical history of hiatal hernia s/p repair >10 years back, hypertension, GERD, non obstructive CAD, depression, dementia, anemia presenting for evaluation of low hemoglobin, measured outpatient to be 7.4. The pt was admitted to medicine for anemia due to a suspected GIB and large hiatal hernia. Emergency contact Sonali (sister) [phone]      Plan  #GIB?  #Microcytic anemia  - Hb 7.5 currently. Denied melena/hematochezia.   - s/p 1u PRBC transfusion.   - started on IV PPI BID  -C/W LR at 75cc/hr  - monitor CBC and transfuse if Hb < 8.   - active T and S.   - check retic count, Fe studies, B12 and folate.   - GI consulted     #hiatal hernia  #Vomiting   - CT abdomen shows Large hiatal hernia containing partially included stomach with apparent organoaxial rotation.  - PPI IV BID   - Pt has good appetite  -More vomitting with solids vs liquids  -Surgery consulted  -Recommend KUB with oral contrast to see passage and lactate to assess for ischemia  -If lactate increasing and abd pain emergency surgery in AM  -NPO after MN  -F/U preop labs     #DAWIT on CKD stage 3 vs. Progressive CKD   - serum Cr 1.6 currently. Baseline Cr 1.2 in March 2022.   - c/w IVF gentle hydration  - F/U renal US and urine studies  - monitor BMP    #CHF?  #HFpEF?  -on lasix PO 40 BID  -last echo in 10/1/20 shows  Left ventricular ejection fraction, by visual estimation, is 60 to 65%.  Mild concentric left ventricular hypertrophy.  Spectral Doppler shows pseudonormal pattern of left ventricular myocardial filling (Grade II diastolic dysfunction).  -holding for now    #asthma  -Follows dr. Hartley  - had recommended symbicort 160-4.5 2 puff BID and combivent prn but pt does not take it.     #HTN   -holding lisinopril 40.   -C/W nifedipine 60 xl  -C/w metoprolol succ 50od. In am confirm with pts sister as she mentioned she takes meto succ 50 bid.    #depression  C/W home meds    DVT ppx: SCD  GI ppx: PPI  Diet: clear liquid diet, NPO after MN  Activity: as tolerated. Assessment  The patient is a 78-year-old female with past medical history of hiatal hernia s/p repair >10 years back, hypertension, GERD, non obstructive CAD, depression, dementia, anemia presenting for evaluation of low hemoglobin, measured outpatient to be 7.4. The pt was admitted to medicine for anemia due to a suspected GIB and large hiatal hernia. Emergency contact Sonali (sister) [phone]      Plan  #GIB?  #Microcytic anemia  - Hb 7.5 currently. Denied melena/hematochezia.   - s/p 1u PRBC transfusion.   - started on IV PPI BID  -C/W LR at 75cc/hr  - monitor CBC and transfuse if Hb < 8.   - active T and S.   - check retic count, Fe studies, B12 and folate.   - GI consulted     #hiatal hernia  #Vomiting   - CT abdomen shows Large hiatal hernia containing partially included stomach with apparent organoaxial rotation.  - PPI IV BID   - Pt has good appetite  -More vomitting with solids vs liquids  -Surgery consulted  -Recommend KUB with oral contrast to see passage and lactate to assess for ischemia  -If lactate increasing and abd pain emergency surgery in AM  -NPO after MN  -F/U preop labs     #DAWIT on CKD stage 3 vs. Progressive CKD   - serum Cr 1.6 currently. Baseline Cr 1.2 in March 2022.   - c/w IVF gentle hydration  - F/U renal US and urine studies  - monitor BMP    #CHF?  #HFpEF?  -on lasix PO 40 BID  -last echo in 10/1/20 shows  Left ventricular ejection fraction, by visual estimation, is 60 to 65%.  Mild concentric left ventricular hypertrophy.  Spectral Doppler shows pseudonormal pattern of left ventricular myocardial filling (Grade II diastolic dysfunction).  -holding for now    #asthma  -Follows dr. Hartley  - had recommended symbicort 160-4.5 2 puff BID and combivent prn but pt does not take it.     #HTN   -holding lisinopril 40.   -C/W nifedipine 60 xl  -C/w metoprolol succ 50od. In am confirm with pts sister as she mentioned she takes meto succ 50 bid.    #depression  C/W home meds    DVT ppx: SCD  GI ppx: PPI  Diet: clear liquid diet, NPO after MN  Activity: as tolerated. Assessment  The patient is a 78-year-old female with past medical history of hiatal hernia s/p repair >10 years back, hypertension, GERD, non obstructive CAD, depression, dementia, anemia presenting for evaluation of low hemoglobin, measured outpatient to be 7.4. The pt was admitted to medicine for anemia due to a suspected GIB and large hiatal hernia. Emergency contact Sonali (sister) [phone]      Plan  #GIB?  #Microcytic anemia  - Hb 7.5 currently. Denied melena/hematochezia.   - s/p 1u PRBC transfusion.   - ERICA negative  - started on IV PPI BID  -C/W LR at 75cc/hr  - monitor CBC and transfuse if Hb < 8.   - active T and S.   - check retic count, Fe studies, B12 and folate.   - GI consulted     #hiatal hernia  #Vomiting   - CT abdomen shows Large hiatal hernia containing partially included stomach with apparent organoaxial rotation.  - PPI IV BID   - Pt has good appetite  -More vomitting with solids vs liquids  -Surgery consulted  -Recommend KUB with oral contrast to see passage and lactate to assess for ischemia  -If lactate increasing and abd pain emergency surgery in AM  -NPO after MN  -F/U preop labs     #DAWIT on CKD stage 3 vs. Progressive CKD   - serum Cr 1.6 currently. Baseline Cr 1.2 in March 2022.   - c/w IVF gentle hydration  - F/U renal US and urine studies  - monitor BMP    #CHF?  #HFpEF?  -on lasix PO 40 BID  -last echo in 10/1/20 shows  Left ventricular ejection fraction, by visual estimation, is 60 to 65%.  Mild concentric left ventricular hypertrophy.  Spectral Doppler shows pseudonormal pattern of left ventricular myocardial filling (Grade II diastolic dysfunction).  -holding for now due to DAWIT and mild hypokalemia    #asthma  -Follows dr. Hartley  - had recommended symbicort 160-4.5 2 puff BID and combivent prn but pt does not take it.     #HTN   -holding lisinopril 40.   -C/W nifedipine 60 xl  -C/w metoprolol succ 50od. In am confirm with pts sister as she mentioned she takes meto succ 50 bid.    #depression  C/W home meds    DVT ppx: SCD  GI ppx: PPI  Diet: clear liquid diet, NPO after MN  Activity: as tolerated. Assessment  The patient is a 78-year-old female with past medical history of hiatal hernia s/p repair >10 years back, hypertension, GERD, non obstructive CAD, depression, dementia, anemia presenting for evaluation of low hemoglobin, measured outpatient to be 7.4. The pt was admitted to medicine for anemia due to a suspected GIB and large hiatal hernia. Emergency contact Sonali (sister) [phone]      Plan  #GIB?  #Microcytic anemia  - Hb 7.5 currently. Denied melena/hematochezia.   - s/p 1u PRBC transfusion.   - ERICA negative  - started on IV PPI BID  -C/W LR at 75cc/hr  - monitor CBC and transfuse if Hb < 8.   - active T and S.   - check retic count, Fe studies, B12 and folate.   - GI consulted     #hiatal hernia  #Vomiting   - CT abdomen shows Large hiatal hernia containing partially included stomach with apparent organoaxial rotation.  - PPI IV BID   - Pt has good appetite  -More vomitting with solids vs liquids  -Surgery consulted  -Recommend KUB with oral contrast to see passage and lactate to assess for ischemia  -If lactate increasing and abd pain emergency surgery in AM  -NPO after MN  -F/U preop labs     #DAWIT on CKD stage 3 vs. Progressive CKD   - serum Cr 1.6 currently. Baseline Cr 1.2 in March 2022.   - c/w IVF gentle hydration  - F/U renal US and urine studies  - monitor BMP    #CHF?  #HFpEF?  -on lasix PO 40 BID  -last echo in 10/1/20 shows  Left ventricular ejection fraction, by visual estimation, is 60 to 65%.  Mild concentric left ventricular hypertrophy.  Spectral Doppler shows pseudonormal pattern of left ventricular myocardial filling (Grade II diastolic dysfunction).  -holding for now due to DAWIT and mild hypokalemia    #asthma  -Follows dr. Hartley  - had recommended symbicort 160-4.5 2 puff BID and combivent prn but pt does not take it.     #HTN   -holding lisinopril 40.   -C/W nifedipine 60 xl  -C/w metoprolol succ 50od. In am confirm with pts sister as she mentioned she takes meto succ 50 bid.    #depression  C/W home meds    DVT ppx: SCD  GI ppx: PPI  Diet: clear liquid diet, NPO after MN  Activity: as tolerated. Assessment  The patient is a 78-year-old female with past medical history of hiatal hernia s/p repair >10 years back, hypertension, GERD, non obstructive CAD, depression, dementia, anemia presenting for evaluation of low hemoglobin, measured outpatient to be 7.4. The pt was admitted to medicine for anemia due to a suspected GIB and large hiatal hernia. Emergency contact Sonali (sister) [phone]      Plan  #GIB?  #Microcytic anemia  - Hb 7.5 currently. Denied melena/hematochezia.   - s/p 1u PRBC transfusion.   - ERICA negative  - started on IV PPI BID  -C/W LR at 75cc/hr  - monitor CBC and transfuse if Hb < 8.   - active T and S.   - check retic count, Fe studies, B12 and folate.   - GI consulted     #hiatal hernia  #Vomiting   - CT abdomen shows Large hiatal hernia containing partially included stomach with apparent organoaxial rotation.  - PPI IV BID   - Pt has good appetite  -More vomitting with solids vs liquids  -Surgery consulted  -Recommend KUB with oral contrast to see passage and lactate to assess for ischemia  -If lactate increasing and abd pain emergency surgery in AM  -NPO after MN  -F/U preop labs     #DAWIT on CKD stage 3 vs. Progressive CKD   - serum Cr 1.6 currently. Baseline Cr 1.2 in March 2022.   - c/w IVF gentle hydration  - F/U renal US and urine studies  - monitor BMP    #CHF?  #HFpEF?  -on lasix PO 40 BID  -last echo in 10/1/20 shows  Left ventricular ejection fraction, by visual estimation, is 60 to 65%.  Mild concentric left ventricular hypertrophy.  Spectral Doppler shows pseudonormal pattern of left ventricular myocardial filling (Grade II diastolic dysfunction).  -holding lasix for now due to DAWIT and mild hypokalemia    #asthma  -Follows dr. Hartley  - had recommended symbicort 160-4.5 2 puff BID and combivent prn but pt does not take it.     #HTN   -holding lisinopril 40.   -C/W nifedipine 60 xl  -C/w metoprolol succ 50od. In am confirm with pts sister as she mentioned she takes meto succ 50 bid.    #depression  C/W home meds    DVT ppx: SCD  GI ppx: PPI  Diet: clear liquid diet, NPO after MN  Activity: as tolerated. Assessment  The patient is a 78-year-old female with past medical history of hiatal hernia s/p repair >10 years back, hypertension, GERD, non obstructive CAD, depression, dementia, anemia presenting for evaluation of low hemoglobin, measured outpatient to be 7.4. The pt was admitted to medicine for anemia due to a suspected GIB and large hiatal hernia. Emergency contact Sonali (sister) [phone]      Plan  #GIB?  #Microcytic anemia  - Hb 7.5 currently. Denied melena/hematochezia.   - s/p 1u PRBC transfusion.   - ERICA negative  - started on IV PPI BID  -C/W LR at 75cc/hr  - monitor CBC and transfuse if Hb < 8.   - active T and S.   - check retic count, Fe studies, B12 and folate.   - GI consulted     #hiatal hernia  #Vomiting   - CT abdomen shows Large hiatal hernia containing partially included stomach with apparent organoaxial rotation.  - PPI IV BID   - Pt has good appetite  -More vomitting with solids vs liquids  -Surgery consulted  -Recommend KUB with oral contrast to see passage and lactate to assess for ischemia  -If lactate increasing and abd pain emergency surgery in AM  -NPO after MN  -F/U preop labs     #DAWIT on CKD stage 3 vs. Progressive CKD   - serum Cr 1.6 currently. Baseline Cr 1.2 in March 2022.   - c/w IVF gentle hydration  - F/U renal US and urine studies  - monitor BMP    #CHF?  #HFpEF?  -on lasix PO 40 BID  -last echo in 10/1/20 shows  Left ventricular ejection fraction, by visual estimation, is 60 to 65%.  Mild concentric left ventricular hypertrophy.  Spectral Doppler shows pseudonormal pattern of left ventricular myocardial filling (Grade II diastolic dysfunction).  -holding lasix for now due to DAWIT and mild hypokalemia    #asthma  -Follows dr. Hartley  - had recommended symbicort 160-4.5 2 puff BID and combivent prn but pt does not take it.     #HTN   -holding lisinopril 40.   -C/W nifedipine 60 xl  -C/w metoprolol succ 50od. In am confirm with pts sister as she mentioned she takes meto succ 50 bid.    #depression  C/W home meds    DVT ppx: SCD  GI ppx: PPI  Diet: clear liquid diet, NPO after MN  Activity: as tolerated.

## 2024-01-08 NOTE — H&P ADULT - ATTENDING COMMENTS
Patient seen and examined at bedside independently of the residents. I read the resident's note and agree with the plan with the additions and corrections as noted below. My note supersedes the resident's note.     REVIEW OF SYSTEMS:  Negative except in HPI.     PMH: HTN, Depression, Anemia, GIB and GERD    FHx: Reviewed. No fhx of asthma/copd, No fhx of liver and pulmonary disease. No fhx of hematological disorder.     Physical Exam:  GEN: No acute distress. Awake, Alert and oriented   Head: Atraumatic, Normocephalic.   Eye: PEERLA. No sclera icterus. EOMI.   ENT: Normal oropharynx, no thyromegaly, no mass, no lymphadenopathy.   LUNGS: Clear to auscultation bilaterally. No wheeze/rales/crackles.   HEART: Normal. S1/S2 present. RRR. No murmur/gallops.   ABD: Soft, non-tender, non-distended. Bowel sounds present.   EXT: No pitting edema. No erythema. No tenderness.  Integumentary: No rash, No sore, No petechia.   NEURO: CN III-XII intact. Strength: 5/5 b/l ULE. Sensory intact b/l ULE.     Vital Signs Last 24 Hrs  T(C): 36.9 (2024 19:06), Max: 36.9 (2024 19:06)  T(F): 98.4 (2024 19:06), Max: 98.4 (2024 19:06)  HR: 81 (2024 19:06) (70 - 100)  BP: 155/74 (2024 19:06) (130/60 - 196/89)  BP(mean): --  RR: 18 (2024 19:06) (18 - 19)  SpO2: 98% (2024 19:06) (98% - 100%)    Parameters below as of 2024 19:06  Patient On (Oxygen Delivery Method): room air      Please see the above notes for Labs and radiology.     Assessment and Plan:     79 yo F with hx of HTN, Depression, Anemia, GIB and GERD presents to ED for low Hb from outpatient lab.     Microcytic anemia  - Hb 7.5 currently. Denied melena/hematochezia.   - s/p 1u PRBC transfusion.   - will start on IV PPI BID  - monitor CBC and transfuse if Hb < 8.   - active T and S.   - check retic count, Fe studies, B12 and folate.   - GI consult.     Vomiting   - CT abdomen shows Large hiatal hernia containing partially included stomach with apparent organoaxial rotation.  - PPI IV BID   - will get Surgery evaluation.     DAWIT on CKD stage 3 vs. Progressive CKD   - serum Cr 1.6 currently. Baseline Cr 1.2 in 2022.   - c/w IVF gentle hydration  - renal US and urine studies  - monitor BMP    HTN - hold ACEI/ARB. If BP persistently elevated, may start on nifedipine XL.     DVT ppx: SCD  GI ppx: PPI  Diet: celar liquid diet  Activity: as tolerated.    Date seen by the attendin2024  Total time spent: 75 minutes. Patient seen and examined at bedside independently of the residents. I read the resident's note and agree with the plan with the additions and corrections as noted below. My note supersedes the resident's note.     REVIEW OF SYSTEMS:  Negative except in HPI.     PMH: HTN, Depression, Anemia, GIB and GERD    FHx: Reviewed. No fhx of asthma/copd, No fhx of liver and pulmonary disease. No fhx of hematological disorder.     Physical Exam:  GEN: No acute distress. Awake, Alert and oriented   Head: Atraumatic, Normocephalic.   Eye: PEERLA. No sclera icterus. EOMI.   ENT: Normal oropharynx, no thyromegaly, no mass, no lymphadenopathy.   LUNGS: Clear to auscultation bilaterally. No wheeze/rales/crackles.   HEART: Normal. S1/S2 present. RRR. No murmur/gallops.   ABD: Soft, non-tender, non-distended. Bowel sounds present.   EXT: No pitting edema. No erythema. No tenderness.  Integumentary: No rash, No sore, No petechia.   NEURO: CN III-XII intact. Strength: 5/5 b/l ULE. Sensory intact b/l ULE.     Vital Signs Last 24 Hrs  T(C): 36.9 (2024 19:06), Max: 36.9 (2024 19:06)  T(F): 98.4 (2024 19:06), Max: 98.4 (2024 19:06)  HR: 81 (2024 19:06) (70 - 100)  BP: 155/74 (2024 19:06) (130/60 - 196/89)  BP(mean): --  RR: 18 (2024 19:06) (18 - 19)  SpO2: 98% (2024 19:06) (98% - 100%)    Parameters below as of 2024 19:06  Patient On (Oxygen Delivery Method): room air      Please see the above notes for Labs and radiology.     Assessment and Plan:     77 yo F with hx of HTN, Depression, Anemia, GIB and GERD presents to ED for low Hb from outpatient lab.     Microcytic anemia  - Hb 7.5 currently. Denied melena/hematochezia.   - s/p 1u PRBC transfusion.   - will start on IV PPI BID  - monitor CBC and transfuse if Hb < 8.   - active T and S.   - check retic count, Fe studies, B12 and folate.   - GI consult.     Vomiting   - CT abdomen shows Large hiatal hernia containing partially included stomach with apparent organoaxial rotation.  - PPI IV BID   - will get Surgery evaluation.     DAWIT on CKD stage 3 vs. Progressive CKD   - serum Cr 1.6 currently. Baseline Cr 1.2 in 2022.   - c/w IVF gentle hydration  - renal US and urine studies  - monitor BMP    HTN - hold ACEI/ARB. If BP persistently elevated, may start on nifedipine XL.     DVT ppx: SCD  GI ppx: PPI  Diet: celar liquid diet  Activity: as tolerated.    Date seen by the [email or web address]  Total time spent: 75 minutes. Patient seen and examined at bedside independently of the residents. I read the resident's note and agree with the plan with the additions and corrections as noted below. My note supersedes the resident's note.     REVIEW OF SYSTEMS:  Negative except in HPI.     PMH: HTN, Depression, Anemia, GIB and GERD    FHx: Reviewed. No fhx of asthma/copd, No fhx of liver and pulmonary disease. No fhx of hematological disorder.     Physical Exam:  GEN: No acute distress. Awake, Alert and oriented x 2-3. Hard of hearing.   Head: Atraumatic, Normocephalic.   Eye: PEERLA. No sclera icterus. EOMI.   ENT: Normal oropharynx, no thyromegaly, no mass, no lymphadenopathy.   LUNGS: Clear to auscultation bilaterally. No wheeze/rales/crackles.   HEART: Normal. S1/S2 present. RRR. No murmur/gallops.   ABD: Soft, non-tender, non-distended. Bowel sounds present.   EXT: No pitting edema. No erythema. No tenderness.  Integumentary: No rash, No sore, No petechia.   NEURO: CN III-XII intact. Strength: 5/5 b/l ULE. Sensory intact b/l ULE.     Vital Signs Last 24 Hrs  T(C): 36.9 (2024 19:06), Max: 36.9 (2024 19:06)  T(F): 98.4 (2024 19:06), Max: 98.4 (2024 19:06)  HR: 81 (2024 19:06) (70 - 100)  BP: 155/74 (2024 19:06) (130/60 - 196/89)  BP(mean): --  RR: 18 (2024 19:06) (18 - 19)  SpO2: 98% (2024 19:06) (98% - 100%)    Parameters below as of 2024 19:06  Patient On (Oxygen Delivery Method): room air      Please see the above notes for Labs and radiology.     Assessment and Plan:     77 yo F with hx of HTN, Depression, Anemia, GIB and GERD presents to ED for low Hb from outpatient lab.     Microcytic anemia (r/o GIB)  - Hb 7.5 currently. Denied melena/hematochezia.   - s/p 1u PRBC transfusion.   - will start on IV PPI BID  - monitor CBC and transfuse if Hb < 8.   - active T and S.   - check retic count, Fe studies, B12 and folate.   - GI consult.     Vomiting likely 2/2 Type III hiatal hernia  - CT abdomen shows Large hiatal hernia containing partially included stomach with apparent organoaxial rotation.  - Currently no abdominal pain/nausea/vomiting. Tolerating water.   - PPI IV BID   - check lactate   - KUB in AM   - f/u Surgery    DAWIT on CKD stage 3 vs. Progressive CKD   - serum Cr 1.6 currently. Baseline Cr 1.2 in 2022.   - c/w IVF gentle hydration  - renal US and urine studies  - monitor BMP    HTN - hold ACEI/ARB. If BP persistently elevated, may start on nifedipine XL.     DVT ppx: SCD  GI ppx: PPI  Diet: NPO after MN.  Activity: as tolerated.    Date seen by the [email or web address]  Total time spent: 75 minutes. Patient seen and examined at bedside independently of the residents. I read the resident's note and agree with the plan with the additions and corrections as noted below. My note supersedes the resident's note.     REVIEW OF SYSTEMS:  Negative except in HPI.     PMH: HTN, Depression, Anemia, GIB and GERD    FHx: Reviewed. No fhx of asthma/copd, No fhx of liver and pulmonary disease. No fhx of hematological disorder.     Physical Exam:  GEN: No acute distress. Awake, Alert and oriented x 2-3. Hard of hearing.   Head: Atraumatic, Normocephalic.   Eye: PEERLA. No sclera icterus. EOMI.   ENT: Normal oropharynx, no thyromegaly, no mass, no lymphadenopathy.   LUNGS: Clear to auscultation bilaterally. No wheeze/rales/crackles.   HEART: Normal. S1/S2 present. RRR. No murmur/gallops.   ABD: Soft, non-tender, non-distended. Bowel sounds present.   EXT: No pitting edema. No erythema. No tenderness.  Integumentary: No rash, No sore, No petechia.   NEURO: CN III-XII intact. Strength: 5/5 b/l ULE. Sensory intact b/l ULE.     Vital Signs Last 24 Hrs  T(C): 36.9 (2024 19:06), Max: 36.9 (2024 19:06)  T(F): 98.4 (2024 19:06), Max: 98.4 (2024 19:06)  HR: 81 (2024 19:06) (70 - 100)  BP: 155/74 (2024 19:06) (130/60 - 196/89)  BP(mean): --  RR: 18 (2024 19:06) (18 - 19)  SpO2: 98% (2024 19:06) (98% - 100%)    Parameters below as of 2024 19:06  Patient On (Oxygen Delivery Method): room air      Please see the above notes for Labs and radiology.     Assessment and Plan:     79 yo F with hx of HTN, Depression, Anemia, GIB and GERD presents to ED for low Hb from outpatient lab.     Microcytic anemia (r/o GIB)  - Hb 7.5 currently. Denied melena/hematochezia.   - s/p 1u PRBC transfusion.   - will start on IV PPI BID  - monitor CBC and transfuse if Hb < 8.   - active T and S.   - check retic count, Fe studies, B12 and folate.   - GI consult.     Vomiting likely 2/2 Type III hiatal hernia  - CT abdomen shows Large hiatal hernia containing partially included stomach with apparent organoaxial rotation.  - Currently no abdominal pain/nausea/vomiting. Tolerating water.   - PPI IV BID   - check lactate   - KUB in AM   - f/u Surgery    DAWIT on CKD stage 3 vs. Progressive CKD   - serum Cr 1.6 currently. Baseline Cr 1.2 in 2022.   - c/w IVF gentle hydration  - renal US and urine studies  - monitor BMP    HTN - hold ACEI/ARB. If BP persistently elevated, may start on nifedipine XL.     DVT ppx: SCD  GI ppx: PPI  Diet: NPO after MN.  Activity: as tolerated.    Date seen by the attendin2024  Total time spent: 75 minutes.

## 2024-01-08 NOTE — H&P ADULT - NSHPPHYSICALEXAM_GEN_ALL_CORE
CONSTITUTIONAL: well-appearing, non-toxic, NAD  SKIN: Warm dry, normal skin turgor  HEAD: NCAT  EYES: EOMI, PERRLA, no scleral icterus  ENT: Moist mucous membranes, normal pharynx with no erythema or exudates  NECK: Supple; non tender. Full ROM. No cervical LAD  CARD: RRR, no murmurs, rubs or gallops  RESP: clear to ausculation b/l.  No rales, rhonchi, or wheezing.  ABD: soft, + BS, non-tender, non-distended, no rebound or guarding.   EXT: Full ROM, pedal edema, no calf tenderness  NEURO: normal motor. normal sensory. CN II-XII intact.  PSYCH: Cooperative, appropriate.

## 2024-01-08 NOTE — CONSULT NOTE ADULT - SUBJECTIVE AND OBJECTIVE BOX
GENERAL SURGERY CONSULT NOTE    Patient: DARIEN BARRERA , 78y ([birth year])Female   MRN: [id number]  Location: [place] [address] A  Visit: 01-08-24 Inpatient  Date: 01-08-24 @ 22:58    HPI:  The patient is a 78-year-old female with past medical history of hiatal hernia s/p repair >10 years back, L emypema s/p decortication 2018, hypertension, GERD, non obstructive CAD, depression, dementia, anemia presenting for evaluation of low hemoglobin, measured outpatient to be 7.4.  Patient has had multiple episodes of nonbloody nonbilious vomiting over the last several months with good appetite and almost 60 pound weight loss over the last few months.  Patient just started following up with hematologist Dr.Yumi Hussein and had recent blood work from 3 days ago that resulted today and showed a low hemoglobin of 7.4. Patient denies fevers, chills, chest pain, shortness of breath, hematuria, melena, hematochezia. The pt presented to the Ed for a blood transfusion that was recommended by the hematologist.    CTAP revealing a type III hiatal hernia and patient states she has had intermittent episodes of epigastric pain associated with vomiting over the past few months. CTAP revealing a large hiatal hernia with stomach above diaphragm and organoaxial rotation.    In the ED  Vital Signs Last 24 Hrs  T(C): 36.9 (08 Jan 2024 19:06), Max: 36.9 (08 Jan 2024 19:06)  T(F): 98.4 (08 Jan 2024 19:06), Max: 98.4 (08 Jan 2024 19:06)  HR: 81 (08 Jan 2024 19:06) (70 - 100)  BP: 155/74 (08 Jan 2024 19:06) (130/60 - 196/89)  BP(mean): --  RR: 18 (08 Jan 2024 19:06) (18 - 19)  SpO2: 98% (08 Jan 2024 19:06) (98% - 100%)    Parameters below as of 08 Jan 2024 19:06  Patient On (Oxygen Delivery Method): room air    In the ED the pt received 1 PRBC  Labs were significant for a Cr of 1.6 (unknown baseline)  and a lactate    Cat scan was significant for  Large hiatal hernia containing partially included stomach with apparent   organoaxial rotation    The pt was admitted to medicine for anemia 2/2 possible GIB     (08 Jan 2024 21:25)      PAST MEDICAL & SURGICAL HISTORY:  Hypertension      GERD (gastroesophageal reflux disease)      Hiatal hernia with GERD      Empyema lung      H/O CHF      History of repair of hiatal hernia      H/O pleural empyema  s/p decortication          Home Medications:  cloNIDine 0.1 mg oral tablet: 1 tab(s) orally 3 times a day (01 Jun 2022 07:20)  Lasix 40 mg oral tablet: 1 tab(s) orally once a day (01 Jun 2022 07:20)  leflunomide 20 mg oral tablet: 1 tab(s) orally once a day (01 Jun 2022 07:20)  methotrexate 5 mg oral tablet: 1 tab(s) orally once a week (01 Jun 2022 07:20)  omeprazole 40 mg oral delayed release capsule: 1 cap(s) orally once a day (01 Jun 2022 07:20)        VITALS:  T(F): 98.4 (01-08-24 @ 19:06), Max: 98.4 (01-08-24 @ 19:06)  HR: 81 (01-08-24 @ 19:06) (70 - 100)  BP: 155/74 (01-08-24 @ 19:06) (130/60 - 196/89)  RR: 18 (01-08-24 @ 19:06) (18 - 19)  SpO2: 98% (01-08-24 @ 19:06) (98% - 100%)    PHYSICAL EXAM:  General: NAD, calm and cooperative  HEENT: NCAT, TIFFANY, EOMI, Trachea ML, Neck supple  Cardiac: RRR S1, S2, no Murmurs, rubs or gallops  Respiratory: CTAB, normal respiratory effort, breath sounds equal BL, no wheeze, rhonchi or crackles  Abdomen: Soft, non-distended, non-tender    MEDICATIONS  (STANDING):  ALPRAZolam 0.25 milliGRAM(s) Oral at bedtime  lactated ringers. 1000 milliLiter(s) (75 mL/Hr) IV Continuous <Continuous>  metoprolol succinate ER 50 milliGRAM(s) Oral daily  NIFEdipine XL 60 milliGRAM(s) Oral daily  pantoprazole  Injectable 40 milliGRAM(s) IV Push two times a day  sertraline 100 milliGRAM(s) Oral daily    MEDICATIONS  (PRN):      LAB/STUDIES:                        7.5    9.95  )-----------( 595      ( 08 Jan 2024 13:35 )             26.4     01-08    138  |  94<L>  |  27<H>  ----------------------------<  98  3.4<L>   |  29  |  1.6<H>    Ca    9.8      08 Jan 2024 13:35    TPro  6.7  /  Alb  3.8  /  TBili  0.5  /  DBili  x   /  AST  14  /  ALT  8   /  AlkPhos  97  01-08    PT/INR - ( 08 Jan 2024 13:35 )   PT: 12.40 sec;   INR: 1.09 ratio         PTT - ( 08 Jan 2024 13:35 )  PTT:26.9 sec  LIVER FUNCTIONS - ( 08 Jan 2024 13:35 )  Alb: 3.8 g/dL / Pro: 6.7 g/dL / ALK PHOS: 97 U/L / ALT: 8 U/L / AST: 14 U/L / GGT: x           Urinalysis Basic - ( 08 Jan 2024 13:35 )    Color: x / Appearance: x / SG: x / pH: x  Gluc: 98 mg/dL / Ketone: x  / Bili: x / Urobili: x   Blood: x / Protein: x / Nitrite: x   Leuk Esterase: x / RBC: x / WBC x   Sq Epi: x / Non Sq Epi: x / Bacteria: x                  IMAGING:  < from: CT Abdomen and Pelvis w/ IV Cont (01.08.24 @ 15:26) >  IMPRESSION:      Large hiatal hernia containing partially included stomach with apparent   organoaxial rotation    Bilateral adrenal nodular thickening. This is similar in appearance to   the prior CT of the chest.    Bilateral multifocal renal cortical thinning likely reflecting sequela of   prior insults    No discrete evidence of abdominal pelvic mass or lymphadenopathy.    --- End of Report ---    < end of copied text >      ACCESS DEVICES:  [X] Peripheral IV         GENERAL SURGERY CONSULT NOTE    Patient: DARIEN BARRERA , 78y ([birth year])Female   MRN: [id number]  Location: [place] [address] A  Visit: 01-08-24 Inpatient  Date: 01-08-24 @ 22:58    HPI:  The patient is a 78-year-old female with past medical history of hiatal hernia s/p repair >10 years back, L emypema s/p decortication 2018, hypertension, GERD, non obstructive CAD, depression, dementia, anemia presenting for evaluation of low hemoglobin, measured outpatient to be 7.4.  Patient has had multiple episodes of nonbloody nonbilious vomiting over the last several months with good appetite and almost 60 pound weight loss over the last few months.  Patient just started following up with hematologist Dr.Yumi Hussein and had recent blood work from 3 days ago that resulted today and showed a low hemoglobin of 7.4. Patient denies fevers, chills, chest pain, shortness of breath, hematuria, melena, hematochezia. The pt presented to the Ed for a blood transfusion that was recommended by the hematologist.    CTAP revealing a type III hiatal hernia and patient states she has had intermittent episodes of epigastric pain associated with vomiting over the past few months. CTAP revealing a large hiatal hernia with stomach above diaphragm and organoaxial rotation.    In the ED  Vital Signs Last 24 Hrs  T(C): 36.9 (08 Jan 2024 19:06), Max: 36.9 (08 Jan 2024 19:06)  T(F): 98.4 (08 Jan 2024 19:06), Max: 98.4 (08 Jan 2024 19:06)  HR: 81 (08 Jan 2024 19:06) (70 - 100)  BP: 155/74 (08 Jan 2024 19:06) (130/60 - 196/89)  BP(mean): --  RR: 18 (08 Jan 2024 19:06) (18 - 19)  SpO2: 98% (08 Jan 2024 19:06) (98% - 100%)    Parameters below as of 08 Jan 2024 19:06  Patient On (Oxygen Delivery Method): room air    In the ED the pt received 1 PRBC  Labs were significant for a Cr of 1.6 (unknown baseline)  and a lactate    Cat scan was significant for  Large hiatal hernia containing partially included stomach with apparent   organoaxial rotation    The pt was admitted to medicine for anemia 2/2 possible GIB     (08 Jan 2024 21:25)      PAST MEDICAL & SURGICAL HISTORY:  Hypertension      GERD (gastroesophageal reflux disease)      Hiatal hernia with GERD      Empyema lung      H/O CHF      History of repair of hiatal hernia      H/O pleural empyema  s/p decortication          Home Medications:  cloNIDine 0.1 mg oral tablet: 1 tab(s) orally 3 times a day (01 Jun 2022 07:20)  Lasix 40 mg oral tablet: 1 tab(s) orally once a day (01 Jun 2022 07:20)  leflunomide 20 mg oral tablet: 1 tab(s) orally once a day (01 Jun 2022 07:20)  methotrexate 5 mg oral tablet: 1 tab(s) orally once a week (01 Jun 2022 07:20)  omeprazole 40 mg oral delayed release capsule: 1 cap(s) orally once a day (01 Jun 2022 07:20)        VITALS:  T(F): 98.4 (01-08-24 @ 19:06), Max: 98.4 (01-08-24 @ 19:06)  HR: 81 (01-08-24 @ 19:06) (70 - 100)  BP: 155/74 (01-08-24 @ 19:06) (130/60 - 196/89)  RR: 18 (01-08-24 @ 19:06) (18 - 19)  SpO2: 98% (01-08-24 @ 19:06) (98% - 100%)    PHYSICAL EXAM:  General: NAD, calm and cooperative  HEENT: NCAT, TIFFANY, EOMI, Trachea ML, Neck supple  Cardiac: RRR S1, S2, no Murmurs, rubs or gallops  Respiratory: CTAB, normal respiratory effort, breath sounds equal BL, no wheeze, rhonchi or crackles  Abdomen: Soft, non-distended, non-tender    MEDICATIONS  (STANDING):  ALPRAZolam 0.25 milliGRAM(s) Oral at bedtime  lactated ringers. 1000 milliLiter(s) (75 mL/Hr) IV Continuous <Continuous>  metoprolol succinate ER 50 milliGRAM(s) Oral daily  NIFEdipine XL 60 milliGRAM(s) Oral daily  pantoprazole  Injectable 40 milliGRAM(s) IV Push two times a day  sertraline 100 milliGRAM(s) Oral daily    MEDICATIONS  (PRN):      LAB/STUDIES:                        7.5    9.95  )-----------( 595      ( 08 Jan 2024 13:35 )             26.4     01-08    138  |  94<L>  |  27<H>  ----------------------------<  98  3.4<L>   |  29  |  1.6<H>    Ca    9.8      08 Jan 2024 13:35    TPro  6.7  /  Alb  3.8  /  TBili  0.5  /  DBili  x   /  AST  14  /  ALT  8   /  AlkPhos  97  01-08    PT/INR - ( 08 Jan 2024 13:35 )   PT: 12.40 sec;   INR: 1.09 ratio         PTT - ( 08 Jan 2024 13:35 )  PTT:26.9 sec  LIVER FUNCTIONS - ( 08 Jan 2024 13:35 )  Alb: 3.8 g/dL / Pro: 6.7 g/dL / ALK PHOS: 97 U/L / ALT: 8 U/L / AST: 14 U/L / GGT: x           Urinalysis Basic - ( 08 Jan 2024 13:35 )    Color: x / Appearance: x / SG: x / pH: x  Gluc: 98 mg/dL / Ketone: x  / Bili: x / Urobili: x   Blood: x / Protein: x / Nitrite: x   Leuk Esterase: x / RBC: x / WBC x   Sq Epi: x / Non Sq Epi: x / Bacteria: x                  IMAGING:  < from: CT Abdomen and Pelvis w/ IV Cont (01.08.24 @ 15:26) >  IMPRESSION:      Large hiatal hernia containing partially included stomach with apparent   organoaxial rotation    Bilateral adrenal nodular thickening. This is similar in appearance to   the prior CT of the chest.    Bilateral multifocal renal cortical thinning likely reflecting sequela of   prior insults    No discrete evidence of abdominal pelvic mass or lymphadenopathy.    --- End of Report ---    < end of copied text >      ACCESS DEVICES:  [X] Peripheral IV         GENERAL SURGERY CONSULT NOTE    Patient: DARIEN BARRERA , 78y ([birth year])Female   MRN: [id number]  Location: [place] [address] A  Visit: 01-08-24 Inpatient  Date: 01-08-24 @ 22:58    HPI:  The patient is a 78-year-old female with past medical history of hiatal hernia s/p repair >10 years back, L emypema s/p decortication 2018, hypertension, GERD, non obstructive CAD, depression, dementia, anemia presenting for evaluation of low hemoglobin, measured outpatient to be 7.4.  Patient has had multiple episodes of nonbloody nonbilious vomiting over the last several months with good appetite and almost 60 pound weight loss over the last few months.  Patient just started following up with hematologist Dr.Yumi Hussein and had recent blood work from 3 days ago that resulted today and showed a low hemoglobin of 7.4. Patient denies fevers, chills, chest pain, shortness of breath, hematuria, melena, hematochezia. The pt presented to the Ed for a blood transfusion that was recommended by the hematologist.    CTAP revealing a type III hiatal hernia and patient states she has had intermittent episodes of epigastric pain associated with vomiting over the past few months. CTAP revealing a large hiatal hernia with stomach above diaphragm and organoaxial rotation.    In the ED  Vital Signs Last 24 Hrs  T(C): 36.9 (08 Jan 2024 19:06), Max: 36.9 (08 Jan 2024 19:06)  T(F): 98.4 (08 Jan 2024 19:06), Max: 98.4 (08 Jan 2024 19:06)  HR: 81 (08 Jan 2024 19:06) (70 - 100)  BP: 155/74 (08 Jan 2024 19:06) (130/60 - 196/89)  BP(mean): --  RR: 18 (08 Jan 2024 19:06) (18 - 19)  SpO2: 98% (08 Jan 2024 19:06) (98% - 100%)    Parameters below as of 08 Jan 2024 19:06  Patient On (Oxygen Delivery Method): room air    In the ED the pt received 1 PRBC  Labs were significant for a Cr of 1.6 (unknown baseline)  and a lactate    Cat scan was significant for  Large hiatal hernia containing partially included stomach with apparent   organoaxial rotation    The pt was admitted to medicine for anemia 2/2 possible GIB     (08 Jan 2024 21:25)      PAST MEDICAL & SURGICAL HISTORY:  Hypertension      GERD (gastroesophageal reflux disease)      Hiatal hernia with GERD      Empyema lung      H/O CHF      History of repair of hiatal hernia      H/O pleural empyema  s/p decortication          Home Medications:  cloNIDine 0.1 mg oral tablet: 1 tab(s) orally 3 times a day (01 Jun 2022 07:20)  Lasix 40 mg oral tablet: 1 tab(s) orally once a day (01 Jun 2022 07:20)  leflunomide 20 mg oral tablet: 1 tab(s) orally once a day (01 Jun 2022 07:20)  methotrexate 5 mg oral tablet: 1 tab(s) orally once a week (01 Jun 2022 07:20)  omeprazole 40 mg oral delayed release capsule: 1 cap(s) orally once a day (01 Jun 2022 07:20)        VITALS:  T(F): 98.4 (01-08-24 @ 19:06), Max: 98.4 (01-08-24 @ 19:06)  HR: 81 (01-08-24 @ 19:06) (70 - 100)  BP: 155/74 (01-08-24 @ 19:06) (130/60 - 196/89)  RR: 18 (01-08-24 @ 19:06) (18 - 19)  SpO2: 98% (01-08-24 @ 19:06) (98% - 100%)    PHYSICAL EXAM:  General: NAD, calm and cooperative  HEENT: NCAT, TIFFANY, EOMI, Trachea ML, Neck supple  Cardiac: RRR S1, S2, no Murmurs, rubs or gallops  Respiratory: CTAB, normal respiratory effort, breath sounds equal BL, no wheeze, rhonchi or crackles  Abdomen: Soft, non-distended, non-tender    MEDICATIONS  (STANDING):  ALPRAZolam 0.25 milliGRAM(s) Oral at bedtime  lactated ringers. 1000 milliLiter(s) (75 mL/Hr) IV Continuous <Continuous>  metoprolol succinate ER 50 milliGRAM(s) Oral daily  NIFEdipine XL 60 milliGRAM(s) Oral daily  pantoprazole  Injectable 40 milliGRAM(s) IV Push two times a day  sertraline 100 milliGRAM(s) Oral daily    MEDICATIONS  (PRN):      LAB/STUDIES:                        7.5    9.95  )-----------( 595      ( 08 Jan 2024 13:35 )             26.4     01-08    138  |  94<L>  |  27<H>  ----------------------------<  98  3.4<L>   |  29  |  1.6<H>    Ca    9.8      08 Jan 2024 13:35    TPro  6.7  /  Alb  3.8  /  TBili  0.5  /  DBili  x   /  AST  14  /  ALT  8   /  AlkPhos  97  01-08    PT/INR - ( 08 Jan 2024 13:35 )   PT: 12.40 sec;   INR: 1.09 ratio         PTT - ( 08 Jan 2024 13:35 )  PTT:26.9 sec  LIVER FUNCTIONS - ( 08 Jan 2024 13:35 )  Alb: 3.8 g/dL / Pro: 6.7 g/dL / ALK PHOS: 97 U/L / ALT: 8 U/L / AST: 14 U/L / GGT: x           Urinalysis Basic - ( 08 Jan 2024 13:35 )    Color: x / Appearance: x / SG: x / pH: x  Gluc: 98 mg/dL / Ketone: x  / Bili: x / Urobili: x   Blood: x / Protein: x / Nitrite: x   Leuk Esterase: x / RBC: x / WBC x   Sq Epi: x / Non Sq Epi: x / Bacteria: x                  IMAGING:  < from: CT Abdomen and Pelvis w/ IV Cont (01.08.24 @ 15:26) >  IMPRESSION:      Large hiatal hernia containing partially included stomach with apparent   organoaxial rotation    Bilateral adrenal nodular thickening. This is similar in appearance to   the prior CT of the chest.    Bilateral multifocal renal cortical thinning likely reflecting sequela of   prior insults    No discrete evidence of abdominal pelvic mass or lymphadenopathy.    --- End of Report ---    < end of copied text >      ACCESS DEVICES:  [X] Peripheral IV         GENERAL SURGERY CONSULT NOTE    Patient: DARIEN BARRERA , 78y ([birth year])Female   MRN: [id number]  Location: [place] [address] A  Visit: 01-08-24 Inpatient  Date: 01-08-24 @ 22:58    HPI:  The patient is a 78-year-old female with past medical history of hiatal hernia s/p repair >10 years back, L emypema s/p decortication 2018, hypertension, GERD, non obstructive CAD, depression, dementia, anemia presenting for evaluation of low hemoglobin, measured outpatient to be 7.4.  Patient has had multiple episodes of nonbloody nonbilious vomiting over the last several months with good appetite and almost 60 pound weight loss over the last few months.  Patient just started following up with hematologist Dr.Yumi Hussein and had recent blood work from 3 days ago that resulted today and showed a low hemoglobin of 7.4. Patient denies fevers, chills, chest pain, shortness of breath, hematuria, melena, hematochezia. The pt presented to the Ed for a blood transfusion that was recommended by the hematologist.    CTAP revealing a type III hiatal hernia and patient states she has had intermittent episodes of epigastric pain associated with vomiting over the past few months. CTAP revealing a large hiatal hernia with stomach above diaphragm and organoaxial rotation.    In the ED  Vital Signs Last 24 Hrs  T(C): 36.9 (08 Jan 2024 19:06), Max: 36.9 (08 Jan 2024 19:06)  T(F): 98.4 (08 Jan 2024 19:06), Max: 98.4 (08 Jan 2024 19:06)  HR: 81 (08 Jan 2024 19:06) (70 - 100)  BP: 155/74 (08 Jan 2024 19:06) (130/60 - 196/89)  BP(mean): --  RR: 18 (08 Jan 2024 19:06) (18 - 19)  SpO2: 98% (08 Jan 2024 19:06) (98% - 100%)    Parameters below as of 08 Jan 2024 19:06  Patient On (Oxygen Delivery Method): room air    In the ED the pt received 1 PRBC  Labs were significant for a Cr of 1.6 (unknown baseline)  and a lactate    Cat scan was significant for  Large hiatal hernia containing partially included stomach with apparent   organoaxial rotation    The pt was admitted to medicine for anemia 2/2 possible GIB     (08 Jan 2024 21:25)      PAST MEDICAL & SURGICAL HISTORY:  Hypertension      GERD (gastroesophageal reflux disease)      Hiatal hernia with GERD      Empyema lung      H/O CHF      History of repair of hiatal hernia      H/O pleural empyema  s/p decortication          Home Medications:  cloNIDine 0.1 mg oral tablet: 1 tab(s) orally 3 times a day (01 Jun 2022 07:20)  Lasix 40 mg oral tablet: 1 tab(s) orally once a day (01 Jun 2022 07:20)  leflunomide 20 mg oral tablet: 1 tab(s) orally once a day (01 Jun 2022 07:20)  methotrexate 5 mg oral tablet: 1 tab(s) orally once a week (01 Jun 2022 07:20)  omeprazole 40 mg oral delayed release capsule: 1 cap(s) orally once a day (01 Jun 2022 07:20)        VITALS:  T(F): 98.4 (01-08-24 @ 19:06), Max: 98.4 (01-08-24 @ 19:06)  HR: 81 (01-08-24 @ 19:06) (70 - 100)  BP: 155/74 (01-08-24 @ 19:06) (130/60 - 196/89)  RR: 18 (01-08-24 @ 19:06) (18 - 19)  SpO2: 98% (01-08-24 @ 19:06) (98% - 100%)    PHYSICAL EXAM:  General: NAD, calm and cooperative  HEENT: NCAT, TIFFANY, EOMI, Trachea ML, Neck supple  Cardiac: RRR S1, S2, no Murmurs, rubs or gallops  Respiratory: CTAB, normal respiratory effort, breath sounds equal BL, no wheeze, rhonchi or crackles  Abdomen: Soft, non-distended, non-tender    MEDICATIONS  (STANDING):  ALPRAZolam 0.25 milliGRAM(s) Oral at bedtime  lactated ringers. 1000 milliLiter(s) (75 mL/Hr) IV Continuous <Continuous>  metoprolol succinate ER 50 milliGRAM(s) Oral daily  NIFEdipine XL 60 milliGRAM(s) Oral daily  pantoprazole  Injectable 40 milliGRAM(s) IV Push two times a day  sertraline 100 milliGRAM(s) Oral daily    MEDICATIONS  (PRN):      LAB/STUDIES:                        7.5    9.95  )-----------( 595      ( 08 Jan 2024 13:35 )             26.4     01-08    138  |  94<L>  |  27<H>  ----------------------------<  98  3.4<L>   |  29  |  1.6<H>    Ca    9.8      08 Jan 2024 13:35    TPro  6.7  /  Alb  3.8  /  TBili  0.5  /  DBili  x   /  AST  14  /  ALT  8   /  AlkPhos  97  01-08    PT/INR - ( 08 Jan 2024 13:35 )   PT: 12.40 sec;   INR: 1.09 ratio         PTT - ( 08 Jan 2024 13:35 )  PTT:26.9 sec  LIVER FUNCTIONS - ( 08 Jan 2024 13:35 )  Alb: 3.8 g/dL / Pro: 6.7 g/dL / ALK PHOS: 97 U/L / ALT: 8 U/L / AST: 14 U/L / GGT: x           Urinalysis Basic - ( 08 Jan 2024 13:35 )    Color: x / Appearance: x / SG: x / pH: x  Gluc: 98 mg/dL / Ketone: x  / Bili: x / Urobili: x   Blood: x / Protein: x / Nitrite: x   Leuk Esterase: x / RBC: x / WBC x   Sq Epi: x / Non Sq Epi: x / Bacteria: x                  IMAGING:  < from: CT Abdomen and Pelvis w/ IV Cont (01.08.24 @ 15:26) >  IMPRESSION:      Large hiatal hernia containing partially included stomach with apparent   organoaxial rotation    Bilateral adrenal nodular thickening. This is similar in appearance to   the prior CT of the chest.    Bilateral multifocal renal cortical thinning likely reflecting sequela of   prior insults    No discrete evidence of abdominal pelvic mass or lymphadenopathy.    --- End of Report ---    < end of copied text >      ACCESS DEVICES:  [X] Peripheral IV

## 2024-01-08 NOTE — ED ADULT NURSE NOTE - NSFALLHARMRISKINTERV_ED_ALL_ED
Assistance OOB with selected safe patient handling equipment if applicable/Assistance with ambulation/Communicate risk of Fall with Harm to all staff, patient, and family/Encourage patient to sit up slowly, dangle for a short time, stand at bedside before walking/Monitor gait and stability/Monitor for mental status changes and reorient to person, place, and time, as needed/Move patient closer to nursing station/within visual sight of ED staff/Provide visual cue: red socks, yellow wristband, yellow gown, etc/Reinforce activity limits and safety measures with patient and family/Review medications for side effects contributing to fall risk/Toileting schedule using arm’s reach rule for commode and bathroom/Use of alarms - bed, stretcher, chair and/or video monitoring/Bed in lowest position, wheels locked, appropriate side rails in place/Call bell, personal items and telephone in reach/Instruct patient to call for assistance before getting out of bed/chair/stretcher/Non-slip footwear applied when patient is off stretcher/[place] to call system/Physically safe environment - no spills, clutter or unnecessary equipment/Purposeful Proactive Rounding/Room/bathroom lighting operational, light cord in reach Assistance OOB with selected safe patient handling equipment if applicable/Assistance with ambulation/Communicate risk of Fall with Harm to all staff, patient, and family/Encourage patient to sit up slowly, dangle for a short time, stand at bedside before walking/Monitor gait and stability/Monitor for mental status changes and reorient to person, place, and time, as needed/Move patient closer to nursing station/within visual sight of ED staff/Provide visual cue: red socks, yellow wristband, yellow gown, etc/Reinforce activity limits and safety measures with patient and family/Review medications for side effects contributing to fall risk/Toileting schedule using arm’s reach rule for commode and bathroom/Use of alarms - bed, stretcher, chair and/or video monitoring/Bed in lowest position, wheels locked, appropriate side rails in place/Call bell, personal items and telephone in reach/Instruct patient to call for assistance before getting out of bed/chair/stretcher/Non-slip footwear applied when patient is off stretcher/[place] to call system/Physically safe environment - no spills, clutter or unnecessary equipment/Purposeful Proactive Rounding/Room/bathroom lighting operational, light cord in reach

## 2024-01-08 NOTE — ED PROVIDER NOTE - ATTENDING CONTRIBUTION TO CARE
78-year-old female sent in by hematologist for low hemoglobin.  Patient with history of dementia and anemia.  Patient has not had to have a transfusion in many years.  No blood in stool no black stool.  Patient has had significant weight loss over the past few months.  Patient also with vomiting over the past few weeks after trying to eat.  No chest pain or shortness of breath.  No other complaints.  Patient is awake alert.  Abdomen nontender.  Patient breathing comfortably.  Patient nontoxic well-appearing.  Plan: Labs, CT, reassess.

## 2024-01-08 NOTE — ED PROVIDER NOTE - CADM POA URETHRAL CATHETER
Prior authorization placed on 3/30.    Staff message sent to pharmacy coordinator to update them. Updated staff message sent to pharmacy coordinator with new date of 4/19/21.    [place] DESOUZA Supervisor at [facility] notified of inpatient admission on 4/16/21. Arrival time is 9 am. Updated [place] that patient being admitted on 4/19/21 instead.     Patient will need a covid test 24 hours prior to admission. We will place order for testing.     Shanae in pharmacy notified of patient's admission for [facility] on 4/16. Notified of new date 4/19/21.    Notified Kwabena Lin, med surg 5 manager of admission also at [phone]. Notified of new date 4/19/21.    Patient to schedule an appt at [facility] to get Covid testing pre Hospital stay.  They do lab at 11 am on 4/16.  Patient to call [phone] to schedule lab appt.     I spoke with patient and he is unable to get covid test 4/15 as still on his way back from [place].  Will need to discuss with KK and then give patient a call back.     Patient is aware.  Covid test to be done on 4/16.  He is aware of admission on 4/19.  He is aware that he needs to quarantine after the covid test on 4/16.  All questions answered at this time.       No

## 2024-01-08 NOTE — H&P ADULT - HIV OFFER
Opt out
Accuchecks monitoring and insulin corrective regimen  sliding scale coverage with short acting inslulin, add longacting insulin as needed ,no concentrated sweets diet, serial labs ,HbA1C,education

## 2024-01-08 NOTE — CONSULT NOTE ADULT - ASSESSMENT
The patient is a 78-year-old female with past medical history of hiatal hernia s/p repair >10 years back, L emypema s/p decortication 2018, hypertension, GERD, non obstructive CAD, depression, dementia, anemia presenting for evaluation of low hemoglobin, measured outpatient to be 7.4.  Patient has had multiple episodes of nonbloody nonbilious vomiting over the last several months with good appetite and almost 60 pound weight loss over the last few months.  Patient just started following up with hematologist Dr.Yumi Hussein and had recent blood work from 3 days ago that resulted today and showed a low hemoglobin of 7.4. Patient denies fevers, retching, chills, chest pain, shortness of breath, hematuria, melena, hematochezia. The pt presented to the Ed for a blood transfusion that was recommended by the hematologist.    CTAP revealing a type III hiatal hernia and patient states she has had intermittent episodes of epigastric pain associated with vomiting over the past few months. CTAP revealing a large hiatal hernia with stomach above diaphragm and organoaxial rotation.    Plan    Please obtain lactate  Administer PO contrast now  AM KUB to see progression  No signs of gastric volvulus on examination or history as patient has vomitus when symptomatic  No signs of ischemia on CT  Transfuse as needed  Hiatal hernia with renzo ulcer may be cause of microcytic anemia  Patient [place] hernia repair  Will follow    4947 The patient is a 78-year-old female with past medical history of hiatal hernia s/p repair >10 years back, L emypema s/p decortication 2018, hypertension, GERD, non obstructive CAD, depression, dementia, anemia presenting for evaluation of low hemoglobin, measured outpatient to be 7.4.  Patient has had multiple episodes of nonbloody nonbilious vomiting over the last several months with good appetite and almost 60 pound weight loss over the last few months.  Patient just started following up with hematologist Dr.Yumi Hussein and had recent blood work from 3 days ago that resulted today and showed a low hemoglobin of 7.4. Patient denies fevers, retching, chills, chest pain, shortness of breath, hematuria, melena, hematochezia. The pt presented to the Ed for a blood transfusion that was recommended by the hematologist.    CTAP revealing a type III hiatal hernia and patient states she has had intermittent episodes of epigastric pain associated with vomiting over the past few months. CTAP revealing a large hiatal hernia with stomach above diaphragm and organoaxial rotation.    Plan    Please obtain lactate  Administer PO contrast now  AM KUB to see progression  No signs of gastric volvulus on examination or history as patient has vomitus when symptomatic  No signs of ischemia on CT  Transfuse as needed  Hiatal hernia with renzo ulcer may be cause of microcytic anemia  Patient wishes to [place] for hiatal hernia repair  Will follow    2998 The patient is a 78-year-old female with past medical history of hiatal hernia s/p repair >10 years back, L emypema s/p decortication 2018, hypertension, GERD, non obstructive CAD, depression, dementia, anemia presenting for evaluation of low hemoglobin, measured outpatient to be 7.4.  Patient has had multiple episodes of nonbloody nonbilious vomiting over the last several months with good appetite and almost 60 pound weight loss over the last few months.  Patient just started following up with hematologist Dr.Yumi Hussein and had recent blood work from 3 days ago that resulted today and showed a low hemoglobin of 7.4. Patient denies fevers, retching, chills, chest pain, shortness of breath, hematuria, melena, hematochezia. The pt presented to the Ed for a blood transfusion that was recommended by the hematologist.    CTAP revealing a type III hiatal hernia and patient states she has had intermittent episodes of epigastric pain associated with vomiting over the past few months. CTAP revealing a large hiatal hernia with stomach above diaphragm and organoaxial rotation.    Plan    Please obtain lactate  Administer PO contrast now  AM KUB to see progression  No signs of gastric volvulus on examination or history as patient has vomitus when symptomatic  No signs of ischemia on CT  Transfuse as needed  Hiatal hernia with renzo ulcer may be cause of microcytic anemia  Patient wishes to [place] for hiatal hernia repair  Will follow    0546 The patient is a 78-year-old female with past medical history of hiatal hernia s/p repair >10 years back, L emypema s/p decortication 2018, hypertension, GERD, non obstructive CAD, depression, dementia, anemia presenting for evaluation of low hemoglobin, measured outpatient to be 7.4.  Patient has had multiple episodes of nonbloody nonbilious vomiting over the last several months with good appetite and almost 60 pound weight loss over the last few months.  Patient just started following up with hematologist Dr.Yumi Hussein and had recent blood work from 3 days ago that resulted today and showed a low hemoglobin of 7.4. Patient denies fevers, retching, chills, chest pain, shortness of breath, hematuria, melena, hematochezia. The pt presented to the Ed for a blood transfusion that was recommended by the hematologist.    CTAP revealing a type III hiatal hernia and patient states she has had intermittent episodes of epigastric pain associated with vomiting over the past few months. CTAP revealing a large hiatal hernia with stomach above diaphragm and organoaxial rotation.    Plan    Please obtain lactate  Administer PO contrast now  AM KUB to see progression  No signs of gastric volvulus on examination or history as patient has vomitus when symptomatic  No signs of ischemia on CT  Transfuse as needed  Hiatal hernia with renzo ulcer may be cause of microcytic anemia  Patient wishes to follow [place] her surgeon [place] for hiatal hernia repair  Will follow    3337 The patient is a 78-year-old female with past medical history of hiatal hernia s/p repair >10 years back, L emypema s/p decortication 2018, hypertension, GERD, non obstructive CAD, depression, dementia, anemia presenting for evaluation of low hemoglobin, measured outpatient to be 7.4.  Patient has had multiple episodes of nonbloody nonbilious vomiting over the last several months with good appetite and almost 60 pound weight loss over the last few months.  Patient just started following up with hematologist Dr.Yumi Hussein and had recent blood work from 3 days ago that resulted today and showed a low hemoglobin of 7.4. Patient denies fevers, retching, chills, chest pain, shortness of breath, hematuria, melena, hematochezia. The pt presented to the Ed for a blood transfusion that was recommended by the hematologist.    CTAP revealing a type III hiatal hernia and patient states she has had intermittent episodes of epigastric pain associated with vomiting over the past few months. CTAP revealing a large hiatal hernia with stomach above diaphragm and organoaxial rotation.    Plan    Please obtain lactate  Administer PO contrast now  AM KUB to see progression  No signs of gastric volvulus on examination or history  No signs of ischemia on CT  Transfuse as needed  Hiatal hernia with renzo ulcer may be cause of microcytic anemia  Patient wishes to follow with her surgeon [place] city for hiatal hernia repair  Will follow    4894 The patient is a 78-year-old female with past medical history of hiatal hernia s/p repair >10 years back, L emypema s/p decortication 2018, hypertension, GERD, non obstructive CAD, depression, dementia, anemia presenting for evaluation of low hemoglobin, measured outpatient to be 7.4.  Patient has had multiple episodes of nonbloody nonbilious vomiting over the last several months with good appetite and almost 60 pound weight loss over the last few months.  Patient just started following up with hematologist Dr.Yumi Hussein and had recent blood work from 3 days ago that resulted today and showed a low hemoglobin of 7.4. Patient denies fevers, retching, chills, chest pain, shortness of breath, hematuria, melena, hematochezia. The pt presented to the Ed for a blood transfusion that was recommended by the hematologist.    CTAP revealing a type III hiatal hernia and patient states she has had intermittent episodes of epigastric pain associated with vomiting over the past few months. CTAP revealing a large hiatal hernia with stomach above diaphragm and organoaxial rotation.    Plan    Please obtain lactate  Administer PO contrast now  AM KUB to see progression  No signs of gastric volvulus on examination or history  No signs of ischemia on CT  Transfuse as needed  Hiatal hernia with renzo ulcer may be cause of microcytic anemia  Patient wishes to follow [place] for hiatal hernia repair  Will follow    7848 The patient is a 78-year-old female with past medical history of hiatal hernia s/p repair >10 years back, L emypema s/p decortication 2018, hypertension, GERD, non obstructive CAD, depression, dementia, anemia presenting for evaluation of low hemoglobin, measured outpatient to be 7.4.  Patient has had multiple episodes of nonbloody nonbilious vomiting over the last several months with good appetite and almost 60 pound weight loss over the last few months.  Patient just started following up with hematologist Dr.Yumi Hussein and had recent blood work from 3 days ago that resulted today and showed a low hemoglobin of 7.4. Patient denies fevers, retching, chills, chest pain, shortness of breath, hematuria, melena, hematochezia. The pt presented to the Ed for a blood transfusion that was recommended by the hematologist.    CTAP revealing a type III hiatal hernia and patient states she has had intermittent episodes of epigastric pain associated with vomiting over the past few months. CTAP revealing a large hiatal hernia with stomach above diaphragm and organoaxial rotation.    Plan    Please obtain lactate  Administer PO contrast now  AM KUB to see progression  No signs of gastric volvulus on examination or history  No signs of ischemia on CT  Transfuse as needed  Hiatal hernia with rnezo ulcer may be cause of microcytic anemia  Patient wishes to follow with her surgeon [place] city for hiatal hernia repair  Will follow    2334 The patient is a 78-year-old female with past medical history of hiatal hernia s/p repair >10 years back, L emypema s/p decortication 2018, hypertension, GERD, non obstructive CAD, depression, dementia, anemia presenting for evaluation of low hemoglobin, measured outpatient to be 7.4.  Patient has had multiple episodes of nonbloody nonbilious vomiting over the last several months with good appetite and almost 60 pound weight loss over the last few months.  Patient just started following up with hematologist Dr.Yumi Hussein and had recent blood work from 3 days ago that resulted today and showed a low hemoglobin of 7.4. Patient denies fevers, retching, chills, chest pain, shortness of breath, hematuria, melena, hematochezia. The pt presented to the Ed for a blood transfusion that was recommended by the hematologist.    CTAP revealing a type III hiatal hernia and patient states she has had intermittent episodes of epigastric pain associated with vomiting over the past few months. CTAP revealing a large hiatal hernia with stomach above diaphragm and organoaxial rotation.    Plan    Please obtain lactate  Administer PO contrast now  AM KUB to see progression  No signs of gastric volvulus on examination or history  No signs of ischemia on CT  Transfuse as needed  Hiatal hernia with renzo ulcer may be cause of microcytic anemia  Patient wishes to follow with her surgeon [place] city for hiatal hernia repair  Will follow    [id number]

## 2024-01-08 NOTE — ED PROVIDER NOTE - PHYSICAL EXAMINATION
VITAL SIGNS: I have reviewed nursing notes and confirm.  CONSTITUTIONAL: well-appearing, non-toxic, NAD  SKIN: Warm dry, normal skin turgor  HEAD: NCAT  EYES: EOMI, PERRLA, no scleral icterus  ENT: Moist mucous membranes, normal pharynx with no erythema or exudates  NECK: Supple; non tender. Full ROM. No cervical LAD  CARD: RRR, no murmurs, rubs or gallops  RESP: clear to ausculation b/l.  No rales, rhonchi, or wheezing.  ABD: soft, + BS, non-tender, non-distended, no rebound or guarding.   EXT: Full ROM, pedal edema, no calf tenderness  NEURO: normal motor. normal sensory. CN II-XII intact.  PSYCH: Cooperative, appropriate.

## 2024-01-08 NOTE — H&P ADULT - NSHPLABSRESULTS_GEN_ALL_CORE
LABS:                          7.5    9.95  )-----------( 595      ( 08 Jan 2024 13:35 )             26.4     01-08    138  |  94<L>  |  27<H>  ----------------------------<  98  3.4<L>   |  29  |  1.6<H>    Ca    9.8      08 Jan 2024 13:35    TPro  6.7  /  Alb  3.8  /  TBili  0.5  /  DBili  x   /  AST  14  /  ALT  8   /  AlkPhos  97  01-08    PT/INR - ( 08 Jan 2024 13:35 )   PT: 12.40 sec;   INR: 1.09 ratio         PTT - ( 08 Jan 2024 13:35 )  PTT:26.9 sec        Radiology  Large hiatal hernia containing partially included stomach with apparent   organoaxial rotation  Bilateral adrenal nodular thickening. This is similar in appearance to   the prior CT of the chest.  Bilateral multifocal renal cortical thinning likely reflecting sequela of   prior insults  No discrete evidence of abdominal pelvic mass or lymphadenopathy.

## 2024-01-08 NOTE — ED PROVIDER NOTE - OBJECTIVE STATEMENT
Patient is a 78-year-old female with past medical history of hypertension, GERD, depression, dementia, anemia presenting for evaluation of low hemoglobin, measured outpatient to be 7.8.  Patient has had multiple episodes of nonbloody nonbilious vomiting over the last several months.  Patient with almost 60 pound recent weight over the last few months.  Patient just started following up with hematologist Dr.Yumi Hussein and had recent blood work from 3 days ago that resulted today and showed a low hemoglobin of 7.8.  Patient was told to come to the ED for transfusion.  Patient denies fevers, chills, chest pain, shortness of breath, hematuria, melena, hematochezia.

## 2024-01-08 NOTE — CONSULT NOTE ADULT - ATTENDING COMMENTS
pt examined  labs and images reviewed  admitted for anemia  needs egd and colonoscopy   s/p hiatal hernia - now recurred - no symptoms    will get egd/colonsocopy   will try to get records of previous hiatal hernia surgery        Please obtain lactate  Administer PO contrast now  AM KUB to see progression  No signs of gastric volvulus on examination or history  No signs of ischemia on CT  Transfuse as needed  Hiatal hernia with renzo ulcer may be cause of microcytic anemia  Patient wishes to [place] city for hiatal hernia repair  Will follow

## 2024-01-09 LAB
A1C WITH ESTIMATED AVERAGE GLUCOSE RESULT: 5.6 % — SIGNIFICANT CHANGE UP (ref 4–5.6)
A1C WITH ESTIMATED AVERAGE GLUCOSE RESULT: 5.6 % — SIGNIFICANT CHANGE UP (ref 4–5.6)
ALBUMIN SERPL ELPH-MCNC: 3.9 G/DL — SIGNIFICANT CHANGE UP (ref 3.5–5.2)
ALBUMIN SERPL ELPH-MCNC: 3.9 G/DL — SIGNIFICANT CHANGE UP (ref 3.5–5.2)
ALP SERPL-CCNC: 103 U/L — SIGNIFICANT CHANGE UP (ref 30–115)
ALP SERPL-CCNC: 103 U/L — SIGNIFICANT CHANGE UP (ref 30–115)
ALT FLD-CCNC: 9 U/L — SIGNIFICANT CHANGE UP (ref 0–41)
ALT FLD-CCNC: 9 U/L — SIGNIFICANT CHANGE UP (ref 0–41)
ANION GAP SERPL CALC-SCNC: 12 MMOL/L — SIGNIFICANT CHANGE UP (ref 7–14)
ANION GAP SERPL CALC-SCNC: 12 MMOL/L — SIGNIFICANT CHANGE UP (ref 7–14)
APTT BLD: 26.5 SEC — LOW (ref 27–39.2)
APTT BLD: 26.5 SEC — LOW (ref 27–39.2)
AST SERPL-CCNC: 12 U/L — SIGNIFICANT CHANGE UP (ref 0–41)
AST SERPL-CCNC: 12 U/L — SIGNIFICANT CHANGE UP (ref 0–41)
BASOPHILS # BLD AUTO: 0.07 K/UL — SIGNIFICANT CHANGE UP (ref 0–0.2)
BASOPHILS # BLD AUTO: 0.07 K/UL — SIGNIFICANT CHANGE UP (ref 0–0.2)
BASOPHILS NFR BLD AUTO: 0.7 % — SIGNIFICANT CHANGE UP (ref 0–1)
BASOPHILS NFR BLD AUTO: 0.7 % — SIGNIFICANT CHANGE UP (ref 0–1)
BILIRUB SERPL-MCNC: 0.5 MG/DL — SIGNIFICANT CHANGE UP (ref 0.2–1.2)
BILIRUB SERPL-MCNC: 0.5 MG/DL — SIGNIFICANT CHANGE UP (ref 0.2–1.2)
BUN SERPL-MCNC: 27 MG/DL — HIGH (ref 10–20)
BUN SERPL-MCNC: 27 MG/DL — HIGH (ref 10–20)
CALCIUM SERPL-MCNC: 9.2 MG/DL — SIGNIFICANT CHANGE UP (ref 8.4–10.5)
CALCIUM SERPL-MCNC: 9.2 MG/DL — SIGNIFICANT CHANGE UP (ref 8.4–10.5)
CHLORIDE SERPL-SCNC: 92 MMOL/L — LOW (ref 98–110)
CHLORIDE SERPL-SCNC: 92 MMOL/L — LOW (ref 98–110)
CHOLEST SERPL-MCNC: 175 MG/DL — SIGNIFICANT CHANGE UP
CHOLEST SERPL-MCNC: 175 MG/DL — SIGNIFICANT CHANGE UP
CO2 SERPL-SCNC: 34 MMOL/L — HIGH (ref 17–32)
CO2 SERPL-SCNC: 34 MMOL/L — HIGH (ref 17–32)
CREAT SERPL-MCNC: 1.7 MG/DL — HIGH (ref 0.7–1.5)
CREAT SERPL-MCNC: 1.7 MG/DL — HIGH (ref 0.7–1.5)
EGFR: 30 ML/MIN/1.73M2 — LOW
EGFR: 30 ML/MIN/1.73M2 — LOW
EOSINOPHIL # BLD AUTO: 0.17 K/UL — SIGNIFICANT CHANGE UP (ref 0–0.7)
EOSINOPHIL # BLD AUTO: 0.17 K/UL — SIGNIFICANT CHANGE UP (ref 0–0.7)
EOSINOPHIL NFR BLD AUTO: 1.8 % — SIGNIFICANT CHANGE UP (ref 0–8)
EOSINOPHIL NFR BLD AUTO: 1.8 % — SIGNIFICANT CHANGE UP (ref 0–8)
ESTIMATED AVERAGE GLUCOSE: 114 MG/DL — SIGNIFICANT CHANGE UP (ref 68–114)
ESTIMATED AVERAGE GLUCOSE: 114 MG/DL — SIGNIFICANT CHANGE UP (ref 68–114)
GLUCOSE SERPL-MCNC: 100 MG/DL — HIGH (ref 70–99)
GLUCOSE SERPL-MCNC: 100 MG/DL — HIGH (ref 70–99)
HCT VFR BLD CALC: 28.7 % — LOW (ref 37–47)
HCT VFR BLD CALC: 28.7 % — LOW (ref 37–47)
HCT VFR BLD CALC: 30.3 % — LOW (ref 37–47)
HCT VFR BLD CALC: 30.3 % — LOW (ref 37–47)
HDLC SERPL-MCNC: 51 MG/DL — SIGNIFICANT CHANGE UP
HDLC SERPL-MCNC: 51 MG/DL — SIGNIFICANT CHANGE UP
HGB BLD-MCNC: 8.5 G/DL — LOW (ref 12–16)
HGB BLD-MCNC: 8.5 G/DL — LOW (ref 12–16)
HGB BLD-MCNC: 8.9 G/DL — LOW (ref 12–16)
HGB BLD-MCNC: 8.9 G/DL — LOW (ref 12–16)
IMM GRANULOCYTES NFR BLD AUTO: 0.5 % — HIGH (ref 0.1–0.3)
IMM GRANULOCYTES NFR BLD AUTO: 0.5 % — HIGH (ref 0.1–0.3)
INR BLD: 1.09 RATIO — SIGNIFICANT CHANGE UP (ref 0.65–1.3)
IRON SATN MFR SERPL: 23 UG/DL — LOW (ref 35–150)
IRON SATN MFR SERPL: 23 UG/DL — LOW (ref 35–150)
IRON SATN MFR SERPL: 6 % — LOW (ref 15–50)
IRON SATN MFR SERPL: 6 % — LOW (ref 15–50)
LACTATE SERPL-SCNC: 1.2 MMOL/L — SIGNIFICANT CHANGE UP (ref 0.7–2)
LACTATE SERPL-SCNC: 1.2 MMOL/L — SIGNIFICANT CHANGE UP (ref 0.7–2)
LIPID PNL WITH DIRECT LDL SERPL: 91 MG/DL — SIGNIFICANT CHANGE UP
LIPID PNL WITH DIRECT LDL SERPL: 91 MG/DL — SIGNIFICANT CHANGE UP
LYMPHOCYTES # BLD AUTO: 2.36 K/UL — SIGNIFICANT CHANGE UP (ref 1.2–3.4)
LYMPHOCYTES # BLD AUTO: 2.36 K/UL — SIGNIFICANT CHANGE UP (ref 1.2–3.4)
LYMPHOCYTES # BLD AUTO: 24.8 % — SIGNIFICANT CHANGE UP (ref 20.5–51.1)
LYMPHOCYTES # BLD AUTO: 24.8 % — SIGNIFICANT CHANGE UP (ref 20.5–51.1)
MCHC RBC-ENTMCNC: 20 PG — LOW (ref 27–31)
MCHC RBC-ENTMCNC: 20 PG — LOW (ref 27–31)
MCHC RBC-ENTMCNC: 20.5 PG — LOW (ref 27–31)
MCHC RBC-ENTMCNC: 20.5 PG — LOW (ref 27–31)
MCHC RBC-ENTMCNC: 29.4 G/DL — LOW (ref 32–37)
MCHC RBC-ENTMCNC: 29.4 G/DL — LOW (ref 32–37)
MCHC RBC-ENTMCNC: 29.6 G/DL — LOW (ref 32–37)
MCHC RBC-ENTMCNC: 29.6 G/DL — LOW (ref 32–37)
MCV RBC AUTO: 68.1 FL — LOW (ref 81–99)
MCV RBC AUTO: 68.1 FL — LOW (ref 81–99)
MCV RBC AUTO: 69.2 FL — LOW (ref 81–99)
MCV RBC AUTO: 69.2 FL — LOW (ref 81–99)
MONOCYTES # BLD AUTO: 0.8 K/UL — HIGH (ref 0.1–0.6)
MONOCYTES # BLD AUTO: 0.8 K/UL — HIGH (ref 0.1–0.6)
MONOCYTES NFR BLD AUTO: 8.4 % — SIGNIFICANT CHANGE UP (ref 1.7–9.3)
MONOCYTES NFR BLD AUTO: 8.4 % — SIGNIFICANT CHANGE UP (ref 1.7–9.3)
NEUTROPHILS # BLD AUTO: 6.05 K/UL — SIGNIFICANT CHANGE UP (ref 1.4–6.5)
NEUTROPHILS # BLD AUTO: 6.05 K/UL — SIGNIFICANT CHANGE UP (ref 1.4–6.5)
NEUTROPHILS NFR BLD AUTO: 63.8 % — SIGNIFICANT CHANGE UP (ref 42.2–75.2)
NEUTROPHILS NFR BLD AUTO: 63.8 % — SIGNIFICANT CHANGE UP (ref 42.2–75.2)
NON HDL CHOLESTEROL: 124 MG/DL — SIGNIFICANT CHANGE UP
NON HDL CHOLESTEROL: 124 MG/DL — SIGNIFICANT CHANGE UP
NRBC # BLD: 0 /100 WBCS — SIGNIFICANT CHANGE UP (ref 0–0)
PLATELET # BLD AUTO: 508 K/UL — HIGH (ref 130–400)
PLATELET # BLD AUTO: 508 K/UL — HIGH (ref 130–400)
PLATELET # BLD AUTO: 555 K/UL — HIGH (ref 130–400)
PLATELET # BLD AUTO: 555 K/UL — HIGH (ref 130–400)
PMV BLD: 8.9 FL — SIGNIFICANT CHANGE UP (ref 7.4–10.4)
PMV BLD: 8.9 FL — SIGNIFICANT CHANGE UP (ref 7.4–10.4)
PMV BLD: 9.5 FL — SIGNIFICANT CHANGE UP (ref 7.4–10.4)
PMV BLD: 9.5 FL — SIGNIFICANT CHANGE UP (ref 7.4–10.4)
POTASSIUM SERPL-MCNC: 3.3 MMOL/L — LOW (ref 3.5–5)
POTASSIUM SERPL-MCNC: 3.3 MMOL/L — LOW (ref 3.5–5)
POTASSIUM SERPL-SCNC: 3.3 MMOL/L — LOW (ref 3.5–5)
POTASSIUM SERPL-SCNC: 3.3 MMOL/L — LOW (ref 3.5–5)
PROT SERPL-MCNC: 6.7 G/DL — SIGNIFICANT CHANGE UP (ref 6–8)
PROT SERPL-MCNC: 6.7 G/DL — SIGNIFICANT CHANGE UP (ref 6–8)
PROTHROM AB SERPL-ACNC: 12.4 SEC — SIGNIFICANT CHANGE UP (ref 9.95–12.87)
RBC # BLD: 4.15 M/UL — LOW (ref 4.2–5.4)
RBC # BLD: 4.45 M/UL — SIGNIFICANT CHANGE UP (ref 4.2–5.4)
RBC # BLD: 4.45 M/UL — SIGNIFICANT CHANGE UP (ref 4.2–5.4)
RBC # FLD: 17.6 % — HIGH (ref 11.5–14.5)
RETICS #: 54.6 K/UL — SIGNIFICANT CHANGE UP (ref 25–125)
RETICS #: 54.6 K/UL — SIGNIFICANT CHANGE UP (ref 25–125)
RETICS/RBC NFR: 1.3 % — SIGNIFICANT CHANGE UP (ref 0.5–1.5)
RETICS/RBC NFR: 1.3 % — SIGNIFICANT CHANGE UP (ref 0.5–1.5)
SODIUM SERPL-SCNC: 138 MMOL/L — SIGNIFICANT CHANGE UP (ref 135–146)
SODIUM SERPL-SCNC: 138 MMOL/L — SIGNIFICANT CHANGE UP (ref 135–146)
TIBC SERPL-MCNC: 379 UG/DL — SIGNIFICANT CHANGE UP (ref 220–430)
TIBC SERPL-MCNC: 379 UG/DL — SIGNIFICANT CHANGE UP (ref 220–430)
TRANSFERRIN SERPL-MCNC: 313 MG/DL — SIGNIFICANT CHANGE UP (ref 200–360)
TRANSFERRIN SERPL-MCNC: 313 MG/DL — SIGNIFICANT CHANGE UP (ref 200–360)
TRIGL SERPL-MCNC: 167 MG/DL — HIGH
TRIGL SERPL-MCNC: 167 MG/DL — HIGH
UIBC SERPL-MCNC: 356 UG/DL — SIGNIFICANT CHANGE UP (ref 110–370)
UIBC SERPL-MCNC: 356 UG/DL — SIGNIFICANT CHANGE UP (ref 110–370)
WBC # BLD: 7.83 K/UL — SIGNIFICANT CHANGE UP (ref 4.8–10.8)
WBC # BLD: 7.83 K/UL — SIGNIFICANT CHANGE UP (ref 4.8–10.8)
WBC # BLD: 9.5 K/UL — SIGNIFICANT CHANGE UP (ref 4.8–10.8)
WBC # BLD: 9.5 K/UL — SIGNIFICANT CHANGE UP (ref 4.8–10.8)
WBC # FLD AUTO: 7.83 K/UL — SIGNIFICANT CHANGE UP (ref 4.8–10.8)
WBC # FLD AUTO: 7.83 K/UL — SIGNIFICANT CHANGE UP (ref 4.8–10.8)
WBC # FLD AUTO: 9.5 K/UL — SIGNIFICANT CHANGE UP (ref 4.8–10.8)
WBC # FLD AUTO: 9.5 K/UL — SIGNIFICANT CHANGE UP (ref 4.8–10.8)

## 2024-01-09 PROCEDURE — 74018 RADEX ABDOMEN 1 VIEW: CPT | Mod: 26

## 2024-01-09 PROCEDURE — 99233 SBSQ HOSP IP/OBS HIGH 50: CPT

## 2024-01-09 PROCEDURE — 99223 1ST HOSP IP/OBS HIGH 75: CPT

## 2024-01-09 PROCEDURE — 76775 US EXAM ABDO BACK WALL LIM: CPT | Mod: 26

## 2024-01-09 RX ORDER — SOD SULF/SODIUM/NAHCO3/KCL/PEG
4000 SOLUTION, RECONSTITUTED, ORAL ORAL ONCE
Refills: 0 | Status: COMPLETED | OUTPATIENT
Start: 2024-01-09 | End: 2024-01-09

## 2024-01-09 RX ORDER — NIFEDIPINE 30 MG
30 TABLET, EXTENDED RELEASE 24 HR ORAL ONCE
Refills: 0 | Status: COMPLETED | OUTPATIENT
Start: 2024-01-09 | End: 2024-01-09

## 2024-01-09 RX ORDER — POTASSIUM CHLORIDE 20 MEQ
20 PACKET (EA) ORAL
Refills: 0 | Status: COMPLETED | OUTPATIENT
Start: 2024-01-09 | End: 2024-01-09

## 2024-01-09 RX ADMIN — SODIUM CHLORIDE 75 MILLILITER(S): 9 INJECTION, SOLUTION INTRAVENOUS at 00:55

## 2024-01-09 RX ADMIN — PANTOPRAZOLE SODIUM 40 MILLIGRAM(S): 20 TABLET, DELAYED RELEASE ORAL at 06:10

## 2024-01-09 RX ADMIN — SERTRALINE 100 MILLIGRAM(S): 25 TABLET, FILM COATED ORAL at 12:49

## 2024-01-09 RX ADMIN — Medication 50 MILLIGRAM(S): at 06:17

## 2024-01-09 RX ADMIN — Medication 20 MILLIEQUIVALENT(S): at 10:53

## 2024-01-09 RX ADMIN — Medication 20 MILLIEQUIVALENT(S): at 12:49

## 2024-01-09 RX ADMIN — Medication 30 MILLIGRAM(S): at 00:53

## 2024-01-09 RX ADMIN — Medication 0.25 MILLIGRAM(S): at 21:27

## 2024-01-09 RX ADMIN — PANTOPRAZOLE SODIUM 40 MILLIGRAM(S): 20 TABLET, DELAYED RELEASE ORAL at 17:31

## 2024-01-09 RX ADMIN — Medication 60 MILLIGRAM(S): at 06:10

## 2024-01-09 RX ADMIN — Medication 4000 MILLILITER(S): at 17:30

## 2024-01-09 RX ADMIN — Medication 20 MILLIGRAM(S): at 21:27

## 2024-01-09 NOTE — PROGRESS NOTE ADULT - ASSESSMENT
77 y/o female with PMHx HTN, GERD, non-obstructive CAD, Dementia, anemia, MDD P/W Hb 7.4 OP, episodic vomiting for few months, 60lb weight loss. Found to have Hb 7.5 on admission.     #Microcytic anemia (r/o GIB)  - Vitals on admission --> /67, , RR 19, T98, O2 98% RA  - Labs on admission --> Hb 7.5, MCV 66, Cr 1.6  - CTAP --> large hiatal hernia, b/l adrenal nodular thickening, b/l multifocal renal cortical thinning   - S/p 1u prbc in ED  - Admitted to medicine for further management  - GI consult pending   - C/w IV PPI BID  - C/w active T&S, monitor CBC    #Vomiting likely 2/2 Type III hiatal hernia  - CT abdomen shows Large hiatal hernia containing partially included stomach with apparent organoaxial rotation.  - Currently no abdominal pain/nausea/vomiting. Tolerating water.   - Surgery Consult --> PO contrast now, KUB in AM, pt wishes to follow surgeon in [facility] for repair  - F/u KUB    #DAWIT on CKD stage 3 vs. Progressive CKD   - Serum Cr 1.6 currently. Baseline Cr 1.2 in March 2022.   - RBUS pending   - C/w IVF gentle hydration  - C/w trending BMP    #HTN   - Lisinopril held  - Started Nifedipine 60mg QD  - C/w metoprolol succinate 50mg QD    #HFpEF  - On lasix PO 40 BID at home  - TTE 10/1/20 --> EF 60-65%, mild concentric LVH, G2DD  - Holding lasix for now due to DAWIT and mild hypokalemia    #Asthma  - Follows dr. Hartley who had recommended symbicort 160-4.5 2 puff BID and combivent prn but pt does not take it  - Monitor     #Depression  - C/w home meds    #MISC  DVT ppx: SCD  GI ppx: PPI  Diet: NPO  Activity: as tolerated 79 y/o female with PMHx HTN, GERD, non-obstructive CAD, Dementia, anemia, MDD P/W Hb 7.4 OP, episodic vomiting for few months, 60lb weight loss. Found to have Hb 7.5 on admission.     #Microcytic anemia (r/o GIB)  - Vitals on admission --> /67, , RR 19, T98, O2 98% RA  - Labs on admission --> Hb 7.5, MCV 66, Cr 1.6  - CTAP --> large hiatal hernia, b/l adrenal nodular thickening, b/l multifocal renal cortical thinning   - S/p 1u prbc in ED  - Admitted to medicine for further management  - GI consult pending   - C/w IV PPI BID  - C/w active T&S, monitor CBC    #Vomiting likely 2/2 Type III hiatal hernia  - CT abdomen shows Large hiatal hernia containing partially included stomach with apparent organoaxial rotation.  - Currently no abdominal pain/nausea/vomiting. Tolerating water.   - Surgery Consult --> PO contrast now, KUB in AM, pt wishes to follow surgeon in [facility] for repair  - F/u KUB    #DAWIT on CKD stage 3 vs. Progressive CKD   - Serum Cr 1.6 currently. Baseline Cr 1.2 in March 2022.   - RBUS pending   - C/w IVF gentle hydration  - C/w trending BMP    #HTN   - Lisinopril held  - Started Nifedipine 60mg QD  - C/w metoprolol succinate 50mg QD    #HFpEF  - On lasix PO 40 BID at home  - TTE 10/1/20 --> EF 60-65%, mild concentric LVH, G2DD  - Holding lasix for now due to DAWIT and mild hypokalemia    #Asthma  - Follows dr. Hartley who had recommended symbicort 160-4.5 2 puff BID and combivent prn but pt does not take it  - Monitor     #Depression  - C/w home meds    #MISC  DVT ppx: SCD  GI ppx: PPI  Diet: NPO  Activity: as tolerated

## 2024-01-09 NOTE — PROGRESS NOTE ADULT - ASSESSMENT
ASSESSMENT:  78-year-old female with past medical history of hiatal hernia s/p repair >10 years back, L emypema s/p decortication 2018, hypertension, GERD, non obstructive CAD, depression, dementia, anemia presenting for evaluation of low hemoglobin, measured outpatient to be 7.4.  Patient has had multiple episodes of nonbloody nonbilious vomiting over the last several months with good appetite and almost 60 pound weight loss over the last few months.  Patient just started following up with hematologist Dr.Yumi Hussein and had recent blood work from 3 days ago that resulted today and showed a low hemoglobin of 7.4. Patient denies fevers, retching, chills, chest pain, shortness of breath, hematuria, melena, hematochezia. The pt presented to the Ed for a blood transfusion that was recommended by the hematologist.    CTAP revealing a type III hiatal hernia and patient states she has had intermittent episodes of epigastric pain associated with vomiting over the past few months. CTAP revealing a large hiatal hernia with stomach above diaphragm and organoaxial rotation.    Plan  - F/u AM KUB results   - F/u GI for endoscopy   - Monitor labs, transfuse PRN   - Care as per primary team     [id number] ASSESSMENT:  78-year-old female with past medical history of hiatal hernia s/p repair >10 years back, L emypema s/p decortication 2018, hypertension, GERD, non obstructive CAD, depression, dementia, anemia presenting for evaluation of low hemoglobin, measured outpatient to be 7.4.  Patient has had multiple episodes of nonbloody nonbilious vomiting over the last several months with good appetite and almost 60 pound weight loss over the last few months.  Patient just started following up with hematologist Dr.Yumi Hussein and had recent blood work from 3 days ago that resulted today and showed a low hemoglobin of 7.4. Patient denies fevers, retching, chills, chest pain, shortness of breath, hematuria, melena, hematochezia. The pt presented to the Ed for a blood transfusion that was recommended by the hematologist.    CTAP revealing a type III hiatal hernia and patient states she has had intermittent episodes of epigastric pain associated with vomiting over the past few months. CTAP revealing a large hiatal hernia with stomach above diaphragm and organoaxial rotation.    Plan  - F/u AM KUB results   - F/u GI for endoscopy   - Monitor labs, transfuse PRN   - Care as per primary team     [id number] ASSESSMENT:  78-year-old female with past medical history of hiatal hernia s/p repair >10 years back, L emypema s/p decortication 2018, hypertension, GERD, non obstructive CAD, depression, dementia, anemia presenting for evaluation of low hemoglobin, measured outpatient to be 7.4.  Patient has had multiple episodes of nonbloody nonbilious vomiting over the last several months with good appetite and almost 60 pound weight loss over the last few months.  Patient just started following up with hematologist Dr.Yumi Hussein and had recent blood work from 3 days ago that resulted today and showed a low hemoglobin of 7.4. Patient denies fevers, retching, chills, chest pain, shortness of breath, hematuria, melena, hematochezia. The pt presented to the Ed for a blood transfusion that was recommended by the hematologist.    CTAP revealing a type III hiatal hernia and patient states she has had intermittent episodes of epigastric pain associated with vomiting over the past few months. CTAP revealing a large hiatal hernia with stomach above diaphragm and organoaxial rotation.    Plan  - F/u AM KUB results   - F/u GI for endoscopy   - Monitor labs, transfuse PRN   - Care as per primary team     [id number] ASSESSMENT:  78-year-old female with past medical history of hiatal hernia s/p repair >10 years back, L emypema s/p decortication 2018, hypertension, GERD, non obstructive CAD, depression, dementia, anemia presenting for evaluation of low hemoglobin, measured outpatient to be 7.4.  Patient has had multiple episodes of nonbloody nonbilious vomiting over the last several months with good appetite and almost 60 pound weight loss over the last few months.  Patient just started following up with hematologist Dr.Yumi Hussein and had recent blood work from 3 days ago that resulted today and showed a low hemoglobin of 7.4. Patient denies fevers, retching, chills, chest pain, shortness of breath, hematuria, melena, hematochezia. The pt presented to the Ed for a blood transfusion that was recommended by the hematologist.    CTAP revealing a type III hiatal hernia and patient states she has had intermittent episodes of epigastric pain associated with vomiting over the past few months. CTAP revealing a large hiatal hernia with stomach above diaphragm and organoaxial rotation.    Plan  - F/u AM KUB results   - F/u GI for endoscopy   - Monitor labs, transfuse PRN   - Care as per primary team     [id number]

## 2024-01-09 NOTE — PROGRESS NOTE ADULT - ASSESSMENT
78-year-old female with past medical history of hiatal hernia s/p repair >10 years back, hypertension, GERD, non obstructive CAD, depression, dementia, anemia presenting for evaluation of low hemoglobin, measured outpatient to be 7.4.  Patient has had multiple episodes of nonbloody nonbilious vomiting over the last several months with good appetite and almost 60 pound weight loss over the last few months.  Patient just started following up with hematologist Dr.Yumi Hussein and had recent blood work from 3 days ago that resulted today and showed a low hemoglobin of 7.4.    # Microcytic anemia   # Hiatal hernia s/p repair around 5 years ago at [facility]  # Post prandial Vomiting  # Weight loss  - CT Abdomen and Pelvis w/ IV Cont (01.08.24 @ 15:26) >Large hiatal hernia containing partially included stomach with apparent organoaxial rotation. Bilateral adrenal nodular thickening. This is similar in appearance to the prior CT of the chest.  - Lactate, Blood: 1.2 mmol/L (01.09.24 @ 01:38)  - s/p 1 unit PRBC   -  c/w protonix  - clear diet  - monitor HB/HCT  -  GI eval: EGD and colonoscopy for workup of anemia, weight loss, and post prandial vomiting.  patient on schedule for 01/10 versus 01/11.   - evaluated by surgery: KUMARIELLE    # Acute kidney injury on CKD stage 2   - US Renal (01.09.24 @ 12:28) >Mildly echogenic kidneys which could reflect medical renal disease. No hydronephrosis  - Hold lasix  - c/w IV fluid  - monitor BMP    # Hypokalemia  - replete k    # Hypertension  - metoprolol, procardia    # DVT prophylaxis    # Full code    # Pending: monitor HB/HCT, BMP  EGD/colonoscopy, KUB  # Discussed plan of care with patient  # Disposition: Home when stable  78-year-old female with past medical history of hiatal hernia s/p repair >10 years back, hypertension, GERD, non obstructive CAD, depression, dementia, anemia presenting for evaluation of low hemoglobin, measured outpatient to be 7.4.  Patient has had multiple episodes of nonbloody nonbilious vomiting over the last several months with good appetite and almost 60 pound weight loss over the last few months.  Patient just started following up with hematologist Dr.Yumi Hussein and had recent blood work from 3 days ago that resulted today and showed a low hemoglobin of 7.4.    # Microcytic anemia   # Hiatal hernia s/p repair around 5 years ago at [facility]  # Post prandial Vomiting  # Weight loss  - CT Abdomen and Pelvis w/ IV Cont (01.08.24 @ 15:26) >Large hiatal hernia containing partially included stomach with apparent organoaxial rotation. Bilateral adrenal nodular thickening. This is similar in appearance to the prior CT of the chest.  - Lactate, Blood: 1.2 mmol/L (01.09.24 @ 01:38)  - s/p 1 unit PRBC   -  c/w protonix  - clear diet  - monitor HB/HCT  -  GI eval: EGD and colonoscopy for workup of anemia, weight loss, and post prandial vomiting.  patient on schedule for 01/10 versus 01/11.   - evaluated by surgery: KUMARIELLE    # Acute kidney injury on CKD stage 2   - US Renal (01.09.24 @ 12:28) >Mildly echogenic kidneys which could reflect medical renal disease. No hydronephrosis  - Hold lasix  - c/w IV fluid  - monitor BMP    # Hypokalemia  - replete k    # Hypertension  - metoprolol, procardia    # DVT prophylaxis    # Full code    # Pending: monitor HB/HCT, BMP  EGD/colonoscopy, KUB  # Discussed plan of care with patient  # Disposition: Home when stable  78-year-old female with past medical history of hiatal hernia s/p repair >10 years back, hypertension, GERD, non obstructive CAD, depression, dementia, anemia presenting for evaluation of low hemoglobin, measured outpatient to be 7.4.  Patient has had multiple episodes of nonbloody nonbilious vomiting over the last several months with good appetite and almost 60 pound weight loss over the last few months.  Patient just started following up with hematologist Dr.Yumi Hussein and had recent blood work from 3 days ago that resulted today and showed a low hemoglobin of 7.4.    # Microcytic anemia   # Hiatal hernia s/p repair around 5 years ago at [facility]  # Post prandial Vomiting  # Weight loss  - CT Abdomen and Pelvis w/ IV Cont (01.08.24 @ 15:26) >Large hiatal hernia containing partially included stomach with apparent organoaxial rotation. Bilateral adrenal nodular thickening. This is similar in appearance to the prior CT of the chest.  - Lactate, Blood: 1.2 mmol/L (01.09.24 @ 01:38)  - s/p 1 unit PRBC   -  c/w protonix  - clear diet  - monitor HB/HCT  -  GI eval: EGD and colonoscopy for workup of anemia, weight loss, and post prandial vomiting.  patient on schedule for 01/10 versus 01/11.   - evaluated by surgery: KUMARIELLE    # Acute kidney injury on CKD stage 3  - US Renal (01.09.24 @ 12:28) >Mildly echogenic kidneys which could reflect medical renal disease. No hydronephrosis  - Hold lasix  - c/w IV fluid  - monitor BMP    # Hypokalemia  - replete k    # Hypertension  - metoprolol, procardia    # DVT prophylaxis    # Full code    # Pending: monitor HB/HCT, BMP  EGD/colonoscopy, KUB  # Discussed plan of care with patient  # Disposition: Home when stable  78-year-old female with past medical history of hiatal hernia s/p repair >10 years back, hypertension, GERD, non obstructive CAD, depression, dementia, anemia presenting for evaluation of low hemoglobin, measured outpatient to be 7.4.  Patient has had multiple episodes of nonbloody nonbilious vomiting over the last several months with good appetite and almost 60 pound weight loss over the last few months.  Patient just started following up with hematologist Dr.Yumi Hussein and had recent blood work from 3 days ago that resulted today and showed a low hemoglobin of 7.4.    # Microcytic anemia   # Hiatal hernia s/p repair around 5 years ago at [facility]  # Post prandial Vomiting  # Weight loss  - CT Abdomen and Pelvis w/ IV Cont (01.08.24 @ 15:26) >Large hiatal hernia containing partially included stomach with apparent organoaxial rotation. Bilateral adrenal nodular thickening. This is similar in appearance to the prior CT of the chest.  - Lactate, Blood: 1.2 mmol/L (01.09.24 @ 01:38)  - s/p 1 unit PRBC   -  c/w protonix  - clear diet  - monitor HB/HCT  -  GI eval: EGD and colonoscopy for workup of anemia, weight loss, and post prandial vomiting.  patient on schedule for 01/10 versus 01/11.   - evaluated by surgery: KUMARIELLE    # Acute kidney injury on CKD stage 3  - US Renal (01.09.24 @ 12:28) >Mildly echogenic kidneys which could reflect medical renal disease. No hydronephrosis  - Hold lasix  - c/w IV fluid  - monitor BMP    # Hypokalemia  - replete k    # Hypertension  - metoprolol, procardia    # DVT prophylaxis    # Full code    # Pending: monitor HB/HCT, BMP  EGD/colonoscopy, KUB  # Discussed plan of care with patient  # Disposition: Home when stable

## 2024-01-09 NOTE — PROGRESS NOTE ADULT - SUBJECTIVE AND OBJECTIVE BOX
GENERAL SURGERY PROGRESS NOTE    Patient: DARIEN BARRERA , 78y ([birth year])Female   MRN: [id number]  Location: [place] [address] A  Visit: 01-08-24 Inpatient  Date: 01-09-24 @ 10:50    Hospital Day #:2    Events of past 24 hours: Patient seen and examined at bedside. Patient reports abdominal pain has resolved. Abdomen soft, ND, NT.     PAST MEDICAL & SURGICAL HISTORY:  Hypertension      GERD (gastroesophageal reflux disease)      Hiatal hernia with GERD      Empyema lung      H/O CHF      History of repair of hiatal hernia      H/O pleural empyema  s/p decortication          Vitals:   T(F): 97 (01-09-24 @ 07:49), Max: 98.4 (01-08-24 @ 19:06)  HR: 69 (01-09-24 @ 07:49)  BP: 160/70 (01-09-24 @ 07:49)  RR: 18 (01-09-24 @ 04:13)  SpO2: 98% (01-08-24 @ 19:06)      Diet, NPO after Midnight:      NPO Start Date: 08-Jan-2024,   NPO Start Time: 23:59  Except Medications  Diet, Clear Liquid      Fluids: lactated ringers.: Solution, 1000 milliLiter(s) infuse at 75 mL/Hr, Stop After 12 Hours      I & O's:    Bowel Movement: : [] YES [] NO  Flatus: : [] YES [] NO    PHYSICAL EXAM:  General: NAD, AAOx3,   HEENT: NCAT, TIFFANY, EOMI,  Cardiac: RRR  Respiratory: CTAB, normal respiratory effort,  Abdomen: Soft, non-distended, non-tender,   Skin: Warm/dry, normal color,     MEDICATIONS  (STANDING):  ALPRAZolam 0.25 milliGRAM(s) Oral at bedtime  lactated ringers. 1000 milliLiter(s) (75 mL/Hr) IV Continuous <Continuous>  metoprolol succinate ER 50 milliGRAM(s) Oral daily  NIFEdipine XL 60 milliGRAM(s) Oral daily  pantoprazole  Injectable 40 milliGRAM(s) IV Push two times a day  potassium chloride    Tablet ER 20 milliEquivalent(s) Oral every 2 hours  sertraline 100 milliGRAM(s) Oral daily    MEDICATIONS  (PRN):      DVT PROPHYLAXIS:   GI PROPHYLAXIS: pantoprazole  Injectable 40 milliGRAM(s) IV Push two times a day    ANTICOAGULATION:   ANTIBIOTICS:            LAB/STUDIES:  Labs:  CAPILLARY BLOOD GLUCOSE                              8.5    9.50  )-----------( 508      ( 09 Jan 2024 07:14 )             28.7       Auto Neutrophil %: 63.8 % (01-09-24 @ 07:14)  Auto Immature Granulocyte %: 0.5 % (01-09-24 @ 07:14)  Auto Neutrophil %: 75.7 % (01-08-24 @ 13:35)  Band Neutrophils %: 0.9 % (01-08-24 @ 13:35)    01-09    138  |  92<L>  |  27<H>  ----------------------------<  100<H>  3.3<L>   |  34<H>  |  1.7<H>      Calcium: 9.2 mg/dL (01-09-24 @ 07:14)      LFTs:             6.7  | 0.5  | 12       ------------------[103     ( 09 Jan 2024 07:14 )  3.9  | x    | 9           Lipase:x      Amylase:x         Lactate, Blood: 1.2 mmol/L (01-09-24 @ 01:38)      Coags:     12.40  ----< 1.09    ( 09 Jan 2024 07:14 )     x                   Urinalysis Basic - ( 09 Jan 2024 07:14 )    Color: x / Appearance: x / SG: x / pH: x  Gluc: 100 mg/dL / Ketone: x  / Bili: x / Urobili: x   Blood: x / Protein: x / Nitrite: x   Leuk Esterase: x / RBC: x / WBC x   Sq Epi: x / Non Sq Epi: x / Bacteria: x                IMAGING:   GENERAL SURGERY PROGRESS NOTE    Patient: DARIEN BARRERA , 78y ([birth year])Female   MRN: [id number]  Location: [place] [address] A  Visit: 01-08-24 Inpatient  Date: 01-09-24 @ 10:50    Hospital Day #:2    Events of past 24 hours: Patient seen and examined at bedside. Patient reports abdominal pain has resolved. Abdomen soft, ND, NT.     PAST MEDICAL & SURGICAL HISTORY:  Hypertension      GERD (gastroesophageal reflux disease)      Hiatal hernia with GERD      Empyema lung      H/O CHF      History of repair of hiatal hernia      H/O pleural empyema  s/p decortication          Vitals:   T(F): 97 (01-09-24 @ 07:49), Max: 98.4 (01-08-24 @ 19:06)  HR: 69 (01-09-24 @ 07:49)  BP: 160/70 (01-09-24 @ 07:49)  RR: 18 (01-09-24 @ 04:13)  SpO2: 98% (01-08-24 @ 19:06)      Diet, NPO after Midnight:      NPO Start Date: 08-Jan-2024,   NPO Start Time: 23:59  Except Medications  Diet, Clear Liquid      Fluids: lactated ringers.: Solution, 1000 milliLiter(s) infuse at 75 mL/Hr, Stop After 12 Hours      I & O's:    Bowel Movement: : [] YES [] NO  Flatus: : [] YES [] NO    PHYSICAL EXAM:  General: NAD, AAOx3,   HEENT: NCAT, TIFFANY, EOMI,  Cardiac: RRR  Respiratory: CTAB, normal respiratory effort,  Abdomen: Soft, non-distended, non-tender,   Skin: Warm/dry, normal color,     MEDICATIONS  (STANDING):  ALPRAZolam 0.25 milliGRAM(s) Oral at bedtime  lactated ringers. 1000 milliLiter(s) (75 mL/Hr) IV Continuous <Continuous>  metoprolol succinate ER 50 milliGRAM(s) Oral daily  NIFEdipine XL 60 milliGRAM(s) Oral daily  pantoprazole  Injectable 40 milliGRAM(s) IV Push two times a day  potassium chloride    Tablet ER 20 milliEquivalent(s) Oral every 2 hours  sertraline 100 milliGRAM(s) Oral daily    MEDICATIONS  (PRN):      DVT PROPHYLAXIS:   GI PROPHYLAXIS: pantoprazole  Injectable 40 milliGRAM(s) IV Push two times a day    ANTICOAGULATION:   ANTIBIOTICS:            LAB/STUDIES:  Labs:  CAPILLARY BLOOD GLUCOSE                              8.5    9.50  )-----------( 508      ( 09 Jan 2024 07:14 )             28.7       Auto Neutrophil %: 63.8 % (01-09-24 @ 07:14)  Auto Immature Granulocyte %: 0.5 % (01-09-24 @ 07:14)  Auto Neutrophil %: 75.7 % (01-08-24 @ 13:35)  Band Neutrophils %: 0.9 % (01-08-24 @ 13:35)    01-09    138  |  92<L>  |  27<H>  ----------------------------<  100<H>  3.3<L>   |  34<H>  |  1.7<H>      Calcium: 9.2 mg/dL (01-09-24 @ 07:14)      LFTs:             6.7  | 0.5  | 12       ------------------[103     ( 09 Jan 2024 07:14 )  3.9  | x    | 9           Lipase:x      Amylase:x         Lactate, Blood: 1.2 mmol/L (01-09-24 @ 01:38)      Coags:     12.40  ----< 1.09    ( 09 Jan 2024 07:14 )     x                   Urinalysis Basic - ( 09 Jan 2024 07:14 )    Color: x / Appearance: x / SG: x / pH: x  Gluc: 100 mg/dL / Ketone: x  / Bili: x / Urobili: x   Blood: x / Protein: x / Nitrite: x   Leuk Esterase: x / RBC: x / WBC x   Sq Epi: x / Non Sq Epi: x / Bacteria: x                IMAGING:   GENERAL SURGERY PROGRESS NOTE    Patient: DARIEN BARRERA , 78y ([birth year])Female   MRN: [id number]  Location: [place] [address] A  Visit: 01-08-24 Inpatient  Date: 01-09-24 @ 10:50    Hospital Day #:2    Events of past 24 hours: Patient seen and examined at bedside. Patient reports abdominal pain has resolved. Abdomen soft, ND, NT.     PAST MEDICAL & SURGICAL HISTORY:  Hypertension      GERD (gastroesophageal reflux disease)      Hiatal hernia with GERD      Empyema lung      H/O CHF      History of repair of hiatal hernia      H/O pleural empyema  s/p decortication          Vitals:   T(F): 97 (01-09-24 @ 07:49), Max: 98.4 (01-08-24 @ 19:06)  HR: 69 (01-09-24 @ 07:49)  BP: 160/70 (01-09-24 @ 07:49)  RR: 18 (01-09-24 @ 04:13)  SpO2: 98% (01-08-24 @ 19:06)      Diet, NPO after Midnight:      NPO Start Date: 08-Jan-2024,   NPO Start Time: 23:59  Except Medications  Diet, Clear Liquid      Fluids: lactated ringers.: Solution, 1000 milliLiter(s) infuse at 75 mL/Hr, Stop After 12 Hours      I & O's:    Bowel Movement: : [] YES [] NO  Flatus: : [] YES [] NO    PHYSICAL EXAM:  General: NAD, AAOx3,   HEENT: NCAT, TIFFANY, EOMI,  Cardiac: RRR  Respiratory: CTAB, normal respiratory effort,  Abdomen: Soft, non-distended, non-tender,   Skin: Warm/dry, normal color,     MEDICATIONS  (STANDING):  ALPRAZolam 0.25 milliGRAM(s) Oral at bedtime  lactated ringers. 1000 milliLiter(s) (75 mL/Hr) IV Continuous <Continuous>  metoprolol succinate ER 50 milliGRAM(s) Oral daily  NIFEdipine XL 60 milliGRAM(s) Oral daily  pantoprazole  Injectable 40 milliGRAM(s) IV Push two times a day  potassium chloride    Tablet ER 20 milliEquivalent(s) Oral every 2 hours  sertraline 100 milliGRAM(s) Oral daily    MEDICATIONS  (PRN):      DVT PROPHYLAXIS:   GI PROPHYLAXIS: pantoprazole  Injectable 40 milliGRAM(s) IV Push two times a day    ANTICOAGULATION:   ANTIBIOTICS:            LAB/STUDIES:  Labs:  CAPILLARY BLOOD GLUCOSE                              8.5    9.50  )-----------( 508      ( 09 Jan 2024 07:14 )             28.7       Auto Neutrophil %: 63.8 % (01-09-24 @ 07:14)  Auto Immature Granulocyte %: 0.5 % (01-09-24 @ 07:14)  Auto Neutrophil %: 75.7 % (01-08-24 @ 13:35)  Band Neutrophils %: 0.9 % (01-08-24 @ 13:35)    01-09    138  |  92<L>  |  27<H>  ----------------------------<  100<H>  3.3<L>   |  34<H>  |  1.7<H>      Calcium: 9.2 mg/dL (01-09-24 @ 07:14)      LFTs:             6.7  | 0.5  | 12       ------------------[103     ( 09 Jan 2024 07:14 )  3.9  | x    | 9           Lipase:x      Amylase:x         Lactate, Blood: 1.2 mmol/L (01-09-24 @ 01:38)      Coags:     12.40  ----< 1.09    ( 09 Jan 2024 07:14 )     x                   Urinalysis Basic - ( 09 Jan 2024 07:14 )    Color: x / Appearance: x / SG: x / pH: x  Gluc: 100 mg/dL / Ketone: x  / Bili: x / Urobili: x   Blood: x / Protein: x / Nitrite: x   Leuk Esterase: x / RBC: x / WBC x   Sq Epi: x / Non Sq Epi: x / Bacteria: x                IMAGING:   GENERAL SURGERY PROGRESS NOTE    Patient: DARIEN BARRERA , 78y ([birth year])Female   MRN: [id number]  Location: [place] [address] A  Visit: 01-08-24 Inpatient  Date: 01-09-24 @ 10:50    Hospital Day #:2    Events of past 24 hours: Patient seen and examined at bedside. Patient reports abdominal pain has resolved. Abdomen soft, ND, NT.     PAST MEDICAL & SURGICAL HISTORY:  Hypertension      GERD (gastroesophageal reflux disease)      Hiatal hernia with GERD      Empyema lung      H/O CHF      History of repair of hiatal hernia      H/O pleural empyema  s/p decortication          Vitals:   T(F): 97 (01-09-24 @ 07:49), Max: 98.4 (01-08-24 @ 19:06)  HR: 69 (01-09-24 @ 07:49)  BP: 160/70 (01-09-24 @ 07:49)  RR: 18 (01-09-24 @ 04:13)  SpO2: 98% (01-08-24 @ 19:06)      Diet, NPO after Midnight:      NPO Start Date: 08-Jan-2024,   NPO Start Time: 23:59  Except Medications  Diet, Clear Liquid      Fluids: lactated ringers.: Solution, 1000 milliLiter(s) infuse at 75 mL/Hr, Stop After 12 Hours      I & O's:    Bowel Movement: : [] YES [] NO  Flatus: : [] YES [] NO    PHYSICAL EXAM:  General: NAD, AAOx3,   HEENT: NCAT, TIFFANY, EOMI,  Cardiac: RRR  Respiratory: CTAB, normal respiratory effort,  Abdomen: Soft, non-distended, non-tender,   Skin: Warm/dry, normal color,     MEDICATIONS  (STANDING):  ALPRAZolam 0.25 milliGRAM(s) Oral at bedtime  lactated ringers. 1000 milliLiter(s) (75 mL/Hr) IV Continuous <Continuous>  metoprolol succinate ER 50 milliGRAM(s) Oral daily  NIFEdipine XL 60 milliGRAM(s) Oral daily  pantoprazole  Injectable 40 milliGRAM(s) IV Push two times a day  potassium chloride    Tablet ER 20 milliEquivalent(s) Oral every 2 hours  sertraline 100 milliGRAM(s) Oral daily    MEDICATIONS  (PRN):      DVT PROPHYLAXIS:   GI PROPHYLAXIS: pantoprazole  Injectable 40 milliGRAM(s) IV Push two times a day    ANTICOAGULATION:   ANTIBIOTICS:            LAB/STUDIES:  Labs:  CAPILLARY BLOOD GLUCOSE                              8.5    9.50  )-----------( 508      ( 09 Jan 2024 07:14 )             28.7       Auto Neutrophil %: 63.8 % (01-09-24 @ 07:14)  Auto Immature Granulocyte %: 0.5 % (01-09-24 @ 07:14)  Auto Neutrophil %: 75.7 % (01-08-24 @ 13:35)  Band Neutrophils %: 0.9 % (01-08-24 @ 13:35)    01-09    138  |  92<L>  |  27<H>  ----------------------------<  100<H>  3.3<L>   |  34<H>  |  1.7<H>      Calcium: 9.2 mg/dL (01-09-24 @ 07:14)      LFTs:             6.7  | 0.5  | 12       ------------------[103     ( 09 Jan 2024 07:14 )  3.9  | x    | 9           Lipase:x      Amylase:x         Lactate, Blood: 1.2 mmol/L (01-09-24 @ 01:38)      Coags:     12.40  ----< 1.09    ( 09 Jan 2024 07:14 )     x                   Urinalysis Basic - ( 09 Jan 2024 07:14 )    Color: x / Appearance: x / SG: x / pH: x  Gluc: 100 mg/dL / Ketone: x  / Bili: x / Urobili: x   Blood: x / Protein: x / Nitrite: x   Leuk Esterase: x / RBC: x / WBC x   Sq Epi: x / Non Sq Epi: x / Bacteria: x                IMAGING:

## 2024-01-09 NOTE — CONSULT NOTE ADULT - SUBJECTIVE AND OBJECTIVE BOX
Gastroenterology Initial Consult Note      Location: [address] ([facility] 4B)  Patient Name: DARIEN BARRERA  Age: 78y  Gender: Female      Chief Complaint  Patient is a 78y old Female who presents with a chief complaint of Anemia (08 Jan 2024 22:57)  Primary diagnosis of Weakness      Reason for Consult  Anemia  Vomiting  Hiatal Hernia      History of Present Illness  78 year old female patient known to have Depression, Dementia, GERD. Hiatal hernia s/p repair, Anemia, HTN, and non obstructive CAD who presented to the ED on 01/08 for evaluation of low outpatient Hb in setting of vomiting, found to be hemodynamically stable with evidence of drop in Hb from baseline of 12.3 in 03/2022 to Hb 7.5 on blood work and evidence of large hiatal hernia on imaging, admitted for further management. We are consulted for concern of acute drop in Hb and vomiting in setting of large hiatal hernia.      Summary:  * History of Hiatal hernia s/p repair around 5 years ago at [facility]  * Baseline Hb 12.3 in 03/2022  * Recent outpatient Hb 7.4 on 01/05 -> followed with Dr Mercedes Hussein outpatient and referred to ED  * History: daily non bilious non bloody vomitus x few months (sometimes food; other times clear acid secretion); 60lb weight loss in few months (unintentional); no overt blood loss per rectum or os; no recent NSAID use  * ED Labs Hb 7.5 on 01/08; LA 1.2; INR 1.09 on 01/08  * No prior iron studies  * Refused ERICA on 01/09  * Prior EGD around 10 years ago in [place]  * Prior colonoscopy: patient not sure  * Family History: negative for GI malignancies  * CT Abdomen and Pelvis w/ IV Cont (01.08.24 @ 15:26) Large hiatal hernia containing partially included stomach with apparent   organoaxial rotationBilateral adrenal nodular thickening. This is similar in appearance to the prior CT of the chest.Bilateral multifocal renal cortical thinning likely reflecting sequela of prior insults. No discrete evidence of abdominal pelvic mass or lymphadenopathy.      Prior EGD:  as below      Prior Colonoscopy:  as below      Past Medical and Surgical History:  Hypertension    GERD (gastroesophageal reflux disease)    Hiatal hernia with GERD    Empyema lung    H/O CHF    History of repair of hiatal hernia    H/O pleural empyema  s/p decortication        Home Medications:  Home Medications:  ALPRAZolam 0.25 mg oral tablet: 1 tab(s) orally 2 times a day (08 Jan 2024 23:10)  furosemide 40 mg oral tablet: 1 tab(s) orally 2 times a day (08 Jan 2024 23:10)  Metoprolol Succinate ER 50 mg oral tablet, extended release: 1 tab(s) orally 2 times a day (08 Jan 2024 23:10)  Nifedical XL 60 mg oral tablet, extended release: 1 tab(s) orally once a day (08 Jan 2024 23:10)  omeprazole 40 mg oral delayed release capsule: 1 cap(s) orally once a day (08 Jan 2024 23:11)  sertraline 100 mg oral tablet: 1 tab(s) orally once a day (08 Jan 2024 23:10)  Symbicort 160 mcg-4.5 mcg/inh inhalation aerosol: 2 puff(s) inhaled 2 times a day (08 Jan 2024 23:13)      Social History:  Tobacco: denies  Alcohol: denies  Drugs: denies      Allergies:  No Known Allergies      Family History:  FAMILY HISTORY:  Family history of cardiac pacemaker  3 siblings    Family history of hypertrophic cardiomyopathy  sister        Vital Signs in the last 24 hours   Vitals Summary T(C): 36.9 (01-08-24 @ 19:06), Max: 36.9 (01-08-24 @ 19:06)  HR: 82 (01-09-24 @ 04:13) (70 - 100)  BP: 142/72 (01-09-24 @ 04:13) (130/60 - 196/89)  RR: 18 (01-09-24 @ 04:13) (18 - 19)  SpO2: 98% (01-08-24 @ 19:06) (98% - 100%)  Vent Data   Intake/ Output   Measurements   Weight (kg): 60.8 (01-08-24 @ 11:36)      Physical Exam  * General Appearance: Alert, cooperative, interactive, oriented to time, place, and person, in no acute distress  * Lungs: Good bilateral air entry  * Heart: Regular Rate and Rhythm, normal S1 and S2, no audible murmur, rub, or gallop  * Abdomen: Symmetric, non-distended, soft, non-tender, bowel sounds active all four quadrants, no masses, no organomegaly (no hepatosplenomegaly)  * Rectal: refused ERIAC 01/09    Investigations   Laboratory Workup      - CBC:                        7.5    9.95  )-----------( 595      ( 08 Jan 2024 13:35 )             26.4       - Hgb Trend:  7.5  01-08-24 @ 13:35        - Chemistry:  01-08    138  |  94<L>  |  27<H>  ----------------------------<  98  3.4<L>   |  29  |  1.6<H>    Ca    9.8      08 Jan 2024 13:35    TPro  6.7  /  Alb  3.8  /  TBili  0.5  /  DBili  x   /  AST  14  /  ALT  8   /  AlkPhos  97  01-08    Liver panel trend:  TBili 0.5   /   AST 14   /   ALT 8   /   AlkP 97   /   Tptn 6.7   /   Alb 3.8    /   DBili --      01-08      - Coagulation Studies:  PT/INR - ( 09 Jan 2024 07:14 )   PT: 12.40 sec;   INR: 1.09 ratio         PTT - ( 08 Jan 2024 13:35 )  PTT:26.9 sec    - ABG:      - Cardiac Markers:        Microbiological Workup  Urinalysis Basic - ( 08 Jan 2024 13:35 )    Color: x / Appearance: x / SG: x / pH: x  Gluc: 98 mg/dL / Ketone: x  / Bili: x / Urobili: x   Blood: x / Protein: x / Nitrite: x   Leuk Esterase: x / RBC: x / WBC x   Sq Epi: x / Non Sq Epi: x / Bacteria: x          Radiological Workup  CT Abdomen and Pelvis w/ IV Cont:   ACC: [id number] EXAM:  CT ABDOMEN AND PELVIS IC   ORDERED BY: BRI LANG     PROCEDURE DATE:  01/08/2024          INTERPRETATION:  Clinical History / Reason for exam: low hemoglobin, 60   pound weight loss    Technique:  Contiguous axial CT imagesof the abdomen and pelvis were   obtained following administration of intravenous contrast.  Oral contrast   was not administered.  Reformatted images in the coronal and sagittal   planes were acquired.    Comparison CT: 1/21/2018 chest CT    FINDINGS:    LOWER CHEST: Partially included lingular atelectasis. Large hiatal   hernia, partially included    HEPATOBILIARY: The liver is normal in appearance.  No evidence of intra   or extrahepatic biliary dilatation. The gallbladder is suboptimally   imaged.    SPLEEN: Unremarkable.    PANCREAS: Coarse calcification in the uncinate process may reflect   sequela of prior inflammation.    ADRENAL GLANDS: Bilateral nodular thickening is similar to the prior exam.    KIDNEYS: Symmetric pattern of renal enhancement with bilateral cortical   thinning compatible with sequela of prior insults. No evidence of a mass,   hydronephrosis or hydroureter.    ABDOMINOPELVIC NODES: No enlarged abdominal or pelvic lymph nodes.    PELVIC ORGANS: Unremarkable.    PERITONEUM/MESENTERY/BOWEL: No bowel obstruction, ascites or free   intraperitoneal air. The appendix is normal in caliber    BONES/SOFT TISSUES: There are degenerative changes of the spine.    VASCULAR:  The aorta is normal in caliber. Mild atherosclerotic   calcifications are noted.      IMPRESSION:      Large hiatal hernia containing partially included stomach with apparent   organoaxial rotation    Bilateral adrenal nodular thickening. This is similar in appearance to   the prior CT of the chest.    Bilateral multifocal renal cortical thinning likely reflecting sequela of   prior insults    No discrete evidence of abdominal pelvic mass or lymphadenopathy.    --- End of Report ---            SHANE THACKER MD; Attending Radiologist  This document has been electronically signed. Jan 8 2024  3:37PM (01-08-24 @ 15:26)            Current Medications  Standing Medications  ALPRAZolam 0.25 milliGRAM(s) Oral at bedtime  lactated ringers. 1000 milliLiter(s) (75 mL/Hr) IV Continuous <Continuous>  metoprolol succinate ER 50 milliGRAM(s) Oral daily  NIFEdipine XL 60 milliGRAM(s) Oral daily  pantoprazole  Injectable 40 milliGRAM(s) IV Push two times a day  sertraline 100 milliGRAM(s) Oral daily    PRN Medications    Singles Doses Administered  (ADM OVERRIDE) 1 each &lt;see task&gt; GiveOnce  iohexol 300 mG (iodine)/mL Oral Solution 30 milliLiter(s) Oral once  NIFEdipine XL 30 milliGRAM(s) Oral once  ondansetron Injectable 4 milliGRAM(s) IV Push once       Gastroenterology Initial Consult Note      Location: [address] ([facility] 4B)  Patient Name: DARIEN BARRERA  Age: 78y  Gender: Female      Chief Complaint  Patient is a 78y old Female who presents with a chief complaint of Anemia (08 Jan 2024 22:57)  Primary diagnosis of Weakness      Reason for Consult  Anemia  Vomiting  Hiatal Hernia      History of Present Illness  78 year old female patient known to have Depression, Dementia, GERD. Hiatal hernia s/p repair, Anemia, HTN, and non obstructive CAD who presented to the ED on 01/08 for evaluation of low outpatient Hb in setting of vomiting, found to be hemodynamically stable with evidence of drop in Hb from baseline of 12.3 in 03/2022 to Hb 7.5 on blood work and evidence of large hiatal hernia on imaging, admitted for further management. We are consulted for concern of acute drop in Hb and vomiting in setting of large hiatal hernia.      Summary:  * History of Hiatal hernia s/p repair around 5 years ago at [facility]  * Baseline Hb 12.3 in 03/2022  * Recent outpatient Hb 7.4 on 01/05 -> followed with Dr Mercedes Hussein outpatient and referred to ED  * History: daily non bilious non bloody vomitus x few months (sometimes food; other times clear acid secretion); 60lb weight loss in few months (unintentional); no overt blood loss per rectum or os; no recent NSAID use  * ED Labs Hb 7.5 on 01/08; LA 1.2; INR 1.09 on 01/08  * No prior iron studies  * Refused ERICA on 01/09  * Prior EGD around 10 years ago in [place]  * Prior colonoscopy: patient not sure  * Family History: negative for GI malignancies  * CT Abdomen and Pelvis w/ IV Cont (01.08.24 @ 15:26) Large hiatal hernia containing partially included stomach with apparent   organoaxial rotationBilateral adrenal nodular thickening. This is similar in appearance to the prior CT of the chest.Bilateral multifocal renal cortical thinning likely reflecting sequela of prior insults. No discrete evidence of abdominal pelvic mass or lymphadenopathy.      Prior EGD:  as below      Prior Colonoscopy:  as below      Past Medical and Surgical History:  Hypertension    GERD (gastroesophageal reflux disease)    Hiatal hernia with GERD    Empyema lung    H/O CHF    History of repair of hiatal hernia    H/O pleural empyema  s/p decortication        Home Medications:  Home Medications:  ALPRAZolam 0.25 mg oral tablet: 1 tab(s) orally 2 times a day (08 Jan 2024 23:10)  furosemide 40 mg oral tablet: 1 tab(s) orally 2 times a day (08 Jan 2024 23:10)  Metoprolol Succinate ER 50 mg oral tablet, extended release: 1 tab(s) orally 2 times a day (08 Jan 2024 23:10)  Nifedical XL 60 mg oral tablet, extended release: 1 tab(s) orally once a day (08 Jan 2024 23:10)  omeprazole 40 mg oral delayed release capsule: 1 cap(s) orally once a day (08 Jan 2024 23:11)  sertraline 100 mg oral tablet: 1 tab(s) orally once a day (08 Jan 2024 23:10)  Symbicort 160 mcg-4.5 mcg/inh inhalation aerosol: 2 puff(s) inhaled 2 times a day (08 Jan 2024 23:13)      Social History:  Tobacco: denies  Alcohol: denies  Drugs: denies      Allergies:  No Known Allergies      Family History:  FAMILY HISTORY:  Family history of cardiac pacemaker  3 siblings    Family history of hypertrophic cardiomyopathy  sister        Vital Signs in the last 24 hours   Vitals Summary T(C): 36.9 (01-08-24 @ 19:06), Max: 36.9 (01-08-24 @ 19:06)  HR: 82 (01-09-24 @ 04:13) (70 - 100)  BP: 142/72 (01-09-24 @ 04:13) (130/60 - 196/89)  RR: 18 (01-09-24 @ 04:13) (18 - 19)  SpO2: 98% (01-08-24 @ 19:06) (98% - 100%)  Vent Data   Intake/ Output   Measurements   Weight (kg): 60.8 (01-08-24 @ 11:36)      Physical Exam  * General Appearance: Alert, cooperative, interactive, oriented to time, place, and person, in no acute distress  * Lungs: Good bilateral air entry  * Heart: Regular Rate and Rhythm, normal S1 and S2, no audible murmur, rub, or gallop  * Abdomen: Symmetric, non-distended, soft, non-tender, bowel sounds active all four quadrants, no masses, no organomegaly (no hepatosplenomegaly)  * Rectal: refused ERICA 01/09    Investigations   Laboratory Workup      - CBC:                        7.5    9.95  )-----------( 595      ( 08 Jan 2024 13:35 )             26.4       - Hgb Trend:  7.5  01-08-24 @ 13:35        - Chemistry:  01-08    138  |  94<L>  |  27<H>  ----------------------------<  98  3.4<L>   |  29  |  1.6<H>    Ca    9.8      08 Jan 2024 13:35    TPro  6.7  /  Alb  3.8  /  TBili  0.5  /  DBili  x   /  AST  14  /  ALT  8   /  AlkPhos  97  01-08    Liver panel trend:  TBili 0.5   /   AST 14   /   ALT 8   /   AlkP 97   /   Tptn 6.7   /   Alb 3.8    /   DBili --      01-08      - Coagulation Studies:  PT/INR - ( 09 Jan 2024 07:14 )   PT: 12.40 sec;   INR: 1.09 ratio         PTT - ( 08 Jan 2024 13:35 )  PTT:26.9 sec    - ABG:      - Cardiac Markers:        Microbiological Workup  Urinalysis Basic - ( 08 Jan 2024 13:35 )    Color: x / Appearance: x / SG: x / pH: x  Gluc: 98 mg/dL / Ketone: x  / Bili: x / Urobili: x   Blood: x / Protein: x / Nitrite: x   Leuk Esterase: x / RBC: x / WBC x   Sq Epi: x / Non Sq Epi: x / Bacteria: x          Radiological Workup  CT Abdomen and Pelvis w/ IV Cont:   ACC: [id number] EXAM:  CT ABDOMEN AND PELVIS IC   ORDERED BY: BRI LANG     PROCEDURE DATE:  01/08/2024          INTERPRETATION:  Clinical History / Reason for exam: low hemoglobin, 60   pound weight loss    Technique:  Contiguous axial CT imagesof the abdomen and pelvis were   obtained following administration of intravenous contrast.  Oral contrast   was not administered.  Reformatted images in the coronal and sagittal   planes were acquired.    Comparison CT: 1/21/2018 chest CT    FINDINGS:    LOWER CHEST: Partially included lingular atelectasis. Large hiatal   hernia, partially included    HEPATOBILIARY: The liver is normal in appearance.  No evidence of intra   or extrahepatic biliary dilatation. The gallbladder is suboptimally   imaged.    SPLEEN: Unremarkable.    PANCREAS: Coarse calcification in the uncinate process may reflect   sequela of prior inflammation.    ADRENAL GLANDS: Bilateral nodular thickening is similar to the prior exam.    KIDNEYS: Symmetric pattern of renal enhancement with bilateral cortical   thinning compatible with sequela of prior insults. No evidence of a mass,   hydronephrosis or hydroureter.    ABDOMINOPELVIC NODES: No enlarged abdominal or pelvic lymph nodes.    PELVIC ORGANS: Unremarkable.    PERITONEUM/MESENTERY/BOWEL: No bowel obstruction, ascites or free   intraperitoneal air. The appendix is normal in caliber    BONES/SOFT TISSUES: There are degenerative changes of the spine.    VASCULAR:  The aorta is normal in caliber. Mild atherosclerotic   calcifications are noted.      IMPRESSION:      Large hiatal hernia containing partially included stomach with apparent   organoaxial rotation    Bilateral adrenal nodular thickening. This is similar in appearance to   the prior CT of the chest.    Bilateral multifocal renal cortical thinning likely reflecting sequela of   prior insults    No discrete evidence of abdominal pelvic mass or lymphadenopathy.    --- End of Report ---            SHANE THACKER MD; Attending Radiologist  This document has been electronically signed. Jan 8 2024  3:37PM (01-08-24 @ 15:26)            Current Medications  Standing Medications  ALPRAZolam 0.25 milliGRAM(s) Oral at bedtime  lactated ringers. 1000 milliLiter(s) (75 mL/Hr) IV Continuous <Continuous>  metoprolol succinate ER 50 milliGRAM(s) Oral daily  NIFEdipine XL 60 milliGRAM(s) Oral daily  pantoprazole  Injectable 40 milliGRAM(s) IV Push two times a day  sertraline 100 milliGRAM(s) Oral daily    PRN Medications    Singles Doses Administered  (ADM OVERRIDE) 1 each &lt;see task&gt; GiveOnce  iohexol 300 mG (iodine)/mL Oral Solution 30 milliLiter(s) Oral once  NIFEdipine XL 30 milliGRAM(s) Oral once  ondansetron Injectable 4 milliGRAM(s) IV Push once

## 2024-01-09 NOTE — PROGRESS NOTE ADULT - SUBJECTIVE AND OBJECTIVE BOX
24H events:    Patient is a 78y old Female who presents with a chief complaint of Anemia (09 Jan 2024 08:21)    Primary diagnosis of Anemia      Today is hospital day 1d. This morning patient was seen and examined at bedside, resting comfortably in bed.    No acute or major events overnight.    Code Status: full code    PAST MEDICAL & SURGICAL HISTORY  Hypertension    GERD (gastroesophageal reflux disease)    Hiatal hernia with GERD    Empyema lung    H/O CHF    History of repair of hiatal hernia    H/O pleural empyema  s/p decortication      SOCIAL HISTORY:  Social History: na    ALLERGIES:  No Known Allergies    MEDICATIONS:  STANDING MEDICATIONS  ALPRAZolam 0.25 milliGRAM(s) Oral at bedtime  lactated ringers. 1000 milliLiter(s) IV Continuous <Continuous>  metoprolol succinate ER 50 milliGRAM(s) Oral daily  NIFEdipine XL 60 milliGRAM(s) Oral daily  pantoprazole  Injectable 40 milliGRAM(s) IV Push two times a day  sertraline 100 milliGRAM(s) Oral daily    PRN MEDICATIONS    ROS:   no headaches, no dizziness, no fevers, no chills, no CP/SOB, no abdominal pain, no n/v/d, no hematochezia, no burning with urination, no hematuria, no weakness or tingling    VITALS:   ICU Vital Signs Last 24 Hrs  T(C): 36.1 (09 Jan 2024 07:49), Max: 36.9 (08 Jan 2024 19:06)  T(F): 97 (09 Jan 2024 07:49), Max: 98.4 (08 Jan 2024 19:06)  HR: 69 (09 Jan 2024 07:49) (69 - 100)  BP: 160/70 (09 Jan 2024 07:49) (130/60 - 196/89)  BP(mean): --  ABP: --  ABP(mean): --  RR: 18 (09 Jan 2024 04:13) (18 - 19)  SpO2: 98% (08 Jan 2024 19:06) (98% - 100%)    O2 Parameters below as of 08 Jan 2024 19:06  Patient On (Oxygen Delivery Method): room air    PHYSICAL EXAM:  GENERAL: lying in bed comfortably  HEAD: normal  HEART: RRR  LUNGS: CTA b/l  ABDOMEN: soft nontender nondistended  EXTREMITIES: no edema  NERVOUS SYSTEM:  A&OX3    Lines: none    LABS:                        7.5    9.95  )-----------( 595      ( 08 Jan 2024 13:35 )             26.4     01-09    138  |  92<L>  |  27<H>  ----------------------------<  100<H>  3.3<L>   |  34<H>  |  1.7<H>    Ca    9.2      09 Jan 2024 07:14    TPro  6.7  /  Alb  3.9  /  TBili  0.5  /  DBili  x   /  AST  12  /  ALT  9   /  AlkPhos  103  01-09    PT/INR - ( 09 Jan 2024 07:14 )   PT: 12.40 sec;   INR: 1.09 ratio         PTT - ( 08 Jan 2024 13:35 )  PTT:26.9 sec  Urinalysis Basic - ( 09 Jan 2024 07:14 )    Color: x / Appearance: x / SG: x / pH: x  Gluc: 100 mg/dL / Ketone: x  / Bili: x / Urobili: x   Blood: x / Protein: x / Nitrite: x   Leuk Esterase: x / RBC: x / WBC x   Sq Epi: x / Non Sq Epi: x / Bacteria: x        Lactate, Blood: 1.2 mmol/L (01-09-24 @ 01:38)      RADIOLOGY:    < from: CT Abdomen and Pelvis w/ IV Cont (01.08.24 @ 15:26) >  IMPRESSION:      Large hiatal hernia containing partially included stomach with apparent   organoaxial rotation    Bilateral adrenal nodular thickening. This is similar in appearance to   the prior CT of the chest.    Bilateral multifocal renal cortical thinning likely reflecting sequela of   prior insults    No discrete evidence of abdominal pelvic mass or lymphadenopathy.    < end of copied text >

## 2024-01-09 NOTE — CHART NOTE - NSCHARTNOTEFT_GEN_A_CORE
Patient has dementia and requested I update her sister oSnali regarding plan for EGD/Colonoscopy tomorrow.  Called Sonali at [phone] and explained plan.   Sonali expressed concern regarding patient's ability to make decisions for herself given her dementia specifically regarding undergoing surgery for hiatal hernia. She stated that the patient is not actively following with surgical team in the city as previously thought.   Will discuss with patient and family at bedside regarding next steps. Patient has dementia and requested I update her sister Sonali regarding plan for EGD/Colonoscopy tomorrow.  Called Sonali at [phone] and explained plan.   Sonali expressed concern regarding patient's ability to make decisions for herself given her dementia specifically regarding undergoing surgery for hiatal hernia. She stated that the patient is not actively following with surgical team in the city as previously thought.   Will discuss with patient and family at bedside regarding next steps.

## 2024-01-09 NOTE — CONSULT NOTE ADULT - ASSESSMENT
Assessment and Plan  Case of a 78 year old female patient known to have Depression, Dementia, GERD. Hiatal hernia s/p repair, Anemia, HTN, and non obstructive CAD who presented to the ED on 01/08 for evaluation of low outpatient Hb in setting of vomiting, found to be hemodynamically stable with evidence of drop in Hb from baseline of 12.3 in 03/2022 to Hb 7.5 on blood work and evidence of large hiatal hernia on imaging, admitted for further management. We are consulted for concern of acute drop in Hb and vomiting in setting of large hiatal hernia.      Acute Drop in Hemoglobin without Evidence of Overt Gastrointestinal Bleeding  Weight Loss and Post Prandial Vomiting with Large Hiatal Hernia  History of Hiatal Hernia s/p Repair  Rule Out Malignancy  No Clinical Evidence of GOO  * History of Hiatal hernia s/p repair around 5 years ago at [facility]  * Baseline Hb 12.3 in 03/2022  * Recent outpatient Hb 7.4 on 01/05 -> followed with Dr Mercedes Hussein outpatient and referred to ED  * History: daily non bilious non bloody vomitus x few months (sometimes food; other times clear acid secretion); 60lb weight loss in few months (unintentional); no overt blood loss per rectum or os; no recent NSAID use  * ED Labs Hb 7.5 on 01/08; LA 1.2; INR 1.09 on 01/08  * No prior iron studies  * Refused ERICA on 01/09  * Prior EGD around 10 years ago in [place]  * Prior colonoscopy: patient not sure  * Family History: negative for GI malignancies  * CT Abdomen and Pelvis w/ IV Cont (01.08.24 @ 15:26) Large hiatal hernia containing partially included stomach with apparent   organoaxial rotationBilateral adrenal nodular thickening. This is similar in appearance to the prior CT of the chest.Bilateral multifocal renal cortical thinning likely reflecting sequela of prior insults. No discrete evidence of abdominal pelvic mass or lymphadenopathy.    RECOMMENDATIONS  - May benefit from EGD and colonoscopy for workup of anemia, weight loss, and post prandial vomiting. Will discuss adding patient on schedule for 01/10 versus 01/11   - Recommend hematology workup. Follow up anemia workup  - Recommend surgery evaluation in setting of large hiatal hernia and prior history of repair in [facility] around 5 years ago  - Trend Hb and keep active T/S. Transfuse as needed to keep Hb >7  - Monitor for nausea and vomiting: last episode was on 01/08. Start IV Zofran 4mg Q8h PRN  - Continue IV PPI 40mg BID for now   - Agree with clear liquid diet today in case colonoscopy is scheduled for 01/10  - Monitor BM for signs of bleeding (for melena or hematochezia)  - Avoid NSAIDs      Pancreatic Uncinate Coarse Calcification  * Noted on imaging  * Likely from prior inflammation    RECOMMENDATIONS  - Outpatient follow up      Thank you for your consult.  - Please note that plan was communicated with medical team.   - Please reach GI on [phone] during weekdays till 5pm.  - Please call the GI service line after 5pm on Weekdays and anytime on Weekends: [phone].      Omar Wiggins MD  PGY - 4 Gastroenterology Fellow   [facility]     Assessment and Plan  Case of a 78 year old female patient known to have Depression, Dementia, GERD. Hiatal hernia s/p repair, Anemia, HTN, and non obstructive CAD who presented to the ED on 01/08 for evaluation of low outpatient Hb in setting of vomiting, found to be hemodynamically stable with evidence of drop in Hb from baseline of 12.3 in 03/2022 to Hb 7.5 on blood work and evidence of large hiatal hernia on imaging, admitted for further management. We are consulted for concern of acute drop in Hb and vomiting in setting of large hiatal hernia.      Acute Drop in Hemoglobin without Evidence of Overt Gastrointestinal Bleeding  Weight Loss and Post Prandial Vomiting with Large Hiatal Hernia  History of Hiatal Hernia s/p Repair  Rule Out Malignancy  No Clinical Evidence of GOO  * History of Hiatal hernia s/p repair around 5 years ago at [facility]  * Baseline Hb 12.3 in 03/2022  * Recent outpatient Hb 7.4 on 01/05 -> followed with Dr Mercedes Hussein outpatient and referred to ED  * History: daily non bilious non bloody vomitus x few months (sometimes food; other times clear acid secretion); 60lb weight loss in few months (unintentional); no overt blood loss per rectum or os; no recent NSAID use  * ED Labs Hb 7.5 on 01/08; LA 1.2; INR 1.09 on 01/08  * No prior iron studies  * Refused ERICA on 01/09  * Prior EGD around 10 years ago in [place]  * Prior colonoscopy: patient not sure  * Family History: negative for GI malignancies  * CT Abdomen and Pelvis w/ IV Cont (01.08.24 @ 15:26) Large hiatal hernia containing partially included stomach with apparent   organoaxial rotationBilateral adrenal nodular thickening. This is similar in appearance to the prior CT of the chest.Bilateral multifocal renal cortical thinning likely reflecting sequela of prior insults. No discrete evidence of abdominal pelvic mass or lymphadenopathy.    RECOMMENDATIONS  - May benefit from EGD and colonoscopy for workup of anemia, weight loss, and post prandial vomiting. Will discuss adding patient on schedule for 01/10 versus 01/11   - Recommend hematology workup. Follow up anemia workup  - Recommend surgery evaluation in setting of large hiatal hernia and prior history of repair in [facility] around 5 years ago  - Trend Hb and keep active T/S. Transfuse as needed to keep Hb >7  - Monitor for nausea and vomiting: last episode was on 01/08. Start IV Zofran 4mg Q8h PRN  - Continue IV PPI 40mg BID for now   - Agree with clear liquid diet today in case colonoscopy is scheduled for 01/10  - Monitor BM for signs of bleeding (for melena or hematochezia)  - Avoid NSAIDs      Pancreatic Uncinate Coarse Calcification  * Noted on imaging  * Likely from prior inflammation    RECOMMENDATIONS  - Outpatient follow up      Thank you for your consult.  - Please note that plan was communicated with medical team.   - Please reach GI on [phone] during weekdays till 5pm.  - Please call the GI service line after 5pm on Weekdays and anytime on Weekends: [phone].      Omar Wiggins MD  PGY - 4 Gastroenterology Fellow   [facility]     Assessment and Plan  Case of a 78 year old female patient known to have Depression, Dementia, GERD. Hiatal hernia s/p repair, Anemia, HTN, and non obstructive CAD who presented to the ED on 01/08 for evaluation of low outpatient Hb in setting of vomiting, found to be hemodynamically stable with evidence of drop in Hb from baseline of 12.3 in 03/2022 to Hb 7.5 on blood work and evidence of large hiatal hernia on imaging, admitted for further management. We are consulted for concern of acute drop in Hb and vomiting in setting of large hiatal hernia.      Acute Drop in Hemoglobin without Evidence of Overt Gastrointestinal Bleeding  Weight Loss and Post Prandial Vomiting with Large Hiatal Hernia  History of Hiatal Hernia s/p Repair  Rule Out Malignancy  No Clinical Evidence of GOO  * History of Hiatal hernia s/p repair around 5 years ago at [facility]  * Baseline Hb 12.3 in 03/2022  * Recent outpatient Hb 7.4 on 01/05 -> followed with Dr Mercedes Hussein outpatient and referred to ED  * History: daily non bilious non bloody vomitus x few months (sometimes food; other times clear acid secretion); 60lb weight loss in few months (unintentional); no overt blood loss per rectum or os; no recent NSAID use  * ED Labs Hb 7.5 on 01/08; LA 1.2; INR 1.09 on 01/08  * No prior iron studies  * Refused ERICA on 01/09  * Prior EGD around 10 years ago in [place]  * Prior colonoscopy: patient not sure  * Family History: negative for GI malignancies  * CT Abdomen and Pelvis w/ IV Cont (01.08.24 @ 15:26) Large hiatal hernia containing partially included stomach with apparent   organoaxial rotationBilateral adrenal nodular thickening. This is similar in appearance to the prior CT of the chest.Bilateral multifocal renal cortical thinning likely reflecting sequela of prior insults. No discrete evidence of abdominal pelvic mass or lymphadenopathy.    RECOMMENDATIONS  - Will schedule patient for EGD and colonoscopy for workup of anemia, weight loss, and post prandial vomiting on 01/10 (pending discussion with sister Ms Lyon)  - Please administer golytely 4L today and dulcolax 20mg at bedtime  - Please check preop labs tonight: CBC, BMP, Mg, and INR  - Recommend hematology workup. Follow up anemia workup  - Recommend surgery evaluation in setting of large hiatal hernia and prior history of repair in [place] around 5 years ago  - Trend Hb and keep active T/S. Transfuse as needed to keep Hb >7  - Monitor for nausea and vomiting: last episode was on 01/08. Start IV Zofran 4mg Q8h PRN  - Continue IV PPI 40mg BID for now   - Agree with clear liquid diet today in case colonoscopy is scheduled for 01/10  - Monitor BM for signs of bleeding (for melena or hematochezia)  - Avoid NSAIDs      Pancreatic Uncinate Coarse Calcification  * Noted on imaging  * Likely from prior inflammation    RECOMMENDATIONS  - Outpatient follow up      Thank you for your consult.  - Please note that plan was communicated with medical team.   - Please reach GI on [phone] during weekdays till 5pm.  - Please call the GI service line after 5pm on Weekdays and anytime on Weekends: [phone].      Omar Wiggins MD  PGY - 4 Gastroenterology Fellow   [facility]     Assessment and Plan  Case of a 78 year old female patient known to have Depression, Dementia, GERD. Hiatal hernia s/p repair, Anemia, HTN, and non obstructive CAD who presented to the ED on 01/08 for evaluation of low outpatient Hb in setting of vomiting, found to be hemodynamically stable with evidence of drop in Hb from baseline of 12.3 in 03/2022 to Hb 7.5 on blood work and evidence of large hiatal hernia on imaging, admitted for further management. We are consulted for concern of acute drop in Hb and vomiting in setting of large hiatal hernia.      Acute Drop in Hemoglobin without Evidence of Overt Gastrointestinal Bleeding  Weight Loss and Post Prandial Vomiting with Large Hiatal Hernia  History of Hiatal Hernia s/p Repair  Rule Out Malignancy  No Clinical Evidence of GOO  * History of Hiatal hernia s/p repair around 5 years ago at [facility]  * Baseline Hb 12.3 in 03/2022  * Recent outpatient Hb 7.4 on 01/05 -> followed with Dr Mercedes Hussein outpatient and referred to ED  * History: daily non bilious non bloody vomitus x few months (sometimes food; other times clear acid secretion); 60lb weight loss in few months (unintentional); no overt blood loss per rectum or os; no recent NSAID use  * ED Labs Hb 7.5 on 01/08; LA 1.2; INR 1.09 on 01/08  * No prior iron studies  * Refused ERICA on 01/09  * Prior EGD around 10 years ago in [place]  * Prior colonoscopy: patient not sure  * Family History: negative for GI malignancies  * CT Abdomen and Pelvis w/ IV Cont (01.08.24 @ 15:26) Large hiatal hernia containing partially included stomach with apparent   organoaxial rotationBilateral adrenal nodular thickening. This is similar in appearance to the prior CT of the chest.Bilateral multifocal renal cortical thinning likely reflecting sequela of prior insults. No discrete evidence of abdominal pelvic mass or lymphadenopathy.    RECOMMENDATIONS  - Will schedule patient for EGD and colonoscopy for workup of anemia, weight loss, and post prandial vomiting on 01/10 (pending discussion with sister Ms Lyon)  - Please administer golytely 4L today and dulcolax 20mg at bedtime  - Please check preop labs tonight: CBC, BMP, Mg, and INR  - Recommend hematology workup. Follow up anemia workup  - Recommend surgery evaluation in setting of large hiatal hernia and prior history of repair in [place] around 5 years ago  - Trend Hb and keep active T/S. Transfuse as needed to keep Hb >7  - Monitor for nausea and vomiting: last episode was on 01/08. Start IV Zofran 4mg Q8h PRN  - Continue IV PPI 40mg BID for now   - Agree with clear liquid diet today in case colonoscopy is scheduled for 01/10  - Monitor BM for signs of bleeding (for melena or hematochezia)  - Avoid NSAIDs      Pancreatic Uncinate Coarse Calcification  * Noted on imaging  * Likely from prior inflammation    RECOMMENDATIONS  - Outpatient follow up      Thank you for your consult.  - Please note that plan was communicated with medical team.   - Please reach GI on [phone] during weekdays till 5pm.  - Please call the GI service line after 5pm on Weekdays and anytime on Weekends: [phone].      Omar Wiggins MD  PGY - 4 Gastroenterology Fellow   [facility]

## 2024-01-09 NOTE — PROGRESS NOTE ADULT - SUBJECTIVE AND OBJECTIVE BOX
Patient is a 78y old  Female who presents with a chief complaint of Anemia (09 Jan 2024 10:50)    Patient was seen and examined.  C/o intermittent episodes of Vomiting  H/o weight loss  Denies any other complaints.  All systems reviewed positive history as mentioned above.    PAST MEDICAL & SURGICAL HISTORY:  Hypertension  GERD (gastroesophageal reflux disease)  Hiatal hernia with GERD  Empyema lung  H/O CHF  History of repair of hiatal hernia  H/O pleural empyema  s/p decortication    Allergies  No Known Allergies    Home Medications:  ALPRAZolam 0.25 mg oral tablet: 1 tab(s) orally 2 times a day (08 Jan 2024 23:10)  furosemide 40 mg oral tablet: 1 tab(s) orally 2 times a day (08 Jan 2024 23:10)  Metoprolol Succinate ER 50 mg oral tablet, extended release: 1 tab(s) orally 2 times a day (08 Jan 2024 23:10)  Nifedical XL 60 mg oral tablet, extended release: 1 tab(s) orally once a day (08 Jan 2024 23:10)  omeprazole 40 mg oral delayed release capsule: 1 cap(s) orally once a day (08 Jan 2024 23:11)  sertraline 100 mg oral tablet: 1 tab(s) orally once a day (08 Jan 2024 23:10)  Symbicort 160 mcg-4.5 mcg/inh inhalation aerosol: 2 puff(s) inhaled 2 times a day (08 Jan 2024 23:13)    MEDICATIONS  (STANDING):  ALPRAZolam 0.25 milliGRAM(s) Oral at bedtime  bisacodyl 20 milliGRAM(s) Oral once  lactated ringers. 1000 milliLiter(s) (75 mL/Hr) IV Continuous <Continuous>  metoprolol succinate ER 50 milliGRAM(s) Oral daily  NIFEdipine XL 60 milliGRAM(s) Oral daily  pantoprazole  Injectable 40 milliGRAM(s) IV Push two times a day  polyethylene glycol/electrolyte Solution. 4000 milliLiter(s) Oral once  sertraline 100 milliGRAM(s) Oral daily    MEDICATIONS  (PRN):    Vital Signs Last 24 Hrs  T(C): 36.1  T(F): 97  HR: 69  BP: 160/70  BP(mean): --  RR: 18  SpO2: 98%    O/E:  Awake, alert, not in distress.  HEENT: atraumatic, EOMI.  Chest: clear.  CVS: SIS2 +, no murmur.  P/A: Soft, BS+  CNS: awake, alert  Ext: no edema feet.  Skin: no rash, no ulcers.  All systems reviewed positive findings as above.                          8.5<L>  9.50  )-----------( 508<H>    ( 09 Jan 2024 07:14 )             28.7<L>                        7.5<L>  9.95  )-----------( 595<H>    ( 08 Jan 2024 13:35 )             26.4<L>    01-09    138  |  92<L>  |  27<H>  ----------------------------<  100<H>  3.3<L>   |  34<H>  |  1.7<H>  01-08    138  |  94<L>  |  27<H>  ----------------------------<  98  3.4<L>   |  29  |  1.6<H>    Ca    9.2      09 Jan 2024 07:14  Ca    9.8      08 Jan 2024 13:35    TPro  6.7  /  Alb  3.9  /  TBili  0.5  /  DBili  x   /  AST  12  /  ALT  9   /  AlkPhos  103  01-09  TPro  6.7  /  Alb  3.8  /  TBili  0.5  /  DBili  x   /  AST  14  /  ALT  8   /  AlkPhos  97  01-08          PT/INR - ( 09 Jan 2024 07:14 )   PT: 12.40 sec;   INR: 1.09 ratio         PTT - ( 08 Jan 2024 13:35 )  PTT:26.9 sec  Urinalysis Basic - ( 09 Jan 2024 07:14 )    Color: x / Appearance: x / SG: x / pH: x  Gluc: 100 mg/dL / Ketone: x  / Bili: x / Urobili: x   Blood: x / Protein: x / Nitrite: x   Leuk Esterase: x / RBC: x / WBC x   Sq Epi: x / Non Sq Epi: x / Bacteria: x

## 2024-01-10 LAB
ALBUMIN SERPL ELPH-MCNC: 3.5 G/DL — SIGNIFICANT CHANGE UP (ref 3.5–5.2)
ALBUMIN SERPL ELPH-MCNC: 3.5 G/DL — SIGNIFICANT CHANGE UP (ref 3.5–5.2)
ALBUMIN SERPL ELPH-MCNC: 3.8 G/DL — SIGNIFICANT CHANGE UP (ref 3.5–5.2)
ALBUMIN SERPL ELPH-MCNC: 3.8 G/DL — SIGNIFICANT CHANGE UP (ref 3.5–5.2)
ALP SERPL-CCNC: 100 U/L — SIGNIFICANT CHANGE UP (ref 30–115)
ALP SERPL-CCNC: 100 U/L — SIGNIFICANT CHANGE UP (ref 30–115)
ALP SERPL-CCNC: 96 U/L — SIGNIFICANT CHANGE UP (ref 30–115)
ALP SERPL-CCNC: 96 U/L — SIGNIFICANT CHANGE UP (ref 30–115)
ALT FLD-CCNC: 7 U/L — SIGNIFICANT CHANGE UP (ref 0–41)
ALT FLD-CCNC: 7 U/L — SIGNIFICANT CHANGE UP (ref 0–41)
ALT FLD-CCNC: 9 U/L — SIGNIFICANT CHANGE UP (ref 0–41)
ALT FLD-CCNC: 9 U/L — SIGNIFICANT CHANGE UP (ref 0–41)
ANION GAP SERPL CALC-SCNC: 10 MMOL/L — SIGNIFICANT CHANGE UP (ref 7–14)
ANION GAP SERPL CALC-SCNC: 10 MMOL/L — SIGNIFICANT CHANGE UP (ref 7–14)
ANION GAP SERPL CALC-SCNC: 14 MMOL/L — SIGNIFICANT CHANGE UP (ref 7–14)
ANION GAP SERPL CALC-SCNC: 14 MMOL/L — SIGNIFICANT CHANGE UP (ref 7–14)
AST SERPL-CCNC: 13 U/L — SIGNIFICANT CHANGE UP (ref 0–41)
AST SERPL-CCNC: 13 U/L — SIGNIFICANT CHANGE UP (ref 0–41)
AST SERPL-CCNC: 14 U/L — SIGNIFICANT CHANGE UP (ref 0–41)
AST SERPL-CCNC: 14 U/L — SIGNIFICANT CHANGE UP (ref 0–41)
BASOPHILS # BLD AUTO: 0.04 K/UL — SIGNIFICANT CHANGE UP (ref 0–0.2)
BASOPHILS # BLD AUTO: 0.04 K/UL — SIGNIFICANT CHANGE UP (ref 0–0.2)
BASOPHILS NFR BLD AUTO: 0.5 % — SIGNIFICANT CHANGE UP (ref 0–1)
BASOPHILS NFR BLD AUTO: 0.5 % — SIGNIFICANT CHANGE UP (ref 0–1)
BILIRUB SERPL-MCNC: 0.5 MG/DL — SIGNIFICANT CHANGE UP (ref 0.2–1.2)
BILIRUB SERPL-MCNC: 0.5 MG/DL — SIGNIFICANT CHANGE UP (ref 0.2–1.2)
BILIRUB SERPL-MCNC: 0.6 MG/DL — SIGNIFICANT CHANGE UP (ref 0.2–1.2)
BILIRUB SERPL-MCNC: 0.6 MG/DL — SIGNIFICANT CHANGE UP (ref 0.2–1.2)
BUN SERPL-MCNC: 24 MG/DL — HIGH (ref 10–20)
CALCIUM SERPL-MCNC: 9.2 MG/DL — SIGNIFICANT CHANGE UP (ref 8.4–10.4)
CALCIUM SERPL-MCNC: 9.2 MG/DL — SIGNIFICANT CHANGE UP (ref 8.4–10.4)
CALCIUM SERPL-MCNC: 9.3 MG/DL — SIGNIFICANT CHANGE UP (ref 8.4–10.5)
CALCIUM SERPL-MCNC: 9.3 MG/DL — SIGNIFICANT CHANGE UP (ref 8.4–10.5)
CHLORIDE SERPL-SCNC: 93 MMOL/L — LOW (ref 98–110)
CHLORIDE SERPL-SCNC: 93 MMOL/L — LOW (ref 98–110)
CHLORIDE SERPL-SCNC: 94 MMOL/L — LOW (ref 98–110)
CHLORIDE SERPL-SCNC: 94 MMOL/L — LOW (ref 98–110)
CO2 SERPL-SCNC: 29 MMOL/L — SIGNIFICANT CHANGE UP (ref 17–32)
CO2 SERPL-SCNC: 29 MMOL/L — SIGNIFICANT CHANGE UP (ref 17–32)
CO2 SERPL-SCNC: 32 MMOL/L — SIGNIFICANT CHANGE UP (ref 17–32)
CO2 SERPL-SCNC: 32 MMOL/L — SIGNIFICANT CHANGE UP (ref 17–32)
CREAT SERPL-MCNC: 1.5 MG/DL — SIGNIFICANT CHANGE UP (ref 0.7–1.5)
EGFR: 35 ML/MIN/1.73M2 — LOW
EOSINOPHIL # BLD AUTO: 0.02 K/UL — SIGNIFICANT CHANGE UP (ref 0–0.7)
EOSINOPHIL # BLD AUTO: 0.02 K/UL — SIGNIFICANT CHANGE UP (ref 0–0.7)
EOSINOPHIL NFR BLD AUTO: 0.3 % — SIGNIFICANT CHANGE UP (ref 0–8)
EOSINOPHIL NFR BLD AUTO: 0.3 % — SIGNIFICANT CHANGE UP (ref 0–8)
FERRITIN SERPL-MCNC: 27 NG/ML — SIGNIFICANT CHANGE UP (ref 13–330)
FERRITIN SERPL-MCNC: 27 NG/ML — SIGNIFICANT CHANGE UP (ref 13–330)
FOLATE SERPL-MCNC: 9.4 NG/ML — SIGNIFICANT CHANGE UP
FOLATE SERPL-MCNC: 9.4 NG/ML — SIGNIFICANT CHANGE UP
GLUCOSE SERPL-MCNC: 87 MG/DL — SIGNIFICANT CHANGE UP (ref 70–99)
GLUCOSE SERPL-MCNC: 87 MG/DL — SIGNIFICANT CHANGE UP (ref 70–99)
GLUCOSE SERPL-MCNC: 89 MG/DL — SIGNIFICANT CHANGE UP (ref 70–99)
GLUCOSE SERPL-MCNC: 89 MG/DL — SIGNIFICANT CHANGE UP (ref 70–99)
HCT VFR BLD CALC: 28 % — LOW (ref 37–47)
HCT VFR BLD CALC: 28 % — LOW (ref 37–47)
HGB BLD-MCNC: 8.3 G/DL — LOW (ref 12–16)
HGB BLD-MCNC: 8.3 G/DL — LOW (ref 12–16)
IMM GRANULOCYTES NFR BLD AUTO: 0.4 % — HIGH (ref 0.1–0.3)
IMM GRANULOCYTES NFR BLD AUTO: 0.4 % — HIGH (ref 0.1–0.3)
LYMPHOCYTES # BLD AUTO: 1.73 K/UL — SIGNIFICANT CHANGE UP (ref 1.2–3.4)
LYMPHOCYTES # BLD AUTO: 1.73 K/UL — SIGNIFICANT CHANGE UP (ref 1.2–3.4)
LYMPHOCYTES # BLD AUTO: 22.1 % — SIGNIFICANT CHANGE UP (ref 20.5–51.1)
LYMPHOCYTES # BLD AUTO: 22.1 % — SIGNIFICANT CHANGE UP (ref 20.5–51.1)
MAGNESIUM SERPL-MCNC: 1.6 MG/DL — LOW (ref 1.8–2.4)
MAGNESIUM SERPL-MCNC: 1.6 MG/DL — LOW (ref 1.8–2.4)
MAGNESIUM SERPL-MCNC: 2.3 MG/DL — SIGNIFICANT CHANGE UP (ref 1.8–2.4)
MAGNESIUM SERPL-MCNC: 2.3 MG/DL — SIGNIFICANT CHANGE UP (ref 1.8–2.4)
MCHC RBC-ENTMCNC: 20.3 PG — LOW (ref 27–31)
MCHC RBC-ENTMCNC: 20.3 PG — LOW (ref 27–31)
MCHC RBC-ENTMCNC: 29.6 G/DL — LOW (ref 32–37)
MCHC RBC-ENTMCNC: 29.6 G/DL — LOW (ref 32–37)
MCV RBC AUTO: 68.6 FL — LOW (ref 81–99)
MCV RBC AUTO: 68.6 FL — LOW (ref 81–99)
MONOCYTES # BLD AUTO: 0.68 K/UL — HIGH (ref 0.1–0.6)
MONOCYTES # BLD AUTO: 0.68 K/UL — HIGH (ref 0.1–0.6)
MONOCYTES NFR BLD AUTO: 8.7 % — SIGNIFICANT CHANGE UP (ref 1.7–9.3)
MONOCYTES NFR BLD AUTO: 8.7 % — SIGNIFICANT CHANGE UP (ref 1.7–9.3)
NEUTROPHILS # BLD AUTO: 5.33 K/UL — SIGNIFICANT CHANGE UP (ref 1.4–6.5)
NEUTROPHILS # BLD AUTO: 5.33 K/UL — SIGNIFICANT CHANGE UP (ref 1.4–6.5)
NEUTROPHILS NFR BLD AUTO: 68 % — SIGNIFICANT CHANGE UP (ref 42.2–75.2)
NEUTROPHILS NFR BLD AUTO: 68 % — SIGNIFICANT CHANGE UP (ref 42.2–75.2)
NRBC # BLD: 0 /100 WBCS — SIGNIFICANT CHANGE UP (ref 0–0)
NRBC # BLD: 0 /100 WBCS — SIGNIFICANT CHANGE UP (ref 0–0)
PLATELET # BLD AUTO: 435 K/UL — HIGH (ref 130–400)
PLATELET # BLD AUTO: 435 K/UL — HIGH (ref 130–400)
PMV BLD: 9.1 FL — SIGNIFICANT CHANGE UP (ref 7.4–10.4)
PMV BLD: 9.1 FL — SIGNIFICANT CHANGE UP (ref 7.4–10.4)
POTASSIUM SERPL-MCNC: 3.4 MMOL/L — LOW (ref 3.5–5)
POTASSIUM SERPL-MCNC: 3.4 MMOL/L — LOW (ref 3.5–5)
POTASSIUM SERPL-MCNC: 3.9 MMOL/L — SIGNIFICANT CHANGE UP (ref 3.5–5)
POTASSIUM SERPL-MCNC: 3.9 MMOL/L — SIGNIFICANT CHANGE UP (ref 3.5–5)
POTASSIUM SERPL-SCNC: 3.4 MMOL/L — LOW (ref 3.5–5)
POTASSIUM SERPL-SCNC: 3.4 MMOL/L — LOW (ref 3.5–5)
POTASSIUM SERPL-SCNC: 3.9 MMOL/L — SIGNIFICANT CHANGE UP (ref 3.5–5)
POTASSIUM SERPL-SCNC: 3.9 MMOL/L — SIGNIFICANT CHANGE UP (ref 3.5–5)
PROT SERPL-MCNC: 6 G/DL — SIGNIFICANT CHANGE UP (ref 6–8)
PROT SERPL-MCNC: 6 G/DL — SIGNIFICANT CHANGE UP (ref 6–8)
PROT SERPL-MCNC: 6.7 G/DL — SIGNIFICANT CHANGE UP (ref 6–8)
PROT SERPL-MCNC: 6.7 G/DL — SIGNIFICANT CHANGE UP (ref 6–8)
RBC # BLD: 4.08 M/UL — LOW (ref 4.2–5.4)
RBC # BLD: 4.08 M/UL — LOW (ref 4.2–5.4)
RBC # FLD: 17.6 % — HIGH (ref 11.5–14.5)
RBC # FLD: 17.6 % — HIGH (ref 11.5–14.5)
SODIUM SERPL-SCNC: 135 MMOL/L — SIGNIFICANT CHANGE UP (ref 135–146)
SODIUM SERPL-SCNC: 135 MMOL/L — SIGNIFICANT CHANGE UP (ref 135–146)
SODIUM SERPL-SCNC: 137 MMOL/L — SIGNIFICANT CHANGE UP (ref 135–146)
SODIUM SERPL-SCNC: 137 MMOL/L — SIGNIFICANT CHANGE UP (ref 135–146)
VIT B12 SERPL-MCNC: 717 PG/ML — SIGNIFICANT CHANGE UP (ref 232–1245)
VIT B12 SERPL-MCNC: 717 PG/ML — SIGNIFICANT CHANGE UP (ref 232–1245)
WBC # BLD: 7.83 K/UL — SIGNIFICANT CHANGE UP (ref 4.8–10.8)
WBC # BLD: 7.83 K/UL — SIGNIFICANT CHANGE UP (ref 4.8–10.8)
WBC # FLD AUTO: 7.83 K/UL — SIGNIFICANT CHANGE UP (ref 4.8–10.8)
WBC # FLD AUTO: 7.83 K/UL — SIGNIFICANT CHANGE UP (ref 4.8–10.8)

## 2024-01-10 PROCEDURE — 99233 SBSQ HOSP IP/OBS HIGH 50: CPT

## 2024-01-10 PROCEDURE — 99232 SBSQ HOSP IP/OBS MODERATE 35: CPT

## 2024-01-10 PROCEDURE — 71045 X-RAY EXAM CHEST 1 VIEW: CPT | Mod: 26

## 2024-01-10 RX ORDER — MULTIVIT WITH MIN/MFOLATE/K2 340-15/3 G
296 POWDER (GRAM) ORAL ONCE
Refills: 0 | Status: COMPLETED | OUTPATIENT
Start: 2024-01-10 | End: 2024-01-10

## 2024-01-10 RX ORDER — POTASSIUM CHLORIDE 20 MEQ
20 PACKET (EA) ORAL ONCE
Refills: 0 | Status: COMPLETED | OUTPATIENT
Start: 2024-01-10 | End: 2024-01-10

## 2024-01-10 RX ORDER — MAGNESIUM SULFATE 500 MG/ML
2 VIAL (ML) INJECTION ONCE
Refills: 0 | Status: COMPLETED | OUTPATIENT
Start: 2024-01-10 | End: 2024-01-10

## 2024-01-10 RX ADMIN — Medication 296 MILLILITER(S): at 09:37

## 2024-01-10 RX ADMIN — Medication 50 MILLIGRAM(S): at 05:44

## 2024-01-10 RX ADMIN — Medication 25 GRAM(S): at 01:31

## 2024-01-10 RX ADMIN — SERTRALINE 100 MILLIGRAM(S): 25 TABLET, FILM COATED ORAL at 11:18

## 2024-01-10 RX ADMIN — Medication 50 MILLIEQUIVALENT(S): at 11:17

## 2024-01-10 RX ADMIN — Medication 60 MILLIGRAM(S): at 05:43

## 2024-01-10 RX ADMIN — PANTOPRAZOLE SODIUM 40 MILLIGRAM(S): 20 TABLET, DELAYED RELEASE ORAL at 05:43

## 2024-01-10 RX ADMIN — Medication 0.25 MILLIGRAM(S): at 21:02

## 2024-01-10 NOTE — PROGRESS NOTE ADULT - SUBJECTIVE AND OBJECTIVE BOX
GENERAL SURGERY PROGRESS NOTE    Patient: DARIEN BARRERA , 78y ([birth year])Female   MRN: [id number]  Location: [address] (Back) 015 A  Visit: 01-08-24 Inpatient  Date: 01-10-24 @ 08:20    PAST MEDICAL & SURGICAL HISTORY:  Hypertension  GERD (gastroesophageal reflux disease)  Hiatal hernia with GERD  Empyema lung  H/O CHF  History of repair of hiatal hernia  H/O pleural empyema  s/p decortication    Vitals:   T(F): 97.5 (01-10-24 @ 07:08), Max: 97.5 (01-10-24 @ 07:08)  HR: 63 (01-10-24 @ 07:08)  BP: 147/69 (01-10-24 @ 07:08)  RR: 18 (01-10-24 @ 07:08)  SpO2: --    Diet, NPO after Midnight:      NPO Start Date: 09-Jan-2024,   NPO Start Time: 23:59  Except Medications  Diet, NPO after Midnight:      NPO Start Date: 08-Jan-2024,   NPO Start Time: 23:59  Except Medications  Diet, Clear Liquid    PHYSICAL EXAM:  General: NAD, AAOx3,   HEENT: NCAT, TIFFANY, EOMI,  Cardiac: RRR  Respiratory: CTAB, normal respiratory effort,  Abdomen: Soft, non-distended, non-tender,   Skin: Warm/dry, normal color,     MEDICATIONS  (STANDING):  ALPRAZolam 0.25 milliGRAM(s) Oral at bedtime  lactated ringers. 1000 milliLiter(s) (75 mL/Hr) IV Continuous <Continuous>  magnesium citrate Oral Solution 296 milliLiter(s) Oral once  metoprolol succinate ER 50 milliGRAM(s) Oral daily  NIFEdipine XL 60 milliGRAM(s) Oral daily  pantoprazole  Injectable 40 milliGRAM(s) IV Push two times a day  sertraline 100 milliGRAM(s) Oral daily    MEDICATIONS  (PRN):    DVT PROPHYLAXIS:   GI PROPHYLAXIS: pantoprazole  Injectable 40 milliGRAM(s) IV Push two times a day    ANTICOAGULATION:   ANTIBIOTICS:      LAB/STUDIES:  Labs:  CAPILLARY BLOOD GLUCOSE                        8.3    7.83  )-----------( 435      ( 10 Richi 2024 06:34 )             28.0       Auto Neutrophil %: 68.0 % (01-10-24 @ 06:34)  Auto Immature Granulocyte %: 0.4 % (01-10-24 @ 06:34)    01-09    135  |  93<L>  |  24<H>  ----------------------------<  87  3.9   |  32  |  1.5    Calcium: 9.3 mg/dL (01-09-24 @ 23:22)    LFTs:             6.7  | 0.6  | 14       ------------------[100     ( 09 Jan 2024 23:22 )  3.8  | x    | 9           Lipase:x      Amylase:x         Lactate, Blood: 1.2 mmol/L (01-09-24 @ 01:38)    Coags:     12.40  ----< 1.09    ( 09 Jan 2024 23:22 )     26.5     Urinalysis Basic - ( 09 Jan 2024 23:22 )    Color: x / Appearance: x / SG: x / pH: x  Gluc: 87 mg/dL / Ketone: x  / Bili: x / Urobili: x   Blood: x / Protein: x / Nitrite: x   Leuk Esterase: x / RBC: x / WBC x   Sq Epi: x / Non Sq Epi: x / Bacteria: x           GENERAL SURGERY PROGRESS NOTE    Patient: DARIEN BARRERA , 78y ([birth year])Female   MRN: [id number]  Location: [address] (Back) 015 A  Visit: 01-08-24 Inpatient  Date: 01-10-24 @ 08:20    PAST MEDICAL & SURGICAL HISTORY:  Hypertension  GERD (gastroesophageal reflux disease)  Hiatal hernia with GERD  Empyema lung  H/O CHF  History of repair of hiatal hernia  H/O pleural empyema  s/p decortication    Vitals:   T(F): 97.5 (01-10-24 @ 07:08), Max: 97.5 (01-10-24 @ 07:08)  HR: 63 (01-10-24 @ 07:08)  BP: 147/69 (01-10-24 @ 07:08)  RR: 18 (01-10-24 @ 07:08)  SpO2: --    Diet, NPO after Midnight:      NPO Start Date: 09-Jan-2024,   NPO Start Time: 23:59  Except Medications  Diet, NPO after Midnight:      NPO Start Date: 08-Jan-2024,   NPO Start Time: 23:59  Except Medications  Diet, Clear Liquid    PHYSICAL EXAM:  General: NAD, AAOx3,   HEENT: NCAT, TIFFANY, EOMI,  Cardiac: RRR  Respiratory: CTAB, normal respiratory effort,  Abdomen: Soft, non-distended, non-tender,   Skin: Warm/dry, normal color,     MEDICATIONS  (STANDING):  ALPRAZolam 0.25 milliGRAM(s) Oral at bedtime  lactated ringers. 1000 milliLiter(s) (75 mL/Hr) IV Continuous <Continuous>  magnesium citrate Oral Solution 296 milliLiter(s) Oral once  metoprolol succinate ER 50 milliGRAM(s) Oral daily  NIFEdipine XL 60 milliGRAM(s) Oral daily  pantoprazole  Injectable 40 milliGRAM(s) IV Push two times a day  sertraline 100 milliGRAM(s) Oral daily    MEDICATIONS  (PRN):    DVT PROPHYLAXIS:   GI PROPHYLAXIS: pantoprazole  Injectable 40 milliGRAM(s) IV Push two times a day    ANTICOAGULATION:   ANTIBIOTICS:      LAB/STUDIES:  Labs:  CAPILLARY BLOOD GLUCOSE                        8.3    7.83  )-----------( 435      ( 10 Richi 2024 06:34 )             28.0       Auto Neutrophil %: 68.0 % (01-10-24 @ 06:34)  Auto Immature Granulocyte %: 0.4 % (01-10-24 @ 06:34)    01-09    135  |  93<L>  |  24<H>  ----------------------------<  87  3.9   |  32  |  1.5    Calcium: 9.3 mg/dL (01-09-24 @ 23:22)    LFTs:             6.7  | 0.6  | 14       ------------------[100     ( 09 Jan 2024 23:22 )  3.8  | x    | 9           Lipase:x      Amylase:x         Lactate, Blood: 1.2 mmol/L (01-09-24 @ 01:38)    Coags:     12.40  ----< 1.09    ( 09 Jan 2024 23:22 )     26.5     Urinalysis Basic - ( 09 Jan 2024 23:22 )    Color: x / Appearance: x / SG: x / pH: x  Gluc: 87 mg/dL / Ketone: x  / Bili: x / Urobili: x   Blood: x / Protein: x / Nitrite: x   Leuk Esterase: x / RBC: x / WBC x   Sq Epi: x / Non Sq Epi: x / Bacteria: x           GENERAL SURGERY PROGRESS NOTE    Patient: DARIEN BARRERA , 78y ([birth year])Female   MRN: [id number]  Location: [address] (Back) 015 A  Visit: 01-08-24 Inpatient  Date: 01-10-24 @ 08:20    PAST MEDICAL & SURGICAL HISTORY:  Hypertension  GERD (gastroesophageal reflux disease)  Hiatal hernia with GERD  Empyema lung  H/O CHF  History of repair of hiatal hernia  H/O pleural empyema  s/p decortication    Vitals:   T(F): 97.5 (01-10-24 @ 07:08), Max: 97.5 (01-10-24 @ 07:08)  HR: 63 (01-10-24 @ 07:08)  BP: 147/69 (01-10-24 @ 07:08)  RR: 18 (01-10-24 @ 07:08)  SpO2: --    Diet, NPO after Midnight:      NPO Start Date: 09-Jan-2024,   NPO Start Time: 23:59  Except Medications  Diet, NPO after Midnight:      NPO Start Date: 08-Jan-2024,   NPO Start Time: 23:59  Except Medications  Diet, Clear Liquid    PHYSICAL EXAM:  General: NAD, AAOx3,   HEENT: NCAT, TIFFANY, EOMI,  Cardiac: RRR  Respiratory: CTAB, normal respiratory effort,  Abdomen: Soft, non-distended, non-tender,   Skin: Warm/dry, normal color,     MEDICATIONS  (STANDING):  ALPRAZolam 0.25 milliGRAM(s) Oral at bedtime  lactated ringers. 1000 milliLiter(s) (75 mL/Hr) IV Continuous <Continuous>  magnesium citrate Oral Solution 296 milliLiter(s) Oral once  metoprolol succinate ER 50 milliGRAM(s) Oral daily  NIFEdipine XL 60 milliGRAM(s) Oral daily  pantoprazole  Injectable 40 milliGRAM(s) IV Push two times a day  sertraline 100 milliGRAM(s) Oral daily    MEDICATIONS  (PRN):    DVT PROPHYLAXIS:   GI PROPHYLAXIS: pantoprazole  Injectable 40 milliGRAM(s) IV Push two times a day    ANTICOAGULATION:   ANTIBIOTICS:      LAB/STUDIES:  Labs:  CAPILLARY BLOOD GLUCOSE                        8.3    7.83  )-----------( 435      ( 10 Richi 2024 06:34 )             28.0       Auto Neutrophil %: 68.0 % (01-10-24 @ 06:34)  Auto Immature Granulocyte %: 0.4 % (01-10-24 @ 06:34)    01-09    135  |  93<L>  |  24<H>  ----------------------------<  87  3.9   |  32  |  1.5    Calcium: 9.3 mg/dL (01-09-24 @ 23:22)    LFTs:             6.7  | 0.6  | 14       ------------------[100     ( 09 Jan 2024 23:22 )  3.8  | x    | 9           Lipase:x      Amylase:x         Lactate, Blood: 1.2 mmol/L (01-09-24 @ 01:38)    Coags:     12.40  ----< 1.09    ( 09 Jan 2024 23:22 )     26.5     Urinalysis Basic - ( 09 Jan 2024 23:22 )    Color: x / Appearance: x / SG: x / pH: x  Gluc: 87 mg/dL / Ketone: x  / Bili: x / Urobili: x   Blood: x / Protein: x / Nitrite: x   Leuk Esterase: x / RBC: x / WBC x   Sq Epi: x / Non Sq Epi: x / Bacteria: x           GENERAL SURGERY PROGRESS NOTE    Patient: DARIEN BARRERA , 78y ([birth year])Female   MRN: [id number]  Location: [address] (Back) 015 A  Visit: 01-08-24 Inpatient  Date: 01-10-24 @ 08:20    PAST MEDICAL & SURGICAL HISTORY:  Hypertension  GERD (gastroesophageal reflux disease)  Hiatal hernia with GERD  Empyema lung  H/O CHF  History of repair of hiatal hernia  H/O pleural empyema  s/p decortication    Vitals:   T(F): 97.5 (01-10-24 @ 07:08), Max: 97.5 (01-10-24 @ 07:08)  HR: 63 (01-10-24 @ 07:08)  BP: 147/69 (01-10-24 @ 07:08)  RR: 18 (01-10-24 @ 07:08)  SpO2: --    Diet, NPO after Midnight:      NPO Start Date: 09-Jan-2024,   NPO Start Time: 23:59  Except Medications  Diet, NPO after Midnight:      NPO Start Date: 08-Jan-2024,   NPO Start Time: 23:59  Except Medications  Diet, Clear Liquid    PHYSICAL EXAM:  General: NAD, AAOx3,   HEENT: NCAT, TIFFANY, EOMI,  Cardiac: RRR  Respiratory: CTAB, normal respiratory effort,  Abdomen: Soft, non-distended, non-tender,   Skin: Warm/dry, normal color,     MEDICATIONS  (STANDING):  ALPRAZolam 0.25 milliGRAM(s) Oral at bedtime  lactated ringers. 1000 milliLiter(s) (75 mL/Hr) IV Continuous <Continuous>  magnesium citrate Oral Solution 296 milliLiter(s) Oral once  metoprolol succinate ER 50 milliGRAM(s) Oral daily  NIFEdipine XL 60 milliGRAM(s) Oral daily  pantoprazole  Injectable 40 milliGRAM(s) IV Push two times a day  sertraline 100 milliGRAM(s) Oral daily    MEDICATIONS  (PRN):    DVT PROPHYLAXIS:   GI PROPHYLAXIS: pantoprazole  Injectable 40 milliGRAM(s) IV Push two times a day    ANTICOAGULATION:   ANTIBIOTICS:      LAB/STUDIES:  Labs:  CAPILLARY BLOOD GLUCOSE                        8.3    7.83  )-----------( 435      ( 10 Richi 2024 06:34 )             28.0       Auto Neutrophil %: 68.0 % (01-10-24 @ 06:34)  Auto Immature Granulocyte %: 0.4 % (01-10-24 @ 06:34)    01-09    135  |  93<L>  |  24<H>  ----------------------------<  87  3.9   |  32  |  1.5    Calcium: 9.3 mg/dL (01-09-24 @ 23:22)    LFTs:             6.7  | 0.6  | 14       ------------------[100     ( 09 Jan 2024 23:22 )  3.8  | x    | 9           Lipase:x      Amylase:x         Lactate, Blood: 1.2 mmol/L (01-09-24 @ 01:38)    Coags:     12.40  ----< 1.09    ( 09 Jan 2024 23:22 )     26.5     Urinalysis Basic - ( 09 Jan 2024 23:22 )    Color: x / Appearance: x / SG: x / pH: x  Gluc: 87 mg/dL / Ketone: x  / Bili: x / Urobili: x   Blood: x / Protein: x / Nitrite: x   Leuk Esterase: x / RBC: x / WBC x   Sq Epi: x / Non Sq Epi: x / Bacteria: x

## 2024-01-10 NOTE — PROGRESS NOTE ADULT - SUBJECTIVE AND OBJECTIVE BOX
Gastroenterology Follow Up Note      Location: [address] (Back) 015 A ([address] (Back))  Patient Name: DARIEN BARRERA  Age: 78y  Gender: Female      Chief Complaint  Patient is a 78y old Female who presents with a chief complaint of Anemia (10 Richi 2024 17:07)  Primary diagnosis of Weakness      Reason for Consult  Vomiting  Anemia      Progress Note  This morning patient was seen and examined at bedside.    Today is hospital day 2d.  Patient is doing fine. No acute events overnight.   Patient denies pain.  Last vomiting episode was on Sunday.  She denies nausea.  Last BM was on 01/10 post golytely.      Vital Signs in the last 24 hours   Vitals Summary T(C): 36.4 (01-10-24 @ 15:25), Max: 36.4 (01-10-24 @ 07:08)  HR: 63 (01-10-24 @ 15:25) (55 - 67)  BP: 147/69 (01-10-24 @ 15:25) (119/60 - 162/83)  RR: 18 (01-10-24 @ 15:25) (18 - 18)  SpO2: 98% (01-10-24 @ 15:25) (98% - 98%)  Vent Data   Intake/ Output   Measurements Height (cm): 152.4 (01-10-24 @ 15:25)  Weight (kg): 60.8 (01-10-24 @ 15:25)  BMI (kg/m2): 26.2 (01-10-24 @ 15:25)  BSA (m2): 1.57 (01-10-24 @ 15:25)      Physical Exam  * General Appearance: Alert, cooperative, interactive, oriented to time, place, and person, in no acute distress  * Lungs: Good bilateral air entry  * Heart: Regular Rate and Rhythm, normal S1 and S2, no audible murmur, rub, or gallop  * Abdomen: Symmetric, non-distended, soft, non-tender, bowel sounds active all four quadrants, no masses, no organomegaly (no hepatosplenomegaly)  * Rectal: refused ERICA 01/09      Investigations   Laboratory Workup      - CBC:                        8.3    7.83  )-----------( 435      ( 10 Richi 2024 06:34 )             28.0       - Hgb Trend:  8.3  01-10-24 @ 06:34  8.9  01-09-24 @ 23:22  8.5  01-09-24 @ 07:14  7.5  01-08-24 @ 13:35        - Chemistry:  01-10    137  |  94<L>  |  24<H>  ----------------------------<  89  3.4<L>   |  29  |  1.5    Ca    9.2      10 Richi 2024 06:34  Mg     2.3     01-10    TPro  6.0  /  Alb  3.5  /  TBili  0.5  /  DBili  x   /  AST  13  /  ALT  7   /  AlkPhos  96  01-10    Liver panel trend:  TBili 0.5   /   AST 13   /   ALT 7   /   AlkP 96   /   Tptn 6.0   /   Alb 3.5    /   DBili --      01-10  TBili 0.6   /   AST 14   /   ALT 9   /   AlkP 100   /   Tptn 6.7   /   Alb 3.8    /   DBili --      01-09  TBili 0.5   /   AST 12   /   ALT 9   /   AlkP 103   /   Tptn 6.7   /   Alb 3.9    /   DBili --      01-09  TBili 0.5   /   AST 14   /   ALT 8   /   AlkP 97   /   Tptn 6.7   /   Alb 3.8    /   DBili --      01-08      - Coagulation Studies:  PT/INR - ( 09 Jan 2024 23:22 )   PT: 12.40 sec;   INR: 1.09 ratio         PTT - ( 09 Jan 2024 23:22 )  PTT:26.5 sec    - ABG:      - Cardiac Markers:        Microbiological Workup  Urinalysis Basic - ( 10 Richi 2024 06:34 )    Color: x / Appearance: x / SG: x / pH: x  Gluc: 89 mg/dL / Ketone: x  / Bili: x / Urobili: x   Blood: x / Protein: x / Nitrite: x           Current Medications  Standing Medications  ALPRAZolam 0.25 milliGRAM(s) Oral at bedtime  lactated ringers. 1000 milliLiter(s) (75 mL/Hr) IV Continuous <Continuous>  metoprolol succinate ER 50 milliGRAM(s) Oral daily  NIFEdipine XL 60 milliGRAM(s) Oral daily  pantoprazole  Injectable 40 milliGRAM(s) IV Push two times a day  sertraline 100 milliGRAM(s) Oral daily    PRN Medications    Singles Doses Administered  (ADM OVERRIDE) 1 each &lt;see task&gt; GiveOnce  (ADM OVERRIDE) 1 each &lt;see task&gt; GiveOnce  (ADM OVERRIDE) 1 each &lt;see task&gt; GiveOnce  (ADM OVERRIDE) 1 each &lt;see task&gt; GiveOnce  bisacodyl 20 milliGRAM(s) Oral once  iohexol 300 mG (iodine)/mL Oral Solution 30 milliLiter(s) Oral once  magnesium citrate Oral Solution 296 milliLiter(s) Oral once  magnesium sulfate  IVPB 2 Gram(s) IV Intermittent once  NIFEdipine XL 30 milliGRAM(s) Oral once  ondansetron Injectable 4 milliGRAM(s) IV Push once  polyethylene glycol/electrolyte Solution. 4000 milliLiter(s) Oral once  potassium chloride    Tablet ER 20 milliEquivalent(s) Oral every 2 hours  potassium chloride  20 mEq/100 mL IVPB 20 milliEquivalent(s) IV Intermittent once     Gastroenterology Follow Up Note      Location: [address] (Back) 015 [address] ([address] (Back))  Patient Name: DARIEN BARRERA  Age: 78y  Gender: Female      Chief Complaint  Patient is a 78y old Female who presents with a chief complaint of Anemia (10 Richi 2024 17:07)  Primary diagnosis of Weakness      Reason for Consult  Vomiting  Anemia      Progress Note  This morning patient was seen and examined at bedside.    Today is hospital day 2d.  Patient is doing fine. No acute events overnight.   Patient denies pain.  Last vomiting episode was on Sunday.  She denies nausea.  Last BM was on 01/10 post golytely.      Vital Signs in the last 24 hours   Vitals Summary T(C): 36.4 (01-10-24 @ 15:25), Max: 36.4 (01-10-24 @ 07:08)  HR: 63 (01-10-24 @ 15:25) (55 - 67)  BP: 147/69 (01-10-24 @ 15:25) (119/60 - 162/83)  RR: 18 (01-10-24 @ 15:25) (18 - 18)  SpO2: 98% (01-10-24 @ 15:25) (98% - 98%)  Vent Data   Intake/ Output   Measurements Height (cm): 152.4 (01-10-24 @ 15:25)  Weight (kg): 60.8 (01-10-24 @ 15:25)  BMI (kg/m2): 26.2 (01-10-24 @ 15:25)  BSA (m2): 1.57 (01-10-24 @ 15:25)      Physical Exam  * General Appearance: Alert, cooperative, interactive, oriented to time, place, and person, in no acute distress  * Lungs: Good bilateral air entry  * Heart: Regular Rate and Rhythm, normal S1 and S2, no audible murmur, rub, or gallop  * Abdomen: Symmetric, non-distended, soft, non-tender, bowel sounds active all four quadrants, no masses, no organomegaly (no hepatosplenomegaly)  * Rectal: refused ERICA 01/09      Investigations   Laboratory Workup      - CBC:                        8.3    7.83  )-----------( 435      ( 10 Richi 2024 06:34 )             28.0       - Hgb Trend:  8.3  01-10-24 @ 06:34  8.9  01-09-24 @ 23:22  8.5  01-09-24 @ 07:14  7.5  01-08-24 @ 13:35        - Chemistry:  01-10    137  |  94<L>  |  24<H>  ----------------------------<  89  3.4<L>   |  29  |  1.5    Ca    9.2      10 Richi 2024 06:34  Mg     2.3     01-10    TPro  6.0  /  Alb  3.5  /  TBili  0.5  /  DBili  x   /  AST  13  /  ALT  7   /  AlkPhos  96  01-10    Liver panel trend:  TBili 0.5   /   AST 13   /   ALT 7   /   AlkP 96   /   Tptn 6.0   /   Alb 3.5    /   DBili --      01-10  TBili 0.6   /   AST 14   /   ALT 9   /   AlkP 100   /   Tptn 6.7   /   Alb 3.8    /   DBili --      01-09  TBili 0.5   /   AST 12   /   ALT 9   /   AlkP 103   /   Tptn 6.7   /   Alb 3.9    /   DBili --      01-09  TBili 0.5   /   AST 14   /   ALT 8   /   AlkP 97   /   Tptn 6.7   /   Alb 3.8    /   DBili --      01-08      - Coagulation Studies:  PT/INR - ( 09 Jan 2024 23:22 )   PT: 12.40 sec;   INR: 1.09 ratio         PTT - ( 09 Jan 2024 23:22 )  PTT:26.5 sec    - ABG:      - Cardiac Markers:        Microbiological Workup  Urinalysis Basic - ( 10 Richi 2024 06:34 )    Color: x / Appearance: x / SG: x / pH: x  Gluc: 89 mg/dL / Ketone: x  / Bili: x / Urobili: x   Blood: x / Protein: x / Nitrite: x           Current Medications  Standing Medications  ALPRAZolam 0.25 milliGRAM(s) Oral at bedtime  lactated ringers. 1000 milliLiter(s) (75 mL/Hr) IV Continuous <Continuous>  metoprolol succinate ER 50 milliGRAM(s) Oral daily  NIFEdipine XL 60 milliGRAM(s) Oral daily  pantoprazole  Injectable 40 milliGRAM(s) IV Push two times a day  sertraline 100 milliGRAM(s) Oral daily    PRN Medications    Singles Doses Administered  (ADM OVERRIDE) 1 each &lt;see task&gt; GiveOnce  (ADM OVERRIDE) 1 each &lt;see task&gt; GiveOnce  (ADM OVERRIDE) 1 each &lt;see task&gt; GiveOnce  (ADM OVERRIDE) 1 each &lt;see task&gt; GiveOnce  bisacodyl 20 milliGRAM(s) Oral once  iohexol 300 mG (iodine)/mL Oral Solution 30 milliLiter(s) Oral once  magnesium citrate Oral Solution 296 milliLiter(s) Oral once  magnesium sulfate  IVPB 2 Gram(s) IV Intermittent once  NIFEdipine XL 30 milliGRAM(s) Oral once  ondansetron Injectable 4 milliGRAM(s) IV Push once  polyethylene glycol/electrolyte Solution. 4000 milliLiter(s) Oral once  potassium chloride    Tablet ER 20 milliEquivalent(s) Oral every 2 hours  potassium chloride  20 mEq/100 mL IVPB 20 milliEquivalent(s) IV Intermittent once

## 2024-01-10 NOTE — PROGRESS NOTE ADULT - SUBJECTIVE AND OBJECTIVE BOX
Patient is a 78y old  Female who presents with a chief complaint of Anemia (10 Richi 2024 09:22)      Patient seen and examined at bedside.  Patient denies any chest pain or shortness of breath.   ALLERGIES:  No Known Allergies    MEDICATIONS:  ALPRAZolam 0.25 milliGRAM(s) Oral at bedtime  lactated ringers. 1000 milliLiter(s) IV Continuous <Continuous>  metoprolol succinate ER 50 milliGRAM(s) Oral daily  NIFEdipine XL 60 milliGRAM(s) Oral daily  pantoprazole  Injectable 40 milliGRAM(s) IV Push two times a day  sertraline 100 milliGRAM(s) Oral daily    Vital Signs Last 24 Hrs  T(F): 97.5 (10 Richi 2024 07:08), Max: 97.5 (10 Richi 2024 07:08)  HR: 63 (10 Richi 2024 15:25) (55 - 67)  BP: 147/69 (10 Richi 2024 15:25) (119/60 - 162/83)  RR: 18 (10 Richi 2024 15:25) (18 - 18)  SpO2: 98% (10 Richi 2024 15:25) (98% - 98%)  I&O's Summary      PHYSICAL EXAM:  General: NAD, Alert  ENT: MMM  Neck: Supple, No JVD  Lungs: Clear to auscultation bilaterally  Cardio: RRR, S1/S2, 2/6 systolic murmur   Abdomen: Soft, Nontender, Nondistended; Bowel sounds present  Extremities: No cyanosis, No edema    LABS:                        8.3    7.83  )-----------( 435      ( 10 Richi 2024 06:34 )             28.0     01-10    137  |  94  |  24  ----------------------------<  89  3.4   |  29  |  1.5    Ca    9.2      10 Richi 2024 06:34  Mg     2.3     01-10    TPro  6.0  /  Alb  3.5  /  TBili  0.5  /  DBili  x   /  AST  13  /  ALT  7   /  AlkPhos  96  01-10      PT/INR - ( 09 Jan 2024 23:22 )   PT: 12.40 sec;   INR: 1.09 ratio         PTT - ( 09 Jan 2024 23:22 )  PTT:26.5 sec  Lactate, Blood: 1.2 mmol/L (01-09 @ 01:38)        01-09 Chol 175 mg/dL LDL -- HDL 51 mg/dL Trig 167 mg/dL                  Urinalysis Basic - ( 10 Richi 2024 06:34 )    Color: x / Appearance: x / SG: x / pH: x  Gluc: 89 mg/dL / Ketone: x  / Bili: x / Urobili: x   Blood: x / Protein: x / Nitrite: x   Leuk Esterase: x / RBC: x / WBC x   Sq Epi: x / Non Sq Epi: x / Bacteria: x            RADIOLOGY & ADDITIONAL TESTS:    Care Discussed with Consultants/Other Providers:

## 2024-01-10 NOTE — PROGRESS NOTE ADULT - SUBJECTIVE AND OBJECTIVE BOX
24H events:    Patient is a 78y old Female who presents with a chief complaint of Anemia (10 Richi 2024 08:19)    Primary diagnosis of Anemia      Today is hospital day 2d. This morning patient was seen and examined at bedside, resting comfortably in bed.    No acute or major events overnight.    Code Status: full code    PAST MEDICAL & SURGICAL HISTORY  Hypertension    GERD (gastroesophageal reflux disease)    Hiatal hernia with GERD    Empyema lung    H/O CHF    History of repair of hiatal hernia    H/O pleural empyema  s/p decortication      SOCIAL HISTORY:  Social History: na    ALLERGIES:  No Known Allergies    MEDICATIONS:  STANDING MEDICATIONS  ALPRAZolam 0.25 milliGRAM(s) Oral at bedtime  lactated ringers. 1000 milliLiter(s) IV Continuous <Continuous>  magnesium citrate Oral Solution 296 milliLiter(s) Oral once  metoprolol succinate ER 50 milliGRAM(s) Oral daily  NIFEdipine XL 60 milliGRAM(s) Oral daily  pantoprazole  Injectable 40 milliGRAM(s) IV Push two times a day  potassium chloride  20 mEq/100 mL IVPB 20 milliEquivalent(s) IV Intermittent once  sertraline 100 milliGRAM(s) Oral daily    PRN MEDICATIONS    ROS:   no headaches, no dizziness, no fevers, no chills, no CP/SOB, no abdominal pain, no n/v/d, no hematochezia, no burning with urination, no hematuria, no weakness or tingling    VITALS:   ICU Vital Signs Last 24 Hrs  T(C): 36.4 (10 Richi 2024 07:08), Max: 36.4 (10 Richi 2024 07:08)  T(F): 97.5 (10 Richi 2024 07:08), Max: 97.5 (10 Richi 2024 07:08)  HR: 63 (10 Richi 2024 07:08) (55 - 67)  BP: 147/69 (10 Richi 2024 07:08) (119/60 - 147/69)  BP(mean): --  ABP: --  ABP(mean): --  RR: 18 (10 Richi 2024 07:08) (18 - 18)  SpO2: --    PHYSICAL EXAM:  GENERAL: lying in bed comfortably  HEAD: normal  HEART: RRR  LUNGS: CTA b/l  ABDOMEN: soft nontender nondistended  EXTREMITIES: no edema  NERVOUS SYSTEM:  A&OX3    Lines: none    LABS:                        8.3    7.83  )-----------( 435      ( 10 Richi 2024 06:34 )             28.0     01-10    137  |  94<L>  |  24<H>  ----------------------------<  89  3.4<L>   |  29  |  1.5    Ca    9.2      10 Richi 2024 06:34  Mg     2.3     01-10    TPro  6.0  /  Alb  3.5  /  TBili  0.5  /  DBili  x   /  AST  13  /  ALT  7   /  AlkPhos  96  01-10    PT/INR - ( 09 Jan 2024 23:22 )   PT: 12.40 sec;   INR: 1.09 ratio         PTT - ( 09 Jan 2024 23:22 )  PTT:26.5 sec  Urinalysis Basic - ( 10 Richi 2024 06:34 )    Color: x / Appearance: x / SG: x / pH: x  Gluc: 89 mg/dL / Ketone: x  / Bili: x / Urobili: x   Blood: x / Protein: x / Nitrite: x   Leuk Esterase: x / RBC: x / WBC x   Sq Epi: x / Non Sq Epi: x / Bacteria: x      RADIOLOGY:    < from: US Renal (01.09.24 @ 12:28) >  IMPRESSION:    Mildly echogenic kidneys which could reflect medical renal disease. No   hydronephrosis.    < end of copied text >

## 2024-01-10 NOTE — PROGRESS NOTE ADULT - ASSESSMENT
ASSESSMENT:  78-year-old female with past medical history of hiatal hernia s/p repair >10 years back, L emypema s/p decortication 2018, hypertension, GERD, non obstructive CAD, depression, dementia, anemia presenting for evaluation of low hemoglobin, measured outpatient to be 7.4.  Patient has had multiple episodes of nonbloody nonbilious vomiting over the last several months with good appetite and almost 60 pound weight loss over the last few months.  Patient just started following up with hematologist Dr.Yumi Hussein and had recent blood work from 3 days ago that resulted today and showed a low hemoglobin of 7.4. Patient denies fevers, retching, chills, chest pain, shortness of breath, hematuria, melena, hematochezia. The pt presented to the Ed for a blood transfusion that was recommended by the hematologist.    CTAP revealing a type III hiatal hernia and patient states she has had intermittent episodes of epigastric pain associated with vomiting over the past few months. CTAP revealing a large hiatal hernia with stomach above diaphragm and organoaxial rotation.    Plan  - F/u EGD/colonoscopy today   - Monitor labs  - Care as per primary team     [id number]     ASSESSMENT:  78-year-old female with past medical history of hiatal hernia s/p repair >10 years back, L emypema s/p decortication 2018, hypertension, GERD, non obstructive CAD, depression, dementia, anemia presenting for evaluation of low hemoglobin, measured outpatient to be 7.4.  Patient has had multiple episodes of nonbloody nonbilious vomiting over the last several months with good appetite and almost 60 pound weight loss over the last few months.  Patient just started following up with hematologist Dr.Yumi Hussein and had recent blood work from 3 days ago that resulted today and showed a low hemoglobin of 7.4. Patient denies fevers, retching, chills, chest pain, shortness of breath, hematuria, melena, hematochezia. The pt presented to the Ed for a blood transfusion that was recommended by the hematologist.    CTAP revealing a type III hiatal hernia and patient states she has had intermittent episodes of epigastric pain associated with vomiting over the past few months. CTAP revealing a large hiatal hernia with stomach above diaphragm and organoaxial rotation.    Plan  - F/u EGD/colonoscopy today   - Monitor labs  - Care as per primary team     [id number]     ASSESSMENT:  78-year-old female with past medical history of hiatal hernia s/p repair >10 years back, L emypema s/p decortication 2018, hypertension, GERD, non obstructive CAD, depression, dementia, anemia presenting for evaluation of low hemoglobin, measured outpatient to be 7.4.  Patient has had multiple episodes of nonbloody nonbilious vomiting over the last several months with good appetite and almost 60 pound weight loss over the last few months.  Patient just started following up with hematologist Dr.Yumi Hussein and had recent blood work from 3 days ago that resulted today and showed a low hemoglobin of 7.4. Patient denies fevers, retching, chills, chest pain, shortness of breath, hematuria, melena, hematochezia. The pt presented to the Ed for a blood transfusion that was recommended by the hematologist.    CTAP revealing a type III hiatal hernia and patient states she has had intermittent episodes of epigastric pain associated with vomiting over the past few months. CTAP revealing a large hiatal hernia with stomach above diaphragm and organoaxial rotation.    Plan  - F/u EGD/colonoscopy today   - Monitor labs  - Care as per primary team     [id number]     ASSESSMENT:  78-year-old female with past medical history of hiatal hernia s/p repair >10 years back, L emypema s/p decortication 2018, hypertension, GERD, non obstructive CAD, depression, dementia, anemia presenting for evaluation of low hemoglobin, measured outpatient to be 7.4.  Patient has had multiple episodes of nonbloody nonbilious vomiting over the last several months with good appetite and almost 60 pound weight loss over the last few months.  Patient just started following up with hematologist Dr.Yumi Hussein and had recent blood work from 3 days ago that resulted today and showed a low hemoglobin of 7.4. Patient denies fevers, retching, chills, chest pain, shortness of breath, hematuria, melena, hematochezia. The pt presented to the Ed for a blood transfusion that was recommended by the hematologist.    CTAP revealing a type III hiatal hernia and patient states she has had intermittent episodes of epigastric pain associated with vomiting over the past few months. CTAP revealing a large hiatal hernia with stomach above diaphragm and organoaxial rotation.    Plan  - F/u EGD/colonoscopy today   - Monitor labs  - Care as per primary team     [phone]

## 2024-01-10 NOTE — PROGRESS NOTE ADULT - ASSESSMENT
Assessment and Plan  Case of a 78 year old female patient known to have Depression, Dementia, GERD. Hiatal hernia s/p repair, Anemia, HTN, and non obstructive CAD who presented to the ED on 01/08 for evaluation of low outpatient Hb in setting of vomiting, found to be hemodynamically stable with evidence of drop in Hb from baseline of 12.3 in 03/2022 to Hb 7.5 on blood work and evidence of large hiatal hernia on imaging, admitted for further management. We are consulted for concern of acute drop in Hb and vomiting in setting of large hiatal hernia.      Drop in Hemoglobin without Evidence of Overt Gastrointestinal Bleeding  Weight Loss and Post Prandial Vomiting with Large Hiatal Hernia  History of Hiatal Hernia s/p Repair  Rule Out Malignancy  No Clinical Evidence of GOO  * History of Hiatal hernia s/p repair around 5 years ago at [facility]  * Baseline Hb 12.3 in 03/2022  * Recent outpatient Hb 7.4 on 01/05 -> followed with Dr Mercedes Hussein outpatient and referred to ED  * History: daily non bilious non bloody vomitus x few months (sometimes food; other times clear acid secretion); 60lb weight loss in few months (unintentional); no overt blood loss per rectum or os; no recent NSAID use  * ED Labs Hb 7.5 on 01/08; LA 1.2; INR 1.09 on 01/08  * No prior iron studies  * Refused ERICA on 01/09  * Prior EGD around 10 years ago in [place]  * Prior colonoscopy: patient not sure  * Family History: negative for GI malignancies  * CT Abdomen and Pelvis w/ IV Cont (01.08.24 @ 15:26) Large hiatal hernia containing partially included stomach with apparent   organoaxial rotationBilateral adrenal nodular thickening. This is similar in appearance to the prior CT of the chest.Bilateral multifocal renal cortical thinning likely reflecting sequela of prior insults. No discrete evidence of abdominal pelvic mass or lymphadenopathy.    RECOMMENDATIONS  - Was scheduled for EGD and colonoscopy for workup of anemia, weight loss, and post prandial vomiting on 01/10 (consent was obtained by me from HCP Ms Lyon); however, procedure was cancelled by anesthesia due to concerns in setting of productive cough and CXR (risk of difficult extubation after intubation for procedure)  - Recommend surgery follow up in setting of large hiatal hernia and prior history of repair in [facility]  - Trend Hb and keep active T/S. Transfuse as needed to keep Hb >7  - Monitor for nausea and vomiting  - Continue IV PPI 40mg BID for now   - Advance diet as tolerated  - Monitor BM for signs of bleeding (for melena or hematochezia)  - Avoid NSAIDs      Pancreatic Uncinate Coarse Calcification  * Noted on imaging  * Likely from prior inflammation    RECOMMENDATIONS  - Outpatient follow up      Thank you for your consult.  - Please note that plan was communicated with medical team.   - Please reach GI on [phone] during weekdays till 5pm.  - Please call the GI service line after 5pm on Weekdays and anytime on Weekends: [phone].      Omar Wiggins MD  PGY - 4 Gastroenterology Fellow   [facility]   Assessment and Plan  Case of a 78 year old female patient known to have Depression, Dementia, GERD. Hiatal hernia s/p repair, Anemia, HTN, and non obstructive CAD who presented to the ED on 01/08 for evaluation of low outpatient Hb in setting of vomiting, found to be hemodynamically stable with evidence of drop in Hb from baseline of 12.3 in 03/2022 to Hb 7.5 on blood work and evidence of large hiatal hernia on imaging, admitted for further management. We are consulted for concern of acute drop in Hb and vomiting in setting of large hiatal hernia.      Drop in Hemoglobin without Evidence of Overt Gastrointestinal Bleeding  Weight Loss and Post Prandial Vomiting with Large Hiatal Hernia  History of Hiatal Hernia s/p Repair  Rule Out Malignancy  No Clinical Evidence of GOO  * History of Hiatal hernia s/p repair around 5 years ago at [facility]  * Baseline Hb 12.3 in 03/2022  * Recent outpatient Hb 7.4 on 01/05 -> followed with Dr Mercedes Hussein outpatient and referred to ED  * History: daily non bilious non bloody vomitus x few months (sometimes food; other times clear acid secretion); 60lb weight loss in few months (unintentional); no overt blood loss per rectum or os; no recent NSAID use  * ED Labs Hb 7.5 on 01/08; LA 1.2; INR 1.09 on 01/08  * No prior iron studies  * Refused ERICA on 01/09  * Prior EGD around 10 years ago in [place]  * Prior colonoscopy: patient not sure  * Family History: negative for GI malignancies  * CT Abdomen and Pelvis w/ IV Cont (01.08.24 @ 15:26) Large hiatal hernia containing partially included stomach with apparent   organoaxial rotationBilateral adrenal nodular thickening. This is similar in appearance to the prior CT of the chest.Bilateral multifocal renal cortical thinning likely reflecting sequela of prior insults. No discrete evidence of abdominal pelvic mass or lymphadenopathy.    RECOMMENDATIONS  - Was scheduled for EGD and colonoscopy for workup of anemia, weight loss, and post prandial vomiting on 01/10 (consent was obtained by me from HCP Ms Lyon); however, procedure was cancelled by anesthesia due to concerns in setting of productive cough and CXR (risk of difficult extubation after intubation for procedure)  - Recommend surgery follow up in setting of large hiatal hernia and prior history of repair in [facility]  - Trend Hb and keep active T/S. Transfuse as needed to keep Hb >7  - Monitor for nausea and vomiting  - Continue IV PPI 40mg BID for now   - Advance diet as tolerated  - Monitor BM for signs of bleeding (for melena or hematochezia)  - Avoid NSAIDs      Pancreatic Uncinate Coarse Calcification  * Noted on imaging  * Likely from prior inflammation    RECOMMENDATIONS  - Outpatient follow up      Thank you for your consult.  - Please note that plan was communicated with medical team.   - Please reach GI on [phone] during weekdays till 5pm.  - Please call the GI service line after 5pm on Weekdays and anytime on Weekends: [phone].      Omar Wiggins MD  PGY - 4 Gastroenterology Fellow   [facility]

## 2024-01-10 NOTE — PROGRESS NOTE ADULT - ASSESSMENT
77 y/o female with PMHx HTN, GERD, non-obstructive CAD, Dementia, anemia, MDD P/W Hb 7.4 OP, episodic vomiting for few months, 60lb weight loss. Found to have Hb 7.5 on admission.     #Microcytic anemia (r/o GIB)  - Vitals on admission --> /67, , RR 19, T98, O2 98% RA  - Labs on admission --> Hb 7.5, MCV 66, Cr 1.6  - CTAP --> large hiatal hernia, b/l adrenal nodular thickening, b/l multifocal renal cortical thinning   - S/p 1u prbc in ED  - Admitted to medicine for further management  - GI Consult --> EGD/C-scope 1/10, golytely and dulcolax 1/9 PM, preop labs  - Mg Citrate x 1 dose given 1/10 AM to help clear BM  - Plan for today: f/u GI after procedure, repeat Fe studies in AM  - C/w IV PPI BID  - C/w active T&S, monitor CBC    #Vomiting likely 2/2 Type III hiatal hernia  - CT abdomen shows Large hiatal hernia containing partially included stomach with apparent organoaxial rotation.  - Currently no abdominal pain/nausea/vomiting. Tolerating water.   - Surgery Consult --> PO contrast now, KUB in AM, pt wishes to follow surgeon in [facility] for repair  - KUB --> no obstruction  - Surgery made aware that patient has dementia and does not actively follow surgeon in [facility], they will re-eval for surgery here    #DAWIT on CKD stage 3 vs. Progressive CKD   - Serum Cr 1.6 currently. Baseline Cr 1.2 in March 2022.   - Renal US --> no hydro, echogenic kidneys can reflect medical renal disease  - Cr peaked at 1.7 then trended down to 1.5  - C/w IVF gentle hydration  - C/w trending BMP    #HTN   - Lisinopril held  - C/w Nifedipine 60mg QD  - C/w metoprolol succinate 50mg QD    #HFpEF  - On lasix PO 40 BID at home  - TTE 10/1/20 --> EF 60-65%, mild concentric LVH, G2DD  - Holding lasix for now due to DAWIT and mild hypokalemia    #Asthma  - Follows dr. Hartley who had recommended symbicort 160-4.5 2 puff BID and combivent prn but pt does not take it  - Monitor     #Depression  - C/w home meds    #MISC  DVT ppx: SCD  GI ppx: PPI  Diet: NPO  Activity: as tolerated 79 y/o female with PMHx HTN, GERD, non-obstructive CAD, Dementia, anemia, MDD P/W Hb 7.4 OP, episodic vomiting for few months, 60lb weight loss. Found to have Hb 7.5 on admission.     #Microcytic anemia (r/o GIB)  - Vitals on admission --> /67, , RR 19, T98, O2 98% RA  - Labs on admission --> Hb 7.5, MCV 66, Cr 1.6  - CTAP --> large hiatal hernia, b/l adrenal nodular thickening, b/l multifocal renal cortical thinning   - S/p 1u prbc in ED  - Admitted to medicine for further management  - GI Consult --> EGD/C-scope 1/10, golytely and dulcolax 1/9 PM, preop labs  - Mg Citrate x 1 dose given 1/10 AM to help clear BM  - Plan for today: f/u GI after procedure, repeat Fe studies in AM  - C/w IV PPI BID  - C/w active T&S, monitor CBC    #Vomiting likely 2/2 Type III hiatal hernia  - CT abdomen shows Large hiatal hernia containing partially included stomach with apparent organoaxial rotation.  - Currently no abdominal pain/nausea/vomiting. Tolerating water.   - Surgery Consult --> PO contrast now, KUB in AM, pt wishes to follow surgeon in [facility] for repair  - KUB --> no obstruction  - Surgery made aware that patient has dementia and does not actively follow surgeon in [facility], they will re-eval for surgery here    #DAWIT on CKD stage 3 vs. Progressive CKD   - Serum Cr 1.6 currently. Baseline Cr 1.2 in March 2022.   - Renal US --> no hydro, echogenic kidneys can reflect medical renal disease  - Cr peaked at 1.7 then trended down to 1.5  - C/w IVF gentle hydration  - C/w trending BMP    #HTN   - Lisinopril held  - C/w Nifedipine 60mg QD  - C/w metoprolol succinate 50mg QD    #HFpEF  - On lasix PO 40 BID at home  - TTE 10/1/20 --> EF 60-65%, mild concentric LVH, G2DD  - Holding lasix for now due to DAWIT and mild hypokalemia    #Asthma  - Follows dr. Hartley who had recommended symbicort 160-4.5 2 puff BID and combivent prn but pt does not take it  - Monitor     #Depression  - C/w home meds    #MISC  DVT ppx: SCD  GI ppx: PPI  Diet: NPO  Activity: as tolerated

## 2024-01-10 NOTE — PROGRESS NOTE ADULT - ASSESSMENT
79 y/o female with PMHx HTN, GERD, non-obstructive CAD, Dementia, anemia, MDD P/W Hb 7.4 OP, episodic vomiting for few months, 60lb weight loss. Found to have Hb 7.5 on admission.     #Microcytic anemia (r/o GIB)  - Vitals on admission --> /67, , RR 19, T98, O2 98% RA  - Labs on admission --> Hb 7.5, MCV 66, Cr 1.6  - CTAP --> large hiatal hernia, b/l adrenal nodular thickening, b/l multifocal renal cortical thinning   - S/p 1u prbc in ED  - Admitted to medicine for further management  - GI Consult --> EGD/C-scope scheduled for today  - Mg Citrate x 1 dose given 1/10 AM to help clear BM  - Plan for today: f/u GI after procedure, repeat Fe studies in AM  - C/w IV PPI BID  - C/w active T&S, monitor CBC    #Vomiting likely 2/2 Type III hiatal hernia  - CT abdomen shows Large hiatal hernia containing partially included stomach with apparent organoaxial rotation.  - Currently no abdominal pain/nausea/vomiting. Tolerating water.   - Surgery Consult --> PO contrast now, KUB in AM, pt wishes to follow surgeon in [facility] for repair  - KUB --> no obstruction  - Surgery made aware that patient has dementia and does not actively follow surgeon in [facility], they will re-eval for surgery here    #DAWIT on CKD stage 3 vs. Progressive CKD   - Serum Cr 1.6 currently. Baseline Cr 1.2 in March 2022.   - Renal US --> no hydro, echogenic kidneys can reflect medical renal disease  - Cr peaked at 1.7 then trended down to 1.5  - C/w IVF gentle hydration  - C/w trending BMP    #HTN   - Lisinopril held  - C/w Nifedipine 60mg QD  - C/w metoprolol succinate 50mg QD    #HFpEF  - On lasix PO 40 BID at home  - TTE 10/1/20 --> EF 60-65%, mild concentric LVH, G2DD  - Holding lasix for now due to DAWIT and mild hypokalemia    #Asthma  - Follows dr. Hartley who had recommended symbicort 160-4.5 2 puff BID and combivent prn but pt does not take it  - Monitor     #Depression  - C/w home meds    #MISC  DVT ppx: SCD  GI ppx: PPI  Diet: NPO  Activity: as tolerated  Pending: egd, colonscopy results, surgery plan  77 y/o female with PMHx HTN, GERD, non-obstructive CAD, Dementia, anemia, MDD P/W Hb 7.4 OP, episodic vomiting for few months, 60lb weight loss. Found to have Hb 7.5 on admission.     #Microcytic anemia (r/o GIB)  - Vitals on admission --> /67, , RR 19, T98, O2 98% RA  - Labs on admission --> Hb 7.5, MCV 66, Cr 1.6  - CTAP --> large hiatal hernia, b/l adrenal nodular thickening, b/l multifocal renal cortical thinning   - S/p 1u prbc in ED  - Admitted to medicine for further management  - GI Consult --> EGD/C-scope scheduled for today  - Mg Citrate x 1 dose given 1/10 AM to help clear BM  - Plan for today: f/u GI after procedure, repeat Fe studies in AM  - C/w IV PPI BID  - C/w active T&S, monitor CBC    #Vomiting likely 2/2 Type III hiatal hernia  - CT abdomen shows Large hiatal hernia containing partially included stomach with apparent organoaxial rotation.  - Currently no abdominal pain/nausea/vomiting. Tolerating water.   - Surgery Consult --> PO contrast now, KUB in AM, pt wishes to follow surgeon in [facility] for repair  - KUB --> no obstruction  - Surgery made aware that patient has dementia and does not actively follow surgeon in [facility], they will re-eval for surgery here    #DAWIT on CKD stage 3 vs. Progressive CKD   - Serum Cr 1.6 currently. Baseline Cr 1.2 in March 2022.   - Renal US --> no hydro, echogenic kidneys can reflect medical renal disease  - Cr peaked at 1.7 then trended down to 1.5  - C/w IVF gentle hydration  - C/w trending BMP    #HTN   - Lisinopril held  - C/w Nifedipine 60mg QD  - C/w metoprolol succinate 50mg QD    #HFpEF  - On lasix PO 40 BID at home  - TTE 10/1/20 --> EF 60-65%, mild concentric LVH, G2DD  - Holding lasix for now due to DAWIT and mild hypokalemia    #Asthma  - Follows dr. Hartley who had recommended symbicort 160-4.5 2 puff BID and combivent prn but pt does not take it  - Monitor     #Depression  - C/w home meds    #MISC  DVT ppx: SCD  GI ppx: PPI  Diet: NPO  Activity: as tolerated  Pending: egd, colonscopy results, surgery plan

## 2024-01-11 LAB
ALBUMIN SERPL ELPH-MCNC: 3.5 G/DL — SIGNIFICANT CHANGE UP (ref 3.5–5.2)
ALBUMIN SERPL ELPH-MCNC: 3.5 G/DL — SIGNIFICANT CHANGE UP (ref 3.5–5.2)
ALBUMIN SERPL ELPH-MCNC: 3.7 G/DL — SIGNIFICANT CHANGE UP (ref 3.5–5.2)
ALBUMIN SERPL ELPH-MCNC: 3.7 G/DL — SIGNIFICANT CHANGE UP (ref 3.5–5.2)
ALP SERPL-CCNC: 101 U/L — SIGNIFICANT CHANGE UP (ref 30–115)
ALP SERPL-CCNC: 101 U/L — SIGNIFICANT CHANGE UP (ref 30–115)
ALP SERPL-CCNC: 108 U/L — SIGNIFICANT CHANGE UP (ref 30–115)
ALP SERPL-CCNC: 108 U/L — SIGNIFICANT CHANGE UP (ref 30–115)
ALT FLD-CCNC: 8 U/L — SIGNIFICANT CHANGE UP (ref 0–41)
ALT FLD-CCNC: 8 U/L — SIGNIFICANT CHANGE UP (ref 0–41)
ALT FLD-CCNC: 9 U/L — SIGNIFICANT CHANGE UP (ref 0–41)
ALT FLD-CCNC: 9 U/L — SIGNIFICANT CHANGE UP (ref 0–41)
ANION GAP SERPL CALC-SCNC: 11 MMOL/L — SIGNIFICANT CHANGE UP (ref 7–14)
APTT BLD: 29.2 SEC — SIGNIFICANT CHANGE UP (ref 27–39.2)
APTT BLD: 29.2 SEC — SIGNIFICANT CHANGE UP (ref 27–39.2)
AST SERPL-CCNC: 12 U/L — SIGNIFICANT CHANGE UP (ref 0–41)
AST SERPL-CCNC: 12 U/L — SIGNIFICANT CHANGE UP (ref 0–41)
AST SERPL-CCNC: 13 U/L — SIGNIFICANT CHANGE UP (ref 0–41)
AST SERPL-CCNC: 13 U/L — SIGNIFICANT CHANGE UP (ref 0–41)
BASOPHILS # BLD AUTO: 0.05 K/UL — SIGNIFICANT CHANGE UP (ref 0–0.2)
BASOPHILS # BLD AUTO: 0.05 K/UL — SIGNIFICANT CHANGE UP (ref 0–0.2)
BASOPHILS NFR BLD AUTO: 0.8 % — SIGNIFICANT CHANGE UP (ref 0–1)
BASOPHILS NFR BLD AUTO: 0.8 % — SIGNIFICANT CHANGE UP (ref 0–1)
BILIRUB SERPL-MCNC: 0.2 MG/DL — SIGNIFICANT CHANGE UP (ref 0.2–1.2)
BILIRUB SERPL-MCNC: 0.2 MG/DL — SIGNIFICANT CHANGE UP (ref 0.2–1.2)
BILIRUB SERPL-MCNC: 0.4 MG/DL — SIGNIFICANT CHANGE UP (ref 0.2–1.2)
BILIRUB SERPL-MCNC: 0.4 MG/DL — SIGNIFICANT CHANGE UP (ref 0.2–1.2)
BUN SERPL-MCNC: 19 MG/DL — SIGNIFICANT CHANGE UP (ref 10–20)
CALCIUM SERPL-MCNC: 9.3 MG/DL — SIGNIFICANT CHANGE UP (ref 8.4–10.5)
CHLORIDE SERPL-SCNC: 94 MMOL/L — LOW (ref 98–110)
CHLORIDE SERPL-SCNC: 94 MMOL/L — LOW (ref 98–110)
CHLORIDE SERPL-SCNC: 97 MMOL/L — LOW (ref 98–110)
CHLORIDE SERPL-SCNC: 97 MMOL/L — LOW (ref 98–110)
CO2 SERPL-SCNC: 28 MMOL/L — SIGNIFICANT CHANGE UP (ref 17–32)
CO2 SERPL-SCNC: 28 MMOL/L — SIGNIFICANT CHANGE UP (ref 17–32)
CO2 SERPL-SCNC: 31 MMOL/L — SIGNIFICANT CHANGE UP (ref 17–32)
CO2 SERPL-SCNC: 31 MMOL/L — SIGNIFICANT CHANGE UP (ref 17–32)
CREAT SERPL-MCNC: 1.2 MG/DL — SIGNIFICANT CHANGE UP (ref 0.7–1.5)
CREAT SERPL-MCNC: 1.2 MG/DL — SIGNIFICANT CHANGE UP (ref 0.7–1.5)
CREAT SERPL-MCNC: 1.6 MG/DL — HIGH (ref 0.7–1.5)
CREAT SERPL-MCNC: 1.6 MG/DL — HIGH (ref 0.7–1.5)
EGFR: 33 ML/MIN/1.73M2 — LOW
EGFR: 33 ML/MIN/1.73M2 — LOW
EGFR: 46 ML/MIN/1.73M2 — LOW
EGFR: 46 ML/MIN/1.73M2 — LOW
EOSINOPHIL # BLD AUTO: 0.14 K/UL — SIGNIFICANT CHANGE UP (ref 0–0.7)
EOSINOPHIL # BLD AUTO: 0.14 K/UL — SIGNIFICANT CHANGE UP (ref 0–0.7)
EOSINOPHIL NFR BLD AUTO: 2.2 % — SIGNIFICANT CHANGE UP (ref 0–8)
EOSINOPHIL NFR BLD AUTO: 2.2 % — SIGNIFICANT CHANGE UP (ref 0–8)
FERRITIN SERPL-MCNC: 28 NG/ML — SIGNIFICANT CHANGE UP (ref 13–330)
FERRITIN SERPL-MCNC: 28 NG/ML — SIGNIFICANT CHANGE UP (ref 13–330)
GLUCOSE SERPL-MCNC: 83 MG/DL — SIGNIFICANT CHANGE UP (ref 70–99)
GLUCOSE SERPL-MCNC: 83 MG/DL — SIGNIFICANT CHANGE UP (ref 70–99)
GLUCOSE SERPL-MCNC: 89 MG/DL — SIGNIFICANT CHANGE UP (ref 70–99)
GLUCOSE SERPL-MCNC: 89 MG/DL — SIGNIFICANT CHANGE UP (ref 70–99)
HCT VFR BLD CALC: 29.5 % — LOW (ref 37–47)
HCT VFR BLD CALC: 29.5 % — LOW (ref 37–47)
HCT VFR BLD CALC: 30.9 % — LOW (ref 37–47)
HCT VFR BLD CALC: 30.9 % — LOW (ref 37–47)
HGB BLD-MCNC: 8.6 G/DL — LOW (ref 12–16)
HGB BLD-MCNC: 8.6 G/DL — LOW (ref 12–16)
HGB BLD-MCNC: 8.7 G/DL — LOW (ref 12–16)
HGB BLD-MCNC: 8.7 G/DL — LOW (ref 12–16)
IMM GRANULOCYTES NFR BLD AUTO: 0.3 % — SIGNIFICANT CHANGE UP (ref 0.1–0.3)
IMM GRANULOCYTES NFR BLD AUTO: 0.3 % — SIGNIFICANT CHANGE UP (ref 0.1–0.3)
INR BLD: 0.98 RATIO — SIGNIFICANT CHANGE UP (ref 0.65–1.3)
INR BLD: 0.98 RATIO — SIGNIFICANT CHANGE UP (ref 0.65–1.3)
IRON SATN MFR SERPL: 13 UG/DL — LOW (ref 35–150)
IRON SATN MFR SERPL: 13 UG/DL — LOW (ref 35–150)
IRON SATN MFR SERPL: 4 % — LOW (ref 15–50)
IRON SATN MFR SERPL: 4 % — LOW (ref 15–50)
LYMPHOCYTES # BLD AUTO: 1.81 K/UL — SIGNIFICANT CHANGE UP (ref 1.2–3.4)
LYMPHOCYTES # BLD AUTO: 1.81 K/UL — SIGNIFICANT CHANGE UP (ref 1.2–3.4)
LYMPHOCYTES # BLD AUTO: 28.8 % — SIGNIFICANT CHANGE UP (ref 20.5–51.1)
LYMPHOCYTES # BLD AUTO: 28.8 % — SIGNIFICANT CHANGE UP (ref 20.5–51.1)
MAGNESIUM SERPL-MCNC: 2.2 MG/DL — SIGNIFICANT CHANGE UP (ref 1.8–2.4)
MCHC RBC-ENTMCNC: 19.7 PG — LOW (ref 27–31)
MCHC RBC-ENTMCNC: 19.7 PG — LOW (ref 27–31)
MCHC RBC-ENTMCNC: 20.4 PG — LOW (ref 27–31)
MCHC RBC-ENTMCNC: 20.4 PG — LOW (ref 27–31)
MCHC RBC-ENTMCNC: 27.8 G/DL — LOW (ref 32–37)
MCHC RBC-ENTMCNC: 27.8 G/DL — LOW (ref 32–37)
MCHC RBC-ENTMCNC: 29.5 G/DL — LOW (ref 32–37)
MCHC RBC-ENTMCNC: 29.5 G/DL — LOW (ref 32–37)
MCV RBC AUTO: 69.1 FL — LOW (ref 81–99)
MCV RBC AUTO: 69.1 FL — LOW (ref 81–99)
MCV RBC AUTO: 70.9 FL — LOW (ref 81–99)
MCV RBC AUTO: 70.9 FL — LOW (ref 81–99)
MONOCYTES # BLD AUTO: 0.53 K/UL — SIGNIFICANT CHANGE UP (ref 0.1–0.6)
MONOCYTES # BLD AUTO: 0.53 K/UL — SIGNIFICANT CHANGE UP (ref 0.1–0.6)
MONOCYTES NFR BLD AUTO: 8.4 % — SIGNIFICANT CHANGE UP (ref 1.7–9.3)
MONOCYTES NFR BLD AUTO: 8.4 % — SIGNIFICANT CHANGE UP (ref 1.7–9.3)
NEUTROPHILS # BLD AUTO: 3.73 K/UL — SIGNIFICANT CHANGE UP (ref 1.4–6.5)
NEUTROPHILS # BLD AUTO: 3.73 K/UL — SIGNIFICANT CHANGE UP (ref 1.4–6.5)
NEUTROPHILS NFR BLD AUTO: 59.5 % — SIGNIFICANT CHANGE UP (ref 42.2–75.2)
NEUTROPHILS NFR BLD AUTO: 59.5 % — SIGNIFICANT CHANGE UP (ref 42.2–75.2)
NRBC # BLD: 0 /100 WBCS — SIGNIFICANT CHANGE UP (ref 0–0)
PLATELET # BLD AUTO: 413 K/UL — HIGH (ref 130–400)
PLATELET # BLD AUTO: 413 K/UL — HIGH (ref 130–400)
PLATELET # BLD AUTO: 463 K/UL — HIGH (ref 130–400)
PLATELET # BLD AUTO: 463 K/UL — HIGH (ref 130–400)
PMV BLD: 8.8 FL — SIGNIFICANT CHANGE UP (ref 7.4–10.4)
PMV BLD: 8.8 FL — SIGNIFICANT CHANGE UP (ref 7.4–10.4)
PMV BLD: 8.9 FL — SIGNIFICANT CHANGE UP (ref 7.4–10.4)
PMV BLD: 8.9 FL — SIGNIFICANT CHANGE UP (ref 7.4–10.4)
POTASSIUM SERPL-MCNC: 3.4 MMOL/L — LOW (ref 3.5–5)
POTASSIUM SERPL-MCNC: 3.4 MMOL/L — LOW (ref 3.5–5)
POTASSIUM SERPL-MCNC: 4.5 MMOL/L — SIGNIFICANT CHANGE UP (ref 3.5–5)
POTASSIUM SERPL-MCNC: 4.5 MMOL/L — SIGNIFICANT CHANGE UP (ref 3.5–5)
POTASSIUM SERPL-SCNC: 3.4 MMOL/L — LOW (ref 3.5–5)
POTASSIUM SERPL-SCNC: 3.4 MMOL/L — LOW (ref 3.5–5)
POTASSIUM SERPL-SCNC: 4.5 MMOL/L — SIGNIFICANT CHANGE UP (ref 3.5–5)
POTASSIUM SERPL-SCNC: 4.5 MMOL/L — SIGNIFICANT CHANGE UP (ref 3.5–5)
PROT SERPL-MCNC: 6 G/DL — SIGNIFICANT CHANGE UP (ref 6–8)
PROT SERPL-MCNC: 6 G/DL — SIGNIFICANT CHANGE UP (ref 6–8)
PROT SERPL-MCNC: 6.3 G/DL — SIGNIFICANT CHANGE UP (ref 6–8)
PROT SERPL-MCNC: 6.3 G/DL — SIGNIFICANT CHANGE UP (ref 6–8)
PROTHROM AB SERPL-ACNC: 11.2 SEC — SIGNIFICANT CHANGE UP (ref 9.95–12.87)
PROTHROM AB SERPL-ACNC: 11.2 SEC — SIGNIFICANT CHANGE UP (ref 9.95–12.87)
RBC # BLD: 4.27 M/UL — SIGNIFICANT CHANGE UP (ref 4.2–5.4)
RBC # BLD: 4.27 M/UL — SIGNIFICANT CHANGE UP (ref 4.2–5.4)
RBC # BLD: 4.36 M/UL — SIGNIFICANT CHANGE UP (ref 4.2–5.4)
RBC # BLD: 4.36 M/UL — SIGNIFICANT CHANGE UP (ref 4.2–5.4)
RBC # FLD: 18 % — HIGH (ref 11.5–14.5)
RBC # FLD: 18 % — HIGH (ref 11.5–14.5)
RBC # FLD: 18.5 % — HIGH (ref 11.5–14.5)
RBC # FLD: 18.5 % — HIGH (ref 11.5–14.5)
SODIUM SERPL-SCNC: 133 MMOL/L — LOW (ref 135–146)
SODIUM SERPL-SCNC: 133 MMOL/L — LOW (ref 135–146)
SODIUM SERPL-SCNC: 139 MMOL/L — SIGNIFICANT CHANGE UP (ref 135–146)
SODIUM SERPL-SCNC: 139 MMOL/L — SIGNIFICANT CHANGE UP (ref 135–146)
TIBC SERPL-MCNC: 321 UG/DL — SIGNIFICANT CHANGE UP (ref 220–430)
TIBC SERPL-MCNC: 321 UG/DL — SIGNIFICANT CHANGE UP (ref 220–430)
TRANSFERRIN SERPL-MCNC: 281 MG/DL — SIGNIFICANT CHANGE UP (ref 200–360)
TRANSFERRIN SERPL-MCNC: 281 MG/DL — SIGNIFICANT CHANGE UP (ref 200–360)
UIBC SERPL-MCNC: 308 UG/DL — SIGNIFICANT CHANGE UP (ref 110–370)
UIBC SERPL-MCNC: 308 UG/DL — SIGNIFICANT CHANGE UP (ref 110–370)
WBC # BLD: 6.28 K/UL — SIGNIFICANT CHANGE UP (ref 4.8–10.8)
WBC # BLD: 6.28 K/UL — SIGNIFICANT CHANGE UP (ref 4.8–10.8)
WBC # BLD: 8.2 K/UL — SIGNIFICANT CHANGE UP (ref 4.8–10.8)
WBC # BLD: 8.2 K/UL — SIGNIFICANT CHANGE UP (ref 4.8–10.8)
WBC # FLD AUTO: 6.28 K/UL — SIGNIFICANT CHANGE UP (ref 4.8–10.8)
WBC # FLD AUTO: 6.28 K/UL — SIGNIFICANT CHANGE UP (ref 4.8–10.8)
WBC # FLD AUTO: 8.2 K/UL — SIGNIFICANT CHANGE UP (ref 4.8–10.8)
WBC # FLD AUTO: 8.2 K/UL — SIGNIFICANT CHANGE UP (ref 4.8–10.8)

## 2024-01-11 PROCEDURE — 99233 SBSQ HOSP IP/OBS HIGH 50: CPT

## 2024-01-11 PROCEDURE — 99222 1ST HOSP IP/OBS MODERATE 55: CPT

## 2024-01-11 PROCEDURE — 99232 SBSQ HOSP IP/OBS MODERATE 35: CPT

## 2024-01-11 RX ORDER — SOD SULF/SODIUM/NAHCO3/KCL/PEG
4000 SOLUTION, RECONSTITUTED, ORAL ORAL ONCE
Refills: 0 | Status: COMPLETED | OUTPATIENT
Start: 2024-01-11 | End: 2024-01-11

## 2024-01-11 RX ORDER — POTASSIUM CHLORIDE 20 MEQ
20 PACKET (EA) ORAL
Refills: 0 | Status: COMPLETED | OUTPATIENT
Start: 2024-01-11 | End: 2024-01-11

## 2024-01-11 RX ORDER — IRON SUCROSE 20 MG/ML
200 INJECTION, SOLUTION INTRAVENOUS ONCE
Refills: 0 | Status: COMPLETED | OUTPATIENT
Start: 2024-01-11 | End: 2024-01-11

## 2024-01-11 RX ORDER — FERROUS SULFATE 325(65) MG
325 TABLET ORAL DAILY
Refills: 0 | Status: DISCONTINUED | OUTPATIENT
Start: 2024-01-12 | End: 2024-01-17

## 2024-01-11 RX ADMIN — Medication 50 MILLIGRAM(S): at 05:05

## 2024-01-11 RX ADMIN — PANTOPRAZOLE SODIUM 40 MILLIGRAM(S): 20 TABLET, DELAYED RELEASE ORAL at 17:59

## 2024-01-11 RX ADMIN — PANTOPRAZOLE SODIUM 40 MILLIGRAM(S): 20 TABLET, DELAYED RELEASE ORAL at 05:05

## 2024-01-11 RX ADMIN — Medication 4000 MILLILITER(S): at 17:57

## 2024-01-11 RX ADMIN — Medication 20 MILLIEQUIVALENT(S): at 13:16

## 2024-01-11 RX ADMIN — SERTRALINE 100 MILLIGRAM(S): 25 TABLET, FILM COATED ORAL at 11:54

## 2024-01-11 RX ADMIN — Medication 0.25 MILLIGRAM(S): at 21:03

## 2024-01-11 RX ADMIN — IRON SUCROSE 110 MILLIGRAM(S): 20 INJECTION, SOLUTION INTRAVENOUS at 11:54

## 2024-01-11 RX ADMIN — Medication 20 MILLIGRAM(S): at 21:03

## 2024-01-11 RX ADMIN — Medication 20 MILLIEQUIVALENT(S): at 10:48

## 2024-01-11 RX ADMIN — Medication 60 MILLIGRAM(S): at 05:05

## 2024-01-11 NOTE — PROGRESS NOTE ADULT - ASSESSMENT
Assessment and Plan  Case of a 78 year old female patient known to have Depression, Dementia, GERD. Hiatal hernia s/p repair, Anemia, HTN, and non obstructive CAD who presented to the ED on 01/08 for evaluation of low outpatient Hb in setting of vomiting, found to be hemodynamically stable with evidence of drop in Hb from baseline of 12.3 in 03/2022 to Hb 7.5 on blood work and evidence of large hiatal hernia on imaging, admitted for further management. We are consulted for concern of acute drop in Hb and vomiting in setting of large hiatal hernia.      Drop in Hemoglobin and Iron Deficiency Anemia without Evidence of Overt Gastrointestinal Bleeding  Weight Loss and Post Prandial Vomiting with Large Hiatal Hernia  History of Hiatal Hernia s/p Repair  Rule Out Malignancy  No Clinical Evidence of GOO  * History of Hiatal hernia s/p repair around 5 years ago at [facility]  * Baseline Hb 12.3 in 03/2022  * Recent outpatient Hb 7.4 on 01/05 -> followed with Dr Mercedes Hussein outpatient and referred to ED  * History: daily non bilious non bloody vomitus x few months (sometimes food; other times clear acid secretion); 60lb weight loss in few months (unintentional); no overt blood loss per rectum or os; no recent NSAID use  * ED Labs Hb 7.5 on 01/08; LA 1.2; INR 1.09 on 01/08  * Iron with Total Binding Capacity in AM (01.11.24 @ 06:09)    Iron - Total Binding Capacity.: 321 ug/dL   % Saturation, Iron: 4 %   Iron Total: 13 ug/dL   Unsaturated Iron Binding Capacity: 308 ug/dL  * Refused ERICA on 01/09  * Prior EGD around 10 years ago in [place]  * Prior colonoscopy: patient not sure  * Family History: negative for GI malignancies  * CT Abdomen and Pelvis w/ IV Cont (01.08.24 @ 15:26) Large hiatal hernia containing partially included stomach with apparent   organoaxial rotationBilateral adrenal nodular thickening. This is similar in appearance to the prior CT of the chest.Bilateral multifocal renal cortical thinning likely reflecting sequela of prior insults. No discrete evidence of abdominal pelvic mass or lymphadenopathy.    RECOMMENDATIONS  - Was scheduled for EGD and colonoscopy for workup of anemia, weight loss, and post prandial vomiting on 01/10 (consent was obtained by me from HCP Ms Lyon); however, procedure was cancelled by anesthesia due to concerns in setting of productive cough and CXR  - Will reschedule patient for EGD and colonoscopy on 01/12. Pulmonary risk stratification noted: low risk patient for low risk procedure; no indication for prophylactic intubation  - Please administer golytely 4L today and dulcolax 20mg at bedtime  - Please check preop labs tonight: CBC, BMP, Mg, and INR  - Clear liquid diet today and then NPO after midnight  - Recommend surgery follow up in setting of large hiatal hernia and prior history of repair in [facility]  - Trend Hb and keep active T/S. Transfuse as needed to keep Hb >8 for procedure  - Monitor for nausea and vomiting  - Continue IV PPI 40mg BID for now   - Monitor BM for signs of bleeding (for melena or hematochezia)  - Avoid NSAIDs      Pancreatic Uncinate Coarse Calcification  * Noted on imaging  * Likely from prior inflammation    RECOMMENDATIONS  - Outpatient follow up      Thank you for your consult.  - Please note that plan was communicated with medical team.   - Please reach GI on [phone] during weekdays till 5pm.  - Please call the GI service line after 5pm on Weekdays and anytime on Weekends: [phone].      Omar Wiggins MD  PGY - 4 Gastroenterology Fellow   [facility]         Assessment and Plan  Case of a 78 year old female patient known to have Depression, Dementia, GERD. Hiatal hernia s/p repair, Anemia, HTN, and non obstructive CAD who presented to the ED on 01/08 for evaluation of low outpatient Hb in setting of vomiting, found to be hemodynamically stable with evidence of drop in Hb from baseline of 12.3 in 03/2022 to Hb 7.5 on blood work and evidence of large hiatal hernia on imaging, admitted for further management. We are consulted for concern of acute drop in Hb and vomiting in setting of large hiatal hernia.      Drop in Hemoglobin and Iron Deficiency Anemia without Evidence of Overt Gastrointestinal Bleeding  Weight Loss and Post Prandial Vomiting with Large Hiatal Hernia  History of Hiatal Hernia s/p Repair  Rule Out Malignancy  No Clinical Evidence of GOO  * History of Hiatal hernia s/p repair around 5 years ago at [facility]  * Baseline Hb 12.3 in 03/2022  * Recent outpatient Hb 7.4 on 01/05 -> followed with Dr Mercedes Hussein outpatient and referred to ED  * History: daily non bilious non bloody vomitus x few months (sometimes food; other times clear acid secretion); 60lb weight loss in few months (unintentional); no overt blood loss per rectum or os; no recent NSAID use  * ED Labs Hb 7.5 on 01/08; LA 1.2; INR 1.09 on 01/08  * Iron with Total Binding Capacity in AM (01.11.24 @ 06:09)    Iron - Total Binding Capacity.: 321 ug/dL   % Saturation, Iron: 4 %   Iron Total: 13 ug/dL   Unsaturated Iron Binding Capacity: 308 ug/dL  * Refused ERICA on 01/09  * Prior EGD around 10 years ago in [place]  * Prior colonoscopy: patient not sure  * Family History: negative for GI malignancies  * CT Abdomen and Pelvis w/ IV Cont (01.08.24 @ 15:26) Large hiatal hernia containing partially included stomach with apparent   organoaxial rotationBilateral adrenal nodular thickening. This is similar in appearance to the prior CT of the chest.Bilateral multifocal renal cortical thinning likely reflecting sequela of prior insults. No discrete evidence of abdominal pelvic mass or lymphadenopathy.    RECOMMENDATIONS  - Was scheduled for EGD and colonoscopy for workup of anemia, weight loss, and post prandial vomiting on 01/10 (consent was obtained by me from HCP Ms Lyon); however, procedure was cancelled by anesthesia due to concerns in setting of productive cough and CXR  - Will reschedule patient for EGD and colonoscopy on 01/12. Pulmonary risk stratification noted: low risk patient for low risk procedure; no indication for prophylactic intubation  - Please administer golytely 4L today and dulcolax 20mg at bedtime  - Please check preop labs tonight: CBC, BMP, Mg, and INR  - Clear liquid diet today and then NPO after midnight  - Recommend surgery follow up in setting of large hiatal hernia and prior history of repair in [facility]  - Trend Hb and keep active T/S. Transfuse as needed to keep Hb >8 for procedure  - Monitor for nausea and vomiting  - Continue IV PPI 40mg BID for now   - Monitor BM for signs of bleeding (for melena or hematochezia)  - Avoid NSAIDs      Pancreatic Uncinate Coarse Calcification  * Noted on imaging  * Likely from prior inflammation    RECOMMENDATIONS  - Outpatient follow up      Thank you for your consult.  - Please note that plan was communicated with medical team.   - Please reach GI on [phone] during weekdays till 5pm.  - Please call the GI service line after 5pm on Weekdays and anytime on Weekends: [phone].      Omar Wiggins MD  PGY - 4 Gastroenterology Fellow   [facility]

## 2024-01-11 NOTE — PROGRESS NOTE ADULT - ASSESSMENT
ASSESSMENT:  78-year-old female with past medical history of hiatal hernia s/p repair >10 years back, L emypema s/p decortication 2018, hypertension, GERD, non obstructive CAD, depression, dementia, anemia presenting for evaluation of low hemoglobin, measured outpatient to be 7.4.  Patient has had multiple episodes of nonbloody nonbilious vomiting over the last several months with good appetite and almost 60 pound weight loss over the last few months.  Patient just started following up with hematologist Dr.Yumi Hussein and had recent blood work from 3 days ago that resulted today and showed a low hemoglobin of 7.4. Patient denies fevers, retching, chills, chest pain, shortness of breath, hematuria, melena, hematochezia. The pt presented to the Ed for a blood transfusion that was recommended by the hematologist.    CTAP revealing a type III hiatal hernia and patient states she has had intermittent episodes of epigastric pain associated with vomiting over the past few months. CTAP revealing a large hiatal hernia with stomach above diaphragm and organoaxial rotation.    Plan  - EGD/colonoscopy cancelled yesterday, final plans pending   - Monitor labs  - Care as per primary team     [phone]   ASSESSMENT:  78-year-old female with past medical history of hiatal hernia s/p repair >10 years back, L emypema s/p decortication 2018, hypertension, GERD, non obstructive CAD, depression, dementia, anemia presenting for evaluation of low hemoglobin, measured outpatient to be 7.4.  Patient has had multiple episodes of nonbloody nonbilious vomiting over the last several months with good appetite and almost 60 pound weight loss over the last few months.  Patient just started following up with hematologist Dr.Yumi Hussein and had recent blood work from 3 days ago that resulted today and showed a low hemoglobin of 7.4. Patient denies fevers, retching, chills, chest pain, shortness of breath, hematuria, melena, hematochezia. The pt presented to the Ed for a blood transfusion that was recommended by the hematologist.    CTAP revealing a type III hiatal hernia and patient states she has had intermittent episodes of epigastric pain associated with vomiting over the past few months. CTAP revealing a large hiatal hernia with stomach above diaphragm and organoaxial rotation.    Plan  - EGD/colonoscopy cancelled yesterday, final plans pending   - Monitor labs  - Care as per primary team     [phone]   ASSESSMENT:  78-year-old female with past medical history of hiatal hernia s/p repair >10 years back, L emypema s/p decortication 2018, hypertension, GERD, non obstructive CAD, depression, dementia, anemia presenting for evaluation of low hemoglobin, measured outpatient to be 7.4.  Patient has had multiple episodes of nonbloody nonbilious vomiting over the last several months with good appetite and almost 60 pound weight loss over the last few months.  Patient just started following up with hematologist Dr.Yumi Hussein and had recent blood work from 3 days ago that resulted today and showed a low hemoglobin of 7.4. Patient denies fevers, retching, chills, chest pain, shortness of breath, hematuria, melena, hematochezia. The pt presented to the Ed for a blood transfusion that was recommended by the hematologist.    CTAP revealing a type III hiatal hernia and patient states she has had intermittent episodes of epigastric pain associated with vomiting over the past few months. CTAP revealing a large hiatal hernia with stomach above diaphragm and organoaxial rotation.    Plan  - EGD/colonoscopy cancelled yesterday, final plans pending   - Monitor labs  - Care as per primary team     [phone]   ASSESSMENT:  78-year-old female with past medical history of hiatal hernia s/p repair >10 years back, L emypema s/p decortication 2018, hypertension, GERD, non obstructive CAD, depression, dementia, anemia presenting for evaluation of low hemoglobin, measured outpatient to be 7.4.  Patient has had multiple episodes of nonbloody nonbilious vomiting over the last several months with good appetite and almost 60 pound weight loss over the last few months.  Patient just started following up with hematologist Dr.Yumi Hussein and had recent blood work from 3 days ago that resulted today and showed a low hemoglobin of 7.4. Patient denies fevers, retching, chills, chest pain, shortness of breath, hematuria, melena, hematochezia. The pt presented to the Ed for a blood transfusion that was recommended by the hematologist.    CTAP revealing a type III hiatal hernia and patient states she has had intermittent episodes of epigastric pain associated with vomiting over the past few months. CTAP revealing a large hiatal hernia with stomach above diaphragm and organoaxial rotation.    Plan  - EGD/colonoscopy cancelled yesterday, final plans pending   - Monitor labs  - Care as per primary team     [phone]

## 2024-01-11 NOTE — PROGRESS NOTE ADULT - SUBJECTIVE AND OBJECTIVE BOX
Patient is a 78y old  Female who presents with a chief complaint of Anemia (11 Jan 2024 11:43)      Patient seen and examined at bedside.  Patient denies any chest pain or shortness of breath   ALLERGIES:  No Known Allergies    MEDICATIONS:  ALPRAZolam 0.25 milliGRAM(s) Oral at bedtime  bisacodyl 20 milliGRAM(s) Oral at bedtime  lactated ringers. 1000 milliLiter(s) IV Continuous <Continuous>  metoprolol succinate ER 50 milliGRAM(s) Oral daily  NIFEdipine XL 60 milliGRAM(s) Oral daily  pantoprazole  Injectable 40 milliGRAM(s) IV Push two times a day  polyethylene glycol/electrolyte Solution. 4000 milliLiter(s) Oral once  sertraline 100 milliGRAM(s) Oral daily    Vital Signs Last 24 Hrs  T(F): 96.9 (11 Jan 2024 07:00), Max: 97.9 (11 Jan 2024 00:07)  HR: 66 (11 Jan 2024 07:00) (63 - 72)  BP: 141/62 (11 Jan 2024 07:00) (134/60 - 147/69)  RR: 18 (11 Jan 2024 07:00) (18 - 18)  SpO2: 98% (10 Richi 2024 15:25) (98% - 98%)  I&O's Summary      PHYSICAL EXAM:  General: NAD, alert  ENT: MMM  Neck: Supple, No JVD  Lungs: Clear to auscultation bilaterally  Cardio: RRR, S1/S2, 2/6 systolic murmur   Abdomen: Soft, Nontender, Nondistended; Bowel sounds present  Extremities: No cyanosis, No edema    LABS:                        8.7    6.28  )-----------( 463      ( 11 Jan 2024 06:09 )             29.5     01-11    139  |  97  |  19  ----------------------------<  89  3.4   |  31  |  1.2    Ca    9.3      11 Jan 2024 06:09  Mg     2.2     01-11    TPro  6.0  /  Alb  3.5  /  TBili  0.4  /  DBili  x   /  AST  12  /  ALT  8   /  AlkPhos  101  01-11      PT/INR - ( 09 Jan 2024 23:22 )   PT: 12.40 sec;   INR: 1.09 ratio         PTT - ( 09 Jan 2024 23:22 )  PTT:26.5 sec  Lactate, Blood: 1.2 mmol/L (01-09 @ 01:38)        01-09 Chol 175 mg/dL LDL -- HDL 51 mg/dL Trig 167 mg/dL                  Urinalysis Basic - ( 11 Jan 2024 06:09 )    Color: x / Appearance: x / SG: x / pH: x  Gluc: 89 mg/dL / Ketone: x  / Bili: x / Urobili: x   Blood: x / Protein: x / Nitrite: x   Leuk Esterase: x / RBC: x / WBC x   Sq Epi: x / Non Sq Epi: x / Bacteria: x            RADIOLOGY & ADDITIONAL TESTS:    Care Discussed with Consultants/Other Providers:

## 2024-01-11 NOTE — PROGRESS NOTE ADULT - ASSESSMENT
79 y/o female with PMHx HTN, GERD, non-obstructive CAD, Dementia, anemia, MDD P/W Hb 7.4 OP, episodic vomiting for few months, 60lb weight loss. Found to have Hb 7.5 on admission.     #Microcytic anemia (r/o GIB)  - Vitals on admission --> /67, , RR 19, T98, O2 98% RA  - Labs on admission --> Hb 7.5, MCV 66, Cr 1.6  - CTAP --> large hiatal hernia, b/l adrenal nodular thickening, b/l multifocal renal cortical thinning   - S/p 1u prbc in ED  - Admitted to medicine for further management  - GI Consult --> EGD/C-scope 1/10, golytely and dulcolax 1/9 PM, preop labs  - Mg Citrate x 1 dose given 1/10 AM to help clear BM  - 1/10 --> EGD/C-scope cancelled by anesthesia due to low pulse ox, GI recommending OP f/u  - Plan for today: f/u Fe studies, f/u GI, f/u surgery   - C/w IV PPI BID  - C/w active T&S, monitor CBC    #Vomiting likely 2/2 Type III hiatal hernia  - CT abdomen shows Large hiatal hernia containing partially included stomach with apparent organoaxial rotation.  - Currently no abdominal pain/nausea/vomiting. Tolerating water.   - Surgery Consult --> PO contrast now, KUB in AM, pt wishes to follow surgeon in [facility] for repair  - KUB --> no obstruction  - Surgery made aware that patient has dementia and does not actively follow surgeon in [facility], they will re-eval for surgery here  - F/u surgery     #DAWIT on CKD stage 3 vs. Progressive CKD   - Serum Cr 1.6 currently. Baseline Cr 1.2 in March 2022.   - Renal US --> no hydro, echogenic kidneys can reflect medical renal disease  - Cr peaked at 1.7 then trended down to 1.5  - C/w IVF gentle hydration  - C/w trending BMP    #HTN   - Lisinopril held  - C/w Nifedipine 60mg QD  - C/w metoprolol succinate 50mg QD    #HFpEF  - On lasix PO 40 BID at home  - TTE 10/1/20 --> EF 60-65%, mild concentric LVH, G2DD  - Holding lasix for now due to DAWIT and mild hypokalemia    #Asthma  - Follows dr. Hartley who had recommended symbicort 160-4.5 2 puff BID and combivent prn but pt does not take it  - Monitor     #Depression  - C/w home meds    #MISC  DVT ppx: SCD  GI ppx: PPI  Diet: AAT  Activity: as tolerated 79 y/o female with PMHx HTN, GERD, non-obstructive CAD, Dementia, anemia, MDD P/W Hb 7.4 OP, episodic vomiting for few months, 60lb weight loss. Found to have Hb 7.5 on admission.     #Microcytic anemia (r/o GIB)  - Vitals on admission --> /67, , RR 19, T98, O2 98% RA  - Labs on admission --> Hb 7.5, MCV 66, Cr 1.6  - CTAP --> large hiatal hernia, b/l adrenal nodular thickening, b/l multifocal renal cortical thinning   - S/p 1u prbc in ED  - Admitted to medicine for further management  - GI Consult --> EGD/C-scope 1/10, golytely and dulcolax 1/9 PM, preop labs  - Mg Citrate x 1 dose given 1/10 AM to help clear BM  - 1/10 --> EGD/C-scope cancelled by anesthesia due to low pulse ox, GI recommending OP f/u  - Plan for today: f/u Fe studies, f/u GI, f/u surgery   - C/w IV PPI BID  - C/w active T&S, monitor CBC    #Vomiting likely 2/2 Type III hiatal hernia  - CT abdomen shows Large hiatal hernia containing partially included stomach with apparent organoaxial rotation.  - Currently no abdominal pain/nausea/vomiting. Tolerating water.   - Surgery Consult --> PO contrast now, KUB in AM, pt wishes to follow surgeon in [facility] for repair  - KUB --> no obstruction  - Surgery made aware that patient has dementia and does not actively follow surgeon in [facility], they will re-eval for surgery here  - F/u surgery     #DAWIT on CKD stage 3 vs. Progressive CKD   - Serum Cr 1.6 currently. Baseline Cr 1.2 in March 2022.   - Renal US --> no hydro, echogenic kidneys can reflect medical renal disease  - Cr peaked at 1.7 then trended down to 1.5  - C/w IVF gentle hydration  - C/w trending BMP    #HTN   - Lisinopril held  - C/w Nifedipine 60mg QD  - C/w metoprolol succinate 50mg QD    #HFpEF  - On lasix PO 40 BID at home  - TTE 10/1/20 --> EF 60-65%, mild concentric LVH, G2DD  - Holding lasix for now due to DAWIT and mild hypokalemia    #Asthma  - Follows dr. Hartley who had recommended symbicort 160-4.5 2 puff BID and combivent prn but pt does not take it  - Monitor     #Depression  - C/w home meds    #MISC  DVT ppx: SCD  GI ppx: PPI  Diet: AAT  Activity: as tolerated 79 y/o female with PMHx HTN, GERD, non-obstructive CAD, Dementia, anemia, MDD P/W Hb 7.4 OP, episodic vomiting for few months, 60lb weight loss. Found to have Hb 7.5 on admission.     #Microcytic anemia (r/o GIB)  - Vitals on admission --> /67, , RR 19, T98, O2 98% RA  - Labs on admission --> Hb 7.5, MCV 66, Cr 1.6  - CTAP --> large hiatal hernia, b/l adrenal nodular thickening, b/l multifocal renal cortical thinning   - S/p 1u prbc in ED  - Admitted to medicine for further management  - GI Consult --> EGD/C-scope 1/10, golytely and dulcolax 1/9 PM, preop labs  - Mg Citrate x 1 dose given 1/10 AM to help clear BM  - 1/10 --> EGD/C-scope cancelled by anesthesia due to low pulse ox, GI recommending OP f/u  - 1/11 --> Pulm consult for further recs || 1 dose IV venofer then Ferrous sulfate QD  - Plan for today: f/u Fe studies, f/u pulm consult, f/u GI, f/u surgery   - C/w IV PPI BID  - C/w active T&S, monitor CBC    #Vomiting likely 2/2 Type III hiatal hernia  - CT abdomen shows Large hiatal hernia containing partially included stomach with apparent organoaxial rotation.  - Currently no abdominal pain/nausea/vomiting. Tolerating water.   - Surgery Consult --> PO contrast now, KUB in AM, pt wishes to follow surgeon in [facility] for repair  - KUB --> no obstruction  - Surgery made aware that patient has dementia and does not actively follow surgeon in [facility], they will re-eval for surgery here  - F/u surgery     #DAWIT on CKD stage 3 vs. Progressive CKD   - Serum Cr 1.6 currently. Baseline Cr 1.2 in March 2022.   - Renal US --> no hydro, echogenic kidneys can reflect medical renal disease  - Cr peaked at 1.7 then trended down to 1.5  - C/w IVF gentle hydration  - C/w trending BMP    #HTN   - Lisinopril held  - C/w Nifedipine 60mg QD  - C/w metoprolol succinate 50mg QD    #HFpEF  - On lasix PO 40 BID at home  - TTE 10/1/20 --> EF 60-65%, mild concentric LVH, G2DD  - Holding lasix for now due to DAWIT and mild hypokalemia    #Asthma  - Follows dr. Hartley who had recommended symbicort 160-4.5 2 puff BID and combivent prn but pt does not take it  - Monitor     #Depression  - C/w home meds    #MISC  DVT ppx: SCD  GI ppx: PPI  Diet: AAT  Activity: as tolerated 79 y/o female with PMHx HTN, GERD, non-obstructive CAD, Dementia, anemia, MDD P/W Hb 7.4 OP, episodic vomiting for few months, 60lb weight loss. Found to have Hb 7.5 on admission.     #Microcytic anemia (r/o GIB)  - Vitals on admission --> /67, , RR 19, T98, O2 98% RA  - Labs on admission --> Hb 7.5, MCV 66, Cr 1.6  - CTAP --> large hiatal hernia, b/l adrenal nodular thickening, b/l multifocal renal cortical thinning   - S/p 1u prbc in ED  - Admitted to medicine for further management  - GI Consult --> EGD/C-scope 1/10, golytely and dulcolax 1/9 PM, preop labs  - Mg Citrate x 1 dose given 1/10 AM to help clear BM  - 1/10 --> EGD/C-scope cancelled by anesthesia due to low pulse ox, GI recommending OP f/u  - 1/11 --> Pulm consult for further recs || 1 dose IV venofer then Ferrous sulfate QD  - Plan for today: f/u Fe studies, f/u pulm consult, f/u GI, f/u surgery   - C/w IV PPI BID  - C/w active T&S, monitor CBC    #Vomiting likely 2/2 Type III hiatal hernia  - CT abdomen shows Large hiatal hernia containing partially included stomach with apparent organoaxial rotation.  - Currently no abdominal pain/nausea/vomiting. Tolerating water.   - Surgery Consult --> PO contrast now, KUB in AM, pt wishes to follow surgeon in [facility] for repair  - KUB --> no obstruction  - Surgery made aware that patient has dementia and does not actively follow surgeon in [facility], they will re-eval for surgery here  - F/u surgery     #DAWIT on CKD stage 3 vs. Progressive CKD   - Serum Cr 1.6 currently. Baseline Cr 1.2 in March 2022.   - Renal US --> no hydro, echogenic kidneys can reflect medical renal disease  - Cr peaked at 1.7 then trended down to 1.5  - C/w IVF gentle hydration  - C/w trending BMP    #HTN   - Lisinopril held  - C/w Nifedipine 60mg QD  - C/w metoprolol succinate 50mg QD    #HFpEF  - On lasix PO 40 BID at home  - TTE 10/1/20 --> EF 60-65%, mild concentric LVH, G2DD  - Holding lasix for now due to DAWIT and mild hypokalemia    #Asthma  - Follows dr. Hartley who had recommended symbicort 160-4.5 2 puff BID and combivent prn but pt does not take it  - Monitor     #Depression  - C/w home meds    #MISC  DVT ppx: SCD  GI ppx: PPI  Diet: AAT  Activity: as tolerated

## 2024-01-11 NOTE — CONSULT NOTE ADULT - SUBJECTIVE AND OBJECTIVE BOX
Patient is a 78y old  Female who presents with a chief complaint of Anemia (11 Jan 2024 06:37)      HPI:  The patient is a 78-year-old female with past medical history of hiatal hernia s/p repair >10 years back, hypertension, GERD, non obstructive CAD, depression, dementia, anemia presenting for evaluation of low hemoglobin, measured outpatient to be 7.4.  Patient has had multiple episodes of nonbloody nonbilious vomiting over the last several months with good appetite and almost 60 pound weight loss over the last few months.  Patient just started following up with hematologist Dr.Yumi Hussein and had recent blood work from 3 days ago that resulted today and showed a low hemoglobin of 7.4. Patient denies fevers, chills, chest pain, shortness of breath, hematuria, melena, hematochezia. The pt presented to the Ed for a blood transfusion that was recommended by the hematologist.    In the ED  Vital Signs Last 24 Hrs  T(C): 36.9 (08 Jan 2024 19:06), Max: 36.9 (08 Jan 2024 19:06)  T(F): 98.4 (08 Jan 2024 19:06), Max: 98.4 (08 Jan 2024 19:06)  HR: 81 (08 Jan 2024 19:06) (70 - 100)  BP: 155/74 (08 Jan 2024 19:06) (130/60 - 196/89)  BP(mean): --  RR: 18 (08 Jan 2024 19:06) (18 - 19)  SpO2: 98% (08 Jan 2024 19:06) (98% - 100%)    Parameters below as of 08 Jan 2024 19:06  Patient On (Oxygen Delivery Method): room air    In the ED the pt received 1 PRBC  Labs were significant for a Cr of 1.6 (unknown baseline)  and a lactate    Cat scan was significant for  Large hiatal hernia containing partially included stomach with apparent   organoaxial rotation    The pt was admitted to medicine for anemia 2/2 possible GIB     (08 Jan 2024 21:25)      PAST MEDICAL & SURGICAL HISTORY:  Hypertension      GERD (gastroesophageal reflux disease)      Hiatal hernia with GERD      Empyema lung      H/O CHF      History of repair of hiatal hernia      H/O pleural empyema  s/p decortication          SOCIAL HX:   Smoking         n/a                ETOH                            Other    FAMILY HISTORY:  Family history of cardiac pacemaker  3 siblings    Family history of hypertrophic cardiomyopathy  sister    .  No cardiovascular or pulmonary family history     REVIEW OF SYSTEMS:    All ROS are negative exept per HPI       Allergies    No Known Allergies    Intolerances      PHYSICAL EXAM  Vital Signs Last 24 Hrs  T(C): 36.1 (11 Jan 2024 07:00), Max: 36.6 (11 Jan 2024 00:07)  T(F): 96.9 (11 Jan 2024 07:00), Max: 97.9 (11 Jan 2024 00:07)  HR: 66 (11 Jan 2024 07:00) (63 - 72)  BP: 141/62 (11 Jan 2024 07:00) (134/60 - 147/69)  BP(mean): --  RR: 18 (11 Jan 2024 07:00) (18 - 18)  SpO2: 98% (10 Richi 2024 15:25) (98% - 98%)        CONSTITUTIONAL:  NAD    CARDIAC:   Normal rate,   regular rhythm.    no edema    RESPIRATORY:   basilar crackles    GASTROINTESTINAL:  Abdomen soft, non-tender,   No guarding,     MUSCULOSKELETAL:   Range of motion is not limited,  No clubbing, cyanosis    NEUROLOGICAL:   Alert and oriented           LABS:                          8.7    6.28  )-----------( 463      ( 11 Jan 2024 06:09 )             29.5                                               01-11    139  |  97<L>  |  19  ----------------------------<  89  3.4<L>   |  31  |  1.2    Ca    9.3      11 Jan 2024 06:09  Mg     2.2     01-11    TPro  6.0  /  Alb  3.5  /  TBili  0.4  /  DBili  x   /  AST  12  /  ALT  8   /  AlkPhos  101  01-11      PT/INR - ( 09 Jan 2024 23:22 )   PT: 12.40 sec;   INR: 1.09 ratio         PTT - ( 09 Jan 2024 23:22 )  PTT:26.5 sec                                       Urinalysis Basic - ( 11 Jan 2024 06:09 )    Color: x / Appearance: x / SG: x / pH: x  Gluc: 89 mg/dL / Ketone: x  / Bili: x / Urobili: x   Blood: x / Protein: x / Nitrite: x   Leuk Esterase: x / RBC: x / WBC x   Sq Epi: x / Non Sq Epi: x / Bacteria: x                                                  LIVER FUNCTIONS - ( 11 Jan 2024 06:09 )  Alb: 3.5 g/dL / Pro: 6.0 g/dL / ALK PHOS: 101 U/L / ALT: 8 U/L / AST: 12 U/L / GGT: x                                                                                                MEDICATIONS  (STANDING):  ALPRAZolam 0.25 milliGRAM(s) Oral at bedtime  iron sucrose IVPB 200 milliGRAM(s) IV Intermittent once  lactated ringers. 1000 milliLiter(s) (75 mL/Hr) IV Continuous <Continuous>  metoprolol succinate ER 50 milliGRAM(s) Oral daily  NIFEdipine XL 60 milliGRAM(s) Oral daily  pantoprazole  Injectable 40 milliGRAM(s) IV Push two times a day  potassium chloride    Tablet ER 20 milliEquivalent(s) Oral every 2 hours  sertraline 100 milliGRAM(s) Oral daily    MEDICATIONS  (PRN):      X-Rays reviewed:    CXR interpreted by me:  Patient is a 78y old  Female who presents with a chief complaint of Anemia (11 Jan 2024 06:37)      HPI:  The patient is a 78-year-old female with past medical history of hiatal hernia s/p repair >10 years back, hypertension, GERD, non obstructive CAD, depression, dementia, anemia presenting for evaluation of low hemoglobin, measured outpatient to be 7.4.  Patient has had multiple episodes of nonbloody nonbilious vomiting over the last several months with good appetite and almost 60 pound weight loss over the last few months.  Patient just started following up with hematologist Dr.Yumi Hussein and had recent blood work from 3 days ago that resulted today and showed a low hemoglobin of 7.4. Patient denies fevers, chills, chest pain, shortness of breath, hematuria, melena, hematochezia. The pt presented to the Ed for a blood transfusion that was recommended by the hematologist.    In the ED  Vital Signs Last 24 Hrs  T(C): 36.9 (08 Jan 2024 19:06), Max: 36.9 (08 Jan 2024 19:06)  T(F): 98.4 (08 Jan 2024 19:06), Max: 98.4 (08 Jan 2024 19:06)  HR: 81 (08 Jan 2024 19:06) (70 - 100)  BP: 155/74 (08 Jan 2024 19:06) (130/60 - 196/89)  BP(mean): --  RR: 18 (08 Jan 2024 19:06) (18 - 19)  SpO2: 98% (08 Jan 2024 19:06) (98% - 100%)    Parameters below as of 08 Jan 2024 19:06  Patient On (Oxygen Delivery Method): room air    In the ED the pt received 1 PRBC  Labs were significant for a Cr of 1.6 (unknown baseline)  and a lactate    Cat scan was significant for  Large hiatal hernia containing partially included stomach with apparent   organoaxial rotation    The pt was admitted to medicine for anemia 2/2 possible GIB     (08 Jan 2024 21:25)      PAST MEDICAL & SURGICAL HISTORY:  Hypertension      GERD (gastroesophageal reflux disease)      Hiatal hernia with GERD      Empyema lung      H/O CHF      History of repair of hiatal hernia      H/O pleural empyema  s/p decortication          FAMILY HISTORY:  Family history of cardiac pacemaker  3 siblings    Family history of hypertrophic cardiomyopathy  sister    .  No cardiovascular or pulmonary family history     REVIEW OF SYSTEMS:    All ROS are negative exept per HPI       Allergies    No Known Allergies    Intolerances      PHYSICAL EXAM  Vital Signs Last 24 Hrs  T(C): 36.1 (11 Jan 2024 07:00), Max: 36.6 (11 Jan 2024 00:07)  T(F): 96.9 (11 Jan 2024 07:00), Max: 97.9 (11 Jan 2024 00:07)  HR: 66 (11 Jan 2024 07:00) (63 - 72)  BP: 141/62 (11 Jan 2024 07:00) (134/60 - 147/69)  BP(mean): --  RR: 18 (11 Jan 2024 07:00) (18 - 18)  SpO2: 98% (10 Richi 2024 15:25) (98% - 98%)        CONSTITUTIONAL:  NAD    CARDIAC:   Normal rate,   regular rhythm.    no edema    RESPIRATORY:   basilar crackles    GASTROINTESTINAL:  Abdomen soft, non-tender,   No guarding,     MUSCULOSKELETAL:   Range of motion is not limited,  No clubbing, cyanosis    NEUROLOGICAL:   Alert and oriented           LABS:                          8.7    6.28  )-----------( 463      ( 11 Jan 2024 06:09 )             29.5                                               01-11    139  |  97<L>  |  19  ----------------------------<  89  3.4<L>   |  31  |  1.2    Ca    9.3      11 Jan 2024 06:09  Mg     2.2     01-11    TPro  6.0  /  Alb  3.5  /  TBili  0.4  /  DBili  x   /  AST  12  /  ALT  8   /  AlkPhos  101  01-11      PT/INR - ( 09 Jan 2024 23:22 )   PT: 12.40 sec;   INR: 1.09 ratio         PTT - ( 09 Jan 2024 23:22 )  PTT:26.5 sec                                       Urinalysis Basic - ( 11 Jan 2024 06:09 )    Color: x / Appearance: x / SG: x / pH: x  Gluc: 89 mg/dL / Ketone: x  / Bili: x / Urobili: x   Blood: x / Protein: x / Nitrite: x   Leuk Esterase: x / RBC: x / WBC x   Sq Epi: x / Non Sq Epi: x / Bacteria: x                                                  LIVER FUNCTIONS - ( 11 Jan 2024 06:09 )  Alb: 3.5 g/dL / Pro: 6.0 g/dL / ALK PHOS: 101 U/L / ALT: 8 U/L / AST: 12 U/L / GGT: x                                                                                                MEDICATIONS  (STANDING):  ALPRAZolam 0.25 milliGRAM(s) Oral at bedtime  iron sucrose IVPB 200 milliGRAM(s) IV Intermittent once  lactated ringers. 1000 milliLiter(s) (75 mL/Hr) IV Continuous <Continuous>  metoprolol succinate ER 50 milliGRAM(s) Oral daily  NIFEdipine XL 60 milliGRAM(s) Oral daily  pantoprazole  Injectable 40 milliGRAM(s) IV Push two times a day  potassium chloride    Tablet ER 20 milliEquivalent(s) Oral every 2 hours  sertraline 100 milliGRAM(s) Oral daily    MEDICATIONS  (PRN):      X-Rays reviewed:    CXR interpreted by me:

## 2024-01-11 NOTE — PROGRESS NOTE ADULT - SUBJECTIVE AND OBJECTIVE BOX
GENERAL SURGERY PROGRESS NOTE    Patient: DARIEN BARRERA , 78y ([birth year])Female   MRN: [id number]  Location: [address] ([address]) [address]  Visit: 01-08-24 Inpatient    PAST MEDICAL & SURGICAL HISTORY:  Hypertension  GERD (gastroesophageal reflux disease)  Hiatal hernia with GERD  Empyema lung  H/O CHF  History of repair of hiatal hernia  H/O pleural empyema  s/p decortication    Vitals:   T(F): 97.9 (01-11-24 @ 00:07), Max: 97.9 (01-11-24 @ 00:07)  HR: 67 (01-11-24 @ 00:07)  BP: 134/60 (01-11-24 @ 00:07)  RR: 18 (01-11-24 @ 00:07)  SpO2: 98% (01-10-24 @ 15:25)    Diet, Full Liquid  Diet, Regular    PHYSICAL EXAM:  General: NAD, AAOx3,   HEENT: NCAT, TIFFANY, EOMI,  Cardiac: RRR  Respiratory: CTAB, normal respiratory effort,  Abdomen: Soft, non-distended, non-tender,   Skin: Warm/dry, normal color,     MEDICATIONS  (STANDING):  ALPRAZolam 0.25 milliGRAM(s) Oral at bedtime  lactated ringers. 1000 milliLiter(s) (75 mL/Hr) IV Continuous <Continuous>  metoprolol succinate ER 50 milliGRAM(s) Oral daily  NIFEdipine XL 60 milliGRAM(s) Oral daily  pantoprazole  Injectable 40 milliGRAM(s) IV Push two times a day  sertraline 100 milliGRAM(s) Oral daily    MEDICATIONS  (PRN):    DVT PROPHYLAXIS:   GI PROPHYLAXIS: pantoprazole  Injectable 40 milliGRAM(s) IV Push two times a day    ANTICOAGULATION:   ANTIBIOTICS:      LAB/STUDIES:  Labs:  CAPILLARY BLOOD GLUCOSE                        8.3    7.83  )-----------( 435      ( 10 Richi 2024 06:34 )             28.0       Auto Neutrophil %: 68.0 % (01-10-24 @ 06:34)  Auto Immature Granulocyte %: 0.4 % (01-10-24 @ 06:34)    01-10    137  |  94<L>  |  24<H>  ----------------------------<  89  3.4<L>   |  29  |  1.5    Magnesium: 2.3 mg/dL (01-10-24 @ 06:34)    LFTs:             6.0  | 0.5  | 13       ------------------[96      ( 10 Richi 2024 06:34 )  3.5  | x    | 7           Lipase:x      Amylase:x         Lactate, Blood: 1.2 mmol/L (01-09-24 @ 01:38)    Coags:     12.40  ----< 1.09    ( 09 Jan 2024 23:22 )     26.5     Urinalysis Basic - ( 10 Richi 2024 06:34 )    Color: x / Appearance: x / SG: x / pH: x  Gluc: 89 mg/dL / Ketone: x  / Bili: x / Urobili: x   Blood: x / Protein: x / Nitrite: x   Leuk Esterase: x / RBC: x / WBC x   Sq Epi: x / Non Sq Epi: x / Bacteria: x       GENERAL SURGERY PROGRESS NOTE    Patient: DARIEN BARRERA , 78y ([birth year])Female   MRN: [id number]  Location: [address] (Back) 015 A  Visit: 01-08-24 Inpatient    PAST MEDICAL & SURGICAL HISTORY:  Hypertension  GERD (gastroesophageal reflux disease)  Hiatal hernia with GERD  Empyema lung  H/O CHF  History of repair of hiatal hernia  H/O pleural empyema  s/p decortication    Vitals:   T(F): 97.9 (01-11-24 @ 00:07), Max: 97.9 (01-11-24 @ 00:07)  HR: 67 (01-11-24 @ 00:07)  BP: 134/60 (01-11-24 @ 00:07)  RR: 18 (01-11-24 @ 00:07)  SpO2: 98% (01-10-24 @ 15:25)    Diet, Full Liquid  Diet, Regular    PHYSICAL EXAM:  General: NAD, AAOx3,   HEENT: NCAT, TIFFANY, EOMI,  Cardiac: RRR  Respiratory: CTAB, normal respiratory effort,  Abdomen: Soft, non-distended, non-tender,   Skin: Warm/dry, normal color,     MEDICATIONS  (STANDING):  ALPRAZolam 0.25 milliGRAM(s) Oral at bedtime  lactated ringers. 1000 milliLiter(s) (75 mL/Hr) IV Continuous <Continuous>  metoprolol succinate ER 50 milliGRAM(s) Oral daily  NIFEdipine XL 60 milliGRAM(s) Oral daily  pantoprazole  Injectable 40 milliGRAM(s) IV Push two times a day  sertraline 100 milliGRAM(s) Oral daily    MEDICATIONS  (PRN):    DVT PROPHYLAXIS:   GI PROPHYLAXIS: pantoprazole  Injectable 40 milliGRAM(s) IV Push two times a day    ANTICOAGULATION:   ANTIBIOTICS:      LAB/STUDIES:  Labs:  CAPILLARY BLOOD GLUCOSE                        8.3    7.83  )-----------( 435      ( 10 Richi 2024 06:34 )             28.0       Auto Neutrophil %: 68.0 % (01-10-24 @ 06:34)  Auto Immature Granulocyte %: 0.4 % (01-10-24 @ 06:34)    01-10    137  |  94<L>  |  24<H>  ----------------------------<  89  3.4<L>   |  29  |  1.5    Magnesium: 2.3 mg/dL (01-10-24 @ 06:34)    LFTs:             6.0  | 0.5  | 13       ------------------[96      ( 10 Richi 2024 06:34 )  3.5  | x    | 7           Lipase:x      Amylase:x         Lactate, Blood: 1.2 mmol/L (01-09-24 @ 01:38)    Coags:     12.40  ----< 1.09    ( 09 Jan 2024 23:22 )     26.5     Urinalysis Basic - ( 10 Richi 2024 06:34 )    Color: x / Appearance: x / SG: x / pH: x  Gluc: 89 mg/dL / Ketone: x  / Bili: x / Urobili: x   Blood: x / Protein: x / Nitrite: x   Leuk Esterase: x / RBC: x / WBC x   Sq Epi: x / Non Sq Epi: x / Bacteria: x       GENERAL SURGERY PROGRESS NOTE    Patient: DARIEN BARRERA , 78y ([birth year])Female   MRN: [id number]  Location: [address] (Back) 015 A  Visit: 01-08-24 Inpatient    PAST MEDICAL & SURGICAL HISTORY:  Hypertension  GERD (gastroesophageal reflux disease)  Hiatal hernia with GERD  Empyema lung  H/O CHF  History of repair of hiatal hernia  H/O pleural empyema  s/p decortication    Vitals:   T(F): 97.9 (01-11-24 @ 00:07), Max: 97.9 (01-11-24 @ 00:07)  HR: 67 (01-11-24 @ 00:07)  BP: 134/60 (01-11-24 @ 00:07)  RR: 18 (01-11-24 @ 00:07)  SpO2: 98% (01-10-24 @ 15:25)    Diet, Full Liquid  Diet, Regular    PHYSICAL EXAM:  General: NAD, AAOx3,   HEENT: NCAT, TIFFANY, EOMI,  Cardiac: RRR  Respiratory: CTAB, normal respiratory effort,  Abdomen: Soft, non-distended, non-tender,   Skin: Warm/dry, normal color,     MEDICATIONS  (STANDING):  ALPRAZolam 0.25 milliGRAM(s) Oral at bedtime  lactated ringers. 1000 milliLiter(s) (75 mL/Hr) IV Continuous <Continuous>  metoprolol succinate ER 50 milliGRAM(s) Oral daily  NIFEdipine XL 60 milliGRAM(s) Oral daily  pantoprazole  Injectable 40 milliGRAM(s) IV Push two times a day  sertraline 100 milliGRAM(s) Oral daily    MEDICATIONS  (PRN):    DVT PROPHYLAXIS:   GI PROPHYLAXIS: pantoprazole  Injectable 40 milliGRAM(s) IV Push two times a day    ANTICOAGULATION:   ANTIBIOTICS:      LAB/STUDIES:  Labs:  CAPILLARY BLOOD GLUCOSE                        8.3    7.83  )-----------( 435      ( 10 Richi 2024 06:34 )             28.0       Auto Neutrophil %: 68.0 % (01-10-24 @ 06:34)  Auto Immature Granulocyte %: 0.4 % (01-10-24 @ 06:34)    01-10    137  |  94<L>  |  24<H>  ----------------------------<  89  3.4<L>   |  29  |  1.5    Magnesium: 2.3 mg/dL (01-10-24 @ 06:34)    LFTs:             6.0  | 0.5  | 13       ------------------[96      ( 10 Richi 2024 06:34 )  3.5  | x    | 7           Lipase:x      Amylase:x         Lactate, Blood: 1.2 mmol/L (01-09-24 @ 01:38)    Coags:     12.40  ----< 1.09    ( 09 Jan 2024 23:22 )     26.5     Urinalysis Basic - ( 10 Richi 2024 06:34 )    Color: x / Appearance: x / SG: x / pH: x  Gluc: 89 mg/dL / Ketone: x  / Bili: x / Urobili: x   Blood: x / Protein: x / Nitrite: x   Leuk Esterase: x / RBC: x / WBC x   Sq Epi: x / Non Sq Epi: x / Bacteria: x       GENERAL SURGERY PROGRESS NOTE    Patient: DARIEN BARRERA , 78y ([birth year])Female   MRN: [id number]  Location: [address] (Back) 015 A  Visit: 01-08-24 Inpatient    PAST MEDICAL & SURGICAL HISTORY:  Hypertension  GERD (gastroesophageal reflux disease)  Hiatal hernia with GERD  Empyema lung  H/O CHF  History of repair of hiatal hernia  H/O pleural empyema  s/p decortication    Vitals:   T(F): 97.9 (01-11-24 @ 00:07), Max: 97.9 (01-11-24 @ 00:07)  HR: 67 (01-11-24 @ 00:07)  BP: 134/60 (01-11-24 @ 00:07)  RR: 18 (01-11-24 @ 00:07)  SpO2: 98% (01-10-24 @ 15:25)    Diet, Full Liquid  Diet, Regular    PHYSICAL EXAM:  General: NAD, AAOx3,   HEENT: NCAT, TIFFANY, EOMI,  Cardiac: RRR  Respiratory: CTAB, normal respiratory effort,  Abdomen: Soft, non-distended, non-tender,   Skin: Warm/dry, normal color,     MEDICATIONS  (STANDING):  ALPRAZolam 0.25 milliGRAM(s) Oral at bedtime  lactated ringers. 1000 milliLiter(s) (75 mL/Hr) IV Continuous <Continuous>  metoprolol succinate ER 50 milliGRAM(s) Oral daily  NIFEdipine XL 60 milliGRAM(s) Oral daily  pantoprazole  Injectable 40 milliGRAM(s) IV Push two times a day  sertraline 100 milliGRAM(s) Oral daily    MEDICATIONS  (PRN):    DVT PROPHYLAXIS:   GI PROPHYLAXIS: pantoprazole  Injectable 40 milliGRAM(s) IV Push two times a day    ANTICOAGULATION:   ANTIBIOTICS:      LAB/STUDIES:  Labs:  CAPILLARY BLOOD GLUCOSE                        8.3    7.83  )-----------( 435      ( 10 Richi 2024 06:34 )             28.0       Auto Neutrophil %: 68.0 % (01-10-24 @ 06:34)  Auto Immature Granulocyte %: 0.4 % (01-10-24 @ 06:34)    01-10    137  |  94<L>  |  24<H>  ----------------------------<  89  3.4<L>   |  29  |  1.5    Magnesium: 2.3 mg/dL (01-10-24 @ 06:34)    LFTs:             6.0  | 0.5  | 13       ------------------[96      ( 10 Richi 2024 06:34 )  3.5  | x    | 7           Lipase:x      Amylase:x         Lactate, Blood: 1.2 mmol/L (01-09-24 @ 01:38)    Coags:     12.40  ----< 1.09    ( 09 Jan 2024 23:22 )     26.5     Urinalysis Basic - ( 10 Richi 2024 06:34 )    Color: x / Appearance: x / SG: x / pH: x  Gluc: 89 mg/dL / Ketone: x  / Bili: x / Urobili: x   Blood: x / Protein: x / Nitrite: x   Leuk Esterase: x / RBC: x / WBC x   Sq Epi: x / Non Sq Epi: x / Bacteria: x

## 2024-01-11 NOTE — PROGRESS NOTE ADULT - SUBJECTIVE AND OBJECTIVE BOX
24H events:    Patient is a 78y old Female who presents with a chief complaint of Anemia (11 Jan 2024 04:58)    Primary diagnosis of Anemia      Today is hospital day 3d. This morning patient was seen and examined at bedside, resting comfortably in bed.    No acute or major events overnight.    Code Status: full code    PAST MEDICAL & SURGICAL HISTORY  Hypertension    GERD (gastroesophageal reflux disease)    Hiatal hernia with GERD    Empyema lung    H/O CHF    History of repair of hiatal hernia    H/O pleural empyema  s/p decortication      SOCIAL HISTORY:  Social History: na    ALLERGIES:  No Known Allergies    MEDICATIONS:  STANDING MEDICATIONS  ALPRAZolam 0.25 milliGRAM(s) Oral at bedtime  lactated ringers. 1000 milliLiter(s) IV Continuous <Continuous>  metoprolol succinate ER 50 milliGRAM(s) Oral daily  NIFEdipine XL 60 milliGRAM(s) Oral daily  pantoprazole  Injectable 40 milliGRAM(s) IV Push two times a day  sertraline 100 milliGRAM(s) Oral daily    PRN MEDICATIONS    ROS:   no headaches, no dizziness, no fevers, no chills, no CP/SOB, no abdominal pain, no n/v/d, no hematochezia, no burning with urination, no hematuria, no weakness or tingling    VITALS:   ICU Vital Signs Last 24 Hrs  T(C): 36.6 (11 Jan 2024 00:07), Max: 36.6 (11 Jan 2024 00:07)  T(F): 97.9 (11 Jan 2024 00:07), Max: 97.9 (11 Jan 2024 00:07)  HR: 72 (11 Jan 2024 05:09) (63 - 72)  BP: 142/70 (11 Jan 2024 05:09) (134/60 - 147/69)  BP(mean): --  ABP: --  ABP(mean): --  RR: 18 (11 Jan 2024 00:07) (18 - 18)  SpO2: 98% (10 Ricih 2024 15:25) (98% - 98%)    PHYSICAL EXAM:  GENERAL: lying in bed comfortably  HEAD: normal  HEART: RRR  LUNGS: CTA b/l  ABDOMEN: soft nontender nondistended  EXTREMITIES: no edema  NERVOUS SYSTEM:  A&OX3    Lines: none    LABS:                        8.3    7.83  )-----------( 435      ( 10 Richi 2024 06:34 )             28.0     01-10    137  |  94<L>  |  24<H>  ----------------------------<  89  3.4<L>   |  29  |  1.5    Ca    9.2      10 Richi 2024 06:34  Mg     2.3     01-10    TPro  6.0  /  Alb  3.5  /  TBili  0.5  /  DBili  x   /  AST  13  /  ALT  7   /  AlkPhos  96  01-10    PT/INR - ( 09 Jan 2024 23:22 )   PT: 12.40 sec;   INR: 1.09 ratio         PTT - ( 09 Jan 2024 23:22 )  PTT:26.5 sec  Urinalysis Basic - ( 10 Richi 2024 06:34 )    Color: x / Appearance: x / SG: x / pH: x  Gluc: 89 mg/dL / Ketone: x  / Bili: x / Urobili: x   Blood: x / Protein: x / Nitrite: x   Leuk Esterase: x / RBC: x / WBC x   Sq Epi: x / Non Sq Epi: x / Bacteria: x    RADIOLOGY:    < from: Xray Chest 1 View-PORTABLE IMMEDIATE (Xray Chest 1 View-PORTABLE IMMEDIATE .) (01.10.24 @ 18:06) >  Impression:    Stable left basilar subsegmental atelectasis, elevated left hemidiaphragm.  Unchanged large hiatal hernia.    < end of copied text >         24H events:    Patient is a 78y old Female who presents with a chief complaint of Anemia (11 Jan 2024 04:58)    Primary diagnosis of Anemia      Today is hospital day 3d. This morning patient was seen and examined at bedside, resting comfortably in bed.    No acute or major events overnight.    Code Status: full code    PAST MEDICAL & SURGICAL HISTORY  Hypertension    GERD (gastroesophageal reflux disease)    Hiatal hernia with GERD    Empyema lung    H/O CHF    History of repair of hiatal hernia    H/O pleural empyema  s/p decortication      SOCIAL HISTORY:  Social History: na    ALLERGIES:  No Known Allergies    MEDICATIONS:  STANDING MEDICATIONS  ALPRAZolam 0.25 milliGRAM(s) Oral at bedtime  lactated ringers. 1000 milliLiter(s) IV Continuous <Continuous>  metoprolol succinate ER 50 milliGRAM(s) Oral daily  NIFEdipine XL 60 milliGRAM(s) Oral daily  pantoprazole  Injectable 40 milliGRAM(s) IV Push two times a day  sertraline 100 milliGRAM(s) Oral daily    PRN MEDICATIONS    ROS:   no headaches, no dizziness, no fevers, no chills, no CP/SOB, no abdominal pain, no n/v/d, no hematochezia, no burning with urination, no hematuria, no weakness or tingling    VITALS:   ICU Vital Signs Last 24 Hrs  T(C): 36.6 (11 Jan 2024 00:07), Max: 36.6 (11 Jan 2024 00:07)  T(F): 97.9 (11 Jan 2024 00:07), Max: 97.9 (11 Jan 2024 00:07)  HR: 72 (11 Jan 2024 05:09) (63 - 72)  BP: 142/70 (11 Jan 2024 05:09) (134/60 - 147/69)  BP(mean): --  ABP: --  ABP(mean): --  RR: 18 (11 Jan 2024 00:07) (18 - 18)  SpO2: 98% (10 Richi 2024 15:25) (98% - 98%)    PHYSICAL EXAM:  GENERAL: lying in bed comfortably  HEAD: normal  HEART: RRR  LUNGS: CTA b/l  ABDOMEN: soft nontender nondistended  EXTREMITIES: no edema  NERVOUS SYSTEM:  A&OX3    Lines: none    LABS:                        8.3    7.83  )-----------( 435      ( 10 Richi 2024 06:34 )             28.0     01-10    137  |  94<L>  |  24<H>  ----------------------------<  89  3.4<L>   |  29  |  1.5    Ca    9.2      10 Richi 2024 06:34  Mg     2.3     01-10    TPro  6.0  /  Alb  3.5  /  TBili  0.5  /  DBili  x   /  AST  13  /  ALT  7   /  AlkPhos  96  01-10    PT/INR - ( 09 Jan 2024 23:22 )   PT: 12.40 sec;   INR: 1.09 ratio         PTT - ( 09 Jan 2024 23:22 )  PTT:26.5 sec  Urinalysis Basic - ( 10 Richi 2024 06:34 )    Color: x / Appearance: x / SG: x / pH: x  Gluc: 89 mg/dL / Ketone: x  / Bili: x / Urobili: x   Blood: x / Protein: x / Nitrite: x   Leuk Esterase: x / RBC: x / WBC x   Sq Epi: x / Non Sq Epi: x / Bacteria: x    RADIOLOGY:    < from: Xray Chest 1 View-PORTABLE IMMEDIATE (Xray Chest 1 View-PORTABLE IMMEDIATE .) (01.10.24 @ 18:06) >  Impression:    Stable left basilar subsegmental atelectasis, elevated left hemidiaphragm.  Unchanged large hiatal hernia.    < end of copied text >

## 2024-01-11 NOTE — CONSULT NOTE ADULT - ASSESSMENT
Impression    Pre op clearance  Hiatal hernia s/p repair >10 years back  Acute on chronic anemia pending EGD/Colonoscopy    Plan    CXR reviewed, findings stable compared to prior xrays  Pt is Moderate risk for low risk procedure  Patient is on RA and is in no respiratory distress  HOB 45 degrees  Perioperative pulmonary precautions  Aspiration precautions post procedure  Conscious sedation if able  If patient gets intubated for procedure, ETCO2 and airway management per anesthesia  NIV post extubation  Incentive spirometry  Pulmonary toilet if warranted  Duonebs PRN  No pulmonary contraindications for the procedure  Recall PRN   Impression    Pre op clearance  Hiatal hernia s/p repair >10 years back  Acute on chronic anemia pending EGD/Colonoscopy    Plan    CXR reviewed, findings stable compared to prior xrays  On room air; has no respiratory complaints   No obvious indications for prophylactic intubation  Conscious sedation with Etco2 monitoring  Lungs clear on exam   Aspiration precautions  Low risk patient; low risk procedure   Recall as needed

## 2024-01-11 NOTE — PROGRESS NOTE ADULT - SUBJECTIVE AND OBJECTIVE BOX
Gastroenterology Follow Up Note      Location: [address] (Back) 015 A ([facility]  Patient Name: DARIEN BARRERA  Age: 78y  Gender: Female      Chief Complaint  Patient is a 78y old Female who presents with a chief complaint of Anemia (11 Jan 2024 09:51)  Primary diagnosis of Weakness      Reason for Consult  Anemia  Vomiting  Hiatal Hernia        Progress Note  This morning patient was seen and examined at bedside.    Today is hospital day 3d.  Patient is doing fine. No acute events overnight.   Patient's appetite is reduced, and she is tolerating diet. She notes some nausea but denies vomiting.   Patient denies any abdominal pain.  Last bowel movement was soft and was on 01/10 after drinking golytely.  Last vomiting episode was on Sunday per patient.      Vital Signs in the last 24 hours   Vitals Summary T(C): 36.1 (01-11-24 @ 07:00), Max: 36.6 (01-11-24 @ 00:07)  HR: 66 (01-11-24 @ 07:00) (63 - 72)  BP: 141/62 (01-11-24 @ 07:00) (134/60 - 147/69)  RR: 18 (01-11-24 @ 07:00) (18 - 18)  SpO2: 98% (01-10-24 @ 15:25) (98% - 98%)  Vent Data   Intake/ Output   Measurements Height (cm): 152.4 (01-10-24 @ 15:25)  Weight (kg): 60.8 (01-10-24 @ 15:25)  BMI (kg/m2): 26.2 (01-10-24 @ 15:25)  BSA (m2): 1.57 (01-10-24 @ 15:25)      Physical Exam  * General Appearance: Alert, cooperative, interactive, oriented to time, place, and person, in no acute distress  * Lungs: Good bilateral air entry  * Heart: Regular Rate and Rhythm, normal S1 and S2, no audible murmur, rub, or gallop  * Abdomen: Symmetric, non-distended, soft, non-tender, bowel sounds active all four quadrants, no masses, no organomegaly (no hepatosplenomegaly)  * Rectal: refused ERICA 01/09      Investigations   Laboratory Workup      - CBC:                        8.7    6.28  )-----------( 463      ( 11 Jan 2024 06:09 )             29.5       - Hgb Trend:  8.7  01-11-24 @ 06:09  8.3  01-10-24 @ 06:34  8.9  01-09-24 @ 23:22  8.5  01-09-24 @ 07:14  7.5  01-08-24 @ 13:35        - Chemistry:  01-11    139  |  97<L>  |  19  ----------------------------<  89  3.4<L>   |  31  |  1.2    Ca    9.3      11 Jan 2024 06:09  Mg     2.2     01-11    TPro  6.0  /  Alb  3.5  /  TBili  0.4  /  DBili  x   /  AST  12  /  ALT  8   /  AlkPhos  101  01-11    Liver panel trend:  TBili 0.4   /   AST 12   /   ALT 8   /   AlkP 101   /   Tptn 6.0   /   Alb 3.5    /   DBili --      01-11  TBili 0.5   /   AST 13   /   ALT 7   /   AlkP 96   /   Tptn 6.0   /   Alb 3.5    /   DBili --      01-10  TBili 0.6   /   AST 14   /   ALT 9   /   AlkP 100   /   Tptn 6.7   /   Alb 3.8    /   DBili --      01-09  TBili 0.5   /   AST 12   /   ALT 9   /   AlkP 103   /   Tptn 6.7   /   Alb 3.9    /   DBili --      01-09  TBili 0.5   /   AST 14   /   ALT 8   /   AlkP 97   /   Tptn 6.7   /   Alb 3.8    /   DBili --      01-08      - Coagulation Studies:  PT/INR - ( 09 Jan 2024 23:22 )   PT: 12.40 sec;   INR: 1.09 ratio    PTT - ( 09 Jan 2024 23:22 )  PTT:26.5 sec          Microbiological Workup  Urinalysis Basic - ( 11 Jan 2024 06:09 )    Color: x / Appearance: x / SG: x / pH: x  Gluc: 89 mg/dL / Ketone: x  / Bili: x / Urobili: x   Blood: x / Protein: x / Nitrite: x   Leuk Esterase: x / RBC: x / WBC x   Sq Epi: x / Non Sq Epi: x / Bacteria: x        Current Medications  Standing Medications  ALPRAZolam 0.25 milliGRAM(s) Oral at bedtime  iron sucrose IVPB 200 milliGRAM(s) IV Intermittent once  lactated ringers. 1000 milliLiter(s) (75 mL/Hr) IV Continuous <Continuous>  metoprolol succinate ER 50 milliGRAM(s) Oral daily  NIFEdipine XL 60 milliGRAM(s) Oral daily  pantoprazole  Injectable 40 milliGRAM(s) IV Push two times a day  potassium chloride    Tablet ER 20 milliEquivalent(s) Oral every 2 hours  sertraline 100 milliGRAM(s) Oral daily    PRN Medications    Singles Doses Administered  (ADM OVERRIDE) 1 each &lt;see task&gt; GiveOnce  (ADM OVERRIDE) 1 each &lt;see task&gt; GiveOnce  (ADM OVERRIDE) 1 each &lt;see task&gt; GiveOnce  (ADM OVERRIDE) 1 each &lt;see task&gt; GiveOnce  bisacodyl 20 milliGRAM(s) Oral once  iohexol 300 mG (iodine)/mL Oral Solution 30 milliLiter(s) Oral once  magnesium citrate Oral Solution 296 milliLiter(s) Oral once  magnesium sulfate  IVPB 2 Gram(s) IV Intermittent once  NIFEdipine XL 30 milliGRAM(s) Oral once  ondansetron Injectable 4 milliGRAM(s) IV Push once  polyethylene glycol/electrolyte Solution. 4000 milliLiter(s) Oral once  potassium chloride    Tablet ER 20 milliEquivalent(s) Oral every 2 hours  potassium chloride  20 mEq/100 mL IVPB 20 milliEquivalent(s) IV Intermittent once       Gastroenterology Follow Up Note      Location: [address] (Back) 015 A [facility]  Patient Name: DARIEN BARRERA  Age: 78y  Gender: Female      Chief Complaint  Patient is a 78y old Female who presents with a chief complaint of Anemia (11 Jan 2024 09:51)  Primary diagnosis of Weakness      Reason for Consult  Anemia  Vomiting  Hiatal Hernia        Progress Note  This morning patient was seen and examined at bedside.    Today is hospital day 3d.  Patient is doing fine. No acute events overnight.   Patient's appetite is reduced, and she is tolerating diet. She notes some nausea but denies vomiting.   Patient denies any abdominal pain.  Last bowel movement was soft and was on 01/10 after drinking golytely.  Last vomiting episode was on Sunday per patient.      Vital Signs in the last 24 hours   Vitals Summary T(C): 36.1 (01-11-24 @ 07:00), Max: 36.6 (01-11-24 @ 00:07)  HR: 66 (01-11-24 @ 07:00) (63 - 72)  BP: 141/62 (01-11-24 @ 07:00) (134/60 - 147/69)  RR: 18 (01-11-24 @ 07:00) (18 - 18)  SpO2: 98% (01-10-24 @ 15:25) (98% - 98%)  Vent Data   Intake/ Output   Measurements Height (cm): 152.4 (01-10-24 @ 15:25)  Weight (kg): 60.8 (01-10-24 @ 15:25)  BMI (kg/m2): 26.2 (01-10-24 @ 15:25)  BSA (m2): 1.57 (01-10-24 @ 15:25)      Physical Exam  * General Appearance: Alert, cooperative, interactive, oriented to time, place, and person, in no acute distress  * Lungs: Good bilateral air entry  * Heart: Regular Rate and Rhythm, normal S1 and S2, no audible murmur, rub, or gallop  * Abdomen: Symmetric, non-distended, soft, non-tender, bowel sounds active all four quadrants, no masses, no organomegaly (no hepatosplenomegaly)  * Rectal: refused ERICA 01/09      Investigations   Laboratory Workup      - CBC:                        8.7    6.28  )-----------( 463      ( 11 Jan 2024 06:09 )             29.5       - Hgb Trend:  8.7  01-11-24 @ 06:09  8.3  01-10-24 @ 06:34  8.9  01-09-24 @ 23:22  8.5  01-09-24 @ 07:14  7.5  01-08-24 @ 13:35        - Chemistry:  01-11    139  |  97<L>  |  19  ----------------------------<  89  3.4<L>   |  31  |  1.2    Ca    9.3      11 Jan 2024 06:09  Mg     2.2     01-11    TPro  6.0  /  Alb  3.5  /  TBili  0.4  /  DBili  x   /  AST  12  /  ALT  8   /  AlkPhos  101  01-11    Liver panel trend:  TBili 0.4   /   AST 12   /   ALT 8   /   AlkP 101   /   Tptn 6.0   /   Alb 3.5    /   DBili --      01-11  TBili 0.5   /   AST 13   /   ALT 7   /   AlkP 96   /   Tptn 6.0   /   Alb 3.5    /   DBili --      01-10  TBili 0.6   /   AST 14   /   ALT 9   /   AlkP 100   /   Tptn 6.7   /   Alb 3.8    /   DBili --      01-09  TBili 0.5   /   AST 12   /   ALT 9   /   AlkP 103   /   Tptn 6.7   /   Alb 3.9    /   DBili --      01-09  TBili 0.5   /   AST 14   /   ALT 8   /   AlkP 97   /   Tptn 6.7   /   Alb 3.8    /   DBili --      01-08      - Coagulation Studies:  PT/INR - ( 09 Jan 2024 23:22 )   PT: 12.40 sec;   INR: 1.09 ratio    PTT - ( 09 Jan 2024 23:22 )  PTT:26.5 sec          Microbiological Workup  Urinalysis Basic - ( 11 Jan 2024 06:09 )    Color: x / Appearance: x / SG: x / pH: x  Gluc: 89 mg/dL / Ketone: x  / Bili: x / Urobili: x   Blood: x / Protein: x / Nitrite: x   Leuk Esterase: x / RBC: x / WBC x   Sq Epi: x / Non Sq Epi: x / Bacteria: x        Current Medications  Standing Medications  ALPRAZolam 0.25 milliGRAM(s) Oral at bedtime  iron sucrose IVPB 200 milliGRAM(s) IV Intermittent once  lactated ringers. 1000 milliLiter(s) (75 mL/Hr) IV Continuous <Continuous>  metoprolol succinate ER 50 milliGRAM(s) Oral daily  NIFEdipine XL 60 milliGRAM(s) Oral daily  pantoprazole  Injectable 40 milliGRAM(s) IV Push two times a day  potassium chloride    Tablet ER 20 milliEquivalent(s) Oral every 2 hours  sertraline 100 milliGRAM(s) Oral daily    PRN Medications    Singles Doses Administered  (ADM OVERRIDE) 1 each &lt;see task&gt; GiveOnce  (ADM OVERRIDE) 1 each &lt;see task&gt; GiveOnce  (ADM OVERRIDE) 1 each &lt;see task&gt; GiveOnce  (ADM OVERRIDE) 1 each &lt;see task&gt; GiveOnce  bisacodyl 20 milliGRAM(s) Oral once  iohexol 300 mG (iodine)/mL Oral Solution 30 milliLiter(s) Oral once  magnesium citrate Oral Solution 296 milliLiter(s) Oral once  magnesium sulfate  IVPB 2 Gram(s) IV Intermittent once  NIFEdipine XL 30 milliGRAM(s) Oral once  ondansetron Injectable 4 milliGRAM(s) IV Push once  polyethylene glycol/electrolyte Solution. 4000 milliLiter(s) Oral once  potassium chloride    Tablet ER 20 milliEquivalent(s) Oral every 2 hours  potassium chloride  20 mEq/100 mL IVPB 20 milliEquivalent(s) IV Intermittent once

## 2024-01-11 NOTE — PROGRESS NOTE ADULT - ASSESSMENT
79 y/o female with PMHx HTN, GERD, non-obstructive CAD, Dementia, anemia, MDD P/W Hb 7.4 OP, episodic vomiting for few months, 60lb weight loss. Found to have Hb 7.5 on admission.     #Microcytic anemia (r/o GIB)  - Vitals on admission --> /67, , RR 19, T98, O2 98% RA  - Labs on admission --> Hb 7.5, MCV 66, Cr 1.6  - CTAP --> large hiatal hernia, b/l adrenal nodular thickening, b/l multifocal renal cortical thinning   - S/p 1u prbc in ED  - Admitted to medicine for further management  - GI Consult --> EGD/Colonscopy was aborted 1/10 due to anesthesia concern, pulm consulted ant recommended conscious sedation for procedure   - Mg Citrate x 1 dose given 1/10 AM to help clear BM  - Hope for reschedule colonoscopy egd 1/12  - C/w IV PPI BID  - C/w active T&S, monitor CBC  - some iron deficiency - 1 dose Venofer 1/11 and start pt on iron PO     #Vomiting likely 2/2 Type III hiatal hernia  - CT abdomen shows Large hiatal hernia containing partially included stomach with apparent organoaxial rotation.  - Currently no abdominal pain/nausea/vomiting. Tolerating water.   - Surgery Consult --> PO contrast now, KUB in AM, pt wishes to follow surgeon in [facility] for repair  - KUB --> no obstruction  - Surgery made aware that patient has dementia and does not actively follow surgeon in [facility], they will re-eval for surgery here    #DAWIT on CKD stage 3 vs. Progressive CKD   - Serum Cr 1.6 currently. Baseline Cr 1.2 in March 2022.   - Renal US --> no hydro, echogenic kidneys can reflect medical renal disease  - Cr peaked at 1.7 then trended down to 1.5  - C/w IVF gentle hydration  - C/w trending BMP    #HTN   - Lisinopril held  - C/w Nifedipine 60mg QD  - C/w metoprolol succinate 50mg QD    #HFpEF  - On lasix PO 40 BID at home  - TTE 10/1/20 --> EF 60-65%, mild concentric LVH, G2DD  - Holding lasix for now due to DAWIT and mild hypokalemia    #Asthma  - Follows dr. Hartley who had recommended symbicort 160-4.5 2 puff BID and combivent prn but pt does not take it  - Monitor     #Depression  - C/w home meds    #MISC  DVT ppx: SCD  GI ppx: PPI  Diet: NPO  Activity: as tolerated  Pending: egd, colonscopy,, surgery plan  77 y/o female with PMHx HTN, GERD, non-obstructive CAD, Dementia, anemia, MDD P/W Hb 7.4 OP, episodic vomiting for few months, 60lb weight loss. Found to have Hb 7.5 on admission.     #Microcytic anemia (r/o GIB)  - Vitals on admission --> /67, , RR 19, T98, O2 98% RA  - Labs on admission --> Hb 7.5, MCV 66, Cr 1.6  - CTAP --> large hiatal hernia, b/l adrenal nodular thickening, b/l multifocal renal cortical thinning   - S/p 1u prbc in ED  - Admitted to medicine for further management  - GI Consult --> EGD/Colonscopy was aborted 1/10 due to anesthesia concern, pulm consulted ant recommended conscious sedation for procedure   - Mg Citrate x 1 dose given 1/10 AM to help clear BM  - Hope for reschedule colonoscopy egd 1/12  - C/w IV PPI BID  - C/w active T&S, monitor CBC  - some iron deficiency - 1 dose Venofer 1/11 and start pt on iron PO     #Vomiting likely 2/2 Type III hiatal hernia  - CT abdomen shows Large hiatal hernia containing partially included stomach with apparent organoaxial rotation.  - Currently no abdominal pain/nausea/vomiting. Tolerating water.   - Surgery Consult --> PO contrast now, KUB in AM, pt wishes to follow surgeon in [facility] for repair  - KUB --> no obstruction  - Surgery made aware that patient has dementia and does not actively follow surgeon in [facility], they will re-eval for surgery here    #DAWIT on CKD stage 3 vs. Progressive CKD   - Serum Cr 1.6 currently. Baseline Cr 1.2 in March 2022.   - Renal US --> no hydro, echogenic kidneys can reflect medical renal disease  - Cr peaked at 1.7 then trended down to 1.5  - C/w IVF gentle hydration  - C/w trending BMP    #HTN   - Lisinopril held  - C/w Nifedipine 60mg QD  - C/w metoprolol succinate 50mg QD    #HFpEF  - On lasix PO 40 BID at home  - TTE 10/1/20 --> EF 60-65%, mild concentric LVH, G2DD  - Holding lasix for now due to DAWIT and mild hypokalemia    #Asthma  - Follows dr. Hartley who had recommended symbicort 160-4.5 2 puff BID and combivent prn but pt does not take it  - Monitor     #Depression  - C/w home meds    #MISC  DVT ppx: SCD  GI ppx: PPI  Diet: NPO  Activity: as tolerated  Pending: egd, colonscopy,, surgery plan

## 2024-01-12 ENCOUNTER — TRANSCRIPTION ENCOUNTER (OUTPATIENT)
Age: 79
End: 2024-01-12

## 2024-01-12 ENCOUNTER — RESULT REVIEW (OUTPATIENT)
Age: 79
End: 2024-01-12

## 2024-01-12 LAB
ALBUMIN SERPL ELPH-MCNC: 3.7 G/DL — SIGNIFICANT CHANGE UP (ref 3.5–5.2)
ALBUMIN SERPL ELPH-MCNC: 3.7 G/DL — SIGNIFICANT CHANGE UP (ref 3.5–5.2)
ALP SERPL-CCNC: 108 U/L — SIGNIFICANT CHANGE UP (ref 30–115)
ALP SERPL-CCNC: 108 U/L — SIGNIFICANT CHANGE UP (ref 30–115)
ALT FLD-CCNC: 8 U/L — SIGNIFICANT CHANGE UP (ref 0–41)
ALT FLD-CCNC: 8 U/L — SIGNIFICANT CHANGE UP (ref 0–41)
ANION GAP SERPL CALC-SCNC: 10 MMOL/L — SIGNIFICANT CHANGE UP (ref 7–14)
ANION GAP SERPL CALC-SCNC: 10 MMOL/L — SIGNIFICANT CHANGE UP (ref 7–14)
AST SERPL-CCNC: 13 U/L — SIGNIFICANT CHANGE UP (ref 0–41)
AST SERPL-CCNC: 13 U/L — SIGNIFICANT CHANGE UP (ref 0–41)
BASOPHILS # BLD AUTO: 0.05 K/UL — SIGNIFICANT CHANGE UP (ref 0–0.2)
BASOPHILS # BLD AUTO: 0.05 K/UL — SIGNIFICANT CHANGE UP (ref 0–0.2)
BASOPHILS NFR BLD AUTO: 0.7 % — SIGNIFICANT CHANGE UP (ref 0–1)
BASOPHILS NFR BLD AUTO: 0.7 % — SIGNIFICANT CHANGE UP (ref 0–1)
BILIRUB SERPL-MCNC: 0.3 MG/DL — SIGNIFICANT CHANGE UP (ref 0.2–1.2)
BILIRUB SERPL-MCNC: 0.3 MG/DL — SIGNIFICANT CHANGE UP (ref 0.2–1.2)
BUN SERPL-MCNC: 17 MG/DL — SIGNIFICANT CHANGE UP (ref 10–20)
BUN SERPL-MCNC: 17 MG/DL — SIGNIFICANT CHANGE UP (ref 10–20)
CALCIUM SERPL-MCNC: 9.2 MG/DL — SIGNIFICANT CHANGE UP (ref 8.4–10.5)
CALCIUM SERPL-MCNC: 9.2 MG/DL — SIGNIFICANT CHANGE UP (ref 8.4–10.5)
CHLORIDE SERPL-SCNC: 95 MMOL/L — LOW (ref 98–110)
CHLORIDE SERPL-SCNC: 95 MMOL/L — LOW (ref 98–110)
CO2 SERPL-SCNC: 30 MMOL/L — SIGNIFICANT CHANGE UP (ref 17–32)
CO2 SERPL-SCNC: 30 MMOL/L — SIGNIFICANT CHANGE UP (ref 17–32)
CREAT SERPL-MCNC: 1.4 MG/DL — SIGNIFICANT CHANGE UP (ref 0.7–1.5)
CREAT SERPL-MCNC: 1.4 MG/DL — SIGNIFICANT CHANGE UP (ref 0.7–1.5)
EGFR: 38 ML/MIN/1.73M2 — LOW
EGFR: 38 ML/MIN/1.73M2 — LOW
EOSINOPHIL # BLD AUTO: 0.15 K/UL — SIGNIFICANT CHANGE UP (ref 0–0.7)
EOSINOPHIL # BLD AUTO: 0.15 K/UL — SIGNIFICANT CHANGE UP (ref 0–0.7)
EOSINOPHIL NFR BLD AUTO: 2.1 % — SIGNIFICANT CHANGE UP (ref 0–8)
EOSINOPHIL NFR BLD AUTO: 2.1 % — SIGNIFICANT CHANGE UP (ref 0–8)
GLUCOSE SERPL-MCNC: 88 MG/DL — SIGNIFICANT CHANGE UP (ref 70–99)
GLUCOSE SERPL-MCNC: 88 MG/DL — SIGNIFICANT CHANGE UP (ref 70–99)
HCT VFR BLD CALC: 32.4 % — LOW (ref 37–47)
HCT VFR BLD CALC: 32.4 % — LOW (ref 37–47)
HGB BLD-MCNC: 9.5 G/DL — LOW (ref 12–16)
HGB BLD-MCNC: 9.5 G/DL — LOW (ref 12–16)
IMM GRANULOCYTES NFR BLD AUTO: 0.4 % — HIGH (ref 0.1–0.3)
IMM GRANULOCYTES NFR BLD AUTO: 0.4 % — HIGH (ref 0.1–0.3)
LYMPHOCYTES # BLD AUTO: 1.89 K/UL — SIGNIFICANT CHANGE UP (ref 1.2–3.4)
LYMPHOCYTES # BLD AUTO: 1.89 K/UL — SIGNIFICANT CHANGE UP (ref 1.2–3.4)
LYMPHOCYTES # BLD AUTO: 26.6 % — SIGNIFICANT CHANGE UP (ref 20.5–51.1)
LYMPHOCYTES # BLD AUTO: 26.6 % — SIGNIFICANT CHANGE UP (ref 20.5–51.1)
MAGNESIUM SERPL-MCNC: 2 MG/DL — SIGNIFICANT CHANGE UP (ref 1.8–2.4)
MAGNESIUM SERPL-MCNC: 2 MG/DL — SIGNIFICANT CHANGE UP (ref 1.8–2.4)
MCHC RBC-ENTMCNC: 20.5 PG — LOW (ref 27–31)
MCHC RBC-ENTMCNC: 20.5 PG — LOW (ref 27–31)
MCHC RBC-ENTMCNC: 29.3 G/DL — LOW (ref 32–37)
MCHC RBC-ENTMCNC: 29.3 G/DL — LOW (ref 32–37)
MCV RBC AUTO: 70 FL — LOW (ref 81–99)
MCV RBC AUTO: 70 FL — LOW (ref 81–99)
MONOCYTES # BLD AUTO: 0.64 K/UL — HIGH (ref 0.1–0.6)
MONOCYTES # BLD AUTO: 0.64 K/UL — HIGH (ref 0.1–0.6)
MONOCYTES NFR BLD AUTO: 9 % — SIGNIFICANT CHANGE UP (ref 1.7–9.3)
MONOCYTES NFR BLD AUTO: 9 % — SIGNIFICANT CHANGE UP (ref 1.7–9.3)
NEUTROPHILS # BLD AUTO: 4.34 K/UL — SIGNIFICANT CHANGE UP (ref 1.4–6.5)
NEUTROPHILS # BLD AUTO: 4.34 K/UL — SIGNIFICANT CHANGE UP (ref 1.4–6.5)
NEUTROPHILS NFR BLD AUTO: 61.2 % — SIGNIFICANT CHANGE UP (ref 42.2–75.2)
NEUTROPHILS NFR BLD AUTO: 61.2 % — SIGNIFICANT CHANGE UP (ref 42.2–75.2)
NRBC # BLD: 0 /100 WBCS — SIGNIFICANT CHANGE UP (ref 0–0)
NRBC # BLD: 0 /100 WBCS — SIGNIFICANT CHANGE UP (ref 0–0)
PLATELET # BLD AUTO: 450 K/UL — HIGH (ref 130–400)
PLATELET # BLD AUTO: 450 K/UL — HIGH (ref 130–400)
PMV BLD: 9.4 FL — SIGNIFICANT CHANGE UP (ref 7.4–10.4)
PMV BLD: 9.4 FL — SIGNIFICANT CHANGE UP (ref 7.4–10.4)
POTASSIUM SERPL-MCNC: 4 MMOL/L — SIGNIFICANT CHANGE UP (ref 3.5–5)
POTASSIUM SERPL-MCNC: 4 MMOL/L — SIGNIFICANT CHANGE UP (ref 3.5–5)
POTASSIUM SERPL-SCNC: 4 MMOL/L — SIGNIFICANT CHANGE UP (ref 3.5–5)
POTASSIUM SERPL-SCNC: 4 MMOL/L — SIGNIFICANT CHANGE UP (ref 3.5–5)
PROT SERPL-MCNC: 6.3 G/DL — SIGNIFICANT CHANGE UP (ref 6–8)
PROT SERPL-MCNC: 6.3 G/DL — SIGNIFICANT CHANGE UP (ref 6–8)
RBC # BLD: 4.63 M/UL — SIGNIFICANT CHANGE UP (ref 4.2–5.4)
RBC # BLD: 4.63 M/UL — SIGNIFICANT CHANGE UP (ref 4.2–5.4)
RBC # FLD: 18.8 % — HIGH (ref 11.5–14.5)
RBC # FLD: 18.8 % — HIGH (ref 11.5–14.5)
SODIUM SERPL-SCNC: 135 MMOL/L — SIGNIFICANT CHANGE UP (ref 135–146)
SODIUM SERPL-SCNC: 135 MMOL/L — SIGNIFICANT CHANGE UP (ref 135–146)
WBC # BLD: 7.1 K/UL — SIGNIFICANT CHANGE UP (ref 4.8–10.8)
WBC # BLD: 7.1 K/UL — SIGNIFICANT CHANGE UP (ref 4.8–10.8)
WBC # FLD AUTO: 7.1 K/UL — SIGNIFICANT CHANGE UP (ref 4.8–10.8)
WBC # FLD AUTO: 7.1 K/UL — SIGNIFICANT CHANGE UP (ref 4.8–10.8)

## 2024-01-12 PROCEDURE — 99223 1ST HOSP IP/OBS HIGH 75: CPT

## 2024-01-12 PROCEDURE — 88342 IMHCHEM/IMCYTCHM 1ST ANTB: CPT | Mod: 26

## 2024-01-12 PROCEDURE — 88341 IMHCHEM/IMCYTCHM EA ADD ANTB: CPT | Mod: 26

## 2024-01-12 PROCEDURE — 99233 SBSQ HOSP IP/OBS HIGH 50: CPT

## 2024-01-12 PROCEDURE — 45381 COLONOSCOPY SUBMUCOUS NJX: CPT | Mod: XU

## 2024-01-12 PROCEDURE — 99232 SBSQ HOSP IP/OBS MODERATE 35: CPT

## 2024-01-12 PROCEDURE — 43239 EGD BIOPSY SINGLE/MULTIPLE: CPT | Mod: XS

## 2024-01-12 PROCEDURE — 71260 CT THORAX DX C+: CPT | Mod: 26

## 2024-01-12 PROCEDURE — 45380 COLONOSCOPY AND BIOPSY: CPT | Mod: XU

## 2024-01-12 PROCEDURE — 88305 TISSUE EXAM BY PATHOLOGIST: CPT | Mod: 26

## 2024-01-12 PROCEDURE — 43246 EGD PLACE GASTROSTOMY TUBE: CPT

## 2024-01-12 PROCEDURE — 88312 SPECIAL STAINS GROUP 1: CPT | Mod: 26

## 2024-01-12 RX ORDER — MORPHINE SULFATE 50 MG/1
1 CAPSULE, EXTENDED RELEASE ORAL ONCE
Refills: 0 | Status: DISCONTINUED | OUTPATIENT
Start: 2024-01-12 | End: 2024-01-12

## 2024-01-12 RX ORDER — ONDANSETRON 8 MG/1
4 TABLET, FILM COATED ORAL ONCE
Refills: 0 | Status: COMPLETED | OUTPATIENT
Start: 2024-01-12 | End: 2024-01-12

## 2024-01-12 RX ORDER — PANTOPRAZOLE SODIUM 20 MG/1
40 TABLET, DELAYED RELEASE ORAL
Refills: 0 | Status: DISCONTINUED | OUTPATIENT
Start: 2024-01-12 | End: 2024-01-26

## 2024-01-12 RX ORDER — SODIUM CHLORIDE 9 MG/ML
1000 INJECTION, SOLUTION INTRAVENOUS
Refills: 0 | Status: DISCONTINUED | OUTPATIENT
Start: 2024-01-12 | End: 2024-01-17

## 2024-01-12 RX ADMIN — MORPHINE SULFATE 1 MILLIGRAM(S): 50 CAPSULE, EXTENDED RELEASE ORAL at 18:55

## 2024-01-12 RX ADMIN — MORPHINE SULFATE 1 MILLIGRAM(S): 50 CAPSULE, EXTENDED RELEASE ORAL at 14:50

## 2024-01-12 RX ADMIN — PANTOPRAZOLE SODIUM 40 MILLIGRAM(S): 20 TABLET, DELAYED RELEASE ORAL at 05:05

## 2024-01-12 RX ADMIN — Medication 20 MILLIGRAM(S): at 21:28

## 2024-01-12 RX ADMIN — Medication 0.25 MILLIGRAM(S): at 21:27

## 2024-01-12 RX ADMIN — Medication 50 MILLIGRAM(S): at 05:05

## 2024-01-12 RX ADMIN — MORPHINE SULFATE 1 MILLIGRAM(S): 50 CAPSULE, EXTENDED RELEASE ORAL at 20:04

## 2024-01-12 RX ADMIN — SODIUM CHLORIDE 75 MILLILITER(S): 9 INJECTION, SOLUTION INTRAVENOUS at 06:40

## 2024-01-12 RX ADMIN — Medication 325 MILLIGRAM(S): at 15:41

## 2024-01-12 RX ADMIN — Medication 60 MILLIGRAM(S): at 05:05

## 2024-01-12 RX ADMIN — MORPHINE SULFATE 1 MILLIGRAM(S): 50 CAPSULE, EXTENDED RELEASE ORAL at 15:05

## 2024-01-12 RX ADMIN — SERTRALINE 100 MILLIGRAM(S): 25 TABLET, FILM COATED ORAL at 15:40

## 2024-01-12 RX ADMIN — ONDANSETRON 4 MILLIGRAM(S): 8 TABLET, FILM COATED ORAL at 14:35

## 2024-01-12 NOTE — CHART NOTE - NSCHARTNOTEFT_GEN_A_CORE
Impressions:  - A 3cm friable fungating ulcerated mass extending from 17cm to 20 cm from the anal verge was noted at the level of the sigmoid colon. The mass was obstructing around 75-90% of the lumen. The PCF scope could not traverse the mass, so it was switched with an upper endoscope. Using an upper endoscope, the mass was easily traversed. Multiple cold forceps biopsies were obtained for histology. .  - Using the upper endoscope, the cecum was reached and the terminal ileum was intubated. Normal mucosa was otherwise noted in the remaining parts of the examined colon. .  - Internal hemorrhoids.    Plan:	  - Await pathology  - Check tumor markers: CEA and Ca 19-9  - Recommend oncology and colorectal surgery (Dr Nunn) evaluation in setting of suspected malignancy  - Recommend pan-CT imaging to complete staging process in setting of suspected malignancy  - Continue clear liquid diet and advance on 01/13

## 2024-01-12 NOTE — CONSULT NOTE ADULT - ATTENDING COMMENTS
78F with PMH of hiatal hernia s/p repair >10 years ago, hypertension, GERD, non obstructive CAD, depression, dementia, anemia presenting for anemia found to have 3 cm sigmoid colon mass on colonoscopy  CT on admission without metastatic disease  Plan  - clinical staging - CT chest, CEA level  - f/u pathology  - advance diet as tolerated  - medical risks stratification  - family offered surgery next week if clinical staging does not show metastatic disease. They would like to consider their options.  Patient is stable for DC and can follow up in my office for further discussion.

## 2024-01-12 NOTE — PROGRESS NOTE ADULT - ASSESSMENT
79 y/o female with PMHx HTN, GERD, non-obstructive CAD, Dementia, anemia, MDD P/W Hb 7.4 OP, episodic vomiting for few months, 60lb weight loss. Found to have Hb 7.5 on admission.     #Microcytic anemia (r/o GIB)  - Vitals on admission --> /67, , RR 19, T98, O2 98% RA  - Labs on admission --> Hb 7.5, MCV 66, Cr 1.6  - CTAP --> large hiatal hernia, b/l adrenal nodular thickening, b/l multifocal renal cortical thinning   - S/p 1u prbc in ED  - Admitted to medicine for further management  - GI Consult --> EGD/Colonscopy done 1/12 - sigmoid mass found - biopsy taken, CEA/Ca 19-9 ordered; pt seen by colorectal surgery- plan dc and OP follow up   - some iron deficiency - 1 dose Venofer 1/11 and start pt on iron PO     #Vomiting likely 2/2 Type III hiatal hernia  - CT abdomen shows Large hiatal hernia containing partially included stomach with apparent organoaxial rotation.  - Currently no abdominal pain/nausea/vomiting. Tolerating water.   - Surgery Consult --> PO contrast now, KUB in AM, pt wishes to follow surgeon in [facility] for repair  - KUB --> no obstruction  - Surgery made aware     #DAWIT on CKD stage 3 vs. Progressive CKD   - Serum Cr 1.6 currently. Baseline Cr 1.2 in March 2022.   - Renal US --> no hydro, echogenic kidneys can reflect medical renal disease  - Cr peaked at 1.7 then trended down to 1.5  - C/w IVF gentle hydration  - C/w trending BMP    #HTN   - Lisinopril held  - C/w Nifedipine 60mg QD  - C/w metoprolol succinate 50mg QD    #HFpEF  - On lasix PO 40 BID at home  - TTE 10/1/20 --> EF 60-65%, mild concentric LVH, G2DD  - Holding lasix for now due to DAWIT and mild hypokalemia    #Asthma  - Follows dr. Hartley who had recommended symbicort 160-4.5 2 puff BID and combivent prn but pt does not take it  - Monitor     #Depression  - C/w home meds    #MISC  DVT ppx: SCD  GI ppx: PPI  Diet: NPO  Activity: as tolerated  Pending: dc planing and OP follow up with colorectal  77 y/o female with PMHx HTN, GERD, non-obstructive CAD, Dementia, anemia, MDD P/W Hb 7.4 OP, episodic vomiting for few months, 60lb weight loss. Found to have Hb 7.5 on admission.     #Microcytic anemia (r/o GIB)  - Vitals on admission --> /67, , RR 19, T98, O2 98% RA  - Labs on admission --> Hb 7.5, MCV 66, Cr 1.6  - CTAP --> large hiatal hernia, b/l adrenal nodular thickening, b/l multifocal renal cortical thinning   - S/p 1u prbc in ED  - Admitted to medicine for further management  - GI Consult --> EGD/Colonscopy done 1/12 - sigmoid mass found - biopsy taken, CEA/Ca 19-9 ordered; pt seen by colorectal surgery- plan dc and OP follow up   - some iron deficiency - 1 dose Venofer 1/11 and start pt on iron PO     #Vomiting likely 2/2 Type III hiatal hernia  - CT abdomen shows Large hiatal hernia containing partially included stomach with apparent organoaxial rotation.  - Currently no abdominal pain/nausea/vomiting. Tolerating water.   - Surgery Consult --> PO contrast now, KUB in AM, pt wishes to follow surgeon in [facility] for repair  - KUB --> no obstruction  - Surgery made aware     #DAWIT on CKD stage 3 vs. Progressive CKD   - Serum Cr 1.6 currently. Baseline Cr 1.2 in March 2022.   - Renal US --> no hydro, echogenic kidneys can reflect medical renal disease  - Cr peaked at 1.7 then trended down to 1.5  - C/w IVF gentle hydration  - C/w trending BMP    #HTN   - Lisinopril held  - C/w Nifedipine 60mg QD  - C/w metoprolol succinate 50mg QD    #HFpEF  - On lasix PO 40 BID at home  - TTE 10/1/20 --> EF 60-65%, mild concentric LVH, G2DD  - Holding lasix for now due to DAWIT and mild hypokalemia    #Asthma  - Follows dr. Hartley who had recommended symbicort 160-4.5 2 puff BID and combivent prn but pt does not take it  - Monitor     #Depression  - C/w home meds    #MISC  DVT ppx: SCD  GI ppx: PPI  Diet: NPO  Activity: as tolerated  Pending: dc planing and OP follow up with colorectal

## 2024-01-12 NOTE — CONSULT NOTE ADULT - SUBJECTIVE AND OBJECTIVE BOX
GENERAL SURGERY CONSULT NOTE    Patient: DARIEN BARRERA , 78y ([birth year])Female   MRN: [id number]  Location: [address] (Back) 015 A  Visit: 01-08-24 Inpatient  Date: 01-12-24 @ 15:46    HPI:  The patient is a 78-year-old female with past medical history of hiatal hernia s/p repair >10 years back, hypertension, GERD, non obstructive CAD, depression, dementia, anemia presenting for evaluation of low hemoglobin, measured outpatient to be 7.4.  Patient has had multiple episodes of nonbloody nonbilious vomiting over the last several months with good appetite and almost 60 pound weight loss over the last few months.  Patient just started following up with hematologist Dr.Yumi Hussein and had recent blood work from 3 days ago that resulted today and showed a low hemoglobin of 7.4. Patient denies fevers, chills, chest pain, shortness of breath, hematuria, melena, hematochezia. The pt presented to the Ed for a blood transfusion that was recommended by the hematologist.    In the ED  Vital Signs Last 24 Hrs  T(C): 36.9 (08 Jan 2024 19:06), Max: 36.9 (08 Jan 2024 19:06)  T(F): 98.4 (08 Jan 2024 19:06), Max: 98.4 (08 Jan 2024 19:06)  HR: 81 (08 Jan 2024 19:06) (70 - 100)  BP: 155/74 (08 Jan 2024 19:06) (130/60 - 196/89)  BP(mean): --  RR: 18 (08 Jan 2024 19:06) (18 - 19)  SpO2: 98% (08 Jan 2024 19:06) (98% - 100%)    Parameters below as of 08 Jan 2024 19:06  Patient On (Oxygen Delivery Method): room air    In the ED the pt received 1 PRBC  Labs were significant for a Cr of 1.6 (unknown baseline)  and a lactate    Cat scan was significant for  Large hiatal hernia containing partially included stomach with apparent   organoaxial rotation    The pt was admitted to medicine for anemia 2/2 possible GIB     (08 Jan 2024 21:25)    Patient underwent EGD/Colonoscopy 1/12 revealing large hiatal hernia s/p T-fasteners and PEG placement and a 3 cm friable ulcerating mass 20 cm from anal verge 75-90% obstructing s/p biopsy. Colorectal surgery consulted for colon mass.      PAST MEDICAL & SURGICAL HISTORY:  Hypertension      GERD (gastroesophageal reflux disease)      Hiatal hernia with GERD      Empyema lung      H/O CHF      History of repair of hiatal hernia      H/O pleural empyema  s/p decortication          Home Medications:  ALPRAZolam 0.25 mg oral tablet: 1 tab(s) orally 2 times a day (08 Jan 2024 23:10)  furosemide 40 mg oral tablet: 1 tab(s) orally 2 times a day (08 Jan 2024 23:10)  Metoprolol Succinate ER 50 mg oral tablet, extended release: 1 tab(s) orally 2 times a day (08 Jan 2024 23:10)  Nifedical XL 60 mg oral tablet, extended release: 1 tab(s) orally once a day (08 Jan 2024 23:10)  omeprazole 40 mg oral delayed release capsule: 1 cap(s) orally once a day (08 Jan 2024 23:11)  sertraline 100 mg oral tablet: 1 tab(s) orally once a day (08 Jan 2024 23:10)  Symbicort 160 mcg-4.5 mcg/inh inhalation aerosol: 2 puff(s) inhaled 2 times a day (08 Jan 2024 23:13)        VITALS:  T(F): 97.3 (01-12-24 @ 14:20), Max: 97.9 (01-11-24 @ 16:46)  HR: 60 (01-12-24 @ 15:05) (57 - 66)  BP: 166/77 (01-12-24 @ 15:05) (131/60 - 178/82)  RR: 18 (01-12-24 @ 15:05) (14 - 18)  SpO2: 95% (01-12-24 @ 15:05) (95% - 98%)    PHYSICAL EXAM:  General: NAD, AAOx2  HEENT: NCAT, TIFFANY, EOMI, Trachea ML, Neck supple  Cardiac: RRR S1, S2, no Murmurs, rubs or gallops  Respiratory: CTAB, no increased work of breathing  Abdomen: Soft, non-distended, non-tender  Rectal: Good tone, +stool, no blood, no mary kay-anal masses/lesions, no fistulas, fissures, hemorrhoids      MEDICATIONS  (STANDING):  ALPRAZolam 0.25 milliGRAM(s) Oral at bedtime  bisacodyl 20 milliGRAM(s) Oral at bedtime  ferrous    sulfate 325 milliGRAM(s) Oral daily  lactated ringers. 1000 milliLiter(s) (75 mL/Hr) IV Continuous <Continuous>  metoprolol succinate ER 50 milliGRAM(s) Oral daily  NIFEdipine XL 60 milliGRAM(s) Oral daily  pantoprazole    Tablet 40 milliGRAM(s) Oral before breakfast  sertraline 100 milliGRAM(s) Oral daily    MEDICATIONS  (PRN):      LAB/STUDIES:                        9.5    7.10  )-----------( 450      ( 12 Jan 2024 06:39 )             32.4     01-12    135  |  95<L>  |  17  ----------------------------<  88  4.0   |  30  |  1.4    Ca    9.2      12 Jan 2024 06:39  Mg     2.0     01-12    TPro  6.3  /  Alb  3.7  /  TBili  0.3  /  DBili  x   /  AST  13  /  ALT  8   /  AlkPhos  108  01-12    PT/INR - ( 11 Jan 2024 22:01 )   PT: 11.20 sec;   INR: 0.98 ratio         PTT - ( 11 Jan 2024 22:01 )  PTT:29.2 sec  LIVER FUNCTIONS - ( 12 Jan 2024 06:39 )  Alb: 3.7 g/dL / Pro: 6.3 g/dL / ALK PHOS: 108 U/L / ALT: 8 U/L / AST: 13 U/L / GGT: x           Urinalysis Basic - ( 12 Jan 2024 06:39 )    Color: x / Appearance: x / SG: x / pH: x  Gluc: 88 mg/dL / Ketone: x  / Bili: x / Urobili: x   Blood: x / Protein: x / Nitrite: x   Leuk Esterase: x / RBC: x / WBC x   Sq Epi: x / Non Sq Epi: x / Bacteria: x                  IMAGING:  < from: CT Abdomen and Pelvis w/ IV Cont (01.08.24 @ 15:26) >    IMPRESSION:      Large hiatal hernia containing partially included stomach with apparent   organoaxial rotation    Bilateral adrenal nodular thickening. This is similar in appearance to   the prior CT of the chest.    Bilateral multifocal renal cortical thinning likely reflecting sequela of   prior insults    No discrete evidence of abdominal pelvic mass or lymphadenopathy.    --- End of Report ---    < end of copied text >      ACCESS DEVICES:  [X] Peripheral IV  [x] PEG       GENERAL SURGERY CONSULT NOTE    Patient: DARIEN BARRERA , 78y ([birth year])Female   MRN: [id number]  Location: [address] (Back) 015 A  Visit: 01-08-24 Inpatient  Date: 01-12-24 @ 15:46    HPI:  The patient is a 78-year-old female with past medical history of hiatal hernia s/p repair >10 years back, hypertension, GERD, non obstructive CAD, depression, dementia, anemia presenting for evaluation of low hemoglobin, measured outpatient to be 7.4.  Patient has had multiple episodes of nonbloody nonbilious vomiting over the last several months with good appetite and almost 60 pound weight loss over the last few months.  Patient just started following up with hematologist Dr.Yumi Hussein and had recent blood work from 3 days ago that resulted today and showed a low hemoglobin of 7.4. Patient denies fevers, chills, chest pain, shortness of breath, hematuria, melena, hematochezia. The pt presented to the Ed for a blood transfusion that was recommended by the hematologist.    In the ED  Vital Signs Last 24 Hrs  T(C): 36.9 (08 Jan 2024 19:06), Max: 36.9 (08 Jan 2024 19:06)  T(F): 98.4 (08 Jan 2024 19:06), Max: 98.4 (08 Jan 2024 19:06)  HR: 81 (08 Jan 2024 19:06) (70 - 100)  BP: 155/74 (08 Jan 2024 19:06) (130/60 - 196/89)  BP(mean): --  RR: 18 (08 Jan 2024 19:06) (18 - 19)  SpO2: 98% (08 Jan 2024 19:06) (98% - 100%)    Parameters below as of 08 Jan 2024 19:06  Patient On (Oxygen Delivery Method): room air    In the ED the pt received 1 PRBC  Labs were significant for a Cr of 1.6 (unknown baseline)  and a lactate    Cat scan was significant for  Large hiatal hernia containing partially included stomach with apparent   organoaxial rotation    The pt was admitted to medicine for anemia 2/2 possible GIB     (08 Jan 2024 21:25)    Patient underwent EGD/Colonoscopy 1/12 revealing large hiatal hernia s/p T-fasteners and PEG placement and a 3 cm friable ulcerating mass 20 cm from anal verge 75-90% obstructing s/p biopsy. Colorectal surgery consulted for colon mass.      PAST MEDICAL & SURGICAL HISTORY:  Hypertension      GERD (gastroesophageal reflux disease)      Hiatal hernia with GERD      Empyema lung      H/O CHF      History of repair of hiatal hernia      H/O pleural empyema  s/p decortication          Home Medications:  ALPRAZolam 0.25 mg oral tablet: 1 tab(s) orally 2 times a day (08 Jan 2024 23:10)  furosemide 40 mg oral tablet: 1 tab(s) orally 2 times a day (08 Jan 2024 23:10)  Metoprolol Succinate ER 50 mg oral tablet, extended release: 1 tab(s) orally 2 times a day (08 Jan 2024 23:10)  Nifedical XL 60 mg oral tablet, extended release: 1 tab(s) orally once a day (08 Jan 2024 23:10)  omeprazole 40 mg oral delayed release capsule: 1 cap(s) orally once a day (08 Jan 2024 23:11)  sertraline 100 mg oral tablet: 1 tab(s) orally once a day (08 Jan 2024 23:10)  Symbicort 160 mcg-4.5 mcg/inh inhalation aerosol: 2 puff(s) inhaled 2 times a day (08 Jan 2024 23:13)        VITALS:  T(F): 97.3 (01-12-24 @ 14:20), Max: 97.9 (01-11-24 @ 16:46)  HR: 60 (01-12-24 @ 15:05) (57 - 66)  BP: 166/77 (01-12-24 @ 15:05) (131/60 - 178/82)  RR: 18 (01-12-24 @ 15:05) (14 - 18)  SpO2: 95% (01-12-24 @ 15:05) (95% - 98%)    PHYSICAL EXAM:  General: NAD, AAOx2  HEENT: NCAT, TIFFANY, EOMI, Trachea ML, Neck supple  Cardiac: RRR S1, S2, no Murmurs, rubs or gallops  Respiratory: CTAB, no increased work of breathing  Abdomen: Soft, non-distended, non-tender  Rectal: Good tone, +stool, no blood, no mary kay-anal masses/lesions, no fistulas, fissures, hemorrhoids      MEDICATIONS  (STANDING):  ALPRAZolam 0.25 milliGRAM(s) Oral at bedtime  bisacodyl 20 milliGRAM(s) Oral at bedtime  ferrous    sulfate 325 milliGRAM(s) Oral daily  lactated ringers. 1000 milliLiter(s) (75 mL/Hr) IV Continuous <Continuous>  metoprolol succinate ER 50 milliGRAM(s) Oral daily  NIFEdipine XL 60 milliGRAM(s) Oral daily  pantoprazole    Tablet 40 milliGRAM(s) Oral before breakfast  sertraline 100 milliGRAM(s) Oral daily    MEDICATIONS  (PRN):      LAB/STUDIES:                        9.5    7.10  )-----------( 450      ( 12 Jan 2024 06:39 )             32.4     01-12    135  |  95<L>  |  17  ----------------------------<  88  4.0   |  30  |  1.4    Ca    9.2      12 Jan 2024 06:39  Mg     2.0     01-12    TPro  6.3  /  Alb  3.7  /  TBili  0.3  /  DBili  x   /  AST  13  /  ALT  8   /  AlkPhos  108  01-12    PT/INR - ( 11 Jan 2024 22:01 )   PT: 11.20 sec;   INR: 0.98 ratio         PTT - ( 11 Jan 2024 22:01 )  PTT:29.2 sec  LIVER FUNCTIONS - ( 12 Jan 2024 06:39 )  Alb: 3.7 g/dL / Pro: 6.3 g/dL / ALK PHOS: 108 U/L / ALT: 8 U/L / AST: 13 U/L / GGT: x           Urinalysis Basic - ( 12 Jan 2024 06:39 )    Color: x / Appearance: x / SG: x / pH: x  Gluc: 88 mg/dL / Ketone: x  / Bili: x / Urobili: x   Blood: x / Protein: x / Nitrite: x   Leuk Esterase: x / RBC: x / WBC x   Sq Epi: x / Non Sq Epi: x / Bacteria: x                  IMAGING:  < from: CT Abdomen and Pelvis w/ IV Cont (01.08.24 @ 15:26) >    IMPRESSION:      Large hiatal hernia containing partially included stomach with apparent   organoaxial rotation    Bilateral adrenal nodular thickening. This is similar in appearance to   the prior CT of the chest.    Bilateral multifocal renal cortical thinning likely reflecting sequela of   prior insults    No discrete evidence of abdominal pelvic mass or lymphadenopathy.    --- End of Report ---    < end of copied text >      ACCESS DEVICES:  [X] Peripheral IV  [x] PEG

## 2024-01-12 NOTE — PROGRESS NOTE ADULT - SUBJECTIVE AND OBJECTIVE BOX
Interval events:    Patient is a 78y old Female who presents with a chief complaint of Anemia (12 Jan 2024 00:46)    Primary diagnosis of Anemia    Today is hospital day 4d  This morning patient was seen and examined at bedside  The following symptoms/complaints/issues (or lack there of) are of note since last evaluation (if any):    Code Status:  see end of document    Family communication:  Contact date:  Name of person contacted:  Relationship to patient:  Communication details:  What matters most:    PAST MEDICAL & SURGICAL HISTORY  Hypertension    GERD (gastroesophageal reflux disease)    Hiatal hernia with GERD    Empyema lung    H/O CHF    History of repair of hiatal hernia    H/O pleural empyema  s/p decortication      SOCIAL HISTORY:  Social History:      ALLERGIES:  No Known Allergies    MEDICATIONS:  STANDING MEDICATIONS  ALPRAZolam 0.25 milliGRAM(s) Oral at bedtime  bisacodyl 20 milliGRAM(s) Oral at bedtime  ferrous    sulfate 325 milliGRAM(s) Oral daily  lactated ringers. 1000 milliLiter(s) IV Continuous <Continuous>  metoprolol succinate ER 50 milliGRAM(s) Oral daily  NIFEdipine XL 60 milliGRAM(s) Oral daily  pantoprazole  Injectable 40 milliGRAM(s) IV Push two times a day  sertraline 100 milliGRAM(s) Oral daily    PRN MEDICATIONS    VITALS:   T(F): 97.1  HR: 58  BP: 167/76  RR: 18  SpO2: 98%    PHYSICAL EXAM:  GENERAL:   ( x ) NAD, lying in bed comfortably     (  ) obtunded     (  ) lethargic     (  ) somnolent    HEAD:   ( x ) Atraumatic     (  ) hematoma     (  ) laceration (specify location:       )     NECK:  ( x ) Supple     (  ) neck stiffness     (  ) nuchal rigidity     (  )  no JVD     (  ) JVD present ( -- cm)    CHEST:  (  ) Chest wall tenderness (specify location:       )    HEART:  Rate -->     ( x ) normal rate     (  ) bradycardic     (  ) tachycardic  Rhythm -->     ( x ) regular     (  ) regularly irregular     (  ) irregularly irregular  Murmurs -->     ( x ) normal s1s2     (  ) systolic murmur     (  ) diastolic murmur     (  ) continuous murmur      (  ) S3 present     (  ) S4 present    LUNG:   ( x ) Unlabored respirations     (  ) tachypnea  ( x ) B/L air entry     (  ) decreased breath sounds in:  (location     )    ( x ) no adventitious sound     (  ) crackles     (  ) wheezing      (  ) rhonchi      (specify location:       )    ABDOMEN:   ( x ) Soft     (  ) tense   |   ( x ) nondistended     (  ) distended   |   ( x ) +BS     (  ) hypoactive bowel sounds     (  ) hyperactive bowel sounds  ( x ) nontender     (  ) RUQ tenderness     (  ) RLQ tenderness     (  ) LLQ tenderness     (  ) epigastric tenderness     (  ) diffuse tenderness  (  ) Splenomegaly      (  ) Hepatomegaly      (  ) Jaundice     (  ) ecchymosis     EXTREMITIES:   (  ) Cyanosis    (  ) varicose veins    (  ) clubbing    (  ) gangrene:     (location:     )  (  ) pitting edema     (  ) non-pitting edema         SKIN:   (  ) rash:     (  ) pustules     (  ) vesicles     (  ) ulcer     (  ) ecchymosis     (specify location:     )    NERVOUS SYSTEM:    ( x ) A&Ox     (  ) confused     (  ) lethargic  CN II-XII:     (  ) grossly intact     (  ) deficits found     (Specify:     )   Upper extremities:     (  ) no sensorimotor deficits     (  ) weakness     (  ) loss of proprioception/vibration     (  ) loss of touch/temperature (specify:    )  Lower extremities:     (  ) no sensorimotor deficits     (  ) weakness     (  ) loss of proprioception/vibration     (  ) loss of touch/temperature (specify:    )    LABS:                        9.5    7.10  )-----------( 450      ( 12 Jan 2024 06:39 )             32.4     01-12    135  |  95<L>  |  17  ----------------------------<  88  4.0   |  30  |  1.4    Ca    9.2      12 Jan 2024 06:39  Mg     2.0     01-12    TPro  6.3  /  Alb  3.7  /  TBili  0.3  /  DBili  x   /  AST  13  /  ALT  8   /  AlkPhos  108  01-12    PT/INR - ( 11 Jan 2024 22:01 )   PT: 11.20 sec;   INR: 0.98 ratio         PTT - ( 11 Jan 2024 22:01 )  PTT:29.2 sec  Urinalysis Basic - ( 12 Jan 2024 06:39 )    Color: x / Appearance: x / SG: x / pH: x  Gluc: 88 mg/dL / Ketone: x  / Bili: x / Urobili: x   Blood: x / Protein: x / Nitrite: x   Leuk Esterase: x / RBC: x / WBC x   Sq Epi: x / Non Sq Epi: x / Bacteria: x                  ECHO, ENDO, EEG, RAD:

## 2024-01-12 NOTE — PROGRESS NOTE ADULT - SUBJECTIVE AND OBJECTIVE BOX
Patient is a 78y old  Female who presents with a chief complaint of Anemia (12 Jan 2024 15:45)      Patient seen and examined at bedside.  Patient denies any chest pain or shortness of breath   ALLERGIES:  No Known Allergies    MEDICATIONS:  ALPRAZolam 0.25 milliGRAM(s) Oral at bedtime  bisacodyl 20 milliGRAM(s) Oral at bedtime  ferrous    sulfate 325 milliGRAM(s) Oral daily  lactated ringers. 1000 milliLiter(s) IV Continuous <Continuous>  metoprolol succinate ER 50 milliGRAM(s) Oral daily  NIFEdipine XL 60 milliGRAM(s) Oral daily  pantoprazole    Tablet 40 milliGRAM(s) Oral before breakfast  sertraline 100 milliGRAM(s) Oral daily    Vital Signs Last 24 Hrs  T(F): 96.3 (12 Jan 2024 16:00), Max: 97.3 (12 Jan 2024 14:20)  HR: 63 (12 Jan 2024 16:00) (57 - 66)  BP: 172/77 (12 Jan 2024 16:00) (131/60 - 178/82)  RR: 18 (12 Jan 2024 16:00) (14 - 18)  SpO2: 95% (12 Jan 2024 16:00) (95% - 98%)  I&O's Summary    11 Jan 2024 07:01  -  12 Jan 2024 07:00  --------------------------------------------------------  IN: 0 mL / OUT: 400 mL / NET: -400 mL    12 Jan 2024 07:01  -  12 Jan 2024 17:33  --------------------------------------------------------  IN: 0 mL / OUT: 200 mL / NET: -200 mL        PHYSICAL EXAM:  General: NAD, A/O x 3  ENT: MMM  Neck: Supple, No JVD  Lungs: Clear to auscultation bilaterally  Cardio: RRR, S1/S2, 2/6 systolic murmur   Abdomen: Soft, Nontender, Nondistended; Bowel sounds present  Extremities: No cyanosis, No edema    LABS:                        9.5    7.10  )-----------( 450      ( 12 Jan 2024 06:39 )             32.4     01-12    135  |  95  |  17  ----------------------------<  88  4.0   |  30  |  1.4    Ca    9.2      12 Jan 2024 06:39  Mg     2.0     01-12    TPro  6.3  /  Alb  3.7  /  TBili  0.3  /  DBili  x   /  AST  13  /  ALT  8   /  AlkPhos  108  01-12      PT/INR - ( 11 Jan 2024 22:01 )   PT: 11.20 sec;   INR: 0.98 ratio         PTT - ( 11 Jan 2024 22:01 )  PTT:29.2 sec        01-09 Chol 175 mg/dL LDL -- HDL 51 mg/dL Trig 167 mg/dL                  Urinalysis Basic - ( 12 Jan 2024 06:39 )    Color: x / Appearance: x / SG: x / pH: x  Gluc: 88 mg/dL / Ketone: x  / Bili: x / Urobili: x   Blood: x / Protein: x / Nitrite: x   Leuk Esterase: x / RBC: x / WBC x   Sq Epi: x / Non Sq Epi: x / Bacteria: x            RADIOLOGY & ADDITIONAL TESTS:    Care Discussed with Consultants/Other Providers:

## 2024-01-12 NOTE — CONSULT NOTE ADULT - ASSESSMENT
The patient is a 78-year-old female with past medical history of hiatal hernia s/p repair >10 years back, hypertension, GERD, non obstructive CAD, depression, dementia, anemia presenting for evaluation of low hemoglobin, measured outpatient to be 7.4. S/p colonoscopy with 3 cm sigmoid colon mass. Having gas and bowel movements.    Plan    [facility]  f/u pathology  CT chest non-con for staging  Advance diet as tolerated  To be discussed with attending    [id number] The patient is a 78-year-old female with past medical history of hiatal hernia s/p repair >10 years back, hypertension, GERD, non obstructive CAD, depression, dementia, anemia presenting for evaluation of low hemoglobin, measured outpatient to be 7.4. S/p colonoscopy with 3 cm sigmoid colon mass. Having gas and bowel movements.    Plan    [facility]  f/u pathology  CT chest non-con for staging  Advance diet as tolerated  Please finish staging workup promptly for possible OR next week  Will discuss with patient and patient's daughter  Please obtain preoperative cardiac risk stratification    1916 The patient is a 78-year-old female with past medical history of hiatal hernia s/p repair >10 years back, hypertension, GERD, non obstructive CAD, depression, dementia, anemia presenting for evaluation of low hemoglobin, measured outpatient to be 7.4. S/p colonoscopy with 3 cm sigmoid colon mass. Having gas and bowel movements.    Plan    [facility]  f/u pathology  CT chest non-con for staging  Advance diet as tolerated  Please finish staging workup promptly for possible OR next week  Will discuss with patient and patient's daughter  Please obtain preoperative cardiac risk stratification    9833 The patient is a 78-year-old female with past medical history of hiatal hernia s/p repair >10 years back, hypertension, GERD, non obstructive CAD, depression, dementia, anemia presenting for evaluation of low hemoglobin, measured outpatient to be 7.4. S/p colonoscopy with 3 cm sigmoid colon mass. Having gas and bowel movements.    Plan    [facility]  f/u pathology  CT chest non-con for staging  Advance diet as tolerated  Will discuss with patient and patient's daughter  Please obtain preoperative cardiac risk stratification    Spoke with patient's HCP Sonali, and she is undecided about whether or not to pursue surgery. Risks and benefits of surgical management including post-operative complications, prolonged ICU stay, likely discharge to rehab facility, likely obstruction and continued anemia if non-op management pursued explained to Sonali in detail. Will follow up continued goals of care conversations.    If patient is medically ready for discharge per primary team, OK to have patient follow up with Dr. Nunn in the office upon discharge.    0013 The patient is a 78-year-old female with past medical history of hiatal hernia s/p repair >10 years back, hypertension, GERD, non obstructive CAD, depression, dementia, anemia presenting for evaluation of low hemoglobin, measured outpatient to be 7.4. S/p colonoscopy with 3 cm sigmoid colon mass. Having gas and bowel movements.    Plan    [facility]  f/u pathology  CT chest non-con for staging  Advance diet as tolerated  Will discuss with patient and patient's daughter  Please obtain preoperative cardiac risk stratification    Spoke with patient's HCP Sonali, and she is undecided about whether or not to pursue surgery. Risks and benefits of surgical management including post-operative complications, prolonged ICU stay, likely discharge to rehab facility, likely obstruction and continued anemia if non-op management pursued explained to Sonali in detail. Will follow up continued goals of care conversations.    If patient is medically ready for discharge per primary team, OK to have patient follow up with Dr. Nunn in the office upon discharge.    [id number]

## 2024-01-12 NOTE — PROGRESS NOTE ADULT - ASSESSMENT
79 y/o female with PMHx HTN, GERD, non-obstructive CAD, Dementia, anemia, MDD P/W Hb 7.4 OP, episodic vomiting for few months, 60lb weight loss. Found to have Hb 7.5 on admission.     #Microcytic anemia (r/o GIB)  - Vitals on admission --> /67, , RR 19, T98, O2 98% RA  - Labs on admission --> Hb 7.5, MCV 66, Cr 1.6  - CTAP --> large hiatal hernia, b/l adrenal nodular thickening, b/l multifocal renal cortical thinning   - S/p 1u prbc in ED  - Admitted to medicine for further management  - GI Consult --> EGD/C-scope 1/10, golytely and dulcolax 1/9 PM, preop labs  - Mg Citrate x 1 dose given 1/10 AM to help clear BM  - 1/10 --> EGD/C-scope cancelled by anesthesia due to low pulse ox, GI recommending OP f/u  - 1/11 --> Pulm consult for further recs || 1 dose IV venofer then Ferrous sulfate QD  - 1/12 --> EGD/COLON  - iron low   - ferritin unremarkable   - transferrin unremarkable   - ferrous sulfate 325mg daily   - protonix 40mg IV bid   - C/w active T&S, monitor CBC    #Vomiting likely 2/2 Type III hiatal hernia  - CT abdomen shows Large hiatal hernia containing partially included stomach with apparent organoaxial rotation.  - Currently no abdominal pain/nausea/vomiting. Tolerating water.   - Surgery Consult --> PO contrast now, KUB in AM, pt wishes to follow surgeon in [facility] for repair  - KUB --> no obstruction  - Surgery made aware that patient has dementia and does not actively follow surgeon in [facility], they will re-eval for surgery here  - follow up after EGD/COLON    #DAWIT on CKD stage 3 vs. Progressive CKD   - Serum Cr 1.6 currently. Baseline Cr 1.2 in March 2022.   - Renal US --> no hydro, echogenic kidneys can reflect medical renal disease  - Cr peaked at 1.7 then trended down to 1.5  - C/w IVF gentle hydration  - C/w trending BMP    #HTN   - Lisinopril held  - C/w Nifedipine 60mg QD  - C/w metoprolol succinate 50mg QD    #HFpEF  - On lasix PO 40 BID at home  - TTE 10/1/20 --> EF 60-65%, mild concentric LVH, G2DD  - Holding lasix for now due to DAWIT and mild hypokalemia    #Asthma  - Follows dr. Hartley who had recommended symbicort 160-4.5 2 puff BID and combivent prn but pt does not take it  - Monitor     #Depression  - C/w home meds    #MISC  DVT ppx: SCD  GI ppx: PPI  Diet: AAT  Activity: as tolerated 79 y/o female with PMHx HTN, GERD, non-obstructive CAD, Dementia, anemia, MDD P/W Hb 7.4 OP, episodic vomiting for few months, 60lb weight loss. Found to have Hb 7.5 on admission.     #Microcytic anemia (r/o GIB)  - Vitals on admission --> /67, , RR 19, T98, O2 98% RA  - Labs on admission --> Hb 7.5, MCV 66, Cr 1.6  - CTAP --> large hiatal hernia, b/l adrenal nodular thickening, b/l multifocal renal cortical thinning   - S/p 1u prbc in ED  - Admitted to medicine for further management  - GI Consult --> EGD/C-scope 1/10, golytely and dulcolax 1/9 PM, preop labs  - Mg Citrate x 1 dose given 1/10 AM to help clear BM  - 1/10 --> EGD/C-scope cancelled by anesthesia due to low pulse ox, GI recommending OP f/u  - 1/11 --> Pulm consult for further recs || 1 dose IV venofer then Ferrous sulfate QD  - 1/12 --> EGD/COLON  - iron low   - ferritin unremarkable   - transferrin unremarkable   - ferrous sulfate 325mg daily   - protonix 40mg IV bid   - C/w active T&S, monitor CBC    #Vomiting likely 2/2 Type III hiatal hernia  - CT abdomen shows Large hiatal hernia containing partially included stomach with apparent organoaxial rotation.  - Currently no abdominal pain/nausea/vomiting. Tolerating water.   - Surgery Consult --> PO contrast now, KUB in AM, pt wishes to follow surgeon in [facility] for repair  - KUB --> no obstruction  - Surgery made aware that patient has dementia and does not actively follow surgeon in [facility], they will re-eval for surgery here  - follow up after EGD/COLON    #DAWIT on CKD stage 3 vs. Progressive CKD   - Serum Cr 1.6 currently. Baseline Cr 1.2 in March 2022.   - Renal US --> no hydro, echogenic kidneys can reflect medical renal disease  - Cr peaked at 1.7 then trended down to 1.5  - C/w IVF gentle hydration  - C/w trending BMP    #HTN   - Lisinopril held  - C/w Nifedipine 60mg QD  - C/w metoprolol succinate 50mg QD    #HFpEF  - On lasix PO 40 BID at home  - TTE 10/1/20 --> EF 60-65%, mild concentric LVH, G2DD  - Holding lasix for now due to DAWIT and mild hypokalemia    #Asthma  - Follows dr. Hartley who had recommended symbicort 160-4.5 2 puff BID and combivent prn but pt does not take it  - Monitor     #Depression  - C/w home meds    #MISC  DVT ppx: SCD  GI ppx: PPI  Diet: AAT  Activity: as tolerated 79 y/o female with PMHx HTN, GERD, non-obstructive CAD, Dementia, anemia, MDD P/W Hb 7.4 OP, episodic vomiting for few months, 60lb weight loss. Found to have Hb 7.5 on admission.     #Microcytic anemia (r/o GIB)  - Vitals on admission --> /67, , RR 19, T98, O2 98% RA  - Labs on admission --> Hb 7.5, MCV 66, Cr 1.6  - CTAP --> large hiatal hernia, b/l adrenal nodular thickening, b/l multifocal renal cortical thinning   - S/p 1u prbc in ED  - Admitted to medicine for further management  - GI Consult --> EGD/C-scope 1/10, golytely and dulcolax 1/9 PM, preop labs  - Mg Citrate x 1 dose given 1/10 AM to help clear BM  - 1/10 --> EGD/C-scope cancelled by anesthesia due to low pulse ox, GI recommending OP f/u  - 1/11 --> Pulm consult for further recs || 1 dose IV venofer then Ferrous sulfate QD  - 1/12 --> EGD/COLON  - iron low   - ferritin unremarkable   - transferrin unremarkable   - ferrous sulfate 325mg daily   - protonix 40mg IV bid     #Vomiting likely 2/2 Type III hiatal hernia  - CT abdomen shows Large hiatal hernia containing partially included stomach with apparent organoaxial rotation.  - Currently no abdominal pain/nausea/vomiting. Tolerating water.   - Surgery Consult --> PO contrast now, KUB in AM, pt wishes to follow surgeon in [facility] for repair  - KUB --> no obstruction  - Surgery made aware that patient has dementia and does not actively follow surgeon in [facility], they will re-eval for surgery here  - follow up after EGD/COLON    #DAWIT on CKD stage 3 vs. Progressive CKD   - Serum Cr 1.6 currently. Baseline Cr 1.2 in March 2022.   - Renal US --> no hydro, echogenic kidneys can reflect medical renal disease  - Cr peaked at 1.7 then trended down to 1.5  - C/w IVF gentle hydration  - C/w trending BMP    #HTN   - Lisinopril held  - C/w Nifedipine 60mg QD  - C/w metoprolol succinate 50mg QD    #HFpEF  - On lasix PO 40 BID at home  - TTE 10/1/20 --> EF 60-65%, mild concentric LVH, G2DD  - Holding lasix for now due to DAWIT and mild hypokalemia    #Asthma  - Follows dr. Hartley who had recommended symbicort 160-4.5 2 puff BID and combivent prn but pt does not take it  - Monitor     #Depression  - C/w home meds    #MISC  DVT ppx: SCD  GI ppx: PPI  Diet: AAT  Activity: as tolerated 77 y/o female with PMHx HTN, GERD, non-obstructive CAD, Dementia, anemia, MDD P/W Hb 7.4 OP, episodic vomiting for few months, 60lb weight loss. Found to have Hb 7.5 on admission.     #Microcytic anemia (r/o GIB)  - Vitals on admission --> /67, , RR 19, T98, O2 98% RA  - Labs on admission --> Hb 7.5, MCV 66, Cr 1.6  - CTAP --> large hiatal hernia, b/l adrenal nodular thickening, b/l multifocal renal cortical thinning   - S/p 1u prbc in ED  - Admitted to medicine for further management  - GI Consult --> EGD/C-scope 1/10, golytely and dulcolax 1/9 PM, preop labs  - Mg Citrate x 1 dose given 1/10 AM to help clear BM  - 1/10 --> EGD/C-scope cancelled by anesthesia due to low pulse ox, GI recommending OP f/u  - 1/11 --> Pulm consult for further recs || 1 dose IV venofer then Ferrous sulfate QD  - 1/12 --> EGD/COLON  - iron low   - ferritin unremarkable   - transferrin unremarkable   - ferrous sulfate 325mg daily   - protonix 40mg IV bid     #Vomiting likely 2/2 Type III hiatal hernia  - CT abdomen shows Large hiatal hernia containing partially included stomach with apparent organoaxial rotation.  - Currently no abdominal pain/nausea/vomiting. Tolerating water.   - Surgery Consult --> PO contrast now, KUB in AM, pt wishes to follow surgeon in [facility] for repair  - KUB --> no obstruction  - Surgery made aware that patient has dementia and does not actively follow surgeon in [facility], they will re-eval for surgery here  - follow up after EGD/COLON    #DAWIT on CKD stage 3 vs. Progressive CKD   - Serum Cr 1.6 currently. Baseline Cr 1.2 in March 2022.   - Renal US --> no hydro, echogenic kidneys can reflect medical renal disease  - Cr peaked at 1.7 then trended down to 1.5  - C/w IVF gentle hydration  - C/w trending BMP    #HTN   - Lisinopril held  - C/w Nifedipine 60mg QD  - C/w metoprolol succinate 50mg QD    #HFpEF  - On lasix PO 40 BID at home  - TTE 10/1/20 --> EF 60-65%, mild concentric LVH, G2DD  - Holding lasix for now due to DAWIT and mild hypokalemia    #Asthma  - Follows dr. Hartley who had recommended symbicort 160-4.5 2 puff BID and combivent prn but pt does not take it  - Monitor     #Depression  - C/w home meds    #MISC  DVT ppx: SCD  GI ppx: PPI  Diet: AAT  Activity: as tolerated 77 y/o female with PMHx HTN, GERD, non-obstructive CAD, Dementia, anemia, MDD P/W Hb 7.4 OP, episodic vomiting for few months, 60lb weight loss. Found to have Hb 7.5 on admission.     #Microcytic anemia (r/o GIB)  - Vitals on admission --> /67, , RR 19, T98, O2 98% RA  - Labs on admission --> Hb 7.5, MCV 66, Cr 1.6  - CTAP --> large hiatal hernia, b/l adrenal nodular thickening, b/l multifocal renal cortical thinning   - S/p 1u prbc in ED  - Admitted to medicine for further management  - GI Consult --> EGD/C-scope 1/10, golytely and dulcolax 1/9 PM, preop labs  - Mg Citrate x 1 dose given 1/10 AM to help clear BM  - 1/10 --> EGD/C-scope cancelled by anesthesia due to low pulse ox, GI recommending OP f/u  - 1/11 --> Pulm consult for further recs || 1 dose IV venofer then Ferrous sulfate QD  - 1/12 --> EGD/COLON  - 1/12 EGD with T-Fastener Wire and PEG tube Placement for Hiatal Hernia  - EGD noted nonspecific erosive esophagitis, non-erosive gastritis. (Biopsy), Normal mucosa in the whole examined duodenum.  - Indentation of the gastric wall by external finger pressure was demonstrated. Under direct endoscopic visualization, three T-Fasteners were inserted through the abdominal wall and into the gastric lumen along the left lower quadrant.  - 1/12 COLON  - COLON showed 3cm friable fungating ulcerated mass extending from 17cm to 20 cm from the anal verge was noted at the level of the sigmoid colon (biopsy), Internal hemorrhoids  - iron low   - ferritin unremarkable   - transferrin unremarkable   - Await pathology for EGD/COLON  - CEA  - Ca 19-9  - Recommend pan-CT imaging to complete staging process in setting of suspected malignancy  CONSULTS  - GI recommendations appreciated  - hematology/oncology recommendations pending   - surgery onc recommendations pending (Dr Nunn)  PLAN  - Switch to PO protonix 40mg QD?  - Avoid NSAIDs  - Continue using oral route for feeding and medication administrations: clear liquid diet today then advance on 01/13  - Do not use PEG tube for feeding or medication administration as it was placed, along with the T fasteners as part of hernia management  - ferrous sulfate 325mg daily     #Vomiting likely 2/2 Type III hiatal hernia  - CT abdomen shows Large hiatal hernia containing partially included stomach with apparent organoaxial rotation.  - Currently no abdominal pain/nausea/vomiting. Tolerating water.   - Surgery Consult --> PO contrast now, KUB in AM, pt wishes to follow surgeon in [facility] for repair  - KUB --> no obstruction  - Surgery made aware that patient has dementia and does not actively follow surgeon in [facility], they will re-eval for surgery here  - 1/12 EGD with T-Fastener Wire and PEG tube Placement for Hiatal Hernia    #DAWIT on CKD stage 3 vs. Progressive CKD   - Serum Cr 1.6 currently. Baseline Cr 1.2 in March 2022.   - Renal US --> no hydro, echogenic kidneys can reflect medical renal disease  - Cr peaked at 1.7 then trended down to 1.5  - C/w IVF gentle hydration  - C/w trending BMP    #HTN   - Lisinopril held  - C/w Nifedipine 60mg QD  - C/w metoprolol succinate 50mg QD    #HFpEF  - On lasix PO 40 BID at home  - TTE 10/1/20 --> EF 60-65%, mild concentric LVH, G2DD  - Holding lasix for now due to DAWIT and mild hypokalemia    #Asthma  - Follows dr. Hartley who had recommended symbicort 160-4.5 2 puff BID and combivent prn but pt does not take it  - Monitor     #Depression  - C/w home meds    #MISC  DVT ppx: SCD  GI ppx: PPI  Diet: AAT  Activity: as tolerated 77 y/o female with PMHx HTN, GERD, non-obstructive CAD, Dementia, anemia, MDD P/W Hb 7.4 OP, episodic vomiting for few months, 60lb weight loss. Found to have Hb 7.5 on admission.     #Microcytic anemia (r/o GIB)  - Vitals on admission --> /67, , RR 19, T98, O2 98% RA  - Labs on admission --> Hb 7.5, MCV 66, Cr 1.6  - CTAP --> large hiatal hernia, b/l adrenal nodular thickening, b/l multifocal renal cortical thinning   - S/p 1u prbc in ED  - Admitted to medicine for further management  - GI Consult --> EGD/C-scope 1/10, golytely and dulcolax 1/9 PM, preop labs  - Mg Citrate x 1 dose given 1/10 AM to help clear BM  - 1/10 --> EGD/C-scope cancelled by anesthesia due to low pulse ox, GI recommending OP f/u  - 1/11 --> Pulm consult for further recs || 1 dose IV venofer then Ferrous sulfate QD  - 1/12 --> EGD/COLON  - 1/12 EGD with T-Fastener Wire and PEG tube Placement for Hiatal Hernia  - EGD noted nonspecific erosive esophagitis, non-erosive gastritis. (Biopsy), Normal mucosa in the whole examined duodenum.  - Indentation of the gastric wall by external finger pressure was demonstrated. Under direct endoscopic visualization, three T-Fasteners were inserted through the abdominal wall and into the gastric lumen along the left lower quadrant.  - 1/12 COLON  - COLON showed 3cm friable fungating ulcerated mass extending from 17cm to 20 cm from the anal verge was noted at the level of the sigmoid colon (biopsy), Internal hemorrhoids  - iron low   - ferritin unremarkable   - transferrin unremarkable   - Await pathology for EGD/COLON  - CEA  - Ca 19-9  - Recommend pan-CT imaging to complete staging process in setting of suspected malignancy  CONSULTS  - GI recommendations appreciated  - hematology/oncology recommendations pending   - surgery onc recommendations pending (Dr Nunn)  PLAN  - Switch to PO protonix 40mg QD?  - Avoid NSAIDs  - Continue using oral route for feeding and medication administrations: clear liquid diet today then advance on 01/13  - Do not use PEG tube for feeding or medication administration as it was placed, along with the T fasteners as part of hernia management  - ferrous sulfate 325mg daily     #Vomiting likely 2/2 Type III hiatal hernia  - CT abdomen shows Large hiatal hernia containing partially included stomach with apparent organoaxial rotation.  - Currently no abdominal pain/nausea/vomiting. Tolerating water.   - Surgery Consult --> PO contrast now, KUB in AM, pt wishes to follow surgeon in [facility] for repair  - KUB --> no obstruction  - Surgery made aware that patient has dementia and does not actively follow surgeon in [facility], they will re-eval for surgery here  - 1/12 EGD with T-Fastener Wire and PEG tube Placement for Hiatal Hernia    #DAWIT on CKD stage 3 vs. Progressive CKD   - Serum Cr 1.6 currently. Baseline Cr 1.2 in March 2022.   - Renal US --> no hydro, echogenic kidneys can reflect medical renal disease  - Cr peaked at 1.7 then trended down to 1.5  - C/w IVF gentle hydration  - C/w trending BMP    #HTN   - Lisinopril held  - C/w Nifedipine 60mg QD  - C/w metoprolol succinate 50mg QD    #HFpEF  - On lasix PO 40 BID at home  - TTE 10/1/20 --> EF 60-65%, mild concentric LVH, G2DD  - Holding lasix for now due to DAWIT and mild hypokalemia    #Asthma  - Follows dr. Hartley who had recommended symbicort 160-4.5 2 puff BID and combivent prn but pt does not take it  - Monitor     #Depression  - C/w home meds    #MISC  DVT ppx: SCD  GI ppx: PPI  Diet: AAT  Activity: as tolerated

## 2024-01-12 NOTE — PROGRESS NOTE ADULT - SUBJECTIVE AND OBJECTIVE BOX
GENERAL SURGERY PROGRESS NOTE     DARIEN BARRERA  78y  Female  Hospital day :4d       T(F): 96.8 (01-12-24 @ 00:39), Max: 97.9 (01-11-24 @ 16:46)  HR: 57 (01-12-24 @ 00:39) (57 - 72)  BP: 131/60 (01-12-24 @ 00:39) (131/60 - 143/79)  ABP: --  ABP(mean): --  RR: 18 (01-12-24 @ 00:39) (18 - 18)  SpO2: --    DIET/FLUIDS: ferrous    sulfate 325 milliGRAM(s) Oral daily  lactated ringers. 1000 milliLiter(s) IV Continuous <Continuous>       GI proph:  pantoprazole  Injectable 40 milliGRAM(s) IV Push two times a day    AC/ proph:   ABx:     PHYSICAL EXAM:  GENERAL: NAD   ABDOMEN: Soft, Nontender, Nondistended;   EXTREMITIES:  No clubbing, cyanosis, or edema      LABS  Labs:  CAPILLARY BLOOD GLUCOSE                              8.6    8.20  )-----------( 413      ( 11 Jan 2024 22:01 )             30.9       Auto Neutrophil %: 59.5 % (01-11-24 @ 06:09)  Auto Immature Granulocyte %: 0.3 % (01-11-24 @ 06:09)    01-11    133<L>  |  94<L>  |  19  ----------------------------<  83  4.5   |  28  |  1.6<H>      Calcium: 9.3 mg/dL (01-11-24 @ 22:01)      LFTs:             6.3  | 0.2  | 13       ------------------[108     ( 11 Jan 2024 22:01 )  3.7  | x    | 9           Lipase:x      Amylase:x         Lactate, Blood: 1.2 mmol/L (01-09-24 @ 01:38)      Coags:     11.20  ----< 0.98    ( 11 Jan 2024 22:01 )     29.2                Urinalysis Basic - ( 11 Jan 2024 22:01 )    Color: x / Appearance: x / SG: x / pH: x  Gluc: 83 mg/dL / Ketone: x  / Bili: x / Urobili: x   Blood: x / Protein: x / Nitrite: x   Leuk Esterase: x / RBC: x / WBC x   Sq Epi: x / Non Sq Epi: x / Bacteria: x            RADIOLOGY & ADDITIONAL TESTS:      A/P    78-year-old female with past medical history of hiatal hernia s/p repair >10 years back, L emypema s/p decortication 2018, hypertension, GERD, non obstructive CAD, depression, dementia, anemia presenting for evaluation of low hemoglobin, measured outpatient to be 7.4.  Patient has had multiple episodes of nonbloody nonbilious vomiting over the last several months with good appetite and almost 60 pound weight loss over the last few months.  Patient just started following up with hematologist Dr.Yumi Hussein and had recent blood work from 3 days ago that resulted today and showed a low hemoglobin of 7.4. Patient denies fevers, retching, chills, chest pain, shortness of breath, hematuria, melena, hematochezia. The pt presented to the Ed for a blood transfusion that was recommended by the hematologist.    CTAP revealing a type III hiatal hernia and patient states she has had intermittent episodes of epigastric pain associated with vomiting over the past few months. CTAP revealing a large hiatal hernia with stomach above diaphragm and organoaxial rotation.    Plan  - EGD/colonoscopy cancelled and rescheduled for 1/12  - Pre op for OR  - Monitor labs  - Care as per primary team     [phone]       GENERAL SURGERY PROGRESS NOTE     DARIEN BARRERA  78y  Female  Hospital day :4d       T(F): 96.8 (01-12-24 @ 00:39), Max: 97.9 (01-11-24 @ 16:46)  HR: 57 (01-12-24 @ 00:39) (57 - 72)  BP: 131/60 (01-12-24 @ 00:39) (131/60 - 143/79)  ABP: --  ABP(mean): --  RR: 18 (01-12-24 @ 00:39) (18 - 18)  SpO2: --    DIET/FLUIDS: ferrous    sulfate 325 milliGRAM(s) Oral daily  lactated ringers. 1000 milliLiter(s) IV Continuous <Continuous>       GI proph:  pantoprazole  Injectable 40 milliGRAM(s) IV Push two times a day    AC/ proph:   ABx:     PHYSICAL EXAM:  GENERAL: NAD   ABDOMEN: Soft, Nontender, Nondistended;   EXTREMITIES:  No clubbing, cyanosis, or edema      LABS  Labs:  CAPILLARY BLOOD GLUCOSE                              8.6    8.20  )-----------( 413      ( 11 Jan 2024 22:01 )             30.9       Auto Neutrophil %: 59.5 % (01-11-24 @ 06:09)  Auto Immature Granulocyte %: 0.3 % (01-11-24 @ 06:09)    01-11    133<L>  |  94<L>  |  19  ----------------------------<  83  4.5   |  28  |  1.6<H>      Calcium: 9.3 mg/dL (01-11-24 @ 22:01)      LFTs:             6.3  | 0.2  | 13       ------------------[108     ( 11 Jan 2024 22:01 )  3.7  | x    | 9           Lipase:x      Amylase:x         Lactate, Blood: 1.2 mmol/L (01-09-24 @ 01:38)      Coags:     11.20  ----< 0.98    ( 11 Jan 2024 22:01 )     29.2                Urinalysis Basic - ( 11 Jan 2024 22:01 )    Color: x / Appearance: x / SG: x / pH: x  Gluc: 83 mg/dL / Ketone: x  / Bili: x / Urobili: x   Blood: x / Protein: x / Nitrite: x   Leuk Esterase: x / RBC: x / WBC x   Sq Epi: x / Non Sq Epi: x / Bacteria: x            RADIOLOGY & ADDITIONAL TESTS:      A/P    78-year-old female with past medical history of hiatal hernia s/p repair >10 years back, L emypema s/p decortication 2018, hypertension, GERD, non obstructive CAD, depression, dementia, anemia presenting for evaluation of low hemoglobin, measured outpatient to be 7.4.  Patient has had multiple episodes of nonbloody nonbilious vomiting over the last several months with good appetite and almost 60 pound weight loss over the last few months.  Patient just started following up with hematologist Dr.Yumi Hussein and had recent blood work from 3 days ago that resulted today and showed a low hemoglobin of 7.4. Patient denies fevers, retching, chills, chest pain, shortness of breath, hematuria, melena, hematochezia. The pt presented to the Ed for a blood transfusion that was recommended by the hematologist.    CTAP revealing a type III hiatal hernia and patient states she has had intermittent episodes of epigastric pain associated with vomiting over the past few months. CTAP revealing a large hiatal hernia with stomach above diaphragm and organoaxial rotation.    Plan  - EGD/colonoscopy cancelled and rescheduled for 1/12  - Pre op for OR  - Monitor labs  - Care as per primary team     [phone]       GENERAL SURGERY PROGRESS NOTE     DARIEN BARRERA  78y  Female  Hospital day :4d       T(F): 96.8 (01-12-24 @ 00:39), Max: 97.9 (01-11-24 @ 16:46)  HR: 57 (01-12-24 @ 00:39) (57 - 72)  BP: 131/60 (01-12-24 @ 00:39) (131/60 - 143/79)  ABP: --  ABP(mean): --  RR: 18 (01-12-24 @ 00:39) (18 - 18)  SpO2: --    DIET/FLUIDS: ferrous    sulfate 325 milliGRAM(s) Oral daily  lactated ringers. 1000 milliLiter(s) IV Continuous <Continuous>       GI proph:  pantoprazole  Injectable 40 milliGRAM(s) IV Push two times a day    AC/ proph:   ABx:     PHYSICAL EXAM:  GENERAL: NAD   ABDOMEN: Soft, Nontender, Nondistended;   EXTREMITIES:  No clubbing, cyanosis, or edema      LABS  Labs:  CAPILLARY BLOOD GLUCOSE                              8.6    8.20  )-----------( 413      ( 11 Jan 2024 22:01 )             30.9       Auto Neutrophil %: 59.5 % (01-11-24 @ 06:09)  Auto Immature Granulocyte %: 0.3 % (01-11-24 @ 06:09)    01-11    133<L>  |  94<L>  |  19  ----------------------------<  83  4.5   |  28  |  1.6<H>      Calcium: 9.3 mg/dL (01-11-24 @ 22:01)      LFTs:             6.3  | 0.2  | 13       ------------------[108     ( 11 Jan 2024 22:01 )  3.7  | x    | 9           Lipase:x      Amylase:x         Lactate, Blood: 1.2 mmol/L (01-09-24 @ 01:38)      Coags:     11.20  ----< 0.98    ( 11 Jan 2024 22:01 )     29.2                Urinalysis Basic - ( 11 Jan 2024 22:01 )    Color: x / Appearance: x / SG: x / pH: x  Gluc: 83 mg/dL / Ketone: x  / Bili: x / Urobili: x   Blood: x / Protein: x / Nitrite: x   Leuk Esterase: x / RBC: x / WBC x   Sq Epi: x / Non Sq Epi: x / Bacteria: x            RADIOLOGY & ADDITIONAL TESTS:      A/P    78-year-old female with past medical history of hiatal hernia s/p repair >10 years back, L emypema s/p decortication 2018, hypertension, GERD, non obstructive CAD, depression, dementia, anemia presenting for evaluation of low hemoglobin, measured outpatient to be 7.4.  Patient has had multiple episodes of nonbloody nonbilious vomiting over the last several months with good appetite and almost 60 pound weight loss over the last few months.  Patient just started following up with hematologist Dr.Yumi Hussein and had recent blood work from 3 days ago that resulted today and showed a low hemoglobin of 7.4. Patient denies fevers, retching, chills, chest pain, shortness of breath, hematuria, melena, hematochezia. The pt presented to the Ed for a blood transfusion that was recommended by the hematologist.    CTAP revealing a type III hiatal hernia and patient states she has had intermittent episodes of epigastric pain associated with vomiting over the past few months. CTAP revealing a large hiatal hernia with stomach above diaphragm and organoaxial rotation.    Plan  - EGD/colonoscopy cancelled and rescheduled for 1/12  - Pre op for OR  - Monitor labs  - Care as per primary team     [phone]       GENERAL SURGERY PROGRESS NOTE     DARIEN BARRERA  78y  Female  Hospital day :4d       T(F): 96.8 (01-12-24 @ 00:39), Max: 97.9 (01-11-24 @ 16:46)  HR: 57 (01-12-24 @ 00:39) (57 - 72)  BP: 131/60 (01-12-24 @ 00:39) (131/60 - 143/79)  ABP: --  ABP(mean): --  RR: 18 (01-12-24 @ 00:39) (18 - 18)  SpO2: --    DIET/FLUIDS: ferrous    sulfate 325 milliGRAM(s) Oral daily  lactated ringers. 1000 milliLiter(s) IV Continuous <Continuous>       GI proph:  pantoprazole  Injectable 40 milliGRAM(s) IV Push two times a day    AC/ proph:   ABx:     PHYSICAL EXAM:  GENERAL: NAD   ABDOMEN: Soft, Nontender, Nondistended;   EXTREMITIES:  No clubbing, cyanosis, or edema      LABS  Labs:  CAPILLARY BLOOD GLUCOSE                              8.6    8.20  )-----------( 413      ( 11 Jan 2024 22:01 )             30.9       Auto Neutrophil %: 59.5 % (01-11-24 @ 06:09)  Auto Immature Granulocyte %: 0.3 % (01-11-24 @ 06:09)    01-11    133<L>  |  94<L>  |  19  ----------------------------<  83  4.5   |  28  |  1.6<H>      Calcium: 9.3 mg/dL (01-11-24 @ 22:01)      LFTs:             6.3  | 0.2  | 13       ------------------[108     ( 11 Jan 2024 22:01 )  3.7  | x    | 9           Lipase:x      Amylase:x         Lactate, Blood: 1.2 mmol/L (01-09-24 @ 01:38)      Coags:     11.20  ----< 0.98    ( 11 Jan 2024 22:01 )     29.2                Urinalysis Basic - ( 11 Jan 2024 22:01 )    Color: x / Appearance: x / SG: x / pH: x  Gluc: 83 mg/dL / Ketone: x  / Bili: x / Urobili: x   Blood: x / Protein: x / Nitrite: x   Leuk Esterase: x / RBC: x / WBC x   Sq Epi: x / Non Sq Epi: x / Bacteria: x            RADIOLOGY & ADDITIONAL TESTS:      A/P    78-year-old female with past medical history of hiatal hernia s/p repair >10 years back, L emypema s/p decortication 2018, hypertension, GERD, non obstructive CAD, depression, dementia, anemia presenting for evaluation of low hemoglobin, measured outpatient to be 7.4.  Patient has had multiple episodes of nonbloody nonbilious vomiting over the last several months with good appetite and almost 60 pound weight loss over the last few months.  Patient just started following up with hematologist Dr.Yumi Hussein and had recent blood work from 3 days ago that resulted today and showed a low hemoglobin of 7.4. Patient denies fevers, retching, chills, chest pain, shortness of breath, hematuria, melena, hematochezia. The pt presented to the Ed for a blood transfusion that was recommended by the hematologist.    CTAP revealing a type III hiatal hernia and patient states she has had intermittent episodes of epigastric pain associated with vomiting over the past few months. CTAP revealing a large hiatal hernia with stomach above diaphragm and organoaxial rotation.    Plan  - EGD/colonoscopy cancelled and rescheduled for 1/12  - Pre op for OR  - Monitor labs  - Care as per primary team     [phone]

## 2024-01-12 NOTE — PROGRESS NOTE ADULT - ATTENDING COMMENTS
pt examined  labs and images reviewed  admitted for anemia  needs egd and colonoscopy   s/p hiatal hernia - now recurred - no symptoms    s/p egd/colonsocopy   - has no gastric ulcer s- pexy done - start diet slowly - advance as tolerated  rectal/ sigmoid cancer  will try to get records of previous hiatal hernia surgery        No signs of gastric volvulus on examination or history  No signs of ischemia on CT  Transfuse as needed      colorectal consult

## 2024-01-12 NOTE — CHART NOTE - NSCHARTNOTEFT_GEN_A_CORE
EGD with T-Fastener Wire and PEG tube Placement for Hiatal Hernia  - Grade B esophagitis compatible with nonspecific erosive esophagitis.  - Erythema in the stomach compatible with non-erosive gastritis. (Biopsy).  - Indentation of the gastric wall by external finger pressure was demonstrated. Under direct endoscopic visualization, three T-Fasteners were inserted through the abdominal wall and into the gastric lumen along the left lower quadrant. .  - Normal mucosa in the whole examined duodenum.    Plan:	  - Await pathology  - Switch to PO protonix 40mg QD?  - Avoid NSAIDs  - Continue using oral route for feeding and medication administrations: clear liquid diet today then advance on 01/13  - Do not use PEG tube for feeding or medication administration as it was placed, along with the T fasteners as part of hernia management  - Surgery team follow up

## 2024-01-13 LAB
ANION GAP SERPL CALC-SCNC: 13 MMOL/L — SIGNIFICANT CHANGE UP (ref 7–14)
ANION GAP SERPL CALC-SCNC: 13 MMOL/L — SIGNIFICANT CHANGE UP (ref 7–14)
BASOPHILS # BLD AUTO: 0.03 K/UL — SIGNIFICANT CHANGE UP (ref 0–0.2)
BASOPHILS # BLD AUTO: 0.03 K/UL — SIGNIFICANT CHANGE UP (ref 0–0.2)
BASOPHILS NFR BLD AUTO: 0.2 % — SIGNIFICANT CHANGE UP (ref 0–1)
BASOPHILS NFR BLD AUTO: 0.2 % — SIGNIFICANT CHANGE UP (ref 0–1)
BUN SERPL-MCNC: 17 MG/DL — SIGNIFICANT CHANGE UP (ref 10–20)
BUN SERPL-MCNC: 17 MG/DL — SIGNIFICANT CHANGE UP (ref 10–20)
CALCIUM SERPL-MCNC: 9.3 MG/DL — SIGNIFICANT CHANGE UP (ref 8.4–10.5)
CALCIUM SERPL-MCNC: 9.3 MG/DL — SIGNIFICANT CHANGE UP (ref 8.4–10.5)
CANCER AG19-9 SERPL-ACNC: <2 U/ML — SIGNIFICANT CHANGE UP
CANCER AG19-9 SERPL-ACNC: <2 U/ML — SIGNIFICANT CHANGE UP
CEA SERPL-MCNC: 2.7 NG/ML — SIGNIFICANT CHANGE UP (ref 0–3.8)
CEA SERPL-MCNC: 2.7 NG/ML — SIGNIFICANT CHANGE UP (ref 0–3.8)
CHLORIDE SERPL-SCNC: 95 MMOL/L — LOW (ref 98–110)
CHLORIDE SERPL-SCNC: 95 MMOL/L — LOW (ref 98–110)
CO2 SERPL-SCNC: 28 MMOL/L — SIGNIFICANT CHANGE UP (ref 17–32)
CO2 SERPL-SCNC: 28 MMOL/L — SIGNIFICANT CHANGE UP (ref 17–32)
CREAT SERPL-MCNC: 1.4 MG/DL — SIGNIFICANT CHANGE UP (ref 0.7–1.5)
CREAT SERPL-MCNC: 1.4 MG/DL — SIGNIFICANT CHANGE UP (ref 0.7–1.5)
EGFR: 38 ML/MIN/1.73M2 — LOW
EGFR: 38 ML/MIN/1.73M2 — LOW
EOSINOPHIL # BLD AUTO: 0 K/UL — SIGNIFICANT CHANGE UP (ref 0–0.7)
EOSINOPHIL # BLD AUTO: 0 K/UL — SIGNIFICANT CHANGE UP (ref 0–0.7)
EOSINOPHIL NFR BLD AUTO: 0 % — SIGNIFICANT CHANGE UP (ref 0–8)
EOSINOPHIL NFR BLD AUTO: 0 % — SIGNIFICANT CHANGE UP (ref 0–8)
GLUCOSE BLDC GLUCOMTR-MCNC: 210 MG/DL — HIGH (ref 70–99)
GLUCOSE BLDC GLUCOMTR-MCNC: 210 MG/DL — HIGH (ref 70–99)
GLUCOSE SERPL-MCNC: 80 MG/DL — SIGNIFICANT CHANGE UP (ref 70–99)
GLUCOSE SERPL-MCNC: 80 MG/DL — SIGNIFICANT CHANGE UP (ref 70–99)
HCT VFR BLD CALC: 32.3 % — LOW (ref 37–47)
HCT VFR BLD CALC: 32.3 % — LOW (ref 37–47)
HGB BLD-MCNC: 9.5 G/DL — LOW (ref 12–16)
HGB BLD-MCNC: 9.5 G/DL — LOW (ref 12–16)
IMM GRANULOCYTES NFR BLD AUTO: 0.4 % — HIGH (ref 0.1–0.3)
IMM GRANULOCYTES NFR BLD AUTO: 0.4 % — HIGH (ref 0.1–0.3)
LYMPHOCYTES # BLD AUTO: 17.2 % — LOW (ref 20.5–51.1)
LYMPHOCYTES # BLD AUTO: 17.2 % — LOW (ref 20.5–51.1)
LYMPHOCYTES # BLD AUTO: 2.12 K/UL — SIGNIFICANT CHANGE UP (ref 1.2–3.4)
LYMPHOCYTES # BLD AUTO: 2.12 K/UL — SIGNIFICANT CHANGE UP (ref 1.2–3.4)
MAGNESIUM SERPL-MCNC: 1.7 MG/DL — LOW (ref 1.8–2.4)
MAGNESIUM SERPL-MCNC: 1.7 MG/DL — LOW (ref 1.8–2.4)
MCHC RBC-ENTMCNC: 20.7 PG — LOW (ref 27–31)
MCHC RBC-ENTMCNC: 20.7 PG — LOW (ref 27–31)
MCHC RBC-ENTMCNC: 29.4 G/DL — LOW (ref 32–37)
MCHC RBC-ENTMCNC: 29.4 G/DL — LOW (ref 32–37)
MCV RBC AUTO: 70.4 FL — LOW (ref 81–99)
MCV RBC AUTO: 70.4 FL — LOW (ref 81–99)
MONOCYTES # BLD AUTO: 0.64 K/UL — HIGH (ref 0.1–0.6)
MONOCYTES # BLD AUTO: 0.64 K/UL — HIGH (ref 0.1–0.6)
MONOCYTES NFR BLD AUTO: 5.2 % — SIGNIFICANT CHANGE UP (ref 1.7–9.3)
MONOCYTES NFR BLD AUTO: 5.2 % — SIGNIFICANT CHANGE UP (ref 1.7–9.3)
NEUTROPHILS # BLD AUTO: 9.5 K/UL — HIGH (ref 1.4–6.5)
NEUTROPHILS # BLD AUTO: 9.5 K/UL — HIGH (ref 1.4–6.5)
NEUTROPHILS NFR BLD AUTO: 77 % — HIGH (ref 42.2–75.2)
NEUTROPHILS NFR BLD AUTO: 77 % — HIGH (ref 42.2–75.2)
NRBC # BLD: 0 /100 WBCS — SIGNIFICANT CHANGE UP (ref 0–0)
NRBC # BLD: 0 /100 WBCS — SIGNIFICANT CHANGE UP (ref 0–0)
PLATELET # BLD AUTO: 429 K/UL — HIGH (ref 130–400)
PLATELET # BLD AUTO: 429 K/UL — HIGH (ref 130–400)
PMV BLD: 9.1 FL — SIGNIFICANT CHANGE UP (ref 7.4–10.4)
PMV BLD: 9.1 FL — SIGNIFICANT CHANGE UP (ref 7.4–10.4)
POTASSIUM SERPL-MCNC: 3.5 MMOL/L — SIGNIFICANT CHANGE UP (ref 3.5–5)
POTASSIUM SERPL-MCNC: 3.5 MMOL/L — SIGNIFICANT CHANGE UP (ref 3.5–5)
POTASSIUM SERPL-SCNC: 3.5 MMOL/L — SIGNIFICANT CHANGE UP (ref 3.5–5)
POTASSIUM SERPL-SCNC: 3.5 MMOL/L — SIGNIFICANT CHANGE UP (ref 3.5–5)
RBC # BLD: 4.59 M/UL — SIGNIFICANT CHANGE UP (ref 4.2–5.4)
RBC # BLD: 4.59 M/UL — SIGNIFICANT CHANGE UP (ref 4.2–5.4)
RBC # FLD: 19 % — HIGH (ref 11.5–14.5)
RBC # FLD: 19 % — HIGH (ref 11.5–14.5)
SODIUM SERPL-SCNC: 136 MMOL/L — SIGNIFICANT CHANGE UP (ref 135–146)
SODIUM SERPL-SCNC: 136 MMOL/L — SIGNIFICANT CHANGE UP (ref 135–146)
WBC # BLD: 12.34 K/UL — HIGH (ref 4.8–10.8)
WBC # BLD: 12.34 K/UL — HIGH (ref 4.8–10.8)
WBC # FLD AUTO: 12.34 K/UL — HIGH (ref 4.8–10.8)
WBC # FLD AUTO: 12.34 K/UL — HIGH (ref 4.8–10.8)

## 2024-01-13 PROCEDURE — 99232 SBSQ HOSP IP/OBS MODERATE 35: CPT

## 2024-01-13 PROCEDURE — 99233 SBSQ HOSP IP/OBS HIGH 50: CPT

## 2024-01-13 PROCEDURE — 99231 SBSQ HOSP IP/OBS SF/LOW 25: CPT

## 2024-01-13 RX ORDER — SIMETHICONE 80 MG/1
80 TABLET, CHEWABLE ORAL ONCE
Refills: 0 | Status: COMPLETED | OUTPATIENT
Start: 2024-01-13 | End: 2024-01-13

## 2024-01-13 RX ORDER — MAGNESIUM SULFATE 500 MG/ML
2 VIAL (ML) INJECTION ONCE
Refills: 0 | Status: COMPLETED | OUTPATIENT
Start: 2024-01-13 | End: 2024-01-13

## 2024-01-13 RX ADMIN — Medication 25 GRAM(S): at 21:05

## 2024-01-13 RX ADMIN — PANTOPRAZOLE SODIUM 40 MILLIGRAM(S): 20 TABLET, DELAYED RELEASE ORAL at 05:57

## 2024-01-13 RX ADMIN — Medication 0.25 MILLIGRAM(S): at 21:04

## 2024-01-13 RX ADMIN — Medication 20 MILLIGRAM(S): at 21:05

## 2024-01-13 RX ADMIN — Medication 325 MILLIGRAM(S): at 12:49

## 2024-01-13 RX ADMIN — Medication 60 MILLIGRAM(S): at 05:57

## 2024-01-13 RX ADMIN — Medication 50 MILLIGRAM(S): at 05:57

## 2024-01-13 RX ADMIN — SERTRALINE 100 MILLIGRAM(S): 25 TABLET, FILM COATED ORAL at 12:49

## 2024-01-13 RX ADMIN — SIMETHICONE 80 MILLIGRAM(S): 80 TABLET, CHEWABLE ORAL at 16:16

## 2024-01-13 NOTE — PROGRESS NOTE ADULT - ASSESSMENT
78-year-old female with past medical history of hiatal hernia s/p repair >10 years back, L emypema s/p decortication 2018, hypertension, GERD, non obstructive CAD, depression, dementia, anemia presenting for evaluation of low hemoglobin, measured outpatient to be 7.4.  Patient has had multiple episodes of nonbloody nonbilious vomiting over the last several months with good appetite and almost 60 pound weight loss over the last few months.  Patient just started following up with hematologist Dr.Yumi Hussein and had recent blood work from 3 days ago that resulted today and showed a low hemoglobin of 7.4. Patient denies fevers, retching, chills, chest pain, shortness of breath, hematuria, melena, hematochezia. The pt presented to the Ed for a blood transfusion that was recommended by the hematologist.    CTAP revealing a type III hiatal hernia and patient states she has had intermittent episodes of epigastric pain associated with vomiting over the past few months. CTAP revealing a large hiatal hernia with stomach above diaphragm and organoaxial rotation.    Plan  - f/u pathology  - Will follow up goals of care discussion regarding surgery  - F/u CT chest read  - Monitor labs  - Care as per primary team

## 2024-01-13 NOTE — PROGRESS NOTE ADULT - SUBJECTIVE AND OBJECTIVE BOX
Patient is a 78y old  Female who presents with a chief complaint of Anemia (13 Jan 2024 04:26)      Patient seen and examined at bedside.  Patient denies any chest pain or shortness of breath.    ALLERGIES:  No Known Allergies    MEDICATIONS:  ALPRAZolam 0.25 milliGRAM(s) Oral at bedtime  bisacodyl 20 milliGRAM(s) Oral at bedtime  ferrous    sulfate 325 milliGRAM(s) Oral daily  lactated ringers. 1000 milliLiter(s) IV Continuous <Continuous>  metoprolol succinate ER 50 milliGRAM(s) Oral daily  NIFEdipine XL 60 milliGRAM(s) Oral daily  pantoprazole    Tablet 40 milliGRAM(s) Oral before breakfast  sertraline 100 milliGRAM(s) Oral daily    Vital Signs Last 24 Hrs  T(F): 97.1 (13 Jan 2024 07:18), Max: 97.3 (12 Jan 2024 23:16)  HR: 68 (13 Jan 2024 07:18) (59 - 69)  BP: 139/63 (13 Jan 2024 07:18) (138/64 - 178/82)  RR: 20 (13 Jan 2024 07:18) (14 - 20)  SpO2: 95% (12 Jan 2024 16:00) (95% - 95%)  I&O's Summary    12 Jan 2024 07:01  -  13 Jan 2024 07:00  --------------------------------------------------------  IN: 418 mL / OUT: 400 mL / NET: 18 mL    13 Jan 2024 07:01  -  13 Jan 2024 14:26  --------------------------------------------------------  IN: 240 mL / OUT: 0 mL / NET: 240 mL        PHYSICAL EXAM:  General: NAD, A/O x 2, confused   ENT: MMM  Neck: Supple, No JVD  Lungs: Clear to auscultation bilaterally  Cardio: RRR, S1/S2, 2/6 systolic murmur   Abdomen: Soft, Nontender, Nondistended; Bowel sounds present  Extremities: No cyanosis, No edema    LABS:                        9.5    12.34 )-----------( 429      ( 13 Jan 2024 07:06 )             32.3     01-13    136  |  95  |  17  ----------------------------<  80  3.5   |  28  |  1.4    Ca    9.3      13 Jan 2024 07:06  Mg     1.7     01-13    TPro  6.3  /  Alb  3.7  /  TBili  0.3  /  DBili  x   /  AST  13  /  ALT  8   /  AlkPhos  108  01-12      PT/INR - ( 11 Jan 2024 22:01 )   PT: 11.20 sec;   INR: 0.98 ratio         PTT - ( 11 Jan 2024 22:01 )  PTT:29.2 sec        01-09 Chol 175 mg/dL LDL -- HDL 51 mg/dL Trig 167 mg/dL                  Urinalysis Basic - ( 13 Jan 2024 07:06 )    Color: x / Appearance: x / SG: x / pH: x  Gluc: 80 mg/dL / Ketone: x  / Bili: x / Urobili: x   Blood: x / Protein: x / Nitrite: x   Leuk Esterase: x / RBC: x / WBC x   Sq Epi: x / Non Sq Epi: x / Bacteria: x            RADIOLOGY & ADDITIONAL TESTS:    Care Discussed with Consultants/Other Providers:

## 2024-01-13 NOTE — PROGRESS NOTE ADULT - SUBJECTIVE AND OBJECTIVE BOX
GENERAL SURGERY PROGRESS NOTE     DARIEN BARRERA  78y  Female  Hospital day :5d  POD:  Procedure:   OVERNIGHT EVENTS: no acute events overnight, awaiting read of CT chest, awaiting tumor marker results    T(F): 97.3 (01-12-24 @ 23:16), Max: 97.3 (01-12-24 @ 14:20)  HR: 66 (01-12-24 @ 23:16) (58 - 67)  BP: 138/64 (01-12-24 @ 23:16) (138/64 - 178/82)  ABP: --  ABP(mean): --  RR: 18 (01-12-24 @ 23:16) (14 - 18)  SpO2: 95% (01-12-24 @ 16:00) (95% - 98%)    DIET/FLUIDS: ferrous    sulfate 325 milliGRAM(s) Oral daily  lactated ringers. 1000 milliLiter(s) IV Continuous <Continuous>    NG:                                                                                DRAINS:     BM:     EMESIS:   01-12-24 @ 07:01  -  01-13-24 @ 04:27  --------------------------------------------------------  OUT: 3.29 mL/kg/d      URINE:      GI proph:  pantoprazole    Tablet 40 milliGRAM(s) Oral before breakfast    AC/ proph:   ABx:     PHYSICAL EXAM:  GENERAL: NAD  CHEST/LUNG: Non-labored breathing  ABDOMEN: Soft, Nontender, Nondistended;       LABS  Labs:  CAPILLARY BLOOD GLUCOSE                              9.5    7.10  )-----------( 450      ( 12 Jan 2024 06:39 )             32.4       Auto Neutrophil %: 61.2 % (01-12-24 @ 06:39)  Auto Immature Granulocyte %: 0.4 % (01-12-24 @ 06:39)    01-12    135  |  95<L>  |  17  ----------------------------<  88  4.0   |  30  |  1.4      Magnesium: 2.0 mg/dL (01-12-24 @ 06:39)      LFTs:             6.3  | 0.3  | 13       ------------------[108     ( 12 Jan 2024 06:39 )  3.7  | x    | 8           Lipase:x      Amylase:x             Coags:     11.20  ----< 0.98    ( 11 Jan 2024 22:01 )     29.2                Urinalysis Basic - ( 12 Jan 2024 06:39 )    Color: x / Appearance: x / SG: x / pH: x  Gluc: 88 mg/dL / Ketone: x  / Bili: x / Urobili: x   Blood: x / Protein: x / Nitrite: x   Leuk Esterase: x / RBC: x / WBC x   Sq Epi: x / Non Sq Epi: x / Bacteria: x            RADIOLOGY & ADDITIONAL TESTS:      A/P

## 2024-01-13 NOTE — PROGRESS NOTE ADULT - ASSESSMENT
Assessment and Plan  Case of a 78 year old female patient known to have Depression, Dementia, GERD. Hiatal hernia s/p repair, Anemia, HTN, and non obstructive CAD who presented to the ED on 01/08 for evaluation of low outpatient Hb in setting of vomiting, found to be hemodynamically stable with evidence of drop in Hb from baseline of 12.3 in 03/2022 to Hb 7.5 on blood work and evidence of large hiatal hernia on imaging, admitted for further management. We are consulted for concern of acute drop in Hb and vomiting in setting of large hiatal hernia.      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  * Baseline Hb 12.3 in 03/2022  * Recent outpatient Hb 7.4 on 01/05 -> followed with Dr Mercedes Hussein outpatient and referred to ED  * History: daily non bilious non bloody vomitus x few months (sometimes food; other times clear acid secretion); 60lb weight loss in few months (unintentional); no overt blood loss per rectum or os; no recent NSAID use  * ED Labs Hb 7.5 on 01/08; LA 1.2; INR 1.09 on 01/08  * Iron with Total Binding Capacity in AM (01.11.24 @ 06:09)    Iron - Total Binding Capacity.: 321 ug/dL   % Saturation, Iron: 4 %   Iron Total: 13 ug/dL   Unsaturated Iron Binding Capacity: 308 ug/dL  * Refused ERICA on 01/09  * Prior colonoscopy: patient not sure  * Family History: negative for GI malignancies  * CT Abdomen and Pelvis w/ IV Cont (01.08.24 @ 15:26) Large hiatal hernia containing partially included stomach with apparent   organoaxial rotationBilateral adrenal nodular thickening. This is similar in appearance to the prior CT of the chest.Bilateral multifocal renal cortical thinning likely reflecting sequela of prior insults. No discrete evidence of abdominal pelvic mass or lymphadenopathy.  * Status Post Colonoscopy on 01/12 A 3cm friable fungating ulcerated mass extending from 17cm to 20 cm from the anal verge was noted at the level of the sigmoid colon. The mass was obstructing around 75-90% of the lumen. The PCF scope could not traverse the mass, so it was switched with an upper endoscope. Using an upper endoscope, the mass was easily traversed. Multiple cold forceps biopsies were obtained for histology. Using the upper endoscope, the cecum was reached and the terminal ileum was intubated. Clean based cecal ulcer noted. Normal mucosa was otherwise noted in the remaining parts of the examined colon.  Internal hemorrhoids.    RECOMMENDATIONS  - Await pathology  - Follow up tumor markers: CEA and Ca 19-9  - Colorectal surgery (Dr Nunn) evaluation appreciated: patient can follow up as outpatient  - Follow up oncology evaluation in setting of suspected malignancy  - Follow up CT chest for staging  - Advance diet as tolerated  - Can discharge patient on 01/14 if outpatient follow up plan is clear to patient and her sister (Ms Lyon)      Weight Loss and Post Prandial Vomiting with Large Hiatal Hernia s/p T Fastener and PEG Tube Placements on 01/12/2024  History of Hiatal Hernia s/p Repair  Grade B Esophagitis  No Clinical Evidence of GOO  * History of Hiatal hernia s/p repair around 5 years ago at [facility]  * Prior EGD around 10 years ago in [place]  * CT Abdomen and Pelvis w/ IV Cont (01.08.24 @ 15:26) Large hiatal hernia containing partially included stomach with apparent   organoaxial rotationBilateral adrenal nodular thickening. This is similar in appearance to the prior CT of the chest.Bilateral multifocal renal cortical thinning likely reflecting sequela of prior insults. No discrete evidence of abdominal pelvic mass or lymphadenopathy.  * Status Post EGD on 01/12 with T-Fastener Wire and PEG tube Placement for Hiatal Hernia: Grade B esophagitis compatible with nonspecific erosive esophagitis. Erythema in the stomach compatible with non-erosive gastritis. (Biopsy). Indentation of the gastric wall by external finger pressure was demonstrated. Under direct endoscopic visualization, three T-Fasteners were inserted through the abdominal wall and into the gastric lumen along the left lower quadrant. Normal mucosa in the whole examined duodenum.    RECOMMENDATIONS  - Await pathology  - PO protonix 40mg QD?  - Monitor for nausea and vomiting: resolved  - Avoid NSAIDs  - Continue using oral route for feeding and medication administrations: advance diet today  - Do not use PEG tube for feeding or medication administration as it was placed, along with the T fasteners as part of hernia management  - Surgery team on board      Pancreatic Uncinate Coarse Calcification  * Noted on imaging  * Likely from prior inflammation  RECOMMENDATIONS  - Outpatient follow up      Thank you for your consult.  - Please note that plan was communicated with medical team.   - Please reach GI on [phone] during weekdays till 5pm.  - Please call the GI service line after 5pm on Weekdays and anytime on Weekends: [phone].      Omar Wiggins MD  PGY - 4 Gastroenterology Fellow   [facility]         Assessment and Plan  Case of a 78 year old female patient known to have Depression, Dementia, GERD. Hiatal hernia s/p repair, Anemia, HTN, and non obstructive CAD who presented to the ED on 01/08 for evaluation of low outpatient Hb in setting of vomiting, found to be hemodynamically stable with evidence of drop in Hb from baseline of 12.3 in 03/2022 to Hb 7.5 on blood work and evidence of large hiatal hernia on imaging, admitted for further management. We are consulted for concern of acute drop in Hb and vomiting in setting of large hiatal hernia.      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  * Baseline Hb 12.3 in 03/2022  * Recent outpatient Hb 7.4 on 01/05 -> followed with Dr Mercedes Hussein outpatient and referred to ED  * History: daily non bilious non bloody vomitus x few months (sometimes food; other times clear acid secretion); 60lb weight loss in few months (unintentional); no overt blood loss per rectum or os; no recent NSAID use  * ED Labs Hb 7.5 on 01/08; LA 1.2; INR 1.09 on 01/08  * Iron with Total Binding Capacity in AM (01.11.24 @ 06:09)    Iron - Total Binding Capacity.: 321 ug/dL   % Saturation, Iron: 4 %   Iron Total: 13 ug/dL   Unsaturated Iron Binding Capacity: 308 ug/dL  * Refused ERICA on 01/09  * Prior colonoscopy: patient not sure  * Family History: negative for GI malignancies  * CT Abdomen and Pelvis w/ IV Cont (01.08.24 @ 15:26) Large hiatal hernia containing partially included stomach with apparent   organoaxial rotationBilateral adrenal nodular thickening. This is similar in appearance to the prior CT of the chest.Bilateral multifocal renal cortical thinning likely reflecting sequela of prior insults. No discrete evidence of abdominal pelvic mass or lymphadenopathy.  * Status Post Colonoscopy on 01/12 A 3cm friable fungating ulcerated mass extending from 17cm to 20 cm from the anal verge was noted at the level of the sigmoid colon. The mass was obstructing around 75-90% of the lumen. The PCF scope could not traverse the mass, so it was switched with an upper endoscope. Using an upper endoscope, the mass was easily traversed. Multiple cold forceps biopsies were obtained for histology. Using the upper endoscope, the cecum was reached and the terminal ileum was intubated. Clean based cecal ulcer noted. Normal mucosa was otherwise noted in the remaining parts of the examined colon.  Internal hemorrhoids.    RECOMMENDATIONS  - Await pathology  - Follow up tumor markers: CEA and Ca 19-9  - Colorectal surgery (Dr Nunn) evaluation appreciated: patient can follow up as outpatient  - Follow up oncology evaluation in setting of suspected malignancy  - Follow up CT chest for staging  - Advance diet as tolerated  - Can discharge patient on 01/14 if outpatient follow up plan is clear to patient and her sister (Ms Lyon)      Weight Loss and Post Prandial Vomiting with Large Hiatal Hernia s/p T Fastener and PEG Tube Placements on 01/12/2024  History of Hiatal Hernia s/p Repair  Grade B Esophagitis  No Clinical Evidence of GOO  * History of Hiatal hernia s/p repair around 5 years ago at [facility]  * Prior EGD around 10 years ago in [place]  * CT Abdomen and Pelvis w/ IV Cont (01.08.24 @ 15:26) Large hiatal hernia containing partially included stomach with apparent   organoaxial rotationBilateral adrenal nodular thickening. This is similar in appearance to the prior CT of the chest.Bilateral multifocal renal cortical thinning likely reflecting sequela of prior insults. No discrete evidence of abdominal pelvic mass or lymphadenopathy.  * Status Post EGD on 01/12 with T-Fastener Wire and PEG tube Placement for Hiatal Hernia: Grade B esophagitis compatible with nonspecific erosive esophagitis. Erythema in the stomach compatible with non-erosive gastritis. (Biopsy). Indentation of the gastric wall by external finger pressure was demonstrated. Under direct endoscopic visualization, three T-Fasteners were inserted through the abdominal wall and into the gastric lumen along the left lower quadrant. Normal mucosa in the whole examined duodenum.    RECOMMENDATIONS  - Await pathology  - PO protonix 40mg QD?  - Monitor for nausea and vomiting: resolved  - Avoid NSAIDs  - Continue using oral route for feeding and medication administrations: advance diet today  - Do not use PEG tube for feeding or medication administration as it was placed, along with the T fasteners as part of hernia management  - Surgery team on board      Pancreatic Uncinate Coarse Calcification  * Noted on imaging  * Likely from prior inflammation  RECOMMENDATIONS  - Outpatient follow up      Thank you for your consult.  - Please note that plan was communicated with medical team.   - Please reach GI on [phone] during weekdays till 5pm.  - Please call the GI service line after 5pm on Weekdays and anytime on Weekends: [phone].      Omar Wiggins MD  PGY - 4 Gastroenterology Fellow   [facility]         Assessment and Plan  Case of a 78 year old female patient known to have Depression, Dementia, GERD. Hiatal hernia s/p repair, Anemia, HTN, and non obstructive CAD who presented to the ED on 01/08 for evaluation of low outpatient Hb in setting of vomiting, found to be hemodynamically stable with evidence of drop in Hb from baseline of 12.3 in 03/2022 to Hb 7.5 on blood work and evidence of large hiatal hernia on imaging, admitted for further management. We are consulted for concern of acute drop in Hb and vomiting in setting of large hiatal hernia.      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  * Baseline Hb 12.3 in 03/2022  * Recent outpatient Hb 7.4 on 01/05 -> followed with Dr Mercedes Hussein outpatient and referred to ED  * History: daily non bilious non bloody vomitus x few months (sometimes food; other times clear acid secretion); 60lb weight loss in few months (unintentional); no overt blood loss per rectum or os; no recent NSAID use  * ED Labs Hb 7.5 on 01/08; LA 1.2; INR 1.09 on 01/08  * Iron with Total Binding Capacity in AM (01.11.24 @ 06:09)    Iron - Total Binding Capacity.: 321 ug/dL   % Saturation, Iron: 4 %   Iron Total: 13 ug/dL   Unsaturated Iron Binding Capacity: 308 ug/dL  * Refused ERICA on 01/09  * Prior colonoscopy: patient not sure  * Family History: negative for GI malignancies  * CT Abdomen and Pelvis w/ IV Cont (01.08.24 @ 15:26) Large hiatal hernia containing partially included stomach with apparent   organoaxial rotation. Bilateral adrenal nodular thickening. This is similar in appearance to the prior CT of the chest. Bilateral multifocal renal cortical thinning likely reflecting sequela of prior insults. No discrete evidence of abdominal pelvic mass or lymphadenopathy.  * Status Post Colonoscopy on 01/12 A 3cm friable fungating ulcerated mass extending from 17cm to 20 cm from the anal verge was noted at the level of the sigmoid colon. The mass was obstructing around 75-90% of the lumen. The PCF scope could not traverse the mass, so it was switched with an upper endoscope. Using an upper endoscope, the mass was easily traversed. Multiple cold forceps biopsies were obtained for histology. Using the upper endoscope, the cecum was reached and the terminal ileum was intubated. Clean based cecal ulcer noted. Normal mucosa was otherwise noted in the remaining parts of the examined colon.  Internal hemorrhoids.    RECOMMENDATIONS  - Await pathology  - Follow up tumor markers: CEA and Ca 19-9  - Colorectal surgery (Dr Nunn) evaluation appreciated: patient can follow up as outpatient  - Follow up oncology evaluation in setting of suspected malignancy  - Follow up CT chest for staging  - Advance diet as tolerated  - from GI perspective Can discharge patient on 01/14 if outpatient follow up plan is clear to patient and her sister (Ms Lyon)      Weight Loss and Post Prandial Vomiting with Large Hiatal Hernia s/p T Fastener and PEG Tube Placements on 01/12/2024  History of Hiatal Hernia s/p Repair  Grade B Esophagitis  No Clinical Evidence of GOO  * History of Hiatal hernia s/p repair around 5 years ago at [facility]  * Prior EGD around 10 years ago in [place]  * CT Abdomen and Pelvis w/ IV Cont (01.08.24 @ 15:26) Large hiatal hernia containing partially included stomach with apparent   organoaxial rotationBilateral adrenal nodular thickening. This is similar in appearance to the prior CT of the chest.Bilateral multifocal renal cortical thinning likely reflecting sequela of prior insults. No discrete evidence of abdominal pelvic mass or lymphadenopathy.  * Status Post EGD on 01/12 with T-Fastener Wire and PEG tube Placement for Hiatal Hernia: Grade B esophagitis compatible with nonspecific erosive esophagitis. Erythema in the stomach compatible with non-erosive gastritis. (Biopsy). Indentation of the gastric wall by external finger pressure was demonstrated. Under direct endoscopic visualization, three T-Fasteners were inserted through the abdominal wall and into the gastric lumen along the left lower quadrant. Normal mucosa in the whole examined duodenum.    RECOMMENDATIONS  - Await pathology  - PO protonix 40mg QD?  - Monitor for nausea and vomiting: resolved  - Avoid NSAIDs  - Continue using oral route for feeding and medication administrations: advance diet today  - Do not use PEG tube for feeding or medication administration as it was placed, along with the T fasteners as part of hernia management  - Surgery team on board      Pancreatic Uncinate Coarse Calcification  * Noted on imaging  * Likely from prior inflammation  RECOMMENDATIONS  - Outpatient follow up      Thank you for your consult.  - Please note that plan was communicated with medical team.   - Please reach GI on [phone] during weekdays till 5pm.  - Please call the GI service line after 5pm on Weekdays and anytime on Weekends: [phone].      Omar Wiggins MD  PGY - 4 Gastroenterology Fellow   [facility]         Assessment and Plan  Case of a 78 year old female patient known to have Depression, Dementia, GERD. Hiatal hernia s/p repair, Anemia, HTN, and non obstructive CAD who presented to the ED on 01/08 for evaluation of low outpatient Hb in setting of vomiting, found to be hemodynamically stable with evidence of drop in Hb from baseline of 12.3 in 03/2022 to Hb 7.5 on blood work and evidence of large hiatal hernia on imaging, admitted for further management. We are consulted for concern of acute drop in Hb and vomiting in setting of large hiatal hernia.      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  * Baseline Hb 12.3 in 03/2022  * Recent outpatient Hb 7.4 on 01/05 -> followed with Dr Mercedes Hussein outpatient and referred to ED  * History: daily non bilious non bloody vomitus x few months (sometimes food; other times clear acid secretion); 60lb weight loss in few months (unintentional); no overt blood loss per rectum or os; no recent NSAID use  * ED Labs Hb 7.5 on 01/08; LA 1.2; INR 1.09 on 01/08  * Iron with Total Binding Capacity in AM (01.11.24 @ 06:09)    Iron - Total Binding Capacity.: 321 ug/dL   % Saturation, Iron: 4 %   Iron Total: 13 ug/dL   Unsaturated Iron Binding Capacity: 308 ug/dL  * Refused ERICA on 01/09  * Prior colonoscopy: patient not sure  * Family History: negative for GI malignancies  * CT Abdomen and Pelvis w/ IV Cont (01.08.24 @ 15:26) Large hiatal hernia containing partially included stomach with apparent   organoaxial rotation. Bilateral adrenal nodular thickening. This is similar in appearance to the prior CT of the chest. Bilateral multifocal renal cortical thinning likely reflecting sequela of prior insults. No discrete evidence of abdominal pelvic mass or lymphadenopathy.  * Status Post Colonoscopy on 01/12 A 3cm friable fungating ulcerated mass extending from 17cm to 20 cm from the anal verge was noted at the level of the sigmoid colon. The mass was obstructing around 75-90% of the lumen. The PCF scope could not traverse the mass, so it was switched with an upper endoscope. Using an upper endoscope, the mass was easily traversed. Multiple cold forceps biopsies were obtained for histology. Using the upper endoscope, the cecum was reached and the terminal ileum was intubated. Clean based cecal ulcer noted. Normal mucosa was otherwise noted in the remaining parts of the examined colon.  Internal hemorrhoids.    RECOMMENDATIONS  - Await pathology  - Follow up tumor markers: CEA and Ca 19-9  - Colorectal surgery (Dr Nunn) evaluation appreciated: patient can follow up as outpatient  - Follow up oncology evaluation in setting of suspected malignancy  - Follow up CT chest for staging  - Advance diet as tolerated  - from GI perspective Can discharge patient on 01/14 if outpatient follow up plan is clear to patient and her sister (Ms Lyon)      Weight Loss and Post Prandial Vomiting with Large Hiatal Hernia s/p T Fastener and PEG Tube Placements on 01/12/2024  History of Hiatal Hernia s/p Repair  Grade B Esophagitis  No Clinical Evidence of GOO  * History of Hiatal hernia s/p repair around 5 years ago at [facility]  * Prior EGD around 10 years ago in [place]  * CT Abdomen and Pelvis w/ IV Cont (01.08.24 @ 15:26) Large hiatal hernia containing partially included stomach with apparent   organoaxial rotationBilateral adrenal nodular thickening. This is similar in appearance to the prior CT of the chest.Bilateral multifocal renal cortical thinning likely reflecting sequela of prior insults. No discrete evidence of abdominal pelvic mass or lymphadenopathy.  * Status Post EGD on 01/12 with T-Fastener Wire and PEG tube Placement for Hiatal Hernia: Grade B esophagitis compatible with nonspecific erosive esophagitis. Erythema in the stomach compatible with non-erosive gastritis. (Biopsy). Indentation of the gastric wall by external finger pressure was demonstrated. Under direct endoscopic visualization, three T-Fasteners were inserted through the abdominal wall and into the gastric lumen along the left lower quadrant. Normal mucosa in the whole examined duodenum.    RECOMMENDATIONS  - Await pathology  - PO protonix 40mg QD?  - Monitor for nausea and vomiting: resolved  - Avoid NSAIDs  - Continue using oral route for feeding and medication administrations: advance diet today  - Do not use PEG tube for feeding or medication administration as it was placed, along with the T fasteners as part of hernia management  - Surgery team on board      Pancreatic Uncinate Coarse Calcification  * Noted on imaging  * Likely from prior inflammation  RECOMMENDATIONS  - Outpatient follow up      Thank you for your consult.  - Please note that plan was communicated with medical team.   - Please reach GI on [phone] during weekdays till 5pm.  - Please call the GI service line after 5pm on Weekdays and anytime on Weekends: [phone].      Omar Wiggins MD  PGY - 4 Gastroenterology Fellow   [facility]

## 2024-01-13 NOTE — PROGRESS NOTE ADULT - ASSESSMENT
77 y/o female with PMHx HTN, GERD, non-obstructive CAD, Dementia, anemia, MDD P/W Hb 7.4 OP, episodic vomiting for few months, 60lb weight loss. Found to have Hb 7.5 on admission.     #Microcytic anemia (r/o GIB)  - Vitals on admission --> /67, , RR 19, T98, O2 98% RA  - Labs on admission --> Hb 7.5, MCV 66, Cr 1.6  - CTAP --> large hiatal hernia, b/l adrenal nodular thickening, b/l multifocal renal cortical thinning   - S/p 1u prbc in ED  - Admitted to medicine for further management  - GI Consult --> EGD/Colonscopy done 1/12 - sigmoid mass found - biopsy taken, CEA/Ca 19-9 ordered; pt seen by colorectal surgery- plan dc and OP follow up   - 1/13 discussion with sister Sonali - no surgery at present, ok for Dr. Hussein to see the patient - Sonali doesn't want pt to know about mass currently   - some iron deficiency - 1 dose Venofer 1/11 and started  pt on iron PO     #Vomiting likely 2/2 Type III hiatal hernia-resolved   - CT abdomen shows Large hiatal hernia containing partially included stomach with apparent organoaxial rotation.  - Currently no abdominal pain/nausea/vomiting. Tolerating water.     #DAWIT on CKD stage 3 vs. Progressive CKD - improving   - Serum Cr 1.4 currently. Baseline Cr 1.2 in March 2022.   - Renal US --> no hydro, echogenic kidneys can reflect medical renal disease  - Cr peaked at 1.7 then trended down to 1.5  - C/w IVF gentle hydration  - C/w trending BMP    #HTN   - Lisinopril held  - C/w Nifedipine 60mg QD  - C/w metoprolol succinate 50mg QD    #HFpEF  - On lasix PO 40 BID at home  - TTE 10/1/20 --> EF 60-65%, mild concentric LVH, G2DD  - Holding lasix for now due to DAWIT and mild hypokalemia    #Asthma  - Follows dr. Hartley who had recommended symbicort 160-4.5 2 puff BID and combivent prn but pt does not take it  - Monitor     #Depression  - C/w home meds    #MISC  DVT ppx: SCD  GI ppx: PPI  Diet: NPO  Activity: as tolerated  Pending: physical therapy consult, Sonali would like pt discharged to [facility]  Family: Sonali (sister) updated via phone 1/13 79 y/o female with PMHx HTN, GERD, non-obstructive CAD, Dementia, anemia, MDD P/W Hb 7.4 OP, episodic vomiting for few months, 60lb weight loss. Found to have Hb 7.5 on admission.     #Microcytic anemia (r/o GIB)  - Vitals on admission --> /67, , RR 19, T98, O2 98% RA  - Labs on admission --> Hb 7.5, MCV 66, Cr 1.6  - CTAP --> large hiatal hernia, b/l adrenal nodular thickening, b/l multifocal renal cortical thinning   - S/p 1u prbc in ED  - Admitted to medicine for further management  - GI Consult --> EGD/Colonscopy done 1/12 - sigmoid mass found - biopsy taken, CEA/Ca 19-9 ordered; pt seen by colorectal surgery- plan dc and OP follow up   - 1/13 discussion with sister Sonali - no surgery at present, ok for Dr. Hussein to see the patient - Sonali doesn't want pt to know about mass currently   - some iron deficiency - 1 dose Venofer 1/11 and started  pt on iron PO     #Vomiting likely 2/2 Type III hiatal hernia-resolved   - CT abdomen shows Large hiatal hernia containing partially included stomach with apparent organoaxial rotation.  - Currently no abdominal pain/nausea/vomiting. Tolerating water.     #DAWIT on CKD stage 3 vs. Progressive CKD - improving   - Serum Cr 1.4 currently. Baseline Cr 1.2 in March 2022.   - Renal US --> no hydro, echogenic kidneys can reflect medical renal disease  - Cr peaked at 1.7 then trended down to 1.5  - C/w IVF gentle hydration  - C/w trending BMP    #HTN   - Lisinopril held  - C/w Nifedipine 60mg QD  - C/w metoprolol succinate 50mg QD    #HFpEF  - On lasix PO 40 BID at home  - TTE 10/1/20 --> EF 60-65%, mild concentric LVH, G2DD  - Holding lasix for now due to DAWIT and mild hypokalemia    #Asthma  - Follows dr. Hartley who had recommended symbicort 160-4.5 2 puff BID and combivent prn but pt does not take it  - Monitor     #Depression  - C/w home meds    #MISC  DVT ppx: SCD  GI ppx: PPI  Diet: NPO  Activity: as tolerated  Pending: physical therapy consult, Sonali would like pt discharged to [facility]  Family: Sonali (sister) updated via phone 1/13

## 2024-01-13 NOTE — PROGRESS NOTE ADULT - SUBJECTIVE AND OBJECTIVE BOX
Gastroenterology Follow Up Note      Location: [address] (Back) 015 [address] ([facility] 4B (Back))  Patient Name: DARIEN BARRERA  Age: 78y  Gender: Female      Chief Complaint  Patient is a 78y old Female who presents with a chief complaint of Anemia (13 Jan 2024 14:25)  Primary diagnosis of Weakness      Reason for Consult  Anemia  Vomiting  Hiatal Hernia      Progress Note  This morning patient was seen and examined at bedside.    Today is hospital day 5d.  Patient is doing fine. No acute events overnight.   Patient's appetite is reduced, but she denies nausea or vomiting.   Patient notes minimal discomfort near site of PEG tube and T fastener wire placements.  Last bowel movement was soft and was on 01/11.      Vital Signs in the last 24 hours   Vitals Summary T(C): 36.4 (01-13-24 @ 16:19), Max: 36.4 (01-13-24 @ 16:19)  HR: 65 (01-13-24 @ 16:19) (65 - 69)  BP: 106/55 (01-13-24 @ 16:19) (106/55 - 140/66)  RR: 18 (01-13-24 @ 16:19) (18 - 20)  SpO2: --  Vent Data   Intake/ Output   01-12-24 @ 07:01  -  01-13-24 @ 07:00  --------------------------------------------------------  IN: 418 mL / OUT: 400 mL / NET: 18 mL    01-13-24 @ 07:01  -  01-13-24 @ 19:13  --------------------------------------------------------  IN: 450 mL / OUT: 0 mL / NET: 450 mL      Physical Exam  * General Appearance: Alert, cooperative, interactive, oriented to time, place, and person, in no acute distress  * Lungs: Good bilateral air entry  * Heart: Regular Rate and Rhythm, normal S1 and S2, no audible murmur, rub, or gallop  * Abdomen: Symmetric, non-distended, soft, non-tender, bowel sounds active all four quadrants, no masses, no organomegaly (no hepatosplenomegaly)  * Rectal: refused ERICA 01/09      Investigations   Laboratory Workup      - CBC:                        9.5    12.34 )-----------( 429      ( 13 Jan 2024 07:06 )             32.3       - Hgb Trend:  9.5  01-13-24 @ 07:06  9.5  01-12-24 @ 06:39  8.6  01-11-24 @ 22:01  8.7  01-11-24 @ 06:09          - Chemistry:  01-13    136  |  95<L>  |  17  ----------------------------<  80  3.5   |  28  |  1.4    Ca    9.3      13 Jan 2024 07:06  Mg     1.7     01-13    TPro  6.3  /  Alb  3.7  /  TBili  0.3  /  DBili  x   /  AST  13  /  ALT  8   /  AlkPhos  108  01-12    Liver panel trend:  TBili 0.3   /   AST 13   /   ALT 8   /   AlkP 108   /   Tptn 6.3   /   Alb 3.7    /   DBili --      01-12  TBili 0.2   /   AST 13   /   ALT 9   /   AlkP 108   /   Tptn 6.3   /   Alb 3.7    /   DBili --      01-11  TBili 0.4   /   AST 12   /   ALT 8   /   AlkP 101   /   Tptn 6.0   /   Alb 3.5    /   DBili --      01-11  TBili 0.5   /   AST 13   /   ALT 7   /   AlkP 96   /   Tptn 6.0   /   Alb 3.5    /   DBili --      01-10  TBili 0.6   /   AST 14   /   ALT 9   /   AlkP 100   /   Tptn 6.7   /   Alb 3.8    /   DBili --      01-09  TBili 0.5   /   AST 12   /   ALT 9   /   AlkP 103   /   Tptn 6.7   /   Alb 3.9    /   DBili --      01-09  TBili 0.5   /   AST 14   /   ALT 8   /   AlkP 97   /   Tptn 6.7   /   Alb 3.8    /   DBili --      01-08      - Coagulation Studies:  PT/INR - ( 11 Jan 2024 22:01 )   PT: 11.20 sec;   INR: 0.98 ratio         PTT - ( 11 Jan 2024 22:01 )  PTT:29.2 sec      Microbiological Workup  Urinalysis Basic - ( 13 Jan 2024 07:06 )    Color: x / Appearance: x / SG: x / pH: x  Gluc: 80 mg/dL / Ketone: x  / Bili: x / Urobili: x   Blood: x / Protein: x / Nitrite: x   Leuk Esterase: x / RBC: x / WBC x   Sq Epi: x / Non Sq Epi: x / Bacteria: x          Current Medications  Standing Medications  ALPRAZolam 0.25 milliGRAM(s) Oral at bedtime  bisacodyl 20 milliGRAM(s) Oral at bedtime  ferrous    sulfate 325 milliGRAM(s) Oral daily  lactated ringers. 1000 milliLiter(s) (75 mL/Hr) IV Continuous <Continuous>  magnesium sulfate  IVPB 2 Gram(s) IV Intermittent once  metoprolol succinate ER 50 milliGRAM(s) Oral daily  NIFEdipine XL 60 milliGRAM(s) Oral daily  pantoprazole    Tablet 40 milliGRAM(s) Oral before breakfast  sertraline 100 milliGRAM(s) Oral daily    PRN Medications    Singles Doses Administered  (ADM OVERRIDE) 1 each &lt;see task&gt; GiveOnce  (ADM OVERRIDE) 1 each &lt;see task&gt; GiveOnce  (ADM OVERRIDE) 1 each &lt;see task&gt; GiveOnce  (ADM OVERRIDE) 1 each &lt;see task&gt; GiveOnce  (ADM OVERRIDE) 1 each &lt;see task&gt; GiveOnce  (ADM OVERRIDE) 1 each &lt;see task&gt; GiveOnce  bisacodyl 20 milliGRAM(s) Oral once  iohexol 300 mG (iodine)/mL Oral Solution 30 milliLiter(s) Oral once  iron sucrose IVPB 200 milliGRAM(s) IV Intermittent once  magnesium citrate Oral Solution 296 milliLiter(s) Oral once  magnesium sulfate  IVPB 2 Gram(s) IV Intermittent once  morphine  - Injectable 1 milliGRAM(s) IV Push once  morphine  - Injectable 1 milliGRAM(s) IV Push once  NIFEdipine XL 30 milliGRAM(s) Oral once  ondansetron Injectable 4 milliGRAM(s) IV Push once  ondansetron Injectable 4 milliGRAM(s) IV Push once  polyethylene glycol/electrolyte Solution. 4000 milliLiter(s) Oral once  polyethylene glycol/electrolyte Solution. 4000 milliLiter(s) Oral once  potassium chloride    Tablet ER 20 milliEquivalent(s) Oral every 2 hours  potassium chloride    Tablet ER 20 milliEquivalent(s) Oral every 2 hours  potassium chloride  20 mEq/100 mL IVPB 20 milliEquivalent(s) IV Intermittent once  simethicone 80 milliGRAM(s) Chew once   Gastroenterology Follow Up Note      Location: [address] (Back) 015 A ([facility] 4B (Back))  Patient Name: DARIEN BARRERA  Age: 78y  Gender: Female      Chief Complaint  Patient is a 78y old Female who presents with a chief complaint of Anemia (13 Jan 2024 14:25)  Primary diagnosis of Weakness      Reason for Consult  Anemia  Vomiting  Hiatal Hernia      Progress Note  This morning patient was seen and examined at bedside.    Today is hospital day 5d.  Patient is doing fine. No acute events overnight.   Patient's appetite is reduced, but she denies nausea or vomiting.   Patient notes minimal discomfort near site of PEG tube and T fastener wire placements.  Last bowel movement was soft and was on 01/11.      Vital Signs in the last 24 hours   Vitals Summary T(C): 36.4 (01-13-24 @ 16:19), Max: 36.4 (01-13-24 @ 16:19)  HR: 65 (01-13-24 @ 16:19) (65 - 69)  BP: 106/55 (01-13-24 @ 16:19) (106/55 - 140/66)  RR: 18 (01-13-24 @ 16:19) (18 - 20)  SpO2: --  Vent Data   Intake/ Output   01-12-24 @ 07:01  -  01-13-24 @ 07:00  --------------------------------------------------------  IN: 418 mL / OUT: 400 mL / NET: 18 mL    01-13-24 @ 07:01  -  01-13-24 @ 19:13  --------------------------------------------------------  IN: 450 mL / OUT: 0 mL / NET: 450 mL      Physical Exam  * General Appearance: Alert, cooperative, interactive, oriented to time, place, and person, in no acute distress  * Lungs: Good bilateral air entry  * Heart: Regular Rate and Rhythm, normal S1 and S2, no audible murmur, rub, or gallop  * Abdomen: Symmetric, non-distended, soft, non-tender, bowel sounds active all four quadrants, no masses, no organomegaly (no hepatosplenomegaly)  * Rectal: refused ERICA 01/09      Investigations   Laboratory Workup      - CBC:                        9.5    12.34 )-----------( 429      ( 13 Jan 2024 07:06 )             32.3       - Hgb Trend:  9.5  01-13-24 @ 07:06  9.5  01-12-24 @ 06:39  8.6  01-11-24 @ 22:01  8.7  01-11-24 @ 06:09          - Chemistry:  01-13    136  |  95<L>  |  17  ----------------------------<  80  3.5   |  28  |  1.4    Ca    9.3      13 Jan 2024 07:06  Mg     1.7     01-13    TPro  6.3  /  Alb  3.7  /  TBili  0.3  /  DBili  x   /  AST  13  /  ALT  8   /  AlkPhos  108  01-12    Liver panel trend:  TBili 0.3   /   AST 13   /   ALT 8   /   AlkP 108   /   Tptn 6.3   /   Alb 3.7    /   DBili --      01-12  TBili 0.2   /   AST 13   /   ALT 9   /   AlkP 108   /   Tptn 6.3   /   Alb 3.7    /   DBili --      01-11  TBili 0.4   /   AST 12   /   ALT 8   /   AlkP 101   /   Tptn 6.0   /   Alb 3.5    /   DBili --      01-11  TBili 0.5   /   AST 13   /   ALT 7   /   AlkP 96   /   Tptn 6.0   /   Alb 3.5    /   DBili --      01-10  TBili 0.6   /   AST 14   /   ALT 9   /   AlkP 100   /   Tptn 6.7   /   Alb 3.8    /   DBili --      01-09  TBili 0.5   /   AST 12   /   ALT 9   /   AlkP 103   /   Tptn 6.7   /   Alb 3.9    /   DBili --      01-09  TBili 0.5   /   AST 14   /   ALT 8   /   AlkP 97   /   Tptn 6.7   /   Alb 3.8    /   DBili --      01-08      - Coagulation Studies:  PT/INR - ( 11 Jan 2024 22:01 )   PT: 11.20 sec;   INR: 0.98 ratio         PTT - ( 11 Jan 2024 22:01 )  PTT:29.2 sec      Microbiological Workup  Urinalysis Basic - ( 13 Jan 2024 07:06 )    Color: x / Appearance: x / SG: x / pH: x  Gluc: 80 mg/dL / Ketone: x  / Bili: x / Urobili: x   Blood: x / Protein: x / Nitrite: x   Leuk Esterase: x / RBC: x / WBC x   Sq Epi: x / Non Sq Epi: x / Bacteria: x          Current Medications  Standing Medications  ALPRAZolam 0.25 milliGRAM(s) Oral at bedtime  bisacodyl 20 milliGRAM(s) Oral at bedtime  ferrous    sulfate 325 milliGRAM(s) Oral daily  lactated ringers. 1000 milliLiter(s) (75 mL/Hr) IV Continuous <Continuous>  magnesium sulfate  IVPB 2 Gram(s) IV Intermittent once  metoprolol succinate ER 50 milliGRAM(s) Oral daily  NIFEdipine XL 60 milliGRAM(s) Oral daily  pantoprazole    Tablet 40 milliGRAM(s) Oral before breakfast  sertraline 100 milliGRAM(s) Oral daily    PRN Medications    Singles Doses Administered  (ADM OVERRIDE) 1 each &lt;see task&gt; GiveOnce  (ADM OVERRIDE) 1 each &lt;see task&gt; GiveOnce  (ADM OVERRIDE) 1 each &lt;see task&gt; GiveOnce  (ADM OVERRIDE) 1 each &lt;see task&gt; GiveOnce  (ADM OVERRIDE) 1 each &lt;see task&gt; GiveOnce  (ADM OVERRIDE) 1 each &lt;see task&gt; GiveOnce  bisacodyl 20 milliGRAM(s) Oral once  iohexol 300 mG (iodine)/mL Oral Solution 30 milliLiter(s) Oral once  iron sucrose IVPB 200 milliGRAM(s) IV Intermittent once  magnesium citrate Oral Solution 296 milliLiter(s) Oral once  magnesium sulfate  IVPB 2 Gram(s) IV Intermittent once  morphine  - Injectable 1 milliGRAM(s) IV Push once  morphine  - Injectable 1 milliGRAM(s) IV Push once  NIFEdipine XL 30 milliGRAM(s) Oral once  ondansetron Injectable 4 milliGRAM(s) IV Push once  ondansetron Injectable 4 milliGRAM(s) IV Push once  polyethylene glycol/electrolyte Solution. 4000 milliLiter(s) Oral once  polyethylene glycol/electrolyte Solution. 4000 milliLiter(s) Oral once  potassium chloride    Tablet ER 20 milliEquivalent(s) Oral every 2 hours  potassium chloride    Tablet ER 20 milliEquivalent(s) Oral every 2 hours  potassium chloride  20 mEq/100 mL IVPB 20 milliEquivalent(s) IV Intermittent once  simethicone 80 milliGRAM(s) Chew once

## 2024-01-14 ENCOUNTER — TRANSCRIPTION ENCOUNTER (OUTPATIENT)
Age: 79
End: 2024-01-14

## 2024-01-14 LAB
ANION GAP SERPL CALC-SCNC: 13 MMOL/L — SIGNIFICANT CHANGE UP (ref 7–14)
ANION GAP SERPL CALC-SCNC: 13 MMOL/L — SIGNIFICANT CHANGE UP (ref 7–14)
BASOPHILS # BLD AUTO: 0.04 K/UL — SIGNIFICANT CHANGE UP (ref 0–0.2)
BASOPHILS NFR BLD AUTO: 0.3 % — SIGNIFICANT CHANGE UP (ref 0–1)
BUN SERPL-MCNC: 23 MG/DL — HIGH (ref 10–20)
BUN SERPL-MCNC: 23 MG/DL — HIGH (ref 10–20)
CALCIUM SERPL-MCNC: 9.2 MG/DL — SIGNIFICANT CHANGE UP (ref 8.4–10.5)
CALCIUM SERPL-MCNC: 9.2 MG/DL — SIGNIFICANT CHANGE UP (ref 8.4–10.5)
CHLORIDE SERPL-SCNC: 96 MMOL/L — LOW (ref 98–110)
CHLORIDE SERPL-SCNC: 96 MMOL/L — LOW (ref 98–110)
CO2 SERPL-SCNC: 26 MMOL/L — SIGNIFICANT CHANGE UP (ref 17–32)
CO2 SERPL-SCNC: 26 MMOL/L — SIGNIFICANT CHANGE UP (ref 17–32)
CREAT SERPL-MCNC: 1.7 MG/DL — HIGH (ref 0.7–1.5)
CREAT SERPL-MCNC: 1.7 MG/DL — HIGH (ref 0.7–1.5)
EGFR: 30 ML/MIN/1.73M2 — LOW
EGFR: 30 ML/MIN/1.73M2 — LOW
EOSINOPHIL # BLD AUTO: 0 K/UL — SIGNIFICANT CHANGE UP (ref 0–0.7)
EOSINOPHIL NFR BLD AUTO: 0 % — SIGNIFICANT CHANGE UP (ref 0–8)
GLUCOSE SERPL-MCNC: 81 MG/DL — SIGNIFICANT CHANGE UP (ref 70–99)
GLUCOSE SERPL-MCNC: 81 MG/DL — SIGNIFICANT CHANGE UP (ref 70–99)
HCT VFR BLD CALC: 30 % — LOW (ref 37–47)
HCT VFR BLD CALC: 30 % — LOW (ref 37–47)
HCT VFR BLD CALC: 31.1 % — LOW (ref 37–47)
HCT VFR BLD CALC: 31.1 % — LOW (ref 37–47)
HGB BLD-MCNC: 8.9 G/DL — LOW (ref 12–16)
HGB BLD-MCNC: 8.9 G/DL — LOW (ref 12–16)
HGB BLD-MCNC: 9.1 G/DL — LOW (ref 12–16)
HGB BLD-MCNC: 9.1 G/DL — LOW (ref 12–16)
IMM GRANULOCYTES NFR BLD AUTO: 0.5 % — HIGH (ref 0.1–0.3)
IMM GRANULOCYTES NFR BLD AUTO: 0.5 % — HIGH (ref 0.1–0.3)
IMM GRANULOCYTES NFR BLD AUTO: 1.1 % — HIGH (ref 0.1–0.3)
IMM GRANULOCYTES NFR BLD AUTO: 1.1 % — HIGH (ref 0.1–0.3)
LYMPHOCYTES # BLD AUTO: 1.76 K/UL — SIGNIFICANT CHANGE UP (ref 1.2–3.4)
LYMPHOCYTES # BLD AUTO: 1.76 K/UL — SIGNIFICANT CHANGE UP (ref 1.2–3.4)
LYMPHOCYTES # BLD AUTO: 12 % — LOW (ref 20.5–51.1)
LYMPHOCYTES # BLD AUTO: 12 % — LOW (ref 20.5–51.1)
LYMPHOCYTES # BLD AUTO: 14.8 % — LOW (ref 20.5–51.1)
LYMPHOCYTES # BLD AUTO: 14.8 % — LOW (ref 20.5–51.1)
LYMPHOCYTES # BLD AUTO: 2.06 K/UL — SIGNIFICANT CHANGE UP (ref 1.2–3.4)
LYMPHOCYTES # BLD AUTO: 2.06 K/UL — SIGNIFICANT CHANGE UP (ref 1.2–3.4)
MAGNESIUM SERPL-MCNC: 2.2 MG/DL — SIGNIFICANT CHANGE UP (ref 1.8–2.4)
MAGNESIUM SERPL-MCNC: 2.2 MG/DL — SIGNIFICANT CHANGE UP (ref 1.8–2.4)
MCHC RBC-ENTMCNC: 20.5 PG — LOW (ref 27–31)
MCHC RBC-ENTMCNC: 20.5 PG — LOW (ref 27–31)
MCHC RBC-ENTMCNC: 20.6 PG — LOW (ref 27–31)
MCHC RBC-ENTMCNC: 20.6 PG — LOW (ref 27–31)
MCHC RBC-ENTMCNC: 29.3 G/DL — LOW (ref 32–37)
MCHC RBC-ENTMCNC: 29.3 G/DL — LOW (ref 32–37)
MCHC RBC-ENTMCNC: 29.7 G/DL — LOW (ref 32–37)
MCHC RBC-ENTMCNC: 29.7 G/DL — LOW (ref 32–37)
MCV RBC AUTO: 69.1 FL — LOW (ref 81–99)
MCV RBC AUTO: 69.1 FL — LOW (ref 81–99)
MCV RBC AUTO: 70.4 FL — LOW (ref 81–99)
MCV RBC AUTO: 70.4 FL — LOW (ref 81–99)
MONOCYTES # BLD AUTO: 0.72 K/UL — HIGH (ref 0.1–0.6)
MONOCYTES # BLD AUTO: 0.72 K/UL — HIGH (ref 0.1–0.6)
MONOCYTES # BLD AUTO: 0.83 K/UL — HIGH (ref 0.1–0.6)
MONOCYTES # BLD AUTO: 0.83 K/UL — HIGH (ref 0.1–0.6)
MONOCYTES NFR BLD AUTO: 4.9 % — SIGNIFICANT CHANGE UP (ref 1.7–9.3)
MONOCYTES NFR BLD AUTO: 4.9 % — SIGNIFICANT CHANGE UP (ref 1.7–9.3)
MONOCYTES NFR BLD AUTO: 6 % — SIGNIFICANT CHANGE UP (ref 1.7–9.3)
MONOCYTES NFR BLD AUTO: 6 % — SIGNIFICANT CHANGE UP (ref 1.7–9.3)
NEUTROPHILS # BLD AUTO: 10.93 K/UL — HIGH (ref 1.4–6.5)
NEUTROPHILS # BLD AUTO: 10.93 K/UL — HIGH (ref 1.4–6.5)
NEUTROPHILS # BLD AUTO: 12.02 K/UL — HIGH (ref 1.4–6.5)
NEUTROPHILS # BLD AUTO: 12.02 K/UL — HIGH (ref 1.4–6.5)
NEUTROPHILS NFR BLD AUTO: 78.4 % — HIGH (ref 42.2–75.2)
NEUTROPHILS NFR BLD AUTO: 78.4 % — HIGH (ref 42.2–75.2)
NEUTROPHILS NFR BLD AUTO: 81.7 % — HIGH (ref 42.2–75.2)
NEUTROPHILS NFR BLD AUTO: 81.7 % — HIGH (ref 42.2–75.2)
NRBC # BLD: 0 /100 WBCS — SIGNIFICANT CHANGE UP (ref 0–0)
PLATELET # BLD AUTO: 357 K/UL — SIGNIFICANT CHANGE UP (ref 130–400)
PLATELET # BLD AUTO: 357 K/UL — SIGNIFICANT CHANGE UP (ref 130–400)
PLATELET # BLD AUTO: 410 K/UL — HIGH (ref 130–400)
PLATELET # BLD AUTO: 410 K/UL — HIGH (ref 130–400)
PMV BLD: 9.1 FL — SIGNIFICANT CHANGE UP (ref 7.4–10.4)
PMV BLD: 9.1 FL — SIGNIFICANT CHANGE UP (ref 7.4–10.4)
PMV BLD: 9.3 FL — SIGNIFICANT CHANGE UP (ref 7.4–10.4)
PMV BLD: 9.3 FL — SIGNIFICANT CHANGE UP (ref 7.4–10.4)
POTASSIUM SERPL-MCNC: 3.4 MMOL/L — LOW (ref 3.5–5)
POTASSIUM SERPL-MCNC: 3.4 MMOL/L — LOW (ref 3.5–5)
POTASSIUM SERPL-SCNC: 3.4 MMOL/L — LOW (ref 3.5–5)
POTASSIUM SERPL-SCNC: 3.4 MMOL/L — LOW (ref 3.5–5)
RBC # BLD: 4.34 M/UL — SIGNIFICANT CHANGE UP (ref 4.2–5.4)
RBC # BLD: 4.34 M/UL — SIGNIFICANT CHANGE UP (ref 4.2–5.4)
RBC # BLD: 4.42 M/UL — SIGNIFICANT CHANGE UP (ref 4.2–5.4)
RBC # BLD: 4.42 M/UL — SIGNIFICANT CHANGE UP (ref 4.2–5.4)
RBC # FLD: 19.5 % — HIGH (ref 11.5–14.5)
RBC # FLD: 19.5 % — HIGH (ref 11.5–14.5)
RBC # FLD: 20.2 % — HIGH (ref 11.5–14.5)
RBC # FLD: 20.2 % — HIGH (ref 11.5–14.5)
SODIUM SERPL-SCNC: 135 MMOL/L — SIGNIFICANT CHANGE UP (ref 135–146)
SODIUM SERPL-SCNC: 135 MMOL/L — SIGNIFICANT CHANGE UP (ref 135–146)
WBC # BLD: 13.93 K/UL — HIGH (ref 4.8–10.8)
WBC # BLD: 13.93 K/UL — HIGH (ref 4.8–10.8)
WBC # BLD: 14.7 K/UL — HIGH (ref 4.8–10.8)
WBC # BLD: 14.7 K/UL — HIGH (ref 4.8–10.8)
WBC # FLD AUTO: 13.93 K/UL — HIGH (ref 4.8–10.8)
WBC # FLD AUTO: 13.93 K/UL — HIGH (ref 4.8–10.8)
WBC # FLD AUTO: 14.7 K/UL — HIGH (ref 4.8–10.8)
WBC # FLD AUTO: 14.7 K/UL — HIGH (ref 4.8–10.8)

## 2024-01-14 PROCEDURE — 99232 SBSQ HOSP IP/OBS MODERATE 35: CPT

## 2024-01-14 RX ADMIN — Medication 0.25 MILLIGRAM(S): at 21:14

## 2024-01-14 RX ADMIN — Medication 60 MILLIGRAM(S): at 05:12

## 2024-01-14 RX ADMIN — SERTRALINE 100 MILLIGRAM(S): 25 TABLET, FILM COATED ORAL at 11:06

## 2024-01-14 RX ADMIN — PANTOPRAZOLE SODIUM 40 MILLIGRAM(S): 20 TABLET, DELAYED RELEASE ORAL at 05:12

## 2024-01-14 RX ADMIN — Medication 50 MILLIGRAM(S): at 05:12

## 2024-01-14 RX ADMIN — Medication 325 MILLIGRAM(S): at 11:07

## 2024-01-14 RX ADMIN — Medication 20 MILLIGRAM(S): at 21:15

## 2024-01-14 NOTE — PROGRESS NOTE ADULT - ASSESSMENT
79 y/o female with PMHx HTN, GERD, non-obstructive CAD, Dementia, anemia, MDD P/W Hb 7.4 OP, episodic vomiting for few months, 60lb weight loss. Found to have Hb 7.5 on admission.     #Microcytic anemia (r/o GIB)  - Vitals on admission --> /67, , RR 19, T98, O2 98% RA  - Labs on admission --> Hb 7.5, MCV 66, Cr 1.6  - CTAP --> large hiatal hernia, b/l adrenal nodular thickening, b/l multifocal renal cortical thinning   - S/p 1u prbc in ED  - Admitted to medicine for further management  - GI Consult --> EGD/C-scope 1/10, golytely and dulcolax 1/9 PM, preop labs  - Mg Citrate x 1 dose given 1/10 AM to help clear BM  - 1/10 --> EGD/C-scope cancelled by anesthesia due to low pulse ox, GI recommending OP f/u  - 1/11 --> Pulm consult for further recs || 1 dose IV venofer then Ferrous sulfate QD  - 1/12 --> EGD/COLON  - 1/12 EGD with T-Fastener Wire and PEG tube Placement for Hiatal Hernia  - EGD noted nonspecific erosive esophagitis, non-erosive gastritis. (Biopsy), Normal mucosa in the whole examined duodenum.  - Indentation of the gastric wall by external finger pressure was demonstrated. Under direct endoscopic visualization, three T-Fasteners were inserted through the abdominal wall and into the gastric lumen along the left lower quadrant.  - 1/12 COLON  - COLON showed 3cm friable fungating ulcerated mass extending from 17cm to 20 cm from the anal verge was noted at the level of the sigmoid colon (biopsy), Internal hemorrhoids  - iron low   - ferritin unremarkable   - transferrin unremarkable   - Await pathology for EGD/COLON  - CEA  - Ca 19-9  - Recommend pan-CT imaging to complete staging process in setting of suspected malignancy  CONSULTS  - GI recommendations appreciated  - hematology/oncology recommendations pending   - surgery onc recommendations pending (Dr Nunn)  PLAN  - PO protonix 40mg QD?  - Avoid NSAIDs  - Continue using oral route for feeding and medication administrations  - Do not use PEG tube for feeding or medication administration as it was placed, along with the T fasteners as part of hernia management  - ferrous sulfate 325mg daily   - Please obtain preoperative cardiac risk stratification  - HCP Soanli unsure about surgery  - patient can follow up outpatient     If patient is medically ready for discharge per primary team, OK to have patient follow up with Dr. Nunn in the office upon discharge.    #Pancreatic Uncinate Coarse Calcification  * Noted on imaging  * Likely from prior inflammation  PLAN  - Outpatient follow up    #Vomiting likely 2/2 Type III hiatal hernia  - CT abdomen shows Large hiatal hernia containing partially included stomach with apparent organoaxial rotation.  - Currently no abdominal pain/nausea/vomiting. Tolerating water.   - Surgery Consult --> PO contrast now, KUB in AM, pt wishes to follow surgeon in [facility] for repair  - KUB --> no obstruction  - Surgery made aware that patient has dementia and does not actively follow surgeon in [facility], they will re-eval for surgery here  - 1/12 EGD with T-Fastener Wire and PEG tube Placement for Hiatal Hernia    #DAWIT on CKD stage 3 vs. Progressive CKD   - Serum Cr 1.6 currently. Baseline Cr 1.2 in March 2022.   - Renal US --> no hydro, echogenic kidneys can reflect medical renal disease  - Cr peaked at 1.7 then trended down to 1.5  - C/w IVF gentle hydration  - C/w trending BMP    #HTN   - Lisinopril held  - C/w Nifedipine 60mg QD  - C/w metoprolol succinate 50mg QD    #HFpEF  - On lasix PO 40 BID at home  - TTE 10/1/20 --> EF 60-65%, mild concentric LVH, G2DD  - Holding lasix for now due to DAWIT and mild hypokalemia    #Asthma  - Follows dr. Hartley who had recommended symbicort 160-4.5 2 puff BID and combivent prn but pt does not take it  - Monitor     #Depression  - C/w home meds    #MISC  DVT ppx: SCD  GI ppx: PPI  Diet: AAT  Activity: as tolerated 79 y/o female with PMHx HTN, GERD, non-obstructive CAD, Dementia, anemia, MDD P/W Hb 7.4 OP, episodic vomiting for few months, 60lb weight loss. Found to have Hb 7.5 on admission.     #Microcytic anemia (r/o GIB)  - Vitals on admission --> /67, , RR 19, T98, O2 98% RA  - Labs on admission --> Hb 7.5, MCV 66, Cr 1.6  - CTAP --> large hiatal hernia, b/l adrenal nodular thickening, b/l multifocal renal cortical thinning   - S/p 1u prbc in ED  - Admitted to medicine for further management  - GI Consult --> EGD/C-scope 1/10, golytely and dulcolax 1/9 PM, preop labs  - Mg Citrate x 1 dose given 1/10 AM to help clear BM  - 1/10 --> EGD/C-scope cancelled by anesthesia due to low pulse ox, GI recommending OP f/u  - 1/11 --> Pulm consult for further recs || 1 dose IV venofer then Ferrous sulfate QD  - 1/12 --> EGD/COLON  - 1/12 EGD with T-Fastener Wire and PEG tube Placement for Hiatal Hernia  - EGD noted nonspecific erosive esophagitis, non-erosive gastritis. (Biopsy), Normal mucosa in the whole examined duodenum.  - Indentation of the gastric wall by external finger pressure was demonstrated. Under direct endoscopic visualization, three T-Fasteners were inserted through the abdominal wall and into the gastric lumen along the left lower quadrant.  - 1/12 COLON  - COLON showed 3cm friable fungating ulcerated mass extending from 17cm to 20 cm from the anal verge was noted at the level of the sigmoid colon (biopsy), Internal hemorrhoids  - iron low   - ferritin unremarkable   - transferrin unremarkable   - Await pathology for EGD/COLON  - CEA  - Ca 19-9  - Recommend pan-CT imaging to complete staging process in setting of suspected malignancy  CONSULTS  - GI recommendations appreciated  - hematology/oncology recommendations pending   - surgery onc recommendations pending (Dr Nunn)  PLAN  - PO protonix 40mg QD?  - Avoid NSAIDs  - Continue using oral route for feeding and medication administrations  - Do not use PEG tube for feeding or medication administration as it was placed, along with the T fasteners as part of hernia management  - ferrous sulfate 325mg daily   - Please obtain preoperative cardiac risk stratification  - HCP Sonali unsure about surgery  - patient can follow up outpatient     If patient is medically ready for discharge per primary team, OK to have patient follow up with Dr. Nunn in the office upon discharge.    #Pancreatic Uncinate Coarse Calcification  * Noted on imaging  * Likely from prior inflammation  PLAN  - Outpatient follow up    #Vomiting likely 2/2 Type III hiatal hernia  - CT abdomen shows Large hiatal hernia containing partially included stomach with apparent organoaxial rotation.  - Currently no abdominal pain/nausea/vomiting. Tolerating water.   - Surgery Consult --> PO contrast now, KUB in AM, pt wishes to follow surgeon in [facility] for repair  - KUB --> no obstruction  - Surgery made aware that patient has dementia and does not actively follow surgeon in [facility], they will re-eval for surgery here  - 1/12 EGD with T-Fastener Wire and PEG tube Placement for Hiatal Hernia    #DAWIT on CKD stage 3 vs. Progressive CKD   - Serum Cr 1.6 currently. Baseline Cr 1.2 in March 2022.   - Renal US --> no hydro, echogenic kidneys can reflect medical renal disease  - Cr peaked at 1.7 then trended down to 1.5  - C/w IVF gentle hydration  - C/w trending BMP    #HTN   - Lisinopril held  - C/w Nifedipine 60mg QD  - C/w metoprolol succinate 50mg QD    #HFpEF  - On lasix PO 40 BID at home  - TTE 10/1/20 --> EF 60-65%, mild concentric LVH, G2DD  - Holding lasix for now due to DAWIT and mild hypokalemia    #Asthma  - Follows dr. Hartley who had recommended symbicort 160-4.5 2 puff BID and combivent prn but pt does not take it  - Monitor     #Depression  - C/w home meds    #MISC  DVT ppx: SCD  GI ppx: PPI  Diet: AAT  Activity: as tolerated

## 2024-01-14 NOTE — DISCHARGE NOTE NURSING/CASE MANAGEMENT/SOCIAL WORK - NSDCMOLSTONCHART_GEN_ALL_CORE
Pre-application: Motor, sensory, and vascular responses intact in the injured extremity./Post-application: Motor, sensory, and vascular responses intact in the injured extremity. yes

## 2024-01-14 NOTE — PROGRESS NOTE ADULT - SUBJECTIVE AND OBJECTIVE BOX
Interval events:    Patient is a 78y old Female who presents with a chief complaint of Anemia (13 Jan 2024 19:07)    Primary diagnosis of Anemia    Today is hospital day 6d  This morning patient was seen and examined at bedside  The following symptoms/complaints/issues (or lack there of) are of note since last evaluation (if any):      Code Status:  see end of document    Family communication:  Contact date:  Name of person contacted:  Relationship to patient:  Communication details:  What matters most:    PAST MEDICAL & SURGICAL HISTORY  Hypertension    GERD (gastroesophageal reflux disease)    Hiatal hernia with GERD    Empyema lung    H/O CHF    History of repair of hiatal hernia    H/O pleural empyema  s/p decortication      SOCIAL HISTORY:  Social History:      ALLERGIES:  No Known Allergies    MEDICATIONS:  STANDING MEDICATIONS  ALPRAZolam 0.25 milliGRAM(s) Oral at bedtime  bisacodyl 20 milliGRAM(s) Oral at bedtime  ferrous    sulfate 325 milliGRAM(s) Oral daily  lactated ringers. 1000 milliLiter(s) IV Continuous <Continuous>  metoprolol succinate ER 50 milliGRAM(s) Oral daily  NIFEdipine XL 60 milliGRAM(s) Oral daily  pantoprazole    Tablet 40 milliGRAM(s) Oral before breakfast  sertraline 100 milliGRAM(s) Oral daily    PRN MEDICATIONS    VITALS:   T(F): 98  HR: 64  BP: 161/67  RR: 19  SpO2: --    PHYSICAL EXAM:  GENERAL:   ( x ) NAD, lying in bed comfortably     (  ) obtunded     (  ) lethargic     (  ) somnolent    HEAD:   ( x ) Atraumatic     (  ) hematoma     (  ) laceration (specify location:       )     NECK:  ( x ) Supple     (  ) neck stiffness     (  ) nuchal rigidity     (  )  no JVD     (  ) JVD present ( -- cm)    CHEST:  (  ) Chest wall tenderness (specify location:       )    HEART:  Rate -->     ( x ) normal rate     (  ) bradycardic     (  ) tachycardic  Rhythm -->     ( x ) regular     (  ) regularly irregular     (  ) irregularly irregular  Murmurs -->     ( x ) normal s1s2     (  ) systolic murmur     (  ) diastolic murmur     (  ) continuous murmur      (  ) S3 present     (  ) S4 present    LUNG:   ( x ) Unlabored respirations     (  ) tachypnea  ( x ) B/L air entry     (  ) decreased breath sounds in:  (location     )    ( x ) no adventitious sound     (  ) crackles     (  ) wheezing      (  ) rhonchi      (specify location:       )    ABDOMEN:   ( x ) Soft     (  ) tense   |   ( x ) nondistended     (  ) distended   |   ( x ) +BS     (  ) hypoactive bowel sounds     (  ) hyperactive bowel sounds  ( x ) nontender     (  ) RUQ tenderness     (  ) RLQ tenderness     (  ) LLQ tenderness     (  ) epigastric tenderness     (  ) diffuse tenderness  (  ) Splenomegaly      (  ) Hepatomegaly      (  ) Jaundice     (  ) ecchymosis     EXTREMITIES:   (  ) Cyanosis    (  ) varicose veins    (  ) clubbing    (  ) gangrene:     (location:     )  (  ) pitting edema     (  ) non-pitting edema         SKIN:   (  ) rash:     (  ) pustules     (  ) vesicles     (  ) ulcer     (  ) ecchymosis     (specify location:     )    NERVOUS SYSTEM:    ( x ) A&Ox     (  ) confused     (  ) lethargic  CN II-XII:     (  ) grossly intact     (  ) deficits found     (Specify:     )   Upper extremities:     (  ) no sensorimotor deficits     (  ) weakness     (  ) loss of proprioception/vibration     (  ) loss of touch/temperature (specify:    )  Lower extremities:     (  ) no sensorimotor deficits     (  ) weakness     (  ) loss of proprioception/vibration     (  ) loss of touch/temperature (specify:    )    LABS:                        8.9    13.93 )-----------( 357      ( 14 Jan 2024 05:47 )             30.0     01-14    135  |  96<L>  |  23<H>  ----------------------------<  81  3.4<L>   |  26  |  1.7<H>    Ca    9.2      14 Jan 2024 05:47  Mg     2.2     01-14        Urinalysis Basic - ( 14 Jan 2024 05:47 )    Color: x / Appearance: x / SG: x / pH: x  Gluc: 81 mg/dL / Ketone: x  / Bili: x / Urobili: x   Blood: x / Protein: x / Nitrite: x   Leuk Esterase: x / RBC: x / WBC x   Sq Epi: x / Non Sq Epi: x / Bacteria: x                  ECHO, ENDO, EEG, RAD:

## 2024-01-14 NOTE — DISCHARGE NOTE NURSING/CASE MANAGEMENT/SOCIAL WORK - NSDCPEFALRISK_GEN_ALL_CORE
For information on Fall & Injury Prevention, visit: [email or web address] OR  [email or web address] OR  https://www.cdc.gov/steadi/patient.html For information on Fall & Injury Prevention, visit: [email or web address] OR  [email or web address]/[email or web address]-injury OR  https://www.cdc.gov/steadi/patient.html For information on Fall & Injury Prevention, visit: [email or web address]://[email or web address].[email or web address]/[email or web address]-[email or web address]-[email or web address]-mobility OR  [email or web address].[email or web address]-injury OR  https://www.cdc.gov/steadi/patient.html

## 2024-01-14 NOTE — PROGRESS NOTE ADULT - ASSESSMENT
77 y/o female with PMHx HTN, GERD, non-obstructive CAD, Dementia, anemia, MDD P/W Hb 7.4 OP, episodic vomiting for few months, 60lb weight loss. Found to have Hb 7.5 on admission.     #Microcytic anemia (r/o GIB)  - Vitals on admission --> /67, , RR 19, T98, O2 98% RA  - Labs on admission --> Hb 7.5, MCV 66, Cr 1.6  - CTAP --> large hiatal hernia, b/l adrenal nodular thickening, b/l multifocal renal cortical thinning   - S/p 1u prbc in ED  - Admitted to medicine for further management  - GI Consult --> EGD/Colonscopy done 1/12 - sigmoid mass found - biopsy taken, CEA/Ca 19-9 ordered; pt seen by colorectal surgery- plan dc and OP follow up   - 1/13 discussion with sister Sonali - no surgery at present, ok for Dr. Hussein to see the patient - Sonali doesn't want pt to know about mass currently   - 1/14 Sonali instructed that she wants surgery to be done asap, colorectal was informed, cardiology consult for cardiac risk stratification   - some iron deficiency - 1 dose Venofer 1/11 and started  pt on iron PO     #Vomiting likely 2/2 Type III hiatal hernia-resolved   - CT abdomen shows Large hiatal hernia containing partially included stomach with apparent organoaxial rotation.  - Currently no abdominal pain/nausea/vomiting. Tolerating water.     #DAWIT on CKD stage 3 vs. Progressive CKD - improving   - Serum Cr 1.4 currently. Baseline Cr 1.2 in March 2022.   - Renal US --> no hydro, echogenic kidneys can reflect medical renal disease  - Cr peaked at 1.7 then trended down to 1.5  - C/w IVF gentle hydration  - C/w trending BMP    #HTN   - Lisinopril held  - C/w Nifedipine 60mg QD  - C/w metoprolol succinate 50mg QD    #HFpEF  - On lasix PO 40 BID at home  - TTE 10/1/20 --> EF 60-65%, mild concentric LVH, G2DD  - Holding lasix for now due to DAWIT and mild hypokalemia    #Asthma  - Follows dr. Hartley who had recommended symbicort 160-4.5 2 puff BID and combivent prn but pt does not take it  - Monitor     #Depression  - C/w home meds    #MISC  DVT ppx: SCD  GI ppx: PPI  Diet: NPO  Activity: as tolerated  Pending: cards risk stratification, path report, colorectal surgery   Family: Sonali (sister) updated via phone 1/14 79 y/o female with PMHx HTN, GERD, non-obstructive CAD, Dementia, anemia, MDD P/W Hb 7.4 OP, episodic vomiting for few months, 60lb weight loss. Found to have Hb 7.5 on admission.     #Microcytic anemia (r/o GIB)  - Vitals on admission --> /67, , RR 19, T98, O2 98% RA  - Labs on admission --> Hb 7.5, MCV 66, Cr 1.6  - CTAP --> large hiatal hernia, b/l adrenal nodular thickening, b/l multifocal renal cortical thinning   - S/p 1u prbc in ED  - Admitted to medicine for further management  - GI Consult --> EGD/Colonscopy done 1/12 - sigmoid mass found - biopsy taken, CEA/Ca 19-9 ordered; pt seen by colorectal surgery- plan dc and OP follow up   - 1/13 discussion with sister Sonali - no surgery at present, ok for Dr. Hussein to see the patient - Sonali doesn't want pt to know about mass currently   - 1/14 Sonali instructed that she wants surgery to be done asap, colorectal was informed, cardiology consult for cardiac risk stratification   - some iron deficiency - 1 dose Venofer 1/11 and started  pt on iron PO     #Vomiting likely 2/2 Type III hiatal hernia-resolved   - CT abdomen shows Large hiatal hernia containing partially included stomach with apparent organoaxial rotation.  - Currently no abdominal pain/nausea/vomiting. Tolerating water.     #DAWIT on CKD stage 3 vs. Progressive CKD - improving   - Serum Cr 1.4 currently. Baseline Cr 1.2 in March 2022.   - Renal US --> no hydro, echogenic kidneys can reflect medical renal disease  - Cr peaked at 1.7 then trended down to 1.5  - C/w IVF gentle hydration  - C/w trending BMP    #HTN   - Lisinopril held  - C/w Nifedipine 60mg QD  - C/w metoprolol succinate 50mg QD    #HFpEF  - On lasix PO 40 BID at home  - TTE 10/1/20 --> EF 60-65%, mild concentric LVH, G2DD  - Holding lasix for now due to DAWIT and mild hypokalemia    #Asthma  - Follows dr. Hartley who had recommended symbicort 160-4.5 2 puff BID and combivent prn but pt does not take it  - Monitor     #Depression  - C/w home meds    #MISC  DVT ppx: SCD  GI ppx: PPI  Diet: NPO  Activity: as tolerated  Pending: cards risk stratification, path report, colorectal surgery   Family: Sonali (sister) updated via phone 1/14

## 2024-01-14 NOTE — PROGRESS NOTE ADULT - SUBJECTIVE AND OBJECTIVE BOX
Patient is a 78y old  Female who presents with a chief complaint of Anemia (14 Jan 2024 08:20)      Patient seen and examined at bedside.  Patient denies any chest pain or shortness of breath   ALLERGIES:  No Known Allergies    MEDICATIONS:  ALPRAZolam 0.25 milliGRAM(s) Oral at bedtime  bisacodyl 20 milliGRAM(s) Oral at bedtime  ferrous    sulfate 325 milliGRAM(s) Oral daily  lactated ringers. 1000 milliLiter(s) IV Continuous <Continuous>  metoprolol succinate ER 50 milliGRAM(s) Oral daily  NIFEdipine XL 60 milliGRAM(s) Oral daily  pantoprazole    Tablet 40 milliGRAM(s) Oral before breakfast  sertraline 100 milliGRAM(s) Oral daily    Vital Signs Last 24 Hrs  T(F): 98 (14 Jan 2024 08:50), Max: 98.5 (14 Jan 2024 00:03)  HR: 61 (14 Jan 2024 08:50) (61 - 85)  BP: 153/70 (14 Jan 2024 08:50) (106/55 - 161/67)  RR: 18 (14 Jan 2024 08:50) (18 - 19)  SpO2: --  I&O's Summary    13 Jan 2024 07:01  -  14 Jan 2024 07:00  --------------------------------------------------------  IN: 450 mL / OUT: 0 mL / NET: 450 mL        PHYSICAL EXAM:  General: NAD, A/O x 3  ENT: MMM  Neck: Supple, No JVD  Lungs: Clear to auscultation bilaterally  Cardio: RRR, S1/S2, No murmurs  Abdomen: Soft, Nontender, Nondistended; Bowel sounds present  Extremities: No cyanosis, No edema    LABS:                        9.1    14.70 )-----------( 410      ( 14 Jan 2024 12:07 )             31.1     01-14    135  |  96  |  23  ----------------------------<  81  3.4   |  26  |  1.7    Ca    9.2      14 Jan 2024 05:47  Mg     2.2     01-14    TPro  6.3  /  Alb  3.7  /  TBili  0.3  /  DBili  x   /  AST  13  /  ALT  8   /  AlkPhos  108  01-12      PT/INR - ( 11 Jan 2024 22:01 )   PT: 11.20 sec;   INR: 0.98 ratio         PTT - ( 11 Jan 2024 22:01 )  PTT:29.2 sec        01-09 Chol 175 mg/dL LDL -- HDL 51 mg/dL Trig 167 mg/dL              POCT Blood Glucose.: 210 mg/dL (13 Jan 2024 16:50)      Urinalysis Basic - ( 14 Jan 2024 05:47 )    Color: x / Appearance: x / SG: x / pH: x  Gluc: 81 mg/dL / Ketone: x  / Bili: x / Urobili: x   Blood: x / Protein: x / Nitrite: x   Leuk Esterase: x / RBC: x / WBC x   Sq Epi: x / Non Sq Epi: x / Bacteria: x            RADIOLOGY & ADDITIONAL TESTS:    Care Discussed with Consultants/Other Providers:  Patient is a 78y old  Female who presents with a chief complaint of Anemia (14 Jan 2024 08:20)      Patient seen and examined at bedside.  Patient denies any chest pain or shortness of breath   ALLERGIES:  No Known Allergies    MEDICATIONS:  ALPRAZolam 0.25 milliGRAM(s) Oral at bedtime  bisacodyl 20 milliGRAM(s) Oral at bedtime  ferrous    sulfate 325 milliGRAM(s) Oral daily  lactated ringers. 1000 milliLiter(s) IV Continuous <Continuous>  metoprolol succinate ER 50 milliGRAM(s) Oral daily  NIFEdipine XL 60 milliGRAM(s) Oral daily  pantoprazole    Tablet 40 milliGRAM(s) Oral before breakfast  sertraline 100 milliGRAM(s) Oral daily    Vital Signs Last 24 Hrs  T(F): 98 (14 Jan 2024 08:50), Max: 98.5 (14 Jan 2024 00:03)  HR: 61 (14 Jan 2024 08:50) (61 - 85)  BP: 153/70 (14 Jan 2024 08:50) (106/55 - 161/67)  RR: 18 (14 Jan 2024 08:50) (18 - 19)  SpO2: --  I&O's Summary    13 Jan 2024 07:01  -  14 Jan 2024 07:00  --------------------------------------------------------  IN: 450 mL / OUT: 0 mL / NET: 450 mL        PHYSICAL EXAM:  General: NAD, A/O x 2  ENT: MMM  Neck: Supple, No JVD  Lungs: Clear to auscultation bilaterally  Cardio: RRR, S1/S2, 2/6 systolic murmur   Abdomen: Soft, Nontender, Nondistended; Bowel sounds present  Extremities: No cyanosis, No edema    LABS:                        9.1    14.70 )-----------( 410      ( 14 Jan 2024 12:07 )             31.1     01-14    135  |  96  |  23  ----------------------------<  81  3.4   |  26  |  1.7    Ca    9.2      14 Jan 2024 05:47  Mg     2.2     01-14    TPro  6.3  /  Alb  3.7  /  TBili  0.3  /  DBili  x   /  AST  13  /  ALT  8   /  AlkPhos  108  01-12      PT/INR - ( 11 Jan 2024 22:01 )   PT: 11.20 sec;   INR: 0.98 ratio         PTT - ( 11 Jan 2024 22:01 )  PTT:29.2 sec        01-09 Chol 175 mg/dL LDL -- HDL 51 mg/dL Trig 167 mg/dL              POCT Blood Glucose.: 210 mg/dL (13 Jan 2024 16:50)      Urinalysis Basic - ( 14 Jan 2024 05:47 )    Color: x / Appearance: x / SG: x / pH: x  Gluc: 81 mg/dL / Ketone: x  / Bili: x / Urobili: x   Blood: x / Protein: x / Nitrite: x   Leuk Esterase: x / RBC: x / WBC x   Sq Epi: x / Non Sq Epi: x / Bacteria: x            RADIOLOGY & ADDITIONAL TESTS:    Care Discussed with Consultants/Other Providers:

## 2024-01-14 NOTE — PHYSICAL THERAPY INITIAL EVALUATION ADULT - GAIT TRAINING, PT EVAL
Pt will ambulate 100 ft with rolling walker independently by d/c. Pt will ascend and descend 5 steps independently by d/c.

## 2024-01-14 NOTE — DISCHARGE NOTE NURSING/CASE MANAGEMENT/SOCIAL WORK - PATIENT PORTAL LINK FT
You can access the FollowMyHealth Patient Portal offered by [facility] by registering at the following website: [email or web address]. By joining [employer]’s FollowMyHealth portal, you will also be able to view your health information using other applications (apps) compatible with our system. You can access the FollowMyHealth Patient Portal offered by [facility] by registering at the following website: [email or web address]. By joining [employer]’s FollowMyHealth portal, you will also be able to view your health information using other applications (apps) compatible with our system. You can access the FollowMyHealth Patient Portal offered by [facility] by registering at the following website: [email or web address]. By joining [employer]’s FollowMyHealth portal, you will also be able to view your health information using other applications (apps) compatible with our system.

## 2024-01-15 LAB
ANION GAP SERPL CALC-SCNC: 11 MMOL/L — SIGNIFICANT CHANGE UP (ref 7–14)
ANION GAP SERPL CALC-SCNC: 11 MMOL/L — SIGNIFICANT CHANGE UP (ref 7–14)
BASOPHILS # BLD AUTO: 0.03 K/UL — SIGNIFICANT CHANGE UP (ref 0–0.2)
BASOPHILS # BLD AUTO: 0.03 K/UL — SIGNIFICANT CHANGE UP (ref 0–0.2)
BASOPHILS NFR BLD AUTO: 0.3 % — SIGNIFICANT CHANGE UP (ref 0–1)
BASOPHILS NFR BLD AUTO: 0.3 % — SIGNIFICANT CHANGE UP (ref 0–1)
BUN SERPL-MCNC: 29 MG/DL — HIGH (ref 10–20)
BUN SERPL-MCNC: 29 MG/DL — HIGH (ref 10–20)
CALCIUM SERPL-MCNC: 8.6 MG/DL — SIGNIFICANT CHANGE UP (ref 8.4–10.5)
CALCIUM SERPL-MCNC: 8.6 MG/DL — SIGNIFICANT CHANGE UP (ref 8.4–10.5)
CHLORIDE SERPL-SCNC: 100 MMOL/L — SIGNIFICANT CHANGE UP (ref 98–110)
CHLORIDE SERPL-SCNC: 100 MMOL/L — SIGNIFICANT CHANGE UP (ref 98–110)
CO2 SERPL-SCNC: 26 MMOL/L — SIGNIFICANT CHANGE UP (ref 17–32)
CO2 SERPL-SCNC: 26 MMOL/L — SIGNIFICANT CHANGE UP (ref 17–32)
CREAT SERPL-MCNC: 1.6 MG/DL — HIGH (ref 0.7–1.5)
CREAT SERPL-MCNC: 1.6 MG/DL — HIGH (ref 0.7–1.5)
EGFR: 33 ML/MIN/1.73M2 — LOW
EGFR: 33 ML/MIN/1.73M2 — LOW
EOSINOPHIL # BLD AUTO: 0 K/UL — SIGNIFICANT CHANGE UP (ref 0–0.7)
EOSINOPHIL # BLD AUTO: 0 K/UL — SIGNIFICANT CHANGE UP (ref 0–0.7)
EOSINOPHIL NFR BLD AUTO: 0 % — SIGNIFICANT CHANGE UP (ref 0–8)
EOSINOPHIL NFR BLD AUTO: 0 % — SIGNIFICANT CHANGE UP (ref 0–8)
GLUCOSE BLDC GLUCOMTR-MCNC: 110 MG/DL — HIGH (ref 70–99)
GLUCOSE BLDC GLUCOMTR-MCNC: 110 MG/DL — HIGH (ref 70–99)
GLUCOSE BLDC GLUCOMTR-MCNC: 137 MG/DL — HIGH (ref 70–99)
GLUCOSE BLDC GLUCOMTR-MCNC: 137 MG/DL — HIGH (ref 70–99)
GLUCOSE BLDC GLUCOMTR-MCNC: 143 MG/DL — HIGH (ref 70–99)
GLUCOSE BLDC GLUCOMTR-MCNC: 186 MG/DL — HIGH (ref 70–99)
GLUCOSE SERPL-MCNC: 86 MG/DL — SIGNIFICANT CHANGE UP (ref 70–99)
GLUCOSE SERPL-MCNC: 86 MG/DL — SIGNIFICANT CHANGE UP (ref 70–99)
HCT VFR BLD CALC: 29.6 % — LOW (ref 37–47)
HCT VFR BLD CALC: 29.6 % — LOW (ref 37–47)
HGB BLD-MCNC: 8.7 G/DL — LOW (ref 12–16)
HGB BLD-MCNC: 8.7 G/DL — LOW (ref 12–16)
IMM GRANULOCYTES NFR BLD AUTO: 0.7 % — HIGH (ref 0.1–0.3)
IMM GRANULOCYTES NFR BLD AUTO: 0.7 % — HIGH (ref 0.1–0.3)
LYMPHOCYTES # BLD AUTO: 1.91 K/UL — SIGNIFICANT CHANGE UP (ref 1.2–3.4)
LYMPHOCYTES # BLD AUTO: 1.91 K/UL — SIGNIFICANT CHANGE UP (ref 1.2–3.4)
LYMPHOCYTES # BLD AUTO: 19.2 % — LOW (ref 20.5–51.1)
LYMPHOCYTES # BLD AUTO: 19.2 % — LOW (ref 20.5–51.1)
MAGNESIUM SERPL-MCNC: 1.9 MG/DL — SIGNIFICANT CHANGE UP (ref 1.8–2.4)
MAGNESIUM SERPL-MCNC: 1.9 MG/DL — SIGNIFICANT CHANGE UP (ref 1.8–2.4)
MCHC RBC-ENTMCNC: 20.7 PG — LOW (ref 27–31)
MCHC RBC-ENTMCNC: 20.7 PG — LOW (ref 27–31)
MCHC RBC-ENTMCNC: 29.4 G/DL — LOW (ref 32–37)
MCHC RBC-ENTMCNC: 29.4 G/DL — LOW (ref 32–37)
MCV RBC AUTO: 70.5 FL — LOW (ref 81–99)
MCV RBC AUTO: 70.5 FL — LOW (ref 81–99)
MONOCYTES # BLD AUTO: 0.56 K/UL — SIGNIFICANT CHANGE UP (ref 0.1–0.6)
MONOCYTES # BLD AUTO: 0.56 K/UL — SIGNIFICANT CHANGE UP (ref 0.1–0.6)
MONOCYTES NFR BLD AUTO: 5.6 % — SIGNIFICANT CHANGE UP (ref 1.7–9.3)
MONOCYTES NFR BLD AUTO: 5.6 % — SIGNIFICANT CHANGE UP (ref 1.7–9.3)
NEUTROPHILS # BLD AUTO: 7.37 K/UL — HIGH (ref 1.4–6.5)
NEUTROPHILS # BLD AUTO: 7.37 K/UL — HIGH (ref 1.4–6.5)
NEUTROPHILS NFR BLD AUTO: 74.2 % — SIGNIFICANT CHANGE UP (ref 42.2–75.2)
NEUTROPHILS NFR BLD AUTO: 74.2 % — SIGNIFICANT CHANGE UP (ref 42.2–75.2)
NRBC # BLD: 0 /100 WBCS — SIGNIFICANT CHANGE UP (ref 0–0)
NRBC # BLD: 0 /100 WBCS — SIGNIFICANT CHANGE UP (ref 0–0)
PLATELET # BLD AUTO: 328 K/UL — SIGNIFICANT CHANGE UP (ref 130–400)
PLATELET # BLD AUTO: 328 K/UL — SIGNIFICANT CHANGE UP (ref 130–400)
PMV BLD: 9.5 FL — SIGNIFICANT CHANGE UP (ref 7.4–10.4)
PMV BLD: 9.5 FL — SIGNIFICANT CHANGE UP (ref 7.4–10.4)
POTASSIUM SERPL-MCNC: 3.4 MMOL/L — LOW (ref 3.5–5)
POTASSIUM SERPL-MCNC: 3.4 MMOL/L — LOW (ref 3.5–5)
POTASSIUM SERPL-SCNC: 3.4 MMOL/L — LOW (ref 3.5–5)
POTASSIUM SERPL-SCNC: 3.4 MMOL/L — LOW (ref 3.5–5)
RBC # BLD: 4.2 M/UL — SIGNIFICANT CHANGE UP (ref 4.2–5.4)
RBC # BLD: 4.2 M/UL — SIGNIFICANT CHANGE UP (ref 4.2–5.4)
RBC # FLD: 20.4 % — HIGH (ref 11.5–14.5)
RBC # FLD: 20.4 % — HIGH (ref 11.5–14.5)
SODIUM SERPL-SCNC: 137 MMOL/L — SIGNIFICANT CHANGE UP (ref 135–146)
SODIUM SERPL-SCNC: 137 MMOL/L — SIGNIFICANT CHANGE UP (ref 135–146)
WBC # BLD: 9.94 K/UL — SIGNIFICANT CHANGE UP (ref 4.8–10.8)
WBC # BLD: 9.94 K/UL — SIGNIFICANT CHANGE UP (ref 4.8–10.8)
WBC # FLD AUTO: 9.94 K/UL — SIGNIFICANT CHANGE UP (ref 4.8–10.8)
WBC # FLD AUTO: 9.94 K/UL — SIGNIFICANT CHANGE UP (ref 4.8–10.8)

## 2024-01-15 PROCEDURE — 93306 TTE W/DOPPLER COMPLETE: CPT | Mod: 26

## 2024-01-15 PROCEDURE — 99223 1ST HOSP IP/OBS HIGH 75: CPT | Mod: GC

## 2024-01-15 PROCEDURE — 99232 SBSQ HOSP IP/OBS MODERATE 35: CPT

## 2024-01-15 PROCEDURE — 93010 ELECTROCARDIOGRAM REPORT: CPT

## 2024-01-15 RX ORDER — POTASSIUM CHLORIDE 20 MEQ
40 PACKET (EA) ORAL ONCE
Refills: 0 | Status: COMPLETED | OUTPATIENT
Start: 2024-01-15 | End: 2024-01-15

## 2024-01-15 RX ORDER — ATORVASTATIN CALCIUM 80 MG/1
40 TABLET, FILM COATED ORAL AT BEDTIME
Refills: 0 | Status: DISCONTINUED | OUTPATIENT
Start: 2024-01-15 | End: 2024-01-26

## 2024-01-15 RX ADMIN — Medication 40 MILLIEQUIVALENT(S): at 10:20

## 2024-01-15 RX ADMIN — Medication 0.25 MILLIGRAM(S): at 21:32

## 2024-01-15 RX ADMIN — PANTOPRAZOLE SODIUM 40 MILLIGRAM(S): 20 TABLET, DELAYED RELEASE ORAL at 05:14

## 2024-01-15 RX ADMIN — SERTRALINE 100 MILLIGRAM(S): 25 TABLET, FILM COATED ORAL at 11:23

## 2024-01-15 RX ADMIN — Medication 20 MILLIGRAM(S): at 21:33

## 2024-01-15 RX ADMIN — ATORVASTATIN CALCIUM 40 MILLIGRAM(S): 80 TABLET, FILM COATED ORAL at 21:32

## 2024-01-15 RX ADMIN — Medication 60 MILLIGRAM(S): at 05:14

## 2024-01-15 RX ADMIN — Medication 50 MILLIGRAM(S): at 05:14

## 2024-01-15 RX ADMIN — Medication 325 MILLIGRAM(S): at 11:24

## 2024-01-15 NOTE — CONSULT NOTE ADULT - ASSESSMENT
Impression   # Non obstructive CAD (cath in 2018)   # HFpEF   # large hiatal hernia   # Sigmoid mass planned for resection     Echo in 2020 with normal EF and grade 2 DD. No major valvular disease     No angina or ACS   No evidence of unstable arrhythmia   No evidence of significant valvular disease   No ADHF    RCRI 2 -> 10% risk of perioperative MI   METS < 4      Recommendations   - She is currently optimized for procedure   - Recommend getting an ECG prior to surgery   - Recommend starting statin and aspirin if no contraindication from a procedural stand point   - She is at moderate risk for perioperative MACE   - This consult serves only as a mary kay-operative cardiac risk stratification and evaluation to predict 30-days cardiac complications risk and mortality. The decision to proceed with the surgery/procedure is made by the performing physician and the patient -   Impression   # Non obstructive CAD (cath in 2018)   # HFpEF   # large hiatal hernia   # Sigmoid mass planned for resection     Echo in 2020 with normal EF and grade 2 DD. No major valvular disease     No angina or ACS   No evidence of unstable arrhythmia   No evidence of significant valvular disease   No ADHF    RCRI 2 -> 10% risk of perioperative MI   METS < 4      Recommendations   - She is currently optimized for procedure   - Recommend getting an ECG and echo prior to surgery   - Recommend starting statin and aspirin if no contraindication from a procedural stand point   - She is at moderate risk for perioperative MACE   - This consult serves only as a mary kay-operative cardiac risk stratification and evaluation to predict 30-days cardiac complications risk and mortality. The decision to proceed with the surgery/procedure is made by the performing physician and the patient -

## 2024-01-15 NOTE — CONSULT NOTE ADULT - SUBJECTIVE AND OBJECTIVE BOX
Outpt cardiologist:    Reason for Consult:     HISTORY OF PRESENT ILLNESS:  The patient is a 78-year-old female with past medical history of hiatal hernia s/p repair >10 years back, hypertension, GERD, non obstructive CAD cath in 2018, depression, dementia, anemia and large hiatal hernia, presenting for evaluation of low hemoglobin, measured outpatient to be 7.4.  Patient has had multiple episodes of nonbloody nonbilious vomiting over the last several months with good appetite and almost 60 pound weight loss over the last few months.  Patient just started following up with hematologist Dr.Yumi Hussein and had recent blood work from 3 days ago that resulted today and showed a low hemoglobin of 7.4. Patient denies fevers, chills, chest pain, shortness of breath, hematuria, melena, hematochezia. The pt presented to the Ed for a blood transfusion that was recommended by the hematologist.    In the ED  Vital Signs Last 24 Hrs  T(C): 36.9 (08 Jan 2024 19:06), Max: 36.9 (08 Jan 2024 19:06)  T(F): 98.4 (08 Jan 2024 19:06), Max: 98.4 (08 Jan 2024 19:06)  HR: 81 (08 Jan 2024 19:06) (70 - 100)  BP: 155/74 (08 Jan 2024 19:06) (130/60 - 196/89)  BP(mean): --  RR: 18 (08 Jan 2024 19:06) (18 - 19)  SpO2: 98% (08 Jan 2024 19:06) (98% - 100%)    Parameters below as of 08 Jan 2024 19:06  Patient On (Oxygen Delivery Method): room air    In the ED the pt received 1 PRBC  Labs were significant for a Cr of 1.6 (unknown baseline)  and a lactate    Cat scan was significant for  Large hiatal hernia containing partially included stomach with apparent   organoaxial rotation    The pt was admitted to medicine for anemia 2/2 possible GIB     (08 Jan 2024 21:25)      PAST MEDICAL & SURGICAL HISTORY  Hypertension    GERD (gastroesophageal reflux disease)    Hiatal hernia with GERD    Empyema lung    H/O CHF    History of repair of hiatal hernia    H/O pleural empyema  s/p decortication        FAMILY HISTORY:  FAMILY HISTORY:  Family history of cardiac pacemaker  3 siblings    Family history of hypertrophic cardiomyopathy  sister        SOCIAL HISTORY:  Social History:      ALLERGIES:  No Known Allergies      MEDICATIONS:  ALPRAZolam 0.25 milliGRAM(s) Oral at bedtime  bisacodyl 20 milliGRAM(s) Oral at bedtime  ferrous    sulfate 325 milliGRAM(s) Oral daily  lactated ringers. 1000 milliLiter(s) (75 mL/Hr) IV Continuous <Continuous>  metoprolol succinate ER 50 milliGRAM(s) Oral daily  NIFEdipine XL 60 milliGRAM(s) Oral daily  pantoprazole    Tablet 40 milliGRAM(s) Oral before breakfast  sertraline 100 milliGRAM(s) Oral daily    PRN:      HOME MEDICATIONS:  Home Medications:  ALPRAZolam 0.25 mg oral tablet: 1 tab(s) orally 2 times a day (08 Jan 2024 23:10)  furosemide 40 mg oral tablet: 1 tab(s) orally 2 times a day (08 Jan 2024 23:10)  Metoprolol Succinate ER 50 mg oral tablet, extended release: 1 tab(s) orally 2 times a day (08 Jan 2024 23:10)  Nifedical XL 60 mg oral tablet, extended release: 1 tab(s) orally once a day (08 Jan 2024 23:10)  omeprazole 40 mg oral delayed release capsule: 1 cap(s) orally once a day (08 Jan 2024 23:11)  sertraline 100 mg oral tablet: 1 tab(s) orally once a day (08 Jan 2024 23:10)  Symbicort 160 mcg-4.5 mcg/inh inhalation aerosol: 2 puff(s) inhaled 2 times a day (08 Jan 2024 23:13)      VITALS:   T(F): 97.7 (01-15 @ 00:00), Max: 98.5 (01-14 @ 00:03)  HR: 62 (01-15 @ 00:00) (57 - 85)  BP: 155/69 (01-15 @ 00:00) (106/55 - 178/82)  BP(mean): --  RR: 18 (01-15 @ 00:00) (14 - 20)  SpO2: 95% (01-12 @ 16:00) (95% - 98%)    I&O's Summary    13 Jan 2024 07:01  -  14 Jan 2024 07:00  --------------------------------------------------------  IN: 450 mL / OUT: 0 mL / NET: 450 mL    14 Jan 2024 07:01  -  15 Jan 2024 03:44  --------------------------------------------------------  IN: 300 mL / OUT: 0 mL / NET: 300 mL        REVIEW OF SYSTEMS:  CONSTITUTIONAL: No weakness, fevers or chills  HEENT: No visual changes, neck/ear pain  RESPIRATORY: No cough, sob  CARDIOVASCULAR: See HPI  GASTROINTESTINAL: No abdominal pain. No nausea, vomiting, diarrhea   GENITOURINARY: No dysuria, frequency or hematuria  NEUROLOGICAL: No new focal deficits  SKIN: No new rashes    PHYSICAL EXAM:  *General: Not in distress.  Non-toxic appearing.   *Cardio: regular, S1, S2, no murmur  *Pulm: B/L BS.  No wheezing / crackles / rales  *Abdomen: Soft, non-tender, non-distended. Normoactive bowel sounds  *Extremities: No edema b/l le  *Neuro: A&O x3. No focal deficits    LABS:                        9.1    14.70 )-----------( 410      ( 14 Jan 2024 12:07 )             31.1     01-14    135  |  96<L>  |  23<H>  ----------------------------<  81  3.4<L>   |  26  |  1.7<H>    Ca    9.2      14 Jan 2024 05:47  Mg     2.2     01-14    Troponin trend:      01-09 Chol 175 LDL -- HDL 51 Trig 167<H>

## 2024-01-15 NOTE — CONSULT NOTE ADULT - ATTENDING COMMENTS
Patient seen and examined. Pertinent labs, imaging and telemetry reviewed. I agree with the above:     Patient feeling well. No complaints at this time. She would like to go home.   Unclear plan for surgery at this time.  Patient denies CP, SOB, palpitations.   Able to walk up 1-2 flights of stairs without issues.   She follows with cardiology (Gregorio) in [place].   -She may have had stress and echo within the last year.   Patient for possilbe colon resection as inpatient.   -Patient is intermediate risk for intermediate risk procedure.   -Check EKG and TTE prior.  -If any abnormalities, will need to assess for need for further testing.     Discussed with patient and fellow. Patient seen and examined. Pertinent labs, imaging and telemetry reviewed. I agree with the above:     Patient feeling well. No complaints at this time. She would like to go home.   Unclear plan for surgery at this time.  Patient denies CP, SOB, palpitations.   Able to walk up 1-2 flights of stairs without issues.   She follows with cardiology (Gregorio) in [place].   -She may have had stress and echo within the last year.   Patient for possilbe colon resection as inpatient.   -Patient is intermediate risk for intermediate risk procedure.   -Check EKG and TTE prior.  -If any abnormalities, will need to assess for need for further testing.     Discussed with patient and fellow. Patient seen and examined. Pertinent labs, imaging and telemetry reviewed. I agree with the above:     Patient feeling well. No complaints at this time. She would like to go home.   Unclear plan for surgery at this time.  Patient denies CP, SOB, palpitations.   Able to walk up 1-2 flights of stairs without issues.   She follows with cardiology (Gregorio) in [place].   -She may have had stress and echo within the last year.   Patient for possilbe colon resection as inpatient.   -Patient is intermediate risk for intermediate risk procedure.   -Check EKG and TTE prior.  -If any abnormalities, will need to assess for need for further testing.     Discussed with patient and fellow.

## 2024-01-15 NOTE — PROGRESS NOTE ADULT - SUBJECTIVE AND OBJECTIVE BOX
GENERAL SURGERY PROGRESS NOTE     DARIEN BARRERA  78y  Female  Hospital day :8d    T(F): 97.7 (01-15-24 @ 00:00), Max: 98 (01-14-24 @ 08:50)  HR: 67 (01-15-24 @ 05:50) (58 - 67)  BP: 162/72 (01-15-24 @ 05:50) (119/58 - 162/72)  RR: 18 (01-15-24 @ 00:00) (18 - 18)    DIET/FLUIDS: ferrous    sulfate 325 milliGRAM(s) Oral daily  lactated ringers. 1000 milliLiter(s) IV Continuous <Continuous>     GI proph:  pantoprazole    Tablet 40 milliGRAM(s) Oral before breakfast    PHYSICAL EXAM:  GENERAL: NAD  CHEST/LUNG: Clear to auscultation bilaterally  HEART: Regular rate and rhythm  ABDOMEN: Soft, Nontender, Nondistended;     LABS  Labs:  CAPILLARY BLOOD GLUCOSE      POCT Blood Glucose.: 110 mg/dL (15 Richi 2024 07:45)                          9.1    14.70 )-----------( 410      ( 14 Jan 2024 12:07 )             31.1       Auto Neutrophil %: 81.7 % (01-14-24 @ 12:07)  Auto Immature Granulocyte %: 1.1 % (01-14-24 @ 12:07)    01-14    135  |  96<L>  |  23<H>  ----------------------------<  81  3.4<L>   |  26  |  1.7<H>      Urinalysis Basic - ( 14 Jan 2024 05:47 )    Color: x / Appearance: x / SG: x / pH: x  Gluc: 81 mg/dL / Ketone: x  / Bili: x / Urobili: x   Blood: x / Protein: x / Nitrite: x   Leuk Esterase: x / RBC: x / WBC x   Sq Epi: x / Non Sq Epi: x / Bacteria: x      RADIOLOGY & ADDITIONAL TESTS:  No new imaging.

## 2024-01-15 NOTE — PROGRESS NOTE ADULT - ATTENDING COMMENTS
78F with PMH of hiatal hernia s/p repair >10 years ago, hypertension, GERD, non obstructive CAD, depression, dementia, anemia presenting for anemia found to have 3 cm sigmoid colon mass on colonoscopy  CTAP on admission without metastatic disease  CTChest - No evidence of intrathoracic neoplastic disease.  CEA2.7  Pathology - adenocarcinoma    Plan  - advance diet as tolerated  - cardiac risk stratification - Patient is intermediate risk for intermediate risk procedure. Check EKG and TTE prior.  - family unsure if they would like surgery on this admission.    - f/u cardiac work up  - surgery to follow

## 2024-01-15 NOTE — PROGRESS NOTE ADULT - ASSESSMENT
77 y/o female with PMHx HTN, GERD, non-obstructive CAD, Dementia, anemia, MDD P/W Hb 7.4 OP, episodic vomiting for few months, 60lb weight loss. Found to have Hb 7.5 on admission.     #Microcytic anemia (r/o GIB)  - Vitals on admission --> /67, , RR 19, T98, O2 98% RA  - Labs on admission --> Hb 7.5, MCV 66, Cr 1.6  - CTAP --> large hiatal hernia, b/l adrenal nodular thickening, b/l multifocal renal cortical thinning   - S/p 1u prbc in ED  - Admitted to medicine for further management  - GI Consult --> EGD/Colonscopy done 1/12 - sigmoid mass found - biopsy taken, CEA/Ca 19-9 ordered; pt seen by colorectal surgery- plan dc and OP follow up   - 1/13 discussion with sister Sonali - no surgery at present, ok for Dr. Hussein to see the patient - Sonali doesn't want pt to know about mass currently   - 1/14 Sonali instructed that she wants surgery to be done asap, colorectal was informed  - 1/15 cardio consult appreciated - TTE, ekg ordered --Patient is intermediate risk for intermediate risk procedure.     #Vomiting likely 2/2 Type III hiatal hernia-resolved   - CT abdomen shows Large hiatal hernia containing partially included stomach with apparent organoaxial rotation.  - Currently no abdominal pain/nausea/vomiting. Tolerating water.     #DAWIT on CKD stage 3 vs. Progressive CKD - improving   - Serum Cr 1.4 currently. Baseline Cr 1.2 in March 2022.   - Renal US --> no hydro, echogenic kidneys can reflect medical renal disease  - Cr peaked at 1.7 then trended down to 1.5  - C/w IVF gentle hydration  - C/w trending BMP    #HTN   - Lisinopril held  - C/w Nifedipine 60mg QD  - C/w metoprolol succinate 50mg QD    #HFpEF  - On lasix PO 40 BID at home  - TTE 10/1/20 --> EF 60-65%, mild concentric LVH, G2DD  - Holding lasix for now due to DAWIT and mild hypokalemia    #Asthma  - Follows dr. Hartley who had recommended symbicort 160-4.5 2 puff BID and combivent prn but pt does not take it  - Monitor     #Depression  - C/w home meds    #MISC  DVT ppx: SCD  GI ppx: PPI  Diet: NPO  Activity: as tolerated  Pending: ekg, TTE, path report, colorectal surgery   Family: Sonali (sister) updated via phone 1/15 by resident  79 y/o female with PMHx HTN, GERD, non-obstructive CAD, Dementia, anemia, MDD P/W Hb 7.4 OP, episodic vomiting for few months, 60lb weight loss. Found to have Hb 7.5 on admission.     #Microcytic anemia (r/o GIB)  - Vitals on admission --> /67, , RR 19, T98, O2 98% RA  - Labs on admission --> Hb 7.5, MCV 66, Cr 1.6  - CTAP --> large hiatal hernia, b/l adrenal nodular thickening, b/l multifocal renal cortical thinning   - S/p 1u prbc in ED  - Admitted to medicine for further management  - GI Consult --> EGD/Colonscopy done 1/12 - sigmoid mass found - biopsy taken, CEA/Ca 19-9 ordered; pt seen by colorectal surgery- plan dc and OP follow up   - 1/13 discussion with sister Sonali - no surgery at present, ok for Dr. Hussein to see the patient - Sonali doesn't want pt to know about mass currently   - 1/14 Sonali instructed that she wants surgery to be done asap, colorectal was informed  - 1/15 cardio consult appreciated - TTE, ekg ordered --Patient is intermediate risk for intermediate risk procedure.     #Vomiting likely 2/2 Type III hiatal hernia-resolved   - CT abdomen shows Large hiatal hernia containing partially included stomach with apparent organoaxial rotation.  - Currently no abdominal pain/nausea/vomiting. Tolerating water.     #DAWIT on CKD stage 3 vs. Progressive CKD - improving   - Serum Cr 1.4 currently. Baseline Cr 1.2 in March 2022.   - Renal US --> no hydro, echogenic kidneys can reflect medical renal disease  - Cr peaked at 1.7 then trended down to 1.5  - C/w IVF gentle hydration  - C/w trending BMP    #HTN   - Lisinopril held  - C/w Nifedipine 60mg QD  - C/w metoprolol succinate 50mg QD    #HFpEF  - On lasix PO 40 BID at home  - TTE 10/1/20 --> EF 60-65%, mild concentric LVH, G2DD  - Holding lasix for now due to DAWIT and mild hypokalemia    #Asthma  - Follows dr. Hartley who had recommended symbicort 160-4.5 2 puff BID and combivent prn but pt does not take it  - Monitor     #Depression  - C/w home meds    #MISC  DVT ppx: SCD  GI ppx: PPI  Diet: NPO  Activity: as tolerated  Pending: ekg, TTE, path report, colorectal surgery   Family: Sonali (sister) updated via phone 1/15 by resident

## 2024-01-15 NOTE — PROGRESS NOTE ADULT - SUBJECTIVE AND OBJECTIVE BOX
24H events:    Patient is a 78y old Female who presents with a chief complaint of Anemia (15 Richi 2024 08:04)    Primary diagnosis of Anemia    Today is hospital day 7d. This morning patient was seen and examined at bedside. Patient is hard of hearing, discussion after placing her hearing aid.  No acute or major events overnight. Hemodynamically stable, tolerating oral diet, voiding appropriately with appropriate bowel movements.     Code Status: Full code    PAST MEDICAL & SURGICAL HISTORY  Hypertension    GERD (gastroesophageal reflux disease)    Hiatal hernia with GERD    Empyema lung    H/O CHF    History of repair of hiatal hernia    H/O pleural empyema s/p decortication    ALLERGIES:  No Known Allergies    MEDICATIONS:  STANDING MEDICATIONS  ALPRAZolam 0.25 milliGRAM(s) Oral at bedtime  bisacodyl 20 milliGRAM(s) Oral at bedtime  ferrous    sulfate 325 milliGRAM(s) Oral daily  lactated ringers. 1000 milliLiter(s) IV Continuous <Continuous>  metoprolol succinate ER 50 milliGRAM(s) Oral daily  NIFEdipine XL 60 milliGRAM(s) Oral daily  pantoprazole    Tablet 40 milliGRAM(s) Oral before breakfast  potassium chloride   Powder 40 milliEquivalent(s) Oral once  sertraline 100 milliGRAM(s) Oral daily    PRN MEDICATIONS    VITALS:   T(F): 95.8  HR: 61  BP: 137/64  RR: 18  SpO2: --    PHYSICAL EXAM:  GENERAL: NAD, lying in bed comfortably   HEAD: NCAD, no hematoma or laceration   NECK: Supple, no JVD    HEART: Regular rate and rhythm, normal S1/S2   LUNGS: No acute respiratory distress, clear b/l breath sounds   ABDOMEN:  soft, non-tender, non-distented   EXTREMITIES: no edema, ulcerations or ecchymosis  NERVOUS SYSTEM:  A&Ox3, CNII-XII intact, follows commands, answers questions appropriately   SKIN: No rashes or lesions       [facility] score: 18    LABS:                        8.7    9.94  )-----------( 328      ( 15 Richi 2024 06:35 )             29.6     01-15    137  |  100  |  29<H>  ----------------------------<  86  3.4<L>   |  26  |  1.6<H>    Ca    8.6      15 Richi 2024 06:35  Mg     1.9     01-15    Urinalysis Basic - ( 15 Richi 2024 06:35 )    Color: x / Appearance: x / SG: x / pH: x  Gluc: 86 mg/dL / Ketone: x  / Bili: x / Urobili: x   Blood: x / Protein: x / Nitrite: x   Leuk Esterase: x / RBC: x / WBC x   Sq Epi: x / Non Sq Epi: x / Bacteria: x   24H events:    Patient is a 78y old Female who presents with a chief complaint of Anemia (15 Richi 2024 08:04)    Primary diagnosis of Anemia    Today is hospital day 7d. This morning patient was seen and examined at bedside. Patient is hard of hearing, discussion after placing her hearing aid.  No acute or major events overnight. Hemodynamically stable, tolerating oral diet, voiding appropriately with appropriate bowel movements.     Code Status: Full code    PAST MEDICAL & SURGICAL HISTORY  Hypertension    GERD (gastroesophageal reflux disease)    Hiatal hernia with GERD    Empyema lung    H/O CHF    History of repair of hiatal hernia    H/O pleural empyema s/p decortication    ALLERGIES:  No Known Allergies    MEDICATIONS:  STANDING MEDICATIONS  ALPRAZolam 0.25 milliGRAM(s) Oral at bedtime  bisacodyl 20 milliGRAM(s) Oral at bedtime  ferrous    sulfate 325 milliGRAM(s) Oral daily  lactated ringers. 1000 milliLiter(s) IV Continuous <Continuous>  metoprolol succinate ER 50 milliGRAM(s) Oral daily  NIFEdipine XL 60 milliGRAM(s) Oral daily  pantoprazole    Tablet 40 milliGRAM(s) Oral before breakfast  potassium chloride   Powder 40 milliEquivalent(s) Oral once  sertraline 100 milliGRAM(s) Oral daily    PRN MEDICATIONS    VITALS:   T(F): 95.8  HR: 61  BP: 137/64  RR: 18  SpO2: --    PHYSICAL EXAM:  GENERAL: NAD, lying in bed comfortably   HEAD: NCAD, no hematoma or laceration   NECK: Supple, no JVD    HEART: Regular rate and rhythm, normal S1/S2   LUNGS: No acute respiratory distress, clear b/l breath sounds   ABDOMEN:  soft, non-tender, non-distented   EXTREMITIES: no edema, ulcerations or ecchymosis  NERVOUS SYSTEM:  A&Ox3, CNII-XII intact, follows commands, answers questions appropriately   SKIN: No rashes or lesions       [facility] score: 18    LABS:                        8.7    9.94  )-----------( 328      ( 15 Richi 2024 06:35 )             29.6     01-15    137  |  100  |  29<H>  ----------------------------<  86  3.4<L>   |  26  |  1.6<H>    Ca    8.6      15 Richi 2024 06:35  Mg     1.9     01-15    Urinalysis Basic - ( 15 Richi 2024 06:35 )    Color: x / Appearance: x / SG: x / pH: x  Gluc: 86 mg/dL / Ketone: x  / Bili: x / Urobili: x   Blood: x / Protein: x / Nitrite: x   Leuk Esterase: x / RBC: x / WBC x   Sq Epi: x / Non Sq Epi: x / Bacteria: x   24H events:    Patient is a 78y old Female who presents with a chief complaint of Anemia (15 Richi 2024 08:04)    Primary diagnosis of Anemia    Today is hospital day 7d. This morning patient was seen and examined at bedside. Patient is hard of hearing, discussion after placing her hearing aid.  No acute or major events overnight. Hemodynamically stable, tolerating oral diet, voiding appropriately with appropriate bowel movements.     Code Status: Full code    PAST MEDICAL & SURGICAL HISTORY  Hypertension    GERD (gastroesophageal reflux disease)    Hiatal hernia with GERD    Empyema lung    H/O CHF    History of repair of hiatal hernia    H/O pleural empyema s/p decortication    ALLERGIES:  No Known Allergies    MEDICATIONS:  STANDING MEDICATIONS  ALPRAZolam 0.25 milliGRAM(s) Oral at bedtime  bisacodyl 20 milliGRAM(s) Oral at bedtime  ferrous    sulfate 325 milliGRAM(s) Oral daily  lactated ringers. 1000 milliLiter(s) IV Continuous <Continuous>  metoprolol succinate ER 50 milliGRAM(s) Oral daily  NIFEdipine XL 60 milliGRAM(s) Oral daily  pantoprazole    Tablet 40 milliGRAM(s) Oral before breakfast  potassium chloride   Powder 40 milliEquivalent(s) Oral once  sertraline 100 milliGRAM(s) Oral daily    PRN MEDICATIONS    VITALS:   T(F): 95.8  HR: 61  BP: 137/64  RR: 18  SpO2: --    PHYSICAL EXAM:  GENERAL: NAD, lying in bed comfortably   HEAD: NCAD, no hematoma or laceration   NECK: Supple, no JVD    HEART: Regular rate and rhythm, normal S1/S2   LUNGS: No acute respiratory distress, clear b/l breath sounds   ABDOMEN:  soft, non-tender, non-distented   EXTREMITIES: no edema, ulcerations or ecchymosis  NERVOUS SYSTEM:  A&Ox3, CNII-XII intact, follows commands, answers questions appropriately   SKIN: No rashes or lesions       [facility] score: 18    LABS:                        8.7    9.94  )-----------( 328      ( 15 Richi 2024 06:35 )             29.6     01-15    137  |  100  |  29<H>  ----------------------------<  86  3.4<L>   |  26  |  1.6<H>    Ca    8.6      15 Richi 2024 06:35  Mg     1.9     01-15    Urinalysis Basic - ( 15 Richi 2024 06:35 )    Color: x / Appearance: x / SG: x / pH: x  Gluc: 86 mg/dL / Ketone: x  / Bili: x / Urobili: x   Blood: x / Protein: x / Nitrite: x   Leuk Esterase: x / RBC: x / WBC x   Sq Epi: x / Non Sq Epi: x / Bacteria: x

## 2024-01-15 NOTE — PROGRESS NOTE ADULT - ASSESSMENT
ASSESSMENT:  78-year-old female with past medical history of hiatal hernia s/p repair >10 years back, L emypema s/p decortication 2018, hypertension, GERD, non obstructive CAD, depression, dementia, anemia presenting for evaluation of low hemoglobin, measured outpatient to be 7.4.  Patient has had multiple episodes of nonbloody nonbilious vomiting over the last several months with good appetite and almost 60 pound weight loss over the last few months.  Patient just started following up with hematologist Dr.Yumi Hussein and had recent blood work from 3 days ago that resulted today and showed a low hemoglobin of 7.4. Patient denies fevers, retching, chills, chest pain, shortness of breath, hematuria, melena, hematochezia. The pt presented to the Ed for a blood transfusion that was recommended by the hematologist.    CTAP revealing a type III hiatal hernia and patient states she has had intermittent episodes of epigastric pain associated with vomiting over the past few months. CTAP revealing a large hiatal hernia with stomach above diaphragm and organoaxial rotation.    PLAN:  - F/U plans for surgery with the family  - If agreeable for surgery, plan for OR this week  - Rest of care per primary team  - Surgery to follow    [id number] ASSESSMENT:  78-year-old female with past medical history of hiatal hernia s/p repair >10 years back, L emypema s/p decortication 2018, hypertension, GERD, non obstructive CAD, depression, dementia, anemia presenting for evaluation of low hemoglobin, measured outpatient to be 7.4.  Patient has had multiple episodes of nonbloody nonbilious vomiting over the last several months with good appetite and almost 60 pound weight loss over the last few months.  Patient just started following up with hematologist Dr.Yumi Hussein and had recent blood work from 3 days ago that resulted today and showed a low hemoglobin of 7.4. Patient denies fevers, retching, chills, chest pain, shortness of breath, hematuria, melena, hematochezia. The pt presented to the Ed for a blood transfusion that was recommended by the hematologist.    CTAP revealing a type III hiatal hernia and patient states she has had intermittent episodes of epigastric pain associated with vomiting over the past few months. CTAP revealing a large hiatal hernia with stomach above diaphragm and organoaxial rotation.    PLAN:  - F/U plans for surgery with the family  - If agreeable for surgery, plan for OR this week  - Rest of care per primary team  - Surgery to follow    [id number] ASSESSMENT:  78-year-old female with past medical history of hiatal hernia s/p repair >10 years back, L emypema s/p decortication 2018, hypertension, GERD, non obstructive CAD, depression, dementia, anemia presenting for evaluation of low hemoglobin, measured outpatient to be 7.4.  Patient has had multiple episodes of nonbloody nonbilious vomiting over the last several months with good appetite and almost 60 pound weight loss over the last few months.  Patient just started following up with hematologist Dr.Yumi Hussein and had recent blood work from 3 days ago that resulted today and showed a low hemoglobin of 7.4. Patient denies fevers, retching, chills, chest pain, shortness of breath, hematuria, melena, hematochezia. The pt presented to the Ed for a blood transfusion that was recommended by the hematologist.    CTAP revealing a type III hiatal hernia and patient states she has had intermittent episodes of epigastric pain associated with vomiting over the past few months. CTAP revealing a large hiatal hernia with stomach above diaphragm and organoaxial rotation.    PLAN:  - F/U plans for surgery with the family  - If agreeable for surgery, plan for OR this week  - Rest of care per primary team  - Surgery to follow    [id number]

## 2024-01-15 NOTE — PROGRESS NOTE ADULT - ASSESSMENT
79 y/o female with PMHx HTN, GERD, non-obstructive CAD, Dementia, anemia, MDD P/W Hb 7.4 OP, episodic vomiting for few months, 60lb weight loss. Found to have Hb 7.5 on admission.     #Microcytic anemia (r/o GIB)  - Vitals on admission --> /67, , RR 19, T98, O2 98% RA  - Labs on admission --> Hb 7.5, MCV 66, Cr 1.6  - CTAP --> large hiatal hernia, b/l adrenal nodular thickening, b/l multifocal renal cortical thinning   - S/p 1u prbc in ED  - Admitted to medicine for further management  - GI Consult --> EGD/Colonoscopy done 1/12 - sigmoid mass found - biopsy taken, CEA came back 2.7 and Ca 19-9 < 2  - 1/13 discussion with sister Sonali - no surgery at present, ok for Dr. Hussein to see the patient - Sonali doesn't want pt to know about mass currently   - some iron deficiency - 1 dose Venofer 1/11 and started pt on iron PO   - 1/14 Sonali instructed that she wants surgery to be done asap, colorectal surgery on board, OR potentially this week if cleared   - Cardio consult on 01/15 for pre-op cardiac risk stratification: No angina or ACS / No evidence of unstable arrhythmia / No evidence of significant valvular disease / No ADHF  _ RCRI 2 -> 10% risk of perioperative MI   _ METS < 4  _ F/U Echo (Performed) and ECG    #Vomiting likely 2/2 Type III hiatal hernia  - Resolved   - CT abdomen shows Large hiatal hernia containing partially included stomach with apparent organoaxial rotation.  - Currently no abdominal pain/nausea/vomiting. Tolerating water.     #DAWIT on CKD stage 3 vs. Progressive CKD  - Serum Cr 1.4 currently. Baseline Cr 1.2 in March 2022 (stage 3A)  - Renal US --> no hydro, echogenic kidneys can reflect medical renal disease  - Cr peaked at 1.7 then trended down to 1.2 on 01/11  - C/w IVF gentle hydration  - C/w trending BMP    #HTN   - Lisinopril still on hold  - C/w Nifedipine 60mg QD  - C/w metoprolol succinate 50mg QD    #HFpEF  - On lasix PO 40 BID at home  - TTE 10/1/20 --> EF 60-65%, mild concentric LVH, G2DD  - Holding Lasix for now due to DAWIT and mild hypokalemia    #Asthma  - Follows dr. Hartley who had recommended symbicort 160-4.5 2 puff BID and combivent prn but pt does not take it  - Monitor     #Depression  - C/w home meds    #MISC  DVT ppx: SCD  GI ppx: PPI  Diet: Regular  Activity: as tolerated  Pending:  path report, colorectal surgery   Family: Sonali (sister) updated via phone 1/14

## 2024-01-15 NOTE — PROGRESS NOTE ADULT - SUBJECTIVE AND OBJECTIVE BOX
Patient is a 78y old  Female who presents with a chief complaint of Anemia (15 Richi 2024 09:16)      Patient seen and examined at bedside.  Patient denies any chest pain or shortness of breath   ALLERGIES:  No Known Allergies    MEDICATIONS:  ALPRAZolam 0.25 milliGRAM(s) Oral at bedtime  atorvastatin 40 milliGRAM(s) Oral at bedtime  bisacodyl 20 milliGRAM(s) Oral at bedtime  ferrous    sulfate 325 milliGRAM(s) Oral daily  lactated ringers. 1000 milliLiter(s) IV Continuous <Continuous>  metoprolol succinate ER 50 milliGRAM(s) Oral daily  NIFEdipine XL 60 milliGRAM(s) Oral daily  pantoprazole    Tablet 40 milliGRAM(s) Oral before breakfast  sertraline 100 milliGRAM(s) Oral daily    Vital Signs Last 24 Hrs  T(F): 95.8 (15 Richi 2024 07:15), Max: 97.7 (15 Richi 2024 00:00)  HR: 61 (15 Richi 2024 07:15) (58 - 67)  BP: 137/64 (15 Richi 2024 07:15) (119/58 - 162/72)  RR: 18 (15 Richi 2024 00:00) (18 - 18)  SpO2: --  I&O's Summary    14 Jan 2024 07:01  -  15 Richi 2024 07:00  --------------------------------------------------------  IN: 300 mL / OUT: 0 mL / NET: 300 mL        PHYSICAL EXAM:  General: NAD, A/O x 3  ENT: MMM  Neck: Supple, No JVD  Lungs: Clear to auscultation bilaterally  Cardio: RRR, S1/S2, 2/6 systolic murmur   Abdomen: Soft, Nontender, Nondistended; Bowel sounds present  Extremities: No cyanosis, No edema    LABS:                        8.7    9.94  )-----------( 328      ( 15 Richi 2024 06:35 )             29.6     01-15    137  |  100  |  29  ----------------------------<  86  3.4   |  26  |  1.6    Ca    8.6      15 Richi 2024 06:35  Mg     1.9     01-15                01-09 Chol 175 mg/dL LDL -- HDL 51 mg/dL Trig 167 mg/dL              POCT Blood Glucose.: 137 mg/dL (15 Richi 2024 11:45)  POCT Blood Glucose.: 110 mg/dL (15 Richi 2024 07:45)      Urinalysis Basic - ( 15 Richi 2024 06:35 )    Color: x / Appearance: x / SG: x / pH: x  Gluc: 86 mg/dL / Ketone: x  / Bili: x / Urobili: x   Blood: x / Protein: x / Nitrite: x   Leuk Esterase: x / RBC: x / WBC x   Sq Epi: x / Non Sq Epi: x / Bacteria: x            RADIOLOGY & ADDITIONAL TESTS:    Care Discussed with Consultants/Other Providers:

## 2024-01-16 LAB
ALBUMIN SERPL ELPH-MCNC: 3.2 G/DL — LOW (ref 3.5–5.2)
ALP SERPL-CCNC: 113 U/L — SIGNIFICANT CHANGE UP (ref 30–115)
ALT FLD-CCNC: 7 U/L — SIGNIFICANT CHANGE UP (ref 0–41)
ANION GAP SERPL CALC-SCNC: 12 MMOL/L — SIGNIFICANT CHANGE UP (ref 7–14)
AST SERPL-CCNC: 15 U/L — SIGNIFICANT CHANGE UP (ref 0–41)
BASOPHILS # BLD AUTO: 0.04 K/UL — SIGNIFICANT CHANGE UP (ref 0–0.2)
BASOPHILS NFR BLD AUTO: 0.4 % — SIGNIFICANT CHANGE UP (ref 0–1)
BILIRUB SERPL-MCNC: <0.2 MG/DL — SIGNIFICANT CHANGE UP (ref 0.2–1.2)
BUN SERPL-MCNC: 29 MG/DL — HIGH (ref 10–20)
CALCIUM SERPL-MCNC: 8.8 MG/DL — SIGNIFICANT CHANGE UP (ref 8.4–10.5)
CHLORIDE SERPL-SCNC: 101 MMOL/L — SIGNIFICANT CHANGE UP (ref 98–110)
CO2 SERPL-SCNC: 25 MMOL/L — SIGNIFICANT CHANGE UP (ref 17–32)
CREAT SERPL-MCNC: 1.7 MG/DL — HIGH (ref 0.7–1.5)
EGFR: 30 ML/MIN/1.73M2 — LOW
EOSINOPHIL # BLD AUTO: 0.01 K/UL — SIGNIFICANT CHANGE UP (ref 0–0.7)
EOSINOPHIL NFR BLD AUTO: 0.1 % — SIGNIFICANT CHANGE UP (ref 0–8)
GLUCOSE BLDC GLUCOMTR-MCNC: 116 MG/DL — HIGH (ref 70–99)
GLUCOSE BLDC GLUCOMTR-MCNC: 130 MG/DL — HIGH (ref 70–99)
GLUCOSE SERPL-MCNC: 95 MG/DL — SIGNIFICANT CHANGE UP (ref 70–99)
HCT VFR BLD CALC: 28.4 % — LOW (ref 37–47)
HGB BLD-MCNC: 8.2 G/DL — LOW (ref 12–16)
IMM GRANULOCYTES NFR BLD AUTO: 0.8 % — HIGH (ref 0.1–0.3)
LYMPHOCYTES # BLD AUTO: 2.53 K/UL — SIGNIFICANT CHANGE UP (ref 1.2–3.4)
LYMPHOCYTES # BLD AUTO: 24 % — SIGNIFICANT CHANGE UP (ref 20.5–51.1)
MAGNESIUM SERPL-MCNC: 1.9 MG/DL — SIGNIFICANT CHANGE UP (ref 1.8–2.4)
MCHC RBC-ENTMCNC: 20.7 PG — LOW (ref 27–31)
MCHC RBC-ENTMCNC: 28.9 G/DL — LOW (ref 32–37)
MCV RBC AUTO: 71.5 FL — LOW (ref 81–99)
MONOCYTES # BLD AUTO: 0.68 K/UL — HIGH (ref 0.1–0.6)
MONOCYTES NFR BLD AUTO: 6.4 % — SIGNIFICANT CHANGE UP (ref 1.7–9.3)
NEUTROPHILS # BLD AUTO: 7.21 K/UL — HIGH (ref 1.4–6.5)
NEUTROPHILS NFR BLD AUTO: 68.3 % — SIGNIFICANT CHANGE UP (ref 42.2–75.2)
NRBC # BLD: 0 /100 WBCS — SIGNIFICANT CHANGE UP (ref 0–0)
PLATELET # BLD AUTO: 336 K/UL — SIGNIFICANT CHANGE UP (ref 130–400)
PMV BLD: 9.8 FL — SIGNIFICANT CHANGE UP (ref 7.4–10.4)
POTASSIUM SERPL-MCNC: 3.8 MMOL/L — SIGNIFICANT CHANGE UP (ref 3.5–5)
POTASSIUM SERPL-SCNC: 3.8 MMOL/L — SIGNIFICANT CHANGE UP (ref 3.5–5)
PROT SERPL-MCNC: 5.5 G/DL — LOW (ref 6–8)
RAPID RVP RESULT: SIGNIFICANT CHANGE UP
RBC # BLD: 3.97 M/UL — LOW (ref 4.2–5.4)
RBC # FLD: 20.5 % — HIGH (ref 11.5–14.5)
SARS-COV-2 RNA SPEC QL NAA+PROBE: SIGNIFICANT CHANGE UP
SODIUM SERPL-SCNC: 138 MMOL/L — SIGNIFICANT CHANGE UP (ref 135–146)
WBC # BLD: 10.55 K/UL — SIGNIFICANT CHANGE UP (ref 4.8–10.8)
WBC # FLD AUTO: 10.55 K/UL — SIGNIFICANT CHANGE UP (ref 4.8–10.8)

## 2024-01-16 PROCEDURE — 99232 SBSQ HOSP IP/OBS MODERATE 35: CPT

## 2024-01-16 RX ORDER — NIFEDIPINE 30 MG
30 TABLET, EXTENDED RELEASE 24 HR ORAL ONCE
Refills: 0 | Status: COMPLETED | OUTPATIENT
Start: 2024-01-16 | End: 2024-01-16

## 2024-01-16 RX ORDER — GUAIFENESIN/DEXTROMETHORPHAN 600MG-30MG
10 TABLET, EXTENDED RELEASE 12 HR ORAL EVERY 6 HOURS
Refills: 0 | Status: DISCONTINUED | OUTPATIENT
Start: 2024-01-16 | End: 2024-01-26

## 2024-01-16 RX ORDER — GUAIFENESIN/DEXTROMETHORPHAN 600MG-30MG
20 TABLET, EXTENDED RELEASE 12 HR ORAL EVERY 6 HOURS
Refills: 0 | Status: DISCONTINUED | OUTPATIENT
Start: 2024-01-16 | End: 2024-01-16

## 2024-01-16 RX ORDER — NIFEDIPINE 30 MG
90 TABLET, EXTENDED RELEASE 24 HR ORAL DAILY
Refills: 0 | Status: DISCONTINUED | OUTPATIENT
Start: 2024-01-17 | End: 2024-01-26

## 2024-01-16 RX ADMIN — Medication 50 MILLIGRAM(S): at 05:43

## 2024-01-16 RX ADMIN — PANTOPRAZOLE SODIUM 40 MILLIGRAM(S): 20 TABLET, DELAYED RELEASE ORAL at 05:44

## 2024-01-16 RX ADMIN — SERTRALINE 100 MILLIGRAM(S): 25 TABLET, FILM COATED ORAL at 11:24

## 2024-01-16 RX ADMIN — Medication 10 MILLILITER(S): at 11:27

## 2024-01-16 RX ADMIN — Medication 60 MILLIGRAM(S): at 05:43

## 2024-01-16 RX ADMIN — Medication 325 MILLIGRAM(S): at 11:25

## 2024-01-16 RX ADMIN — Medication 30 MILLIGRAM(S): at 08:56

## 2024-01-16 RX ADMIN — ATORVASTATIN CALCIUM 40 MILLIGRAM(S): 80 TABLET, FILM COATED ORAL at 21:38

## 2024-01-16 NOTE — PROGRESS NOTE ADULT - ASSESSMENT
78-year-old female with past medical history of hiatal hernia s/p repair >10 years back, L emypema s/p decortication 2018, hypertension, GERD, non obstructive CAD, depression, dementia, anemia presenting for evaluation of low hemoglobin, measured outpatient to be 7.4. The pt presented to the Ed for a blood transfusion that was recommended by the hematologist.    PLAN:  - Family discussion for possible surgical intervention   - If agreeable for surgery, plan on this admissions  - Cards risk strat noted  - CEA negative, CT Chest w/o any mets  - Rest of care per primary team  - Surgery to follow    [phone]

## 2024-01-16 NOTE — PROGRESS NOTE ADULT - SUBJECTIVE AND OBJECTIVE BOX
Patient is a 78y old  Female who presents with a chief complaint of Anemia (16 Jan 2024 12:28)      Patient seen and examined at bedside.  Patient denies any chest pain or shortness of breath   ALLERGIES:  No Known Allergies    MEDICATIONS:  atorvastatin 40 milliGRAM(s) Oral at bedtime  bisacodyl 20 milliGRAM(s) Oral at bedtime  ferrous    sulfate 325 milliGRAM(s) Oral daily  guaifenesin/dextromethorphan Oral Liquid 10 milliLiter(s) Oral every 6 hours PRN  lactated ringers. 1000 milliLiter(s) IV Continuous <Continuous>  metoprolol succinate ER 50 milliGRAM(s) Oral daily  pantoprazole    Tablet 40 milliGRAM(s) Oral before breakfast  sertraline 100 milliGRAM(s) Oral daily    Vital Signs Last 24 Hrs  T(F): 97 (16 Jan 2024 15:30), Max: 97.9 (16 Jan 2024 05:46)  HR: 62 (16 Jan 2024 15:30) (61 - 78)  BP: 131/60 (16 Jan 2024 15:30) (114/59 - 183/79)  RR: 18 (16 Jan 2024 15:30) (18 - 18)  SpO2: --  I&O's Summary    16 Jan 2024 07:01  -  16 Jan 2024 16:42  --------------------------------------------------------  IN: 720 mL / OUT: 0 mL / NET: 720 mL        PHYSICAL EXAM:  General: NAD, A/O x 3, confused at times   ENT: MMM  Neck: Supple, No JVD  Lungs: Clear to auscultation bilaterally  Cardio: RRR, S1/S2, 2/6 systolic murmur  Abdomen: Soft, Nontender, Nondistended; Bowel sounds present  Extremities: No cyanosis, No edema    LABS:                        8.2    10.55 )-----------( 336      ( 16 Jan 2024 05:49 )             28.4     01-16    138  |  101  |  29  ----------------------------<  95  3.8   |  25  |  1.7    Ca    8.8      16 Jan 2024 05:49  Mg     1.9     01-16    TPro  5.5  /  Alb  3.2  /  TBili  <0.2  /  DBili  x   /  AST  15  /  ALT  7   /  AlkPhos  113  01-16 01-09 Chol 175 mg/dL LDL -- HDL 51 mg/dL Trig 167 mg/dL              POCT Blood Glucose.: 116 mg/dL (16 Jan 2024 11:03)  POCT Blood Glucose.: 130 mg/dL (16 Jan 2024 08:30)  POCT Blood Glucose.: 186 mg/dL (15 Richi 2024 21:12)      Urinalysis Basic - ( 16 Jan 2024 05:49 )    Color: x / Appearance: x / SG: x / pH: x  Gluc: 95 mg/dL / Ketone: x  / Bili: x / Urobili: x   Blood: x / Protein: x / Nitrite: x   Leuk Esterase: x / RBC: x / WBC x   Sq Epi: x / Non Sq Epi: x / Bacteria: x            RADIOLOGY & ADDITIONAL TESTS:    Care Discussed with Consultants/Other Providers:

## 2024-01-16 NOTE — PROGRESS NOTE ADULT - ASSESSMENT
79 y/o female with PMHx HTN, GERD, non-obstructive CAD, Dementia, anemia, MDD P/W Hb 7.4 OP, episodic vomiting for few months, 60lb weight loss. Found to have Hb 7.5 on admission.     #Microcytic anemia (r/o GIB)  - Vitals on admission --> /67, , RR 19, T98, O2 98% RA  - Labs on admission --> Hb 7.5, MCV 66, Cr 1.6  - CTAP --> large hiatal hernia, b/l adrenal nodular thickening, b/l multifocal renal cortical thinning   - S/p 1u prbc in ED  - Admitted to medicine for further management  - GI Consult --> EGD/Colonscopy done 1/12 - sigmoid mass found - biopsy taken, CEA/Ca 19-9 ordered; pt seen by colorectal surgery- plan dc and OP follow up   - 1/13 discussion with sister Sonali - no surgery at present, ok for Dr. Hussein to see the patient - Sonali doesn't want pt to know about mass currently   - 1/14 Sonali instructed that she wants surgery to be done asap, colorectal was informed  - 1/15 cardio consult appreciated - Patient is intermediate risk for intermediate risk procedure.   - 1/16: surgery resident spoke with Sonali - explained that the intervention may involve placement of a colostomy bag - the family is currently evaluating their options    #Vomiting likely 2/2 Type III hiatal hernia-resolved   - CT abdomen shows Large hiatal hernia containing partially included stomach with apparent organoaxial rotation.  - Currently no abdominal pain/nausea/vomiting. Tolerating water.     #DAWIT on CKD stage 3 vs. Progressive CKD - improving   - Serum Cr 1.4 currently. Baseline Cr 1.2 in March 2022.   - Renal US --> no hydro, echogenic kidneys can reflect medical renal disease  - Cr peaked at 1.7 then trended down to 1.5  - C/w IVF gentle hydration  - C/w trending BMP    #HTN   - Lisinopril held  - C/w Nifedipine 60mg QD  - C/w metoprolol succinate 50mg QD    #HFpEF  - On lasix PO 40 BID at home  - TTE 10/1/20 --> EF 60-65%, mild concentric LVH, G2DD  - Holding lasix for now due to DAWIT and mild hypokalemia    #Asthma  - Follows dr. Hartley who had recommended symbicort 160-4.5 2 puff BID and combivent prn but pt does not take it  - Monitor     #Depression  - C/w home meds    #MISC  DVT ppx: SCD  GI ppx: PPI  Diet: NPO  Activity: as tolerated  Pending: path report, colorectal surgery? anticipate for discharge tomorrow

## 2024-01-16 NOTE — PROGRESS NOTE ADULT - SUBJECTIVE AND OBJECTIVE BOX
GENERAL SURGERY PROGRESS NOTE    Patient: DARIEN BARRERA , 78y ([birth year])Female   MRN: [id number]  Location: [address] ([address]) [address]  Visit: 01-08-24 Inpatient  Date: 01-16-24 @ 11:30      Procedure/Dx/Injuries: hiatal hernia w organoaxial rotation s/p PEG placement, sigmoid colon mass    Events of past 24 hours: no acute events, echo performed, tolerating regular diet    PAST MEDICAL & SURGICAL HISTORY:  Hypertension      GERD (gastroesophageal reflux disease)      Hiatal hernia with GERD      Empyema lung      H/O CHF      History of repair of hiatal hernia      H/O pleural empyema  s/p decortication          Vitals:   T(F): 97 (01-16-24 @ 07:53), Max: 97.9 (01-16-24 @ 05:46)  HR: 72 (01-16-24 @ 07:53)  BP: 183/79 (01-16-24 @ 07:53)  RR: 18 (01-16-24 @ 07:53)  SpO2: --      Diet, Regular      Fluids:     I & O's:      PHYSICAL EXAM:  PHYSICAL EXAM:  GENERAL: No acute distress, well-developed  CHEST/LUNG: CTAB  HEART: Regular rate and rhythm.   ABDOMEN: Soft, non-tender, non-distended; normal bowel sounds, no organomegaly  EXTREMITIES:  No clubbing, cyanosis, or edema      MEDICATIONS  (STANDING):  atorvastatin 40 milliGRAM(s) Oral at bedtime  bisacodyl 20 milliGRAM(s) Oral at bedtime  ferrous    sulfate 325 milliGRAM(s) Oral daily  lactated ringers. 1000 milliLiter(s) (75 mL/Hr) IV Continuous <Continuous>  metoprolol succinate ER 50 milliGRAM(s) Oral daily  pantoprazole    Tablet 40 milliGRAM(s) Oral before breakfast  sertraline 100 milliGRAM(s) Oral daily    MEDICATIONS  (PRN):  guaifenesin/dextromethorphan Oral Liquid 10 milliLiter(s) Oral every 6 hours PRN Cough      DVT PROPHYLAXIS:   GI PROPHYLAXIS: pantoprazole    Tablet 40 milliGRAM(s) Oral before breakfast    ANTICOAGULATION:   ANTIBIOTICS:            LAB/STUDIES:  Labs:  CAPILLARY BLOOD GLUCOSE      POCT Blood Glucose.: 116 mg/dL (16 Jan 2024 11:03)  POCT Blood Glucose.: 130 mg/dL (16 Jan 2024 08:30)  POCT Blood Glucose.: 186 mg/dL (15 Richi 2024 21:12)  POCT Blood Glucose.: 143 mg/dL (15 Richi 2024 16:19)  POCT Blood Glucose.: 137 mg/dL (15 Richi 2024 11:45)                          8.2    10.55 )-----------( 336      ( 16 Jan 2024 05:49 )             28.4       Auto Neutrophil %: 68.3 % (01-16-24 @ 05:49)  Auto Immature Granulocyte %: 0.8 % (01-16-24 @ 05:49)    01-16    138  |  101  |  29<H>  ----------------------------<  95  3.8   |  25  |  1.7<H>      Calcium: 8.8 mg/dL (01-16-24 @ 05:49)      LFTs:             5.5  | <0.2 | 15       ------------------[113     ( 16 Jan 2024 05:49 )  3.2  | x    | 7           Lipase:x      Amylase:x             Coags:            Urinalysis Basic - ( 16 Jan 2024 05:49 )    Color: x / Appearance: x / SG: x / pH: x  Gluc: 95 mg/dL / Ketone: x  / Bili: x / Urobili: x   Blood: x / Protein: x / Nitrite: x   Leuk Esterase: x / RBC: x / WBC x   Sq Epi: x / Non Sq Epi: x / Bacteria: x                IMAGING:      ACCESS/ DEVICES:  [x ] Peripheral IV  [ ] Central Venous Line	[ ] R	[ ] L	[ ] IJ	[ ] Fem	[ ] SC	Placed:   [ ] Arterial Line		[ ] R	[ ] L	[ ] Fem	[ ] Rad	[ ] Ax	Placed:   [ ] PICC:					[ ] Mediport  [ ] Urinary Catheter,  Date Placed:   [ ] Chest tube: [ ] Right, [ ] Left  [ ] VIVIAN/River Drains       GENERAL SURGERY PROGRESS NOTE    Patient: DARIEN BARRERA , 78y ([birth year])Female   MRN: [id number]  Location: [address] ([address]) 015 A  Visit: 01-08-24 Inpatient  Date: 01-16-24 @ 11:30      Procedure/Dx/Injuries: hiatal hernia w organoaxial rotation s/p PEG placement, sigmoid colon mass    Events of past 24 hours: no acute events, echo performed, tolerating regular diet    PAST MEDICAL & SURGICAL HISTORY:  Hypertension      GERD (gastroesophageal reflux disease)      Hiatal hernia with GERD      Empyema lung      H/O CHF      History of repair of hiatal hernia      H/O pleural empyema  s/p decortication          Vitals:   T(F): 97 (01-16-24 @ 07:53), Max: 97.9 (01-16-24 @ 05:46)  HR: 72 (01-16-24 @ 07:53)  BP: 183/79 (01-16-24 @ 07:53)  RR: 18 (01-16-24 @ 07:53)  SpO2: --      Diet, Regular      Fluids:     I & O's:      PHYSICAL EXAM:  PHYSICAL EXAM:  GENERAL: No acute distress, well-developed  CHEST/LUNG: CTAB  HEART: Regular rate and rhythm.   ABDOMEN: Soft, non-tender, non-distended; normal bowel sounds, no organomegaly  EXTREMITIES:  No clubbing, cyanosis, or edema      MEDICATIONS  (STANDING):  atorvastatin 40 milliGRAM(s) Oral at bedtime  bisacodyl 20 milliGRAM(s) Oral at bedtime  ferrous    sulfate 325 milliGRAM(s) Oral daily  lactated ringers. 1000 milliLiter(s) (75 mL/Hr) IV Continuous <Continuous>  metoprolol succinate ER 50 milliGRAM(s) Oral daily  pantoprazole    Tablet 40 milliGRAM(s) Oral before breakfast  sertraline 100 milliGRAM(s) Oral daily    MEDICATIONS  (PRN):  guaifenesin/dextromethorphan Oral Liquid 10 milliLiter(s) Oral every 6 hours PRN Cough      DVT PROPHYLAXIS:   GI PROPHYLAXIS: pantoprazole    Tablet 40 milliGRAM(s) Oral before breakfast    ANTICOAGULATION:   ANTIBIOTICS:            LAB/STUDIES:  Labs:  CAPILLARY BLOOD GLUCOSE      POCT Blood Glucose.: 116 mg/dL (16 Jan 2024 11:03)  POCT Blood Glucose.: 130 mg/dL (16 Jan 2024 08:30)  POCT Blood Glucose.: 186 mg/dL (15 Richi 2024 21:12)  POCT Blood Glucose.: 143 mg/dL (15 Richi 2024 16:19)  POCT Blood Glucose.: 137 mg/dL (15 Richi 2024 11:45)                          8.2    10.55 )-----------( 336      ( 16 Jan 2024 05:49 )             28.4       Auto Neutrophil %: 68.3 % (01-16-24 @ 05:49)  Auto Immature Granulocyte %: 0.8 % (01-16-24 @ 05:49)    01-16    138  |  101  |  29<H>  ----------------------------<  95  3.8   |  25  |  1.7<H>      Calcium: 8.8 mg/dL (01-16-24 @ 05:49)      LFTs:             5.5  | <0.2 | 15       ------------------[113     ( 16 Jan 2024 05:49 )  3.2  | x    | 7           Lipase:x      Amylase:x             Coags:            Urinalysis Basic - ( 16 Jan 2024 05:49 )    Color: x / Appearance: x / SG: x / pH: x  Gluc: 95 mg/dL / Ketone: x  / Bili: x / Urobili: x   Blood: x / Protein: x / Nitrite: x   Leuk Esterase: x / RBC: x / WBC x   Sq Epi: x / Non Sq Epi: x / Bacteria: x                IMAGING:      ACCESS/ DEVICES:  [x ] Peripheral IV  [ ] Central Venous Line	[ ] R	[ ] L	[ ] IJ	[ ] Fem	[ ] SC	Placed:   [ ] Arterial Line		[ ] R	[ ] L	[ ] Fem	[ ] Rad	[ ] Ax	Placed:   [ ] PICC:					[ ] Mediport  [ ] Urinary Catheter,  Date Placed:   [ ] Chest tube: [ ] Right, [ ] Left  [ ] VIVIAN/River Drains

## 2024-01-16 NOTE — PROGRESS NOTE ADULT - ASSESSMENT
77 y/o female with PMHx HTN, GERD, non-obstructive CAD, Dementia, anemia, MDD P/W Hb 7.4 OP, episodic vomiting for few months, 60lb weight loss. Found to have Hb 7.5 on admission.     #Microcytic anemia (r/o GIB)  - Vitals on admission --> /67, , RR 19, T98, O2 98% RA  - Labs on admission --> Hb 7.5, MCV 66, Cr 1.6  - CTAP --> large hiatal hernia, b/l adrenal nodular thickening, b/l multifocal renal cortical thinning   - S/p 1u prbc in ED  - Admitted to medicine for further management  - GI Consult --> EGD/Colonscopy done 1/12 - sigmoid mass found - biopsy taken, CEA/Ca 19-9 ordered; pt seen by colorectal surgery- plan dc and OP follow up   - 1/13 discussion with sister Sonali - no surgery at present, ok for Dr. Hussein to see the patient - Sonali doesn't want pt to know about mass currently   - 1/14 Sonali instructed that she wants surgery to be done asap, colorectal was informed  - 1/15 cardio consult appreciated - TTE, ekg ordered --Patient is intermediate risk for intermediate risk procedure.   - 1/16 family is going to say for sure if they are ok with the surgery on 1/17    #Vomiting likely 2/2 Type III hiatal hernia-resolved   - CT abdomen shows Large hiatal hernia containing partially included stomach with apparent organoaxial rotation.  - Currently no abdominal pain/nausea/vomiting. Tolerating water.     #DAWIT on CKD stage 3 vs. Progressive CKD - improving   - Serum Cr 1.4 currently. Baseline Cr 1.2 in March 2022.   - Renal US --> no hydro, echogenic kidneys can reflect medical renal disease  - Cr peaked at 1.7 then trended down to 1.5  - C/w IVF gentle hydration  - C/w trending BMP    #HTN   - Lisinopril held  - C/w Nifedipine 60mg QD  - C/w metoprolol succinate 50mg QD    #HFpEF  - On lasix PO 40 BID at home  - TTE 10/1/20 --> EF 60-65%, mild concentric LVH, G2DD  - Holding lasix for now due to DAWIT and mild hypokalemia    #Asthma  - Follows dr. Hartley who had recommended symbicort 160-4.5 2 puff BID and combivent prn but pt does not take it  - Monitor     #Depression  - C/w home meds    #MISC  DVT ppx: SCD  GI ppx: PPI  Diet: NPO  Activity: as tolerated  Pending:  TTE, path report, colorectal surgery   Family: Sonali (sister) updated via phone 1/16 by resident  79 y/o female with PMHx HTN, GERD, non-obstructive CAD, Dementia, anemia, MDD P/W Hb 7.4 OP, episodic vomiting for few months, 60lb weight loss. Found to have Hb 7.5 on admission.     #Microcytic anemia (r/o GIB)  - Vitals on admission --> /67, , RR 19, T98, O2 98% RA  - Labs on admission --> Hb 7.5, MCV 66, Cr 1.6  - CTAP --> large hiatal hernia, b/l adrenal nodular thickening, b/l multifocal renal cortical thinning   - S/p 1u prbc in ED  - Admitted to medicine for further management  - GI Consult --> EGD/Colonscopy done 1/12 - sigmoid mass found - biopsy taken, CEA/Ca 19-9 ordered; pt seen by colorectal surgery- plan dc and OP follow up   - 1/13 discussion with sister Sonali - no surgery at present, ok for Dr. Hussein to see the patient - Sonali doesn't want pt to know about mass currently   - 1/14 Sonali instructed that she wants surgery to be done asap, colorectal was informed  - 1/15 cardio consult appreciated - TTE, ekg ordered --Patient is intermediate risk for intermediate risk procedure.   - 1/16 family is going to say for sure if they are ok with the surgery on 1/17    #Vomiting likely 2/2 Type III hiatal hernia-resolved   - CT abdomen shows Large hiatal hernia containing partially included stomach with apparent organoaxial rotation.  - Currently no abdominal pain/nausea/vomiting. Tolerating water.     #DAWIT on CKD stage 3 vs. Progressive CKD - improving   - Serum Cr 1.4 currently. Baseline Cr 1.2 in March 2022.   - Renal US --> no hydro, echogenic kidneys can reflect medical renal disease  - Cr peaked at 1.7 then trended down to 1.5  - C/w IVF gentle hydration  - C/w trending BMP    #HTN   - Lisinopril held  - C/w Nifedipine 60mg QD  - C/w metoprolol succinate 50mg QD    #HFpEF  - On lasix PO 40 BID at home  - TTE 10/1/20 --> EF 60-65%, mild concentric LVH, G2DD  - Holding lasix for now due to DAWIT and mild hypokalemia    #Asthma  - Follows dr. Hartley who had recommended symbicort 160-4.5 2 puff BID and combivent prn but pt does not take it  - Monitor     #Depression  - C/w home meds    #MISC  DVT ppx: SCD  GI ppx: PPI  Diet: NPO  Activity: as tolerated  Pending:  TTE, path report, colorectal surgery   Family: Sonali (sister) updated via phone 1/16 by resident

## 2024-01-16 NOTE — PROGRESS NOTE ADULT - SUBJECTIVE AND OBJECTIVE BOX
24H events:    Patient is a 78y old Female who presents with a chief complaint of Anemia (16 Jan 2024 11:29)    Primary diagnosis of Anemia    Today is hospital day 8d. This morning patient was seen and examined at bedside. Patient is hard of hearing.  She was having breakfast and she looked comfortable. No acute or major events overnight.   Hemodynamically stable, tolerating oral diet, voiding appropriately with appropriate bowel movements.     Code Status: Full code    Family communication:  Contact date: 01/15/24  Name of person contacted: Sonali  Relationship to patient: Sister  Communication details: Medical update, current treatment plan    PAST MEDICAL & SURGICAL HISTORY  Hypertension    GERD (gastroesophageal reflux disease)    Hiatal hernia with GERD    Empyema lung    H/O CHF    History of repair of hiatal hernia    H/O pleural empyema  s/p decortication      SOCIAL HISTORY:  Social History:      ALLERGIES:  No Known Allergies    MEDICATIONS:  STANDING MEDICATIONS  atorvastatin 40 milliGRAM(s) Oral at bedtime  bisacodyl 20 milliGRAM(s) Oral at bedtime  ferrous    sulfate 325 milliGRAM(s) Oral daily  lactated ringers. 1000 milliLiter(s) IV Continuous <Continuous>  metoprolol succinate ER 50 milliGRAM(s) Oral daily  pantoprazole    Tablet 40 milliGRAM(s) Oral before breakfast  sertraline 100 milliGRAM(s) Oral daily    PRN MEDICATIONS  guaifenesin/dextromethorphan Oral Liquid 10 milliLiter(s) Oral every 6 hours PRN    VITALS:   T(F): 97  HR: 61  BP: 114/59  RR: 18  SpO2: --    PHYSICAL EXAM:  GENERAL: NAD, lying in bed comfortably   HEART: Regular rate and rhythm, normal S1/S2   LUNGS: No acute respiratory distress, crackles at the bases  ABDOMEN:  soft, non-tender, non-distented   EXTREMITIES:  no edema, ulcerations or ecchymosis  NERVOUS SYSTEM:  A&Ox2, CNII-XII intact, follows commands, answers questions appropriately   SKIN: No rashes or lesions       [facility] score: 18    LABS:                        8.2    10.55 )-----------( 336      ( 16 Jan 2024 05:49 )             28.4     01-16    138  |  101  |  29<H>  ----------------------------<  95  3.8   |  25  |  1.7<H>    Ca    8.8      16 Jan 2024 05:49  Mg     1.9     01-16    TPro  5.5<L>  /  Alb  3.2<L>  /  TBili  <0.2  /  DBili  x   /  AST  15  /  ALT  7   /  AlkPhos  113  01-16    Urinalysis Basic - ( 16 Jan 2024 05:49 )    Color: x / Appearance: x / SG: x / pH: x  Gluc: 95 mg/dL / Ketone: x  / Bili: x / Urobili: x   Blood: x / Protein: x / Nitrite: x   Leuk Esterase: x / RBC: x / WBC x   Sq Epi: x / Non Sq Epi: x / Bacteria: x    RVP (-)    RADIOLOGY:    EXAM:  CT CHEST IC   ORDERED BY: TATIANA REINOSO     PROCEDURE DATE:  01/12/2024      INTERPRETATION:  CLINICAL HISTORY / REASON FOR EXAM: Colonic mass..    TECHNIQUE: Multislice helical sections were obtained from the thoracic inlet to the lung bases with 80 cc Omnipaque 350 intravenous contrast administration. Coronal and sagittal images have been submitted for evaluation. 3D (MIP) images obtained. 20 cc contrast discarded    COMPARISON: CT abdomen and pelvis 1/8/2024, CTchest 1/21/2018. Chest x-ray 8/27/2023    FINDINGS:    LUNGS, PLEURA, AIRWAYS: Lingula atelectasis with areas of bronchiectasis/scarring, similar to 2018 CT, likely representing sequela of prior inflammation. Bilateral subsegmental atelectasisor scarring.    No suspicious pulmonary nodule or mass identified. Trachea patent. No pleural effusion or pneumothorax. Scattered patchy left upper lobe airspace opacities, which may be inflammatory or infectious in etiology.    THORACIC NODES: No mediastinal, hilar, supraclavicular, or axillary lymphadenopathy.    MEDIASTINUM/GREAT VESSELS: No pericardial effusion. Heart size is within normal limits. The aorta and main pulmonary artery are of normal caliber.   Large hiatal hernia.    BONES/SOFT TISSUES: Age-indeterminate, likely subacute or chronic left medial clavicular displaced fracture, new since 8/27/2023 chest x-ray.    VISUALIZED UPPER ABDOMEN: Interval gastrostomy tube placement, with upper abdominal pneumoperitoneum, which could be seen post gastrostomy placement; correlation with procedure history suggested. Unchanged bilateral adrenal gland nodular thickening.      IMPRESSION:    No evidence of intrathoracic neoplastic disease.    Interval gastrostomy tube placement, with upper abdominal pneumoperitoneum, which could be seen post gastrostomy placement; correlation with procedure history suggested.    Lingula atelectasis with areas of bronchiectasis/scarring, similar to 2018 CT, likely representing sequela of prior inflammation.    Age-indeterminate, likely subacute or chronic left medial clavicular displaced fracture, new since 8/27/2023 chest x-ray.    Scattered patchy left upper lobe airspace opacities, which may be inflammatory or infectious in etiology.    --- End of Report ---      Procedure:   2D Echo/Doppler/Color Doppler Complete on 01/15/24  Indications: I50.9 - Heart Failure, unspecified  Diagnosis:   I50.9 - Heart failure, unspecified    Summary:   1. Normal global left ventricular systolic function.   2. LV Ejection Fraction by Rubio's Method with a biplane EF of 61 %.   3. Spectral Doppler shows impaired relaxation pattern of left ventricular myocardial filling (Grade I diastolic dysfunction).   4. Normal left atrial size.   5. Normal right atrial size.   6. Mild mitral valve regurgitation.   7. Mild tricuspid regurgitation.     24H events:    Patient is a 78y old Female who presents with a chief complaint of Anemia (16 Jan 2024 11:29)    Primary diagnosis of Anemia    Today is hospital day 8d. This morning patient was seen and examined at bedside. Patient is hard of hearing.  She was having breakfast and she looked comfortable. No acute or major events overnight.   Hemodynamically stable, tolerating oral diet, voiding appropriately with appropriate bowel movements.     Code Status: Full code    Family communication:  Contact date: 01/15/24  Name of person contacted: Sonali  Relationship to patient: Sister  Communication details: Medical update, current treatment plan    PAST MEDICAL & SURGICAL HISTORY  Hypertension    GERD (gastroesophageal reflux disease)    Hiatal hernia with GERD    Empyema lung    H/O CHF    History of repair of hiatal hernia    H/O pleural empyema  s/p decortication      SOCIAL HISTORY:  Social History:      ALLERGIES:  No Known Allergies    MEDICATIONS:  STANDING MEDICATIONS  atorvastatin 40 milliGRAM(s) Oral at bedtime  bisacodyl 20 milliGRAM(s) Oral at bedtime  ferrous    sulfate 325 milliGRAM(s) Oral daily  lactated ringers. 1000 milliLiter(s) IV Continuous <Continuous>  metoprolol succinate ER 50 milliGRAM(s) Oral daily  pantoprazole    Tablet 40 milliGRAM(s) Oral before breakfast  sertraline 100 milliGRAM(s) Oral daily    PRN MEDICATIONS  guaifenesin/dextromethorphan Oral Liquid 10 milliLiter(s) Oral every 6 hours PRN    VITALS:   T(F): 97  HR: 61  BP: 114/59  RR: 18  SpO2: --    PHYSICAL EXAM:  GENERAL: NAD, lying in bed comfortably   HEART: Regular rate and rhythm, normal S1/S2   LUNGS: No acute respiratory distress, crackles at the bases  ABDOMEN:  soft, non-tender, non-distented   EXTREMITIES:  no edema, ulcerations or ecchymosis  NERVOUS SYSTEM:  A&Ox2, CNII-XII intact, follows commands, answers questions appropriately   SKIN: No rashes or lesions       [facility] score: 18    LABS:                        8.2    10.55 )-----------( 336      ( 16 Jan 2024 05:49 )             28.4     01-16    138  |  101  |  29<H>  ----------------------------<  95  3.8   |  25  |  1.7<H>    Ca    8.8      16 Jan 2024 05:49  Mg     1.9     01-16    TPro  5.5<L>  /  Alb  3.2<L>  /  TBili  <0.2  /  DBili  x   /  AST  15  /  ALT  7   /  AlkPhos  113  01-16    Urinalysis Basic - ( 16 Jan 2024 05:49 )    Color: x / Appearance: x / SG: x / pH: x  Gluc: 95 mg/dL / Ketone: x  / Bili: x / Urobili: x   Blood: x / Protein: x / Nitrite: x   Leuk Esterase: x / RBC: x / WBC x   Sq Epi: x / Non Sq Epi: x / Bacteria: x    RVP (-)    RADIOLOGY:    EXAM:  CT CHEST IC   ORDERED BY: TATIANA REINOSO     PROCEDURE DATE:  01/12/2024      INTERPRETATION:  CLINICAL HISTORY / REASON FOR EXAM: Colonic mass..    TECHNIQUE: Multislice helical sections were obtained from the thoracic inlet to the lung bases with 80 cc Omnipaque 350 intravenous contrast administration. Coronal and sagittal images have been submitted for evaluation. 3D (MIP) images obtained. 20 cc contrast discarded    COMPARISON: CT abdomen and pelvis 1/8/2024, CTchest 1/21/2018. Chest x-ray 8/27/2023    FINDINGS:    LUNGS, PLEURA, AIRWAYS: Lingula atelectasis with areas of bronchiectasis/scarring, similar to 2018 CT, likely representing sequela of prior inflammation. Bilateral subsegmental atelectasisor scarring.    No suspicious pulmonary nodule or mass identified. Trachea patent. No pleural effusion or pneumothorax. Scattered patchy left upper lobe airspace opacities, which may be inflammatory or infectious in etiology.    THORACIC NODES: No mediastinal, hilar, supraclavicular, or axillary lymphadenopathy.    MEDIASTINUM/GREAT VESSELS: No pericardial effusion. Heart size is within normal limits. The aorta and main pulmonary artery are of normal caliber.   Large hiatal hernia.    BONES/SOFT TISSUES: Age-indeterminate, likely subacute or chronic left medial clavicular displaced fracture, new since 8/27/2023 chest x-ray.    VISUALIZED UPPER ABDOMEN: Interval gastrostomy tube placement, with upper abdominal pneumoperitoneum, which could be seen post gastrostomy placement; correlation with procedure history suggested. Unchanged bilateral adrenal gland nodular thickening.      IMPRESSION:    No evidence of intrathoracic neoplastic disease.    Interval gastrostomy tube placement, with upper abdominal pneumoperitoneum, which could be seen post gastrostomy placement; correlation with procedure history suggested.    Lingula atelectasis with areas of bronchiectasis/scarring, similar to 2018 CT, likely representing sequela of prior inflammation.    Age-indeterminate, likely subacute or chronic left medial clavicular displaced fracture, new since 8/27/2023 chest x-ray.    Scattered patchy left upper lobe airspace opacities, which may be inflammatory or infectious in etiology.    --- End of Report ---      Procedure:   2D Echo/Doppler/Color Doppler Complete on 01/15/24  Indications: I50.9 - Heart Failure, unspecified  Diagnosis:   I50.9 - Heart failure, unspecified    Summary:   1. Normal global left ventricular systolic function.   2. LV Ejection Fraction by Rubio's Method with a biplane EF of 61 %.   3. Spectral Doppler shows impaired relaxation pattern of left ventricular myocardial filling (Grade I diastolic dysfunction).   4. Normal left atrial size.   5. Normal right atrial size.   6. Mild mitral valve regurgitation.   7. Mild tricuspid regurgitation.

## 2024-01-17 LAB
ALBUMIN SERPL ELPH-MCNC: 3.4 G/DL — LOW (ref 3.5–5.2)
ALP SERPL-CCNC: 119 U/L — HIGH (ref 30–115)
ALT FLD-CCNC: 8 U/L — SIGNIFICANT CHANGE UP (ref 0–41)
ANION GAP SERPL CALC-SCNC: 11 MMOL/L — SIGNIFICANT CHANGE UP (ref 7–14)
AST SERPL-CCNC: 16 U/L — SIGNIFICANT CHANGE UP (ref 0–41)
BASOPHILS # BLD AUTO: 0.04 K/UL — SIGNIFICANT CHANGE UP (ref 0–0.2)
BASOPHILS NFR BLD AUTO: 0.5 % — SIGNIFICANT CHANGE UP (ref 0–1)
BILIRUB SERPL-MCNC: <0.2 MG/DL — SIGNIFICANT CHANGE UP (ref 0.2–1.2)
BUN SERPL-MCNC: 33 MG/DL — HIGH (ref 10–20)
CALCIUM SERPL-MCNC: 8.9 MG/DL — SIGNIFICANT CHANGE UP (ref 8.4–10.4)
CHLORIDE SERPL-SCNC: 102 MMOL/L — SIGNIFICANT CHANGE UP (ref 98–110)
CO2 SERPL-SCNC: 22 MMOL/L — SIGNIFICANT CHANGE UP (ref 17–32)
CREAT SERPL-MCNC: 1.5 MG/DL — SIGNIFICANT CHANGE UP (ref 0.7–1.5)
EGFR: 35 ML/MIN/1.73M2 — LOW
EOSINOPHIL # BLD AUTO: 0.02 K/UL — SIGNIFICANT CHANGE UP (ref 0–0.7)
EOSINOPHIL NFR BLD AUTO: 0.2 % — SIGNIFICANT CHANGE UP (ref 0–8)
GLUCOSE BLDC GLUCOMTR-MCNC: 127 MG/DL — HIGH (ref 70–99)
GLUCOSE BLDC GLUCOMTR-MCNC: 191 MG/DL — HIGH (ref 70–99)
GLUCOSE SERPL-MCNC: 91 MG/DL — SIGNIFICANT CHANGE UP (ref 70–99)
HCT VFR BLD CALC: 28.8 % — LOW (ref 37–47)
HGB BLD-MCNC: 8.5 G/DL — LOW (ref 12–16)
IMM GRANULOCYTES NFR BLD AUTO: 0.7 % — HIGH (ref 0.1–0.3)
LYMPHOCYTES # BLD AUTO: 2.13 K/UL — SIGNIFICANT CHANGE UP (ref 1.2–3.4)
LYMPHOCYTES # BLD AUTO: 25.1 % — SIGNIFICANT CHANGE UP (ref 20.5–51.1)
MAGNESIUM SERPL-MCNC: 1.9 MG/DL — SIGNIFICANT CHANGE UP (ref 1.8–2.4)
MCHC RBC-ENTMCNC: 21.1 PG — LOW (ref 27–31)
MCHC RBC-ENTMCNC: 29.5 G/DL — LOW (ref 32–37)
MCV RBC AUTO: 71.5 FL — LOW (ref 81–99)
MONOCYTES # BLD AUTO: 0.58 K/UL — SIGNIFICANT CHANGE UP (ref 0.1–0.6)
MONOCYTES NFR BLD AUTO: 6.8 % — SIGNIFICANT CHANGE UP (ref 1.7–9.3)
NEUTROPHILS # BLD AUTO: 5.65 K/UL — SIGNIFICANT CHANGE UP (ref 1.4–6.5)
NEUTROPHILS NFR BLD AUTO: 66.7 % — SIGNIFICANT CHANGE UP (ref 42.2–75.2)
NRBC # BLD: 0 /100 WBCS — SIGNIFICANT CHANGE UP (ref 0–0)
PLATELET # BLD AUTO: 324 K/UL — SIGNIFICANT CHANGE UP (ref 130–400)
PMV BLD: 9.5 FL — SIGNIFICANT CHANGE UP (ref 7.4–10.4)
POTASSIUM SERPL-MCNC: 3.5 MMOL/L — SIGNIFICANT CHANGE UP (ref 3.5–5)
POTASSIUM SERPL-SCNC: 3.5 MMOL/L — SIGNIFICANT CHANGE UP (ref 3.5–5)
PROT SERPL-MCNC: 5.8 G/DL — LOW (ref 6–8)
RBC # BLD: 4.03 M/UL — LOW (ref 4.2–5.4)
RBC # FLD: 20.7 % — HIGH (ref 11.5–14.5)
SODIUM SERPL-SCNC: 135 MMOL/L — SIGNIFICANT CHANGE UP (ref 135–146)
SURGICAL PATHOLOGY STUDY: SIGNIFICANT CHANGE UP
SURGICAL PATHOLOGY STUDY: SIGNIFICANT CHANGE UP
WBC # BLD: 8.48 K/UL — SIGNIFICANT CHANGE UP (ref 4.8–10.8)
WBC # FLD AUTO: 8.48 K/UL — SIGNIFICANT CHANGE UP (ref 4.8–10.8)

## 2024-01-17 PROCEDURE — 99232 SBSQ HOSP IP/OBS MODERATE 35: CPT

## 2024-01-17 RX ORDER — SIMETHICONE 80 MG/1
80 TABLET, CHEWABLE ORAL ONCE
Refills: 0 | Status: COMPLETED | OUTPATIENT
Start: 2024-01-17 | End: 2024-01-17

## 2024-01-17 RX ADMIN — Medication 325 MILLIGRAM(S): at 12:05

## 2024-01-17 RX ADMIN — PANTOPRAZOLE SODIUM 40 MILLIGRAM(S): 20 TABLET, DELAYED RELEASE ORAL at 05:25

## 2024-01-17 RX ADMIN — SIMETHICONE 80 MILLIGRAM(S): 80 TABLET, CHEWABLE ORAL at 15:14

## 2024-01-17 RX ADMIN — ATORVASTATIN CALCIUM 40 MILLIGRAM(S): 80 TABLET, FILM COATED ORAL at 21:05

## 2024-01-17 RX ADMIN — SERTRALINE 100 MILLIGRAM(S): 25 TABLET, FILM COATED ORAL at 12:05

## 2024-01-17 RX ADMIN — Medication 90 MILLIGRAM(S): at 05:25

## 2024-01-17 RX ADMIN — Medication 50 MILLIGRAM(S): at 05:25

## 2024-01-17 NOTE — PROGRESS NOTE ADULT - ATTENDING COMMENTS
77 y/o female with PMHx HTN, GERD, non-obstructive CAD, Dementia, anemia, MDD P/W Hb 7.4 OP, episodic vomiting for few months, 60lb weight loss. Found to have Hb 7.5 on admission.     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 Hussein to see the patient - Sonali doesn't want pt to know about mass currently   - 1/14 Sonali instructed that she wants surgery to be done asap, colorectal was informed  - 1/15 cardio consult appreciated - TTE, ekg ordered --Patient is intermediate risk for intermediate risk procedure.   - palliative care consult to help with discussion and goc  - heme/onc to provide treatment options other than surgery.   - plan for dispo if not moving forward with surgery   - hcp is currently sick and going to the hospital. Collins ( son of hcp /nephew to patient) will help make decisions # [phone]. Sonali also notified me that Joel ( [phone]) who is sister in law can be updated with any information about the patient. Joel was at bedside of the patient.     #Vomiting likely 2/2 Type III hiatal hernia-resolved   - CT abdomen shows Large hiatal hernia containing partially included stomach with apparent organoaxial rotation.  - Currently no abdominal pain/nausea/vomiting. Tolerating water.     #DAWIT on CKD stage 3 vs. Progressive CKD - improving   - Serum Cr 1.4 currently. Baseline Cr 1.2 in March 2022.   - Renal US --> no hydro, echogenic kidneys can reflect medical renal disease  - Cr peaked at 1.7 then trended down to 1.5  - C/w trending BMP    #HTN   - Lisinopril held  - C/w Nifedipine 60mg QD  - C/w metoprolol succinate 50mg QD    #HFpEF  - On lasix PO 40 BID at home  - TTE 10/1/20 --> EF 60-65%, mild concentric LVH, G2DD  - Holding lasix for now due to DAWIT and mild hypokalemia    #Asthma  - Follows dr. Hartley who had recommended symbicort 160-4.5 2 puff BID and combivent prn but pt does not take it  - Monitor     #Depression  - C/w home meds    #MISC  DVT ppx: SCD  GI ppx: PPI  Diet: regular + ensure to help with nutrition  Activity: as tolerated  Pending: dispo planning, family having difficult time with making decision if surgery is right for patient, heme onc follow up for adjunct therapy, palliative care consult   Family: Sonali (sister) updated via phone 1/16 by resident

## 2024-01-17 NOTE — PROGRESS NOTE ADULT - SUBJECTIVE AND OBJECTIVE BOX
GENERAL SURGERY PROGRESS NOTE     DARIEN BARRERA  78y  Female  Hospital day :10d    T(F): 97 (01-17-24 @ 00:03), Max: 97.9 (01-16-24 @ 05:46)  HR: 70 (01-17-24 @ 00:03) (61 - 78)  BP: 126/60 (01-17-24 @ 00:03) (114/59 - 183/79)  RR: 18 (01-17-24 @ 00:03) (18 - 18)    DIET/FLUIDS: ferrous    sulfate 325 milliGRAM(s) Oral daily  lactated ringers. 1000 milliLiter(s) IV Continuous <Continuous>    GI proph:  pantoprazole    Tablet 40 milliGRAM(s) Oral before breakfast    PHYSICAL EXAM:  GENERAL: NAD  CHEST/LUNG: Clear to auscultation bilaterally  HEART: Regular rate and rhythm  ABDOMEN: Soft, Nontender, Nondistended;       LABS  Labs:  CAPILLARY BLOOD GLUCOSE      POCT Blood Glucose.: 116 mg/dL (16 Jan 2024 11:03)  POCT Blood Glucose.: 130 mg/dL (16 Jan 2024 08:30)                          8.2    10.55 )-----------( 336      ( 16 Jan 2024 05:49 )             28.4       Auto Neutrophil %: 68.3 % (01-16-24 @ 05:49)  Auto Immature Granulocyte %: 0.8 % (01-16-24 @ 05:49)    01-16    138  |  101  |  29<H>  ----------------------------<  95  3.8   |  25  |  1.7<H>      Calcium: 8.8 mg/dL (01-16-24 @ 05:49)      LFTs:             5.5  | <0.2 | 15       ------------------[113     ( 16 Jan 2024 05:49 )  3.2  | x    | 7             Urinalysis Basic - ( 16 Jan 2024 05:49 )    Color: x / Appearance: x / SG: x / pH: x  Gluc: 95 mg/dL / Ketone: x  / Bili: x / Urobili: x   Blood: x / Protein: x / Nitrite: x   Leuk Esterase: x / RBC: x / WBC x   Sq Epi: x / Non Sq Epi: x / Bacteria: x      RADIOLOGY & ADDITIONAL TESTS:  No new imaging.

## 2024-01-17 NOTE — PROGRESS NOTE ADULT - ASSESSMENT
79 y/o female with PMHx HTN, GERD, non-obstructive CAD, Dementia, anemia, MDD P/W Hb 7.4 OP, episodic vomiting for few months, 60lb weight loss. Found to have Hb 7.5 on admission.     #Microcytic anemia (r/o GIB)  - Vitals on admission --> /67, , RR 19, T98, O2 98% RA  - Labs on admission --> Hb 7.5, MCV 66, Cr 1.6  - CTAP --> large hiatal hernia, b/l adrenal nodular thickening, b/l multifocal renal cortical thinning   - S/p 1u prbc in ED  - Admitted to medicine for further management  - GI Consult --> EGD/Colonscopy done 1/12 - sigmoid mass found - biopsy taken, CEA/Ca 19-9 ordered; pt seen by colorectal surgery- plan dc and OP follow up   - 1/13 discussion with sister Sonali - no surgery at present, ok for Dr. Hussein to see the patient - Sonali doesn't want pt to know about mass currently   - 1/14 Sonali instructed that she wants surgery to be done asap, colorectal was informed  - 1/15 cardio consult appreciated - TTE, ekg ordered --Patient is intermediate risk for intermediate risk procedure.   - 1/16 family is going to say for sure if they are ok with the surgery on 1/17    #Vomiting likely 2/2 Type III hiatal hernia-resolved   - CT abdomen shows Large hiatal hernia containing partially included stomach with apparent organoaxial rotation.  - Currently no abdominal pain/nausea/vomiting. Tolerating water.     #DAWIT on CKD stage 3 vs. Progressive CKD - improving   - Serum Cr 1.4 currently. Baseline Cr 1.2 in March 2022.   - Renal US --> no hydro, echogenic kidneys can reflect medical renal disease  - Cr peaked at 1.7 then trended down to 1.5  - C/w IVF gentle hydration  - C/w trending BMP    #HTN   - Lisinopril held  - C/w Nifedipine 60mg QD  - C/w metoprolol succinate 50mg QD    #HFpEF  - On lasix PO 40 BID at home  - TTE 10/1/20 --> EF 60-65%, mild concentric LVH, G2DD  - Holding lasix for now due to DAWIT and mild hypokalemia    #Asthma  - Follows dr. Hartley who had recommended symbicort 160-4.5 2 puff BID and combivent prn but pt does not take it  - Monitor     #Depression  - C/w home meds    #MISC  DVT ppx: SCD  GI ppx: PPI  Diet: NPO  Activity: as tolerated  Pending:  TTE, path report, colorectal surgery   Family: Sonali (sister) updated via phone 1/16 by resident

## 2024-01-17 NOTE — PROGRESS NOTE ADULT - SUBJECTIVE AND OBJECTIVE BOX
HPI:  The patient is a 78-year-old female with past medical history of hiatal hernia s/p repair >10 years back, hypertension, GERD, non obstructive CAD, depression,   dementia, anemia presenting for evaluation of low hemoglobin, measured outpatient to be 7.4.  Patient has had multiple episodes of nonbloody nonbilious vomiting over the last several months   with good appetite and almost 60 pound weight loss over the last few months.      Pt was seen in our office for anemia at hb 7.5 and ferr at 36. 1/5/24  Pt refuses GI w/p and was recommended for transfusion   In course of hospital, pt was found to have colon mass.  Biopsy pending.  Surgery had seen pt .     In the ED the pt received 1 PRBC  Labs were significant for a Cr of 1.6 (unknown baseline)  and a lactate    Cat scan was significant for  Large hiatal hernia containing partially included stomach with apparent   organoaxial rotation          PAST MEDICAL & SURGICAL HISTORY:  Hypertension      GERD (gastroesophageal reflux disease)      Hiatal hernia with GERD      Empyema lung      H/O CHF      History of repair of hiatal hernia      H/O pleural empyema  s/p decortication          FAMILY HISTORY:  Family history of cardiac pacemaker  3 siblings    Family history of hypertrophic cardiomyopathy  sister    .  No cardiovascular or pulmonary family history     REVIEW OF SYSTEMS:    All ROS are negative exept per HPI       Allergies    No Known Allergies    Intolerances      PHYSICAL EXAM  Vital Signs Last 24 Hrs  T(C): 36.1 (11 Jan 2024 07:00), Max: 36.6 (11 Jan 2024 00:07)  T(F): 96.9 (11 Jan 2024 07:00), Max: 97.9 (11 Jan 2024 00:07)  HR: 66 (11 Jan 2024 07:00) (63 - 72)  BP: 141/62 (11 Jan 2024 07:00) (134/60 - 147/69)  BP(mean): --  RR: 18 (11 Jan 2024 07:00) (18 - 18)  SpO2: 98% (10 Richi 2024 15:25) (98% - 98%)        CONSTITUTIONAL:  NAD    CARDIAC:   Normal rate,   regular rhythm.    no edema    RESPIRATORY:   basilar crackles    GASTROINTESTINAL:  Abdomen soft, non-tender,   No guarding,     MUSCULOSKELETAL:   Range of motion is not limited,  No clubbing, cyanosis    NEUROLOGICAL:   Alert and oriented           LABS:                          8.7    6.28  )-----------( 463      ( 11 Jan 2024 06:09 )             29.5                                               01-11    139  |  97<L>  |  19  ----------------------------<  89  3.4<L>   |  31  |  1.2    Ca    9.3      11 Jan 2024 06:09  Mg     2.2     01-11    TPro  6.0  /  Alb  3.5  /  TBili  0.4  /  DBili  x   /  AST  12  /  ALT  8   /  AlkPhos  101  01-11      PT/INR - ( 09 Jan 2024 23:22 )   PT: 12.40 sec;   INR: 1.09 ratio         PTT - ( 09 Jan 2024 23:22 )  PTT:26.5 sec                                       Urinalysis Basic - ( 11 Jan 2024 06:09 )      Alb: 3.5 g/dL / Pro: 6.0 g/dL / ALK PHOS: 101 U/L / ALT: 8 U/L / AST: 12 U/L / GGT: x                                                                                                MEDICATIONS  (STANDING):  ALPRAZolam 0.25 milliGRAM(s) Oral at bedtime  iron sucrose IVPB 200 milliGRAM(s) IV Intermittent once  lactated ringers. 1000 milliLiter(s) (75 mL/Hr) IV Continuous <Continuous>  metoprolol succinate ER 50 milliGRAM(s) Oral daily  NIFEdipine XL 60 milliGRAM(s) Oral daily  pantoprazole  Injectable 40 milliGRAM(s) IV Push two times a day  potassium chloride    Tablet ER 20 milliEquivalent(s) Oral every 2 hours  sertraline 100 milliGRAM(s) Oral daily    MEDICATIONS  (PRN):      X-Rays reviewed:    CXR interpreted by me:  Advised Dr. Rosado that patient is scheduled for SALPINGO-OOPHORECTOMY, ROBOT-ASSISTED on 5/6/2022.    Advised Dr. Rosado   Patient should  and start Endocrine therapy as discussed.  Patient was deciding between anastrozole and tamoxifen and was going to make decision after appointment with GYN    Contacted patient to discuss  No anwser  LVM for patient to return call to writer.

## 2024-01-17 NOTE — PROGRESS NOTE ADULT - SUBJECTIVE AND OBJECTIVE BOX
24H events:    Patient is a 78y old Female who presents with a chief complaint of Anemia (17 Jan 2024 00:29)    Primary diagnosis of Anemia    Today is hospital day 9d. This morning patient was seen and examined at bedside, resting comfortably in bed.    No acute or major events overnight. Hemodynamically stable, tolerating oral diet, voiding appropriately with appropriate bowel movements.     Code Status: Full code    Family communication:  Contact date:  Name of person contacted:  Relationship to patient:  Communication details:  What matters most:    PAST MEDICAL & SURGICAL HISTORY  Hypertension    GERD (gastroesophageal reflux disease)    Hiatal hernia with GERD    Empyema lung    H/O CHF    History of repair of hiatal hernia    H/O pleural empyema s/p decortication       ALLERGIES:  No Known Allergies    MEDICATIONS:  STANDING MEDICATIONS  atorvastatin 40 milliGRAM(s) Oral at bedtime  bisacodyl 20 milliGRAM(s) Oral at bedtime  ferrous    sulfate 325 milliGRAM(s) Oral daily  lactated ringers. 1000 milliLiter(s) IV Continuous <Continuous>  metoprolol succinate ER 50 milliGRAM(s) Oral daily  NIFEdipine XL 90 milliGRAM(s) Oral daily  pantoprazole    Tablet 40 milliGRAM(s) Oral before breakfast  sertraline 100 milliGRAM(s) Oral daily    PRN MEDICATIONS  guaifenesin/dextromethorphan Oral Liquid 10 milliLiter(s) Oral every 6 hours PRN    VITALS:   T(F): 97.2  HR: 74  BP: 146/66  RR: 18  SpO2: --    PHYSICAL EXAM:  GENERAL: NAD, lying in bed comfortably, obtunded, lethargic,  somnolent, cooperative, elderly  HEAD: NCAD, no hematoma or laceration   NECK: Supple, no neck stiffness/nuchal rigidity, no JVD    HEART: Regular rate and rhythm, normal S1/S2, no murmurs, heaves, thrills  LUNGS: No acute respiratory distress, clear b/l breath sounds, no wheezing, rales, rhonchi  ABDOMEN:  soft, non-tender, non-distented, + BS, no hepatosplenomegaly   EXTREMITIES: no rashes, extremities warm/dry, no cyanosis, no edema, ulcerations or ecchymosis  NERVOUS SYSTEM:  A&Ox3, CNII-XII intact, follows commands, answers questions appropriately   SKIN: No rashes or lesions       [facility] score: 24    LABS:                        8.5    8.48  )-----------( 324      ( 17 Jan 2024 05:47 )             28.8     01-16    138  |  101  |  29<H>  ----------------------------<  95  3.8   |  25  |  1.7<H>    Ca    8.8      16 Jan 2024 05:49  Mg     1.9     01-16    TPro  5.5<L>  /  Alb  3.2<L>  /  TBili  <0.2  /  DBili  x   /  AST  15  /  ALT  7   /  AlkPhos  113  01-16    Urinalysis Basic - ( 16 Jan 2024 05:49 )    Color: x / Appearance: x / SG: x / pH: x  Gluc: 95 mg/dL / Ketone: x  / Bili: x / Urobili: x   Blood: x / Protein: x / Nitrite: x   Leuk Esterase: x / RBC: x / WBC x   Sq Epi: x / Non Sq Epi: x / Bacteria: x

## 2024-01-17 NOTE — PROGRESS NOTE ADULT - ASSESSMENT
ASSESSMENT:  78-year-old female with past medical history of hiatal hernia s/p repair >10 years back, L emypema s/p decortication 2018, hypertension, GERD, non obstructive CAD, depression, dementia, anemia presenting for evaluation of low hemoglobin, measured outpatient to be 7.4. The pt presented to the Ed for a blood transfusion that was recommended by the hematologist.    PLAN:  - Family discussion for possible surgical intervention   - If agreeable for surgery, plan on this admission  - Cards risk strat noted  - CEA negative, CT Chest w/o any mets  - Rest of care per primary team  - Surgery to follow    [phone]

## 2024-01-17 NOTE — CHART NOTE - NSCHARTNOTEFT_GEN_A_CORE
- I contacted the patient and sister Ms Lyon  - They were informed about the pathology results corresponding to the rectosigmoid mass: adenocarcinoma

## 2024-01-17 NOTE — PROGRESS NOTE ADULT - ASSESSMENT
1- anemia sig hb 7 s/p transfusion  w/p outpt showed PIPO ferr at 35.  s/p transfusion   start iv venofer 100mg qd for next 5 days    2. w/p as inpt showed colon mass, s/p biopsy   path pending  send for CEA    3. surg eval in process.    4. will follow

## 2024-01-18 DIAGNOSIS — Z51.5 ENCOUNTER FOR PALLIATIVE CARE: ICD-10-CM

## 2024-01-18 DIAGNOSIS — F03.90 UNSPECIFIED DEMENTIA WITHOUT BEHAVIORAL DISTURBANCE: ICD-10-CM

## 2024-01-18 DIAGNOSIS — I10 ESSENTIAL (PRIMARY) HYPERTENSION: ICD-10-CM

## 2024-01-18 DIAGNOSIS — C18.9 MALIGNANT NEOPLASM OF COLON, UNSPECIFIED: ICD-10-CM

## 2024-01-18 DIAGNOSIS — Z71.89 OTHER SPECIFIED COUNSELING: ICD-10-CM

## 2024-01-18 LAB
ALBUMIN SERPL ELPH-MCNC: 3.4 G/DL — LOW (ref 3.5–5.2)
ALP SERPL-CCNC: 115 U/L — SIGNIFICANT CHANGE UP (ref 30–115)
ALT FLD-CCNC: 11 U/L — SIGNIFICANT CHANGE UP (ref 0–41)
ANION GAP SERPL CALC-SCNC: 12 MMOL/L — SIGNIFICANT CHANGE UP (ref 7–14)
AST SERPL-CCNC: 18 U/L — SIGNIFICANT CHANGE UP (ref 0–41)
BASOPHILS # BLD AUTO: 0.05 K/UL — SIGNIFICANT CHANGE UP (ref 0–0.2)
BASOPHILS NFR BLD AUTO: 0.6 % — SIGNIFICANT CHANGE UP (ref 0–1)
BILIRUB SERPL-MCNC: <0.2 MG/DL — SIGNIFICANT CHANGE UP (ref 0.2–1.2)
BUN SERPL-MCNC: 32 MG/DL — HIGH (ref 10–20)
CALCIUM SERPL-MCNC: 9 MG/DL — SIGNIFICANT CHANGE UP (ref 8.4–10.4)
CHLORIDE SERPL-SCNC: 105 MMOL/L — SIGNIFICANT CHANGE UP (ref 98–110)
CO2 SERPL-SCNC: 23 MMOL/L — SIGNIFICANT CHANGE UP (ref 17–32)
CREAT SERPL-MCNC: 1.5 MG/DL — SIGNIFICANT CHANGE UP (ref 0.7–1.5)
EGFR: 35 ML/MIN/1.73M2 — LOW
EOSINOPHIL # BLD AUTO: 0.01 K/UL — SIGNIFICANT CHANGE UP (ref 0–0.7)
EOSINOPHIL NFR BLD AUTO: 0.1 % — SIGNIFICANT CHANGE UP (ref 0–8)
GLUCOSE BLDC GLUCOMTR-MCNC: 113 MG/DL — HIGH (ref 70–99)
GLUCOSE SERPL-MCNC: 97 MG/DL — SIGNIFICANT CHANGE UP (ref 70–99)
HCT VFR BLD CALC: 28 % — LOW (ref 37–47)
HGB BLD-MCNC: 8.2 G/DL — LOW (ref 12–16)
IMM GRANULOCYTES NFR BLD AUTO: 0.9 % — HIGH (ref 0.1–0.3)
LYMPHOCYTES # BLD AUTO: 2.01 K/UL — SIGNIFICANT CHANGE UP (ref 1.2–3.4)
LYMPHOCYTES # BLD AUTO: 24.7 % — SIGNIFICANT CHANGE UP (ref 20.5–51.1)
MAGNESIUM SERPL-MCNC: 1.7 MG/DL — LOW (ref 1.8–2.4)
MCHC RBC-ENTMCNC: 21 PG — LOW (ref 27–31)
MCHC RBC-ENTMCNC: 29.3 G/DL — LOW (ref 32–37)
MCV RBC AUTO: 71.8 FL — LOW (ref 81–99)
MONOCYTES # BLD AUTO: 0.67 K/UL — HIGH (ref 0.1–0.6)
MONOCYTES NFR BLD AUTO: 8.2 % — SIGNIFICANT CHANGE UP (ref 1.7–9.3)
NEUTROPHILS # BLD AUTO: 5.33 K/UL — SIGNIFICANT CHANGE UP (ref 1.4–6.5)
NEUTROPHILS NFR BLD AUTO: 65.5 % — SIGNIFICANT CHANGE UP (ref 42.2–75.2)
NRBC # BLD: 0 /100 WBCS — SIGNIFICANT CHANGE UP (ref 0–0)
PHOSPHATE SERPL-MCNC: 2.6 MG/DL — SIGNIFICANT CHANGE UP (ref 2.1–4.9)
PLATELET # BLD AUTO: 296 K/UL — SIGNIFICANT CHANGE UP (ref 130–400)
PMV BLD: 9.2 FL — SIGNIFICANT CHANGE UP (ref 7.4–10.4)
POTASSIUM SERPL-MCNC: 3.6 MMOL/L — SIGNIFICANT CHANGE UP (ref 3.5–5)
POTASSIUM SERPL-SCNC: 3.6 MMOL/L — SIGNIFICANT CHANGE UP (ref 3.5–5)
PROT SERPL-MCNC: 5.7 G/DL — LOW (ref 6–8)
RBC # BLD: 3.9 M/UL — LOW (ref 4.2–5.4)
RBC # FLD: 21.2 % — HIGH (ref 11.5–14.5)
SODIUM SERPL-SCNC: 140 MMOL/L — SIGNIFICANT CHANGE UP (ref 135–146)
WBC # BLD: 8.14 K/UL — SIGNIFICANT CHANGE UP (ref 4.8–10.8)
WBC # FLD AUTO: 8.14 K/UL — SIGNIFICANT CHANGE UP (ref 4.8–10.8)

## 2024-01-18 PROCEDURE — 99232 SBSQ HOSP IP/OBS MODERATE 35: CPT

## 2024-01-18 PROCEDURE — 99223 1ST HOSP IP/OBS HIGH 75: CPT

## 2024-01-18 RX ORDER — IRON SUCROSE 20 MG/ML
200 INJECTION, SOLUTION INTRAVENOUS EVERY 24 HOURS
Refills: 0 | Status: DISCONTINUED | OUTPATIENT
Start: 2024-01-18 | End: 2024-01-18

## 2024-01-18 RX ORDER — CHLORHEXIDINE GLUCONATE 213 G/1000ML
1 SOLUTION TOPICAL
Refills: 0 | Status: DISCONTINUED | OUTPATIENT
Start: 2024-01-18 | End: 2024-01-26

## 2024-01-18 RX ORDER — MAGNESIUM SULFATE 500 MG/ML
2 VIAL (ML) INJECTION ONCE
Refills: 0 | Status: COMPLETED | OUTPATIENT
Start: 2024-01-18 | End: 2024-01-18

## 2024-01-18 RX ORDER — IRON SUCROSE 20 MG/ML
100 INJECTION, SOLUTION INTRAVENOUS EVERY 24 HOURS
Refills: 0 | Status: COMPLETED | OUTPATIENT
Start: 2024-01-19 | End: 2024-01-22

## 2024-01-18 RX ADMIN — Medication 90 MILLIGRAM(S): at 05:55

## 2024-01-18 RX ADMIN — Medication 20 MILLIGRAM(S): at 21:21

## 2024-01-18 RX ADMIN — IRON SUCROSE 110 MILLIGRAM(S): 20 INJECTION, SOLUTION INTRAVENOUS at 10:42

## 2024-01-18 RX ADMIN — PANTOPRAZOLE SODIUM 40 MILLIGRAM(S): 20 TABLET, DELAYED RELEASE ORAL at 05:54

## 2024-01-18 RX ADMIN — ATORVASTATIN CALCIUM 40 MILLIGRAM(S): 80 TABLET, FILM COATED ORAL at 21:22

## 2024-01-18 RX ADMIN — Medication 50 MILLIGRAM(S): at 05:55

## 2024-01-18 RX ADMIN — SERTRALINE 100 MILLIGRAM(S): 25 TABLET, FILM COATED ORAL at 16:02

## 2024-01-18 RX ADMIN — Medication 25 GRAM(S): at 10:44

## 2024-01-18 NOTE — CONSULT NOTE ADULT - SUBJECTIVE AND OBJECTIVE BOX
HPI: 78F with PMH of HTN, GERD, CAD, dementia, anemia, MDD here with anemia, s/p colonoscopy, and found to have adenocarcinoma. In discussions with family (sister Sonali), considering surgery but awaiting staging of the mass before further decisions. Also with vomiting, improving now. Course further complicated by DAWIT. Palliative care consulted for GOC.    PERTINENT PM/SXH:   Hypertension    GERD (gastroesophageal reflux disease)    Hiatal hernia with GERD    Empyema lung    H/O CHF      No significant past surgical history    History of repair of hiatal hernia    H/O pleural empyema    FAMILY HISTORY:  Family history of cardiac pacemaker  3 siblings    Family history of hypertrophic cardiomyopathy  sister    no pertinent family history      SOCIAL HISTORY:   Significant other/partner[ ]  Children[ ]  Amish/Spirituality:  Substance hx:  [ ]   Tobacco hx:  [ ]   Alcohol hx: [ ]   Living Situation: [X ]Home  [ ]Long term care  [ ]Rehab [ ]Other  Home Services: [ ] HHA [ ] Visting RN [ ] Hospice  Occupation:  Home Opioid hx:  [ ] Y [ ] N [ ] I-Stop Reference No:    ADVANCE DIRECTIVES:    [ X] Full Code [ ] DNR  MOLST  [ ]  Living Will  [ ]   DECISION MAKER(s):  [ ] Health Care Proxy(s)  [ ] Surrogate(s)  [ ] Guardian           Name(s): Phone Number(s): sister Sonali [phone]; nephew Collins [phone]    BASELINE (I)ADL(s) (prior to admission):  [place]: [ ]Total  [ ] Moderate [ ]Dependent  Palliative Performance Status Version 2:         %    http://npcrc.org/files/news/palliative_performance_scale_ppsv2.pdf    Allergies    No Known Allergies    Intolerances    MEDICATIONS  (STANDING):  atorvastatin 40 milliGRAM(s) Oral at bedtime  bisacodyl 20 milliGRAM(s) Oral at bedtime  chlorhexidine 2% Cloths 1 Application(s) Topical <User Schedule>  metoprolol succinate ER 50 milliGRAM(s) Oral daily  NIFEdipine XL 90 milliGRAM(s) Oral daily  pantoprazole    Tablet 40 milliGRAM(s) Oral before breakfast  sertraline 100 milliGRAM(s) Oral daily    MEDICATIONS  (PRN):  guaifenesin/dextromethorphan Oral Liquid 10 milliLiter(s) Oral every 6 hours PRN Cough      PRESENT SYMPTOMS: [ ]Unable to obtain due to poor mentation   Source if other than patient:  [ ]Family   [ ]Team     Pain: [ ]yes [X ]no  QOL impact -   Location -                    Aggravating factors -  Quality -  Radiation -  Timing-  Severity (0-10 scale):  Minimal acceptable level (0-10 scale):     CPOT:    https://www.[facility].org/getattachment/[email or web address]/Critical-Care-Pain-Observation-Tool-(CPOT)    PAIN AD Score:   http://geriatrictoolkit.[email or web address]/cog/painad.pdf (press ctrl +  left click to view)    Dyspnea:                           [ ]None[ ]Mild [ ]Moderate [ ]Severe     Respiratory Distress Observation Scale (RDOS):   A score of 0 to 2 signifies little or no respiratory distress, 3 signifies mild distress, scores 4 to 6 indicate moderate distress, and scores greater than 7 signify severe distress  https://www.[facility].ca/sites/default/files/PDFS/421154-hqhhwaykafg-biwbkpom-frvqddxbwpa-fwxwt.pdf    Anxiety:                             [ ]None[ ]Mild [ ]Moderate [ ]Severe   Fatigue:                             [ ]None[ ]Mild [ ]Moderate [ ]Severe   Nausea:                             [ ]None[ ]Mild [ ]Moderate [ ]Severe   Loss of appetite:              [ ]None[ ]Mild [ ]Moderate [ ]Severe   Constipation:                    [ ]None[ ]Mild [ ]Moderate [ ]Severe    Other Symptoms:  [ X]All other review of systems negative     Palliative Performance Status Version 2:         %    http://npcrc.org/files/news/palliative_performance_scale_ppsv2.pdf  PHYSICAL EXAM:  Vital Signs Last 24 Hrs  T(C): 36.3 (18 Jan 2024 07:53), Max: 36.4 (18 Jan 2024 00:00)  T(F): 97.3 (18 Jan 2024 07:53), Max: 97.5 (18 Jan 2024 00:00)  HR: 68 (18 Jan 2024 07:53) (61 - 76)  BP: 154/69 (18 Jan 2024 07:53) (120/58 - 154/69)  BP(mean): --  RR: 18 (18 Jan 2024 07:53) (16 - 18)  SpO2: 98% (18 Jan 2024 07:53) (98% - 98%)    Parameters below as of 18 Jan 2024 07:53  Patient On (Oxygen Delivery Method): room air     I&O's Summary    17 Jan 2024 07:01  -  18 Jan 2024 07:00  --------------------------------------------------------  IN: 320 mL / OUT: 0 mL / NET: 320 mL        GENERAL:  [X ] No acute distress [ ]Lethargic  [ ]Unarousable  [ ]Verbal  [ ]Non-Verbal [ ]Cachexia    BEHAVIORAL/PSYCH:  [ ]Alert and Oriented x  [ ] Anxiety [ ] Delirium [ ] Agitation [X ] Calm   EYES: [X ] No scleral icterus [ ] Scleral icterus [ ] Closed  ENMT:  [ ]Dry mouth  [ ]No external oral lesions [ X] No external ear or nose lesions  CARDIOVASCULAR:  [ ]Regular [ ]Irregular [ ]Tachy [ ]Not Tachy  [ ]Percy [ ] Edema [ ] No edema  PULMONARY:  [ ]Tachypnea  [ ]Audible excessive secretions [X ] No labored breathing [ ] labored breathing  GASTROINTESTINAL: [ ]Soft  [ ]Distended  [ X]Not distended [ ]Non tender [ ]Tender  MUSCULOSKELETAL: [ ]No clubbing [ ] clubbing  [ X] No cyanosis [ ] cyanosis  NEUROLOGIC: [ ]No focal deficits  [ X]Follows commands  [ ]Does not follow commands  [ ]Cognitive impairment  [ ]Dysphagia  [ ]Dysarthria  [ ]Paresis   SKIN: [ ] Jaundiced [X ] Non-jaundiced [ ]Rash [ ]No Rash [ ] Warm [ ] Dry  MISC/LINES: [ ] ET tube [ ] Trach [ ]NGT/OGT [ ]PEG [ ]Tapia    CRITICAL CARE:  [ ] Shock Present  [ ]Septic [ ]Cardiogenic [ ]Neurologic [ ]Hypovolemic  [ ]  Vasopressors [ ]  Inotropes   [ ]Respiratory failure present [ ]Mechanical ventilation [ ]Non-invasive ventilatory support [ ]High flow  [ ]Acute  [ ]Chronic [ ]Hypoxic  [ ]Hypercarbic [ ]Other  [ ]Other organ failure     LABS: reviewed by me                        8.2    8.14  )-----------( 296      ( 18 Jan 2024 07:18 )             28.0   01-18    140  |  105  |  32<H>  ----------------------------<  97  3.6   |  23  |  1.5    Ca    9.0      18 Jan 2024 07:18  Phos  2.6     01-18  Mg     1.7     01-18    TPro  5.7<L>  /  Alb  3.4<L>  /  TBili  <0.2  /  DBili  x   /  AST  18  /  ALT  11  /  AlkPhos  115  01-18      Urinalysis Basic - ( 18 Jan 2024 07:18 )    Color: x / Appearance: x / SG: x / pH: x  Gluc: 97 mg/dL / Ketone: x  / Bili: x / Urobili: x   Blood: x / Protein: x / Nitrite: x   Leuk Esterase: x / RBC: x / WBC x   Sq Epi: x / Non Sq Epi: x / Bacteria: x      RADIOLOGY & ADDITIONAL STUDIES: reviewed by me  CT chest 1/12/24  No evidence of intrathoracic neoplastic disease.    Interval gastrostomy tube placement, with upper abdominal   pneumoperitoneum, which could be seen post gastrostomy placement;   correlation with procedure history suggested.    Lingula atelectasis with areas of bronchiectasis/scarring, similar to   2018 CT, likely representing sequela of prior inflammation.    Age-indeterminate, likely subacute or chronic left medial clavicular   displaced fracture, new since 8/27/2023 chest x-ray.    Scattered patchy left upper lobe airspace opacities, which may be   inflammatory or infectious in etiology.    PROTEIN CALORIE MALNUTRITION PRESENT: [ ]mild [ ]moderate [ ]severe [ ]underweight [ ]morbid obesity  https://www.andeal.org/vault/2440/web/files/ONC/Table_Clinical%20Characteristics%20to%20Document%20Malnutrition-White%20JV%20et%20al%202012.pdf    Height (cm): 152.4 (01-12-24 @ 11:39)  Weight (kg): 60.8 (01-12-24 @ 11:39), 86.2 (08-27-23 @ 19:29)  BMI (kg/m2): 26.2 (01-12-24 @ 11:39)    [ ]PPSV2 < or = to 30% [ ]significant weight loss  [ ]poor nutritional intake  [ ]anasarca      [ ]Artificial Nutrition      Palliative Care Spiritual/Emotional Screening Tool Question  Severity (0-4):                    OR                    [X ] Unable to determine/NA  Score of 2 or greater indicates recommendation of Chaplaincy referral  Chaplaincy Referral: [ ] Yes [ ] Refused [ ] Following     Caregiver Boles:  [ ] Yes [ ] No    OR    [x ] Unable to determine. Will assess at later time if appropriate.  Social Work Referral [ ]  Patient and Family Centered Care Referral [ ]    Anticipatory Grief Present: [ ] Yes [ ] No    OR     [ x] Unable to determine. Will assess at later time if appropriate.  Social Work Referral [ ]  Patient and Family Centered Care Referral [ ]    REFERRALS:   [ ]Chaplaincy  [ ]Hospice  [ ]Child Life  [ ]Social Work  [ ]Case management [ ]Holistic Therapy     Palliative care education provided to patient and/or family    Goals of Care Document:     ______________  Timothy Roque MD  Palliative Medicine  [facility]   of Geriatric and Palliative Medicine  [phone]

## 2024-01-18 NOTE — PROGRESS NOTE ADULT - SUBJECTIVE AND OBJECTIVE BOX
GENERAL SURGERY PROGRESS NOTE     DARIEN BARRERA  78y  Female  Hospital day :11d    T(F): 97.3 (01-18-24 @ 07:53), Max: 97.5 (01-18-24 @ 00:00)  HR: 68 (01-18-24 @ 07:53) (61 - 76)  BP: 154/69 (01-18-24 @ 07:53) (120/58 - 154/69)  RR: 18 (01-18-24 @ 07:53) (16 - 18)  SpO2: 98% (01-18-24 @ 07:53) (98% - 98%)    DIET/FLUIDS: iron sucrose IVPB 200 milliGRAM(s) IV Intermittent every 24 hours    GI proph:  pantoprazole    Tablet 40 milliGRAM(s) Oral before breakfast    PHYSICAL EXAM:  GENERAL: NAD  CHEST/LUNG: Clear to auscultation bilaterally  HEART: Regular rate and rhythm  ABDOMEN: Soft, Nontender, Nondistended;       LABS  Labs:  CAPILLARY BLOOD GLUCOSE      POCT Blood Glucose.: 113 mg/dL (18 Jan 2024 08:26)  POCT Blood Glucose.: 127 mg/dL (17 Jan 2024 11:04)                          8.2    8.14  )-----------( 296      ( 18 Jan 2024 07:18 )             28.0       Auto Neutrophil %: 65.5 % (01-18-24 @ 07:18)  Auto Immature Granulocyte %: 0.9 % (01-18-24 @ 07:18)    01-18    140  |  105  |  32<H>  ----------------------------<  97  3.6   |  23  |  1.5      Calcium: 9.0 mg/dL (01-18-24 @ 07:18)      LFTs:             5.7  | <0.2 | 18       ------------------[115     ( 18 Jan 2024 07:18 )  3.4  | x    | 11            Urinalysis Basic - ( 18 Jan 2024 07:18 )    Color: x / Appearance: x / SG: x / pH: x  Gluc: 97 mg/dL / Ketone: x  / Bili: x / Urobili: x   Blood: x / Protein: x / Nitrite: x   Leuk Esterase: x / RBC: x / WBC x   Sq Epi: x / Non Sq Epi: x / Bacteria: x      RADIOLOGY & ADDITIONAL TESTS:  No new imaging.

## 2024-01-18 NOTE — CONSULT NOTE ADULT - ASSESSMENT
78F with PMH of HTN, GERD, CAD, dementia, anemia, MDD here with anemia, s/p colonoscopy, and found to have adenocarcinoma. In discussions wiht family (sister Sonali), considering surgery but awaiting staging of the mass before further decisions. Also with vomiting, improving now. Course further complicated by DAWIT. Palliative care consulted for GOC.

## 2024-01-18 NOTE — PROGRESS NOTE ADULT - SUBJECTIVE AND OBJECTIVE BOX
DARIEN BARRERA  78y  Female      Patient is a 78y old  Female who presents with Anemia (18 Jan 2024 10:59)      INTERVAL HPI/OVERNIGHT EVENTS:  Patient seen and examined earlier this morning  sitting in bed  walking around the unit  asking to go home  denies any complaints      REVIEW OF SYSTEMS:  CONSTITUTIONAL: No fever, weight loss, or fatigue  EYES: No eye pain, visual disturbances, or discharge  ENMT:  No difficulty hearing, tinnitus, vertigo; No sinus or throat pain  NECK: No pain or stiffness  RESPIRATORY: No cough, wheezing, chills or hemoptysis; No shortness of breath  CARDIOVASCULAR: No chest pain, palpitations, dizziness, or leg swelling  GASTROINTESTINAL: No abdominal or epigastric pain. No nausea, vomiting, or hematemesis; No diarrhea or constipation. No melena or hematochezia.  GENITOURINARY: No dysuria, frequency, hematuria, or incontinence  NEUROLOGICAL: No headaches, memory loss, loss of strength, numbness, or tremors  SKIN: No itching, burning, rashes, or lesions   LYMPH NODES: No enlarged glands  ENDOCRINE: No heat or cold intolerance; No hair loss  MUSCULOSKELETAL: No joint pain or swelling; No muscle, back, or extremity pain  PSYCHIATRIC: No depression, anxiety, mood swings, or difficulty sleeping  HEME/LYMPH: No easy bruising, or bleeding gums  ALLERY AND IMMUNOLOGIC: No hives or eczema    T(C): 36.3 (01-18-24 @ 07:53), Max: 36.4 (01-18-24 @ 00:00)  HR: 68 (01-18-24 @ 07:53) (61 - 76)  BP: 154/69 (01-18-24 @ 07:53) (120/58 - 154/69)  RR: 18 (01-18-24 @ 07:53) (16 - 18)  SpO2: 98% (01-18-24 @ 07:53) (98% - 98%)    PHYSICAL EXAM:  GENERAL: NAD, well-groomed,   HEAD:  Atraumatic, Normocephalic  EYES: conjunctiva and sclera clear  ENMT: Moist mucous membranes,  No visible lesions  NECK: Supple, No JVD, Normal thyroid  NERVOUS SYSTEM:  Alert & Oriented X3, Good concentration; moves all extremities   CHEST/LUNG: good air entry   HEART: Regular rate and rhythm; No murmurs, rubs, or gallops  ABDOMEN: Soft, Nontender, Nondistended; Bowel sounds present  EXTREMITIES:  2+ Peripheral Pulses, No clubbing, cyanosis, or edema  LYMPH: No lymphadenopathy noted  SKIN: No rashes or lesions    Consultant(s) Notes Reviewed:  [x ] YES  [ ] NO  Care Discussed with Consultants/Other Providers [ x] YES  [ ] NO    LAB:                        8.2    8.14  )-----------( 296      ( 18 Jan 2024 07:18 )             28.0     01-18    140  |  105  |  32<H>  ----------------------------<  97  3.6   |  23  |  1.5    Ca    9.0      18 Jan 2024 07:18  Phos  2.6     01-18  Mg     1.7     01-18    TPro  5.7<L>  /  Alb  3.4<L>  /  TBili  <0.2  /  DBili  x   /  AST  18  /  ALT  11  /  AlkPhos  115  01-18    LIVER FUNCTIONS - ( 18 Jan 2024 07:18 )  Alb: 3.4 g/dL / Pro: 5.7 g/dL / ALK PHOS: 115 U/L / ALT: 11 U/L / AST: 18 U/L / GGT: x               Drug Dosing Weight  Height (cm): 152.4 (12 Jan 2024 11:39)  Weight (kg): 60.8 (12 Jan 2024 11:39)  BMI (kg/m2): 26.2 (12 Jan 2024 11:39)  BSA (m2): 1.57 (12 Jan 2024 11:39)    CAPILLARY BLOOD GLUCOSE  POCT Blood Glucose.: 113 mg/dL (18 Jan 2024 08:26)    I&O's Summary    17 Jan 2024 07:01  -  18 Jan 2024 07:00  --------------------------------------------------------  IN: 320 mL / OUT: 0 mL / NET: 320 mL      Urinalysis Basic - ( 18 Jan 2024 07:18 )    Color: x / Appearance: x / SG: x / pH: x  Gluc: 97 mg/dL / Ketone: x  / Bili: x / Urobili: x   Blood: x / Protein: x / Nitrite: x   Leuk Esterase: x / RBC: x / WBC x   Sq Epi: x / Non Sq Epi: x / Bacteria: x        RADIOLOGY & ADDITIONAL TESTS:  Imaging Personally Reviewed:  [x] YES  [ ] NO      MEDS:  atorvastatin 40 milliGRAM(s) Oral at bedtime  bisacodyl 20 milliGRAM(s) Oral at bedtime  chlorhexidine 2% Cloths 1 Application(s) Topical <User Schedule>  guaifenesin/dextromethorphan Oral Liquid 10 milliLiter(s) Oral every 6 hours PRN  iron sucrose IVPB 200 milliGRAM(s) IV Intermittent every 24 hours  metoprolol succinate ER 50 milliGRAM(s) Oral daily  NIFEdipine XL 90 milliGRAM(s) Oral daily  pantoprazole    Tablet 40 milliGRAM(s) Oral before breakfast  sertraline 100 milliGRAM(s) Oral daily

## 2024-01-18 NOTE — PROGRESS NOTE ADULT - ASSESSMENT
79 y/o female with PMHx HTN, GERD, non-obstructive CAD, Dementia, anemia, MDD P/W Hb 7.4 OP, episodic vomiting for few months, 60lb weight loss. Found to have Hb 7.5 on admission.     #Microcytic anemia (r/o GIB)  - Vitals on admission --> /67, , RR 19, T98, O2 98% RA  - Labs on admission --> Hb 7.5, MCV 66, Cr 1.6  - CTAP --> large hiatal hernia, b/l adrenal nodular thickening, b/l multifocal renal cortical thinning   - S/p 1u prbc in ED  - Admitted to medicine for further management  - GI Consult --> EGD/Colonoscopy done 1/12   - sigmoid mass found - biopsy taken --> identified as an adenocarcinoma  - CEA 2.7 /Ca 19-9 <2  - 1/13 discussion with sister Sonali - no surgery at present, ok for Dr. Hussein to see the patient - Sonali doesn't want pt to know about mass currently   - 1/14 Sonali instructed that she wants surgery to be done asap, colorectal was informed  - 1/15 cardio consult appreciated - TTE, EKG read--Patient is intermediate risk for intermediate risk procedure.   - 1/16 family is going to say for sure if they are ok with the surgery on 1/17  - 1/17 : family wants to know the staging of the mass before making any decision    #Vomiting likely 2/2 Type III hiatal hernia-resolved   - CT abdomen shows Large hiatal hernia containing partially included stomach with apparent organoaxial rotation.  - Currently no abdominal pain/nausea/vomiting. Tolerating water.     #DAWIT on CKD stage 3 vs. Progressive CKD - improving   - Serum Cr 1.4 currently. Baseline Cr 1.2 in March 2022.   - Renal US --> no hydro, echogenic kidneys can reflect medical renal disease  - Cr peaked at 1.7 then trended down to 1.5  - C/w IVF gentle hydration  - C/w trending BMP    #HTN   - Lisinopril held  - C/w Nifedipine 60mg QD  - C/w metoprolol succinate 50mg QD    #HFpEF  - On lasix PO 40 BID at home  - TTE 10/1/20 --> EF 60-65%, mild concentric LVH, G2DD  - Holding lasix for now due to DAWIT and mild hypokalemia    #Asthma  - Follows Dr. Hartley who had recommended symbicort 160-4.5 2 puff BID and combivent prn but pt does not take it  - Monitor     #Depression  - C/w home meds    #MISC  DVT ppx: SCD  GI ppx: PPI  Diet: NPO  Activity: as tolerated  Pending:  TTE, path report, colorectal surgery   Family: Sonali (sister) updated via phone 1/17 - she requested staging of the mass before making any decision - palliative care and oncology were consulted

## 2024-01-18 NOTE — CONSULT NOTE ADULT - PROBLEM SELECTOR RECOMMENDATION 9
- came in with anemia, high risk, monitor H/H  - may need surgery if within GOC, awaiting further d/w patient and family tomorrow

## 2024-01-18 NOTE — CONSULT NOTE ADULT - PROBLEM SELECTOR RECOMMENDATION 3
- nephew Collins is currently making decisions as patient's sister is hospitalized  - Collins and his cousin are planning to discuss with patient tomorrow, and would like interdisciplinary team members present if possible  - palliative callback number given

## 2024-01-18 NOTE — PROGRESS NOTE ADULT - SUBJECTIVE AND OBJECTIVE BOX
24H events:    Patient is a 78y old Female who presents with a chief complaint of Anemia (17 Jan 2024 14:31)    Primary diagnosis of Anemia    Today is hospital day 10d. This morning patient was seen and examined at bedside, resting comfortably in bed.    No acute or major events overnight. Hemodynamically stable, tolerating oral diet, voiding appropriately with appropriate bowel movements.     Code Status: Full code    PAST MEDICAL & SURGICAL HISTORY  Hypertension  GERD (gastroesophageal reflux disease)  Hiatal hernia with GERD  Empyema lung  H/O CHF  History of repair of hiatal hernia  H/O pleural empyema s/p decortication    SOCIAL HISTORY:  Social History:      ALLERGIES:  No Known Allergies    MEDICATIONS:  STANDING MEDICATIONS  atorvastatin 40 milliGRAM(s) Oral at bedtime  bisacodyl 20 milliGRAM(s) Oral at bedtime  chlorhexidine 2% Cloths 1 Application(s) Topical <User Schedule>  iron sucrose IVPB 200 milliGRAM(s) IV Intermittent every 24 hours  magnesium sulfate  IVPB 2 Gram(s) IV Intermittent once  metoprolol succinate ER 50 milliGRAM(s) Oral daily  NIFEdipine XL 90 milliGRAM(s) Oral daily  pantoprazole    Tablet 40 milliGRAM(s) Oral before breakfast  sertraline 100 milliGRAM(s) Oral daily    PRN MEDICATIONS  guaifenesin/dextromethorphan Oral Liquid 10 milliLiter(s) Oral every 6 hours PRN    VITALS:   T(F): 97.3  HR: 68  BP: 154/69  RR: 18  SpO2: 98%    PHYSICAL EXAM:  GENERAL: NAD, lying in bed comfortably   HEAD: NCAD, no hematoma or laceration   NECK: Supple, no JVD    HEART: Regular rate and rhythm, normal S1/S2   LUNGS: No acute respiratory distress, clear b/l breath sounds   ABDOMEN:  soft, non-tender, non-distented, + BS, no hepatosplenomegaly   EXTREMITIES: no rashes, no edema, ulcerations or ecchymosis  NERVOUS SYSTEM:  A&Ox3, CNII-XII intact, follows commands, answers questions appropriately   SKIN: No rashes or lesions       [facility] score: 24    LABS:                        8.2    8.14  )-----------( 296      ( 18 Jan 2024 07:18 )             28.0     01-18    140  |  105  |  32<H>  ----------------------------<  97  3.6   |  23  |  1.5    Ca    9.0      18 Jan 2024 07:18  Phos  2.6     01-18  Mg     1.7     01-18    TPro  5.7<L>  /  Alb  3.4<L>  /  TBili  <0.2  /  DBili  x   /  AST  18  /  ALT  11  /  AlkPhos  115  01-18    Urinalysis Basic - ( 18 Jan 2024 07:18 )    Color: x / Appearance: x / SG: x / pH: x  Gluc: 97 mg/dL / Ketone: x  / Bili: x / Urobili: x   Blood: x / Protein: x / Nitrite: x   Leuk Esterase: x / RBC: x / WBC x   Sq Epi: x / Non Sq Epi: x / Bacteria: x

## 2024-01-18 NOTE — PROGRESS NOTE ADULT - SUBJECTIVE AND OBJECTIVE BOX
HPI:  The patient is a 78-year-old female with past medical history of hiatal hernia s/p repair >10 years back, hypertension, GERD, non obstructive CAD, depression,   dementia, anemia presenting for evaluation of low hemoglobin, measured outpatient to be 7.4.  Patient has had multiple episodes of nonbloody nonbilious vomiting over the last several months   with good appetite and almost 60 pound weight loss over the last few months.      Pt was seen in our office for anemia at hb 7.5 and ferr at 36. 1/5/24  Pt refuses GI w/p and was recommended for transfusion   In course of hospital, pt was found to have colon mass.  Biopsy pending.  Surgery had seen pt .     In the ED the pt received 1 PRBC  Labs were significant for a Cr of 1.6 (unknown baseline)  and a lactate    Cat scan was significant for  Large hiatal hernia containing partially included stomach with apparent   organoaxial rotation          PAST MEDICAL & SURGICAL HISTORY:  Hypertension      GERD (gastroesophageal reflux disease)      Hiatal hernia with GERD      Empyema lung      H/O CHF      History of repair of hiatal hernia      H/O pleural empyema  s/p decortication          FAMILY HISTORY:  Family history of cardiac pacemaker  3 siblings    Family history of hypertrophic cardiomyopathy  sister    .  No cardiovascular or pulmonary family history     REVIEW OF SYSTEMS:    All ROS are negative exept per HPI       Allergies    No Known Allergies    Intolerances      PHYSICAL EXAM  Vital Signs Last 24 Hrs  T(C): 36.1 (11 Jan 2024 07:00), Max: 36.6 (11 Jan 2024 00:07)  T(F): 96.9 (11 Jan 2024 07:00), Max: 97.9 (11 Jan 2024 00:07)  HR: 66 (11 Jan 2024 07:00) (63 - 72)  BP: 141/62 (11 Jan 2024 07:00) (134/60 - 147/69)  BP(mean): --  RR: 18 (11 Jan 2024 07:00) (18 - 18)  SpO2: 98% (10 Richi 2024 15:25) (98% - 98%)        CONSTITUTIONAL:  NAD    CARDIAC:   Normal rate,   regular rhythm.    no edema    RESPIRATORY:   basilar crackles    GASTROINTESTINAL:  Abdomen soft, non-tender,   No guarding,     MUSCULOSKELETAL:   Range of motion is not limited,  No clubbing, cyanosis    NEUROLOGICAL:   Alert and oriented           LABS:                          8.7    6.28  )-----------( 463      ( 11 Jan 2024 06:09 )             29.5                                               01-11    139  |  97<L>  |  19  ----------------------------<  89  3.4<L>   |  31  |  1.2    Ca    9.3      11 Jan 2024 06:09  Mg     2.2     01-11    TPro  6.0  /  Alb  3.5  /  TBili  0.4  /  DBili  x   /  AST  12  /  ALT  8   /  AlkPhos  101  01-11      PT/INR - ( 09 Jan 2024 23:22 )   PT: 12.40 sec;   INR: 1.09 ratio         PTT - ( 09 Jan 2024 23:22 )  PTT:26.5 sec                                       Urinalysis Basic - ( 11 Jan 2024 06:09 )      Alb: 3.5 g/dL / Pro: 6.0 g/dL / ALK PHOS: 101 U/L / ALT: 8 U/L / AST: 12 U/L / GGT: x                                                                                                MEDICATIONS  (STANDING):  ALPRAZolam 0.25 milliGRAM(s) Oral at bedtime  iron sucrose IVPB 200 milliGRAM(s) IV Intermittent once  lactated ringers. 1000 milliLiter(s) (75 mL/Hr) IV Continuous <Continuous>  metoprolol succinate ER 50 milliGRAM(s) Oral daily  NIFEdipine XL 60 milliGRAM(s) Oral daily  pantoprazole  Injectable 40 milliGRAM(s) IV Push two times a day  potassium chloride    Tablet ER 20 milliEquivalent(s) Oral every 2 hours  sertraline 100 milliGRAM(s) Oral daily    MEDICATIONS  (PRN):      X-Rays reviewed:    CXR interpreted by me:

## 2024-01-18 NOTE — PROGRESS NOTE ADULT - ASSESSMENT
ASSESSMENT:  78-year-old female with past medical history of hiatal hernia s/p repair >10 years back, L emypema s/p decortication 2018, hypertension, GERD, non obstructive CAD, depression, dementia, anemia presenting for evaluation of low hemoglobin, measured outpatient to be 7.4. The pt presented to the Ed for a blood transfusion that was recommended by the hematologist.    PLAN:  - Please finalize plans with family for surgical intervention, currently sister Sonali wants to have a conversation with the patient first before making a decision about any intervention  - Cards risk strat noted  - CEA negative, CT Chest w/o any mets  - Rest of care per primary team  - Please recall surgery if the family has definitive plans for the surgery    [phone]

## 2024-01-18 NOTE — PROGRESS NOTE ADULT - ASSESSMENT
77 y/o female with PMHx HTN, GERD, non-obstructive CAD, Dementia, anemia, MDD P/W Hb 7.4 OP, episodic vomiting for few months, 60lb weight loss. Found to have Hb 7.5 on admission.     #newly diagnosed adenocarcinoma sigmoid mass   #Microcytic anemia   - GI Consult --> EGD/Colonscopy done 1/12 - sigmoid mass found - biopsy taken  - 1/13 discussion with sister Sonali - no surgery at present, ok for Dr. Hussein to see the patient - Sonali doesn't want pt to know about mass currently   - 1/14 Sonali instructed that she wants surgery to be done asap, colorectal was informed  - 1/15 cardio consult appreciated - TTE, ekg ordered --Patient is intermediate risk for intermediate risk procedure.   - CT chest - no mets  - CEA negative   - heme/onc to provide treatment options other than surgery  - hcp is currently sick and hospitalized at ; Collins ( son of hcp /nephew to patient) will help make decisions # [phone]. - Called twice today - left message  - Sonali also states that Joel ( [phone]) who is sister in law can be updated with any information about the patient. Joel was at bedside of the patient.   - 1/18 - spoke to Sonali; she is deferring to her son Collins today  - palliative consulted on 1/17    #Vomiting likely 2/2 Type III hiatal hernia-resolved   - CT abdomen shows Large hiatal hernia containing partially included stomach with apparent organoaxial rotation.  - Currently no abdominal pain/nausea/vomiting    #Suspected CKD III  - cr stable  - avoid nephrotoxics    #HTN   - Lisinopril on hold due to CKD  - continue current tx    #HFpEF  - On lasix PO 40 BID at home  - TTE 10/1/20 --> EF 60-65%, mild concentric LVH, G2DD  - Holding lasix for now due to DAWIT and mild hypokalemia    #Asthma  - Follows dr. Hartley who had recommended symbicort 160-4.5 2 puff BID and combivent prn but pt does not take it  - Monitor     #Depression  #Mild dementia??  - C/w home meds  - patient resides in a two family home with her sister, Sonali, and reluctant to have her sister return as she doesn't go to her doctor's appointments     Progress Note Handoff  Pending Consults: surgery   Pending Tests:   Pending Results:  Family Discussion: Discussed labs, meds,   Disposition: Home_____/SNF______/Other_____/Unknown at this time_____  Spent over 55 min reviewing chart, speaking with patient/family and on coordinating patient care during interdisciplinary rounds     Please call me with any questions at extension [phone] 77 y/o female with PMHx HTN, GERD, non-obstructive CAD, Dementia, anemia, MDD P/W Hb 7.4 OP, episodic vomiting for few months, 60lb weight loss. Found to have Hb 7.5 on admission.     #newly diagnosed adenocarcinoma sigmoid mass   #Microcytic anemia   - GI Consult --> EGD/Colonscopy done 1/12 - sigmoid mass found - biopsy taken  - 1/13 discussion with sister Sonali - no surgery at present, ok for Dr. Hussein to see the patient - Sonali doesn't want pt to know about mass currently   - 1/14 Sonali instructed that she wants surgery to be done asap, colorectal was informed  - 1/15 cardio consult appreciated - TTE, ekg ordered --Patient is intermediate risk for intermediate risk procedure.   - CT chest - no mets  - CEA negative   - heme/onc to provide treatment options other than surgery  - hcp is currently sick and hospitalized at ; Collins ( son of hcp /nephew to patient) will help make decisions # [phone]. - Called twice today - left message  - Sonali also states that Joel ( [phone]) who is sister in law can be updated with any information about the patient. Joel was at bedside of the patient.   - 1/18 - spoke to Sonali; she is deferring to her son Collins today  - palliative consulted on 1/17    #Vomiting likely 2/2 Type III hiatal hernia-resolved   - CT abdomen shows Large hiatal hernia containing partially included stomach with apparent organoaxial rotation.  - Currently no abdominal pain/nausea/vomiting    #Suspected CKD III  - cr stable  - avoid nephrotoxics    #HTN   - Lisinopril on hold due to CKD  - continue current tx    #HFpEF  - On lasix PO 40 BID at home  - TTE 10/1/20 --> EF 60-65%, mild concentric LVH, G2DD  - Holding lasix for now due to DAWIT and mild hypokalemia    #Asthma  - Follows dr. Hartley who had recommended symbicort 160-4.5 2 puff BID and combivent prn but pt does not take it  - Monitor     #Depression  #Mild dementia??  - C/w home meds  - patient resides in a two family home with her sister, Sonali, and reluctant to have her sister return as she doesn't go to her doctor's appointments     Progress Note Handoff  Pending Consults: surgery, palliative  Pending Tests:  none  Pending Results: none  Family Discussion: Discussed labs, meds, diet, consults and overall plan of care with pt and medical staff. Also spoke to pt's sister, Sonali, in great detail.  Disposition: Home_____/SNF______/Other_____/Unknown at this time_x____  Spent over 55 min reviewing chart, speaking with patient/family and on coordinating patient care during interdisciplinary rounds     Please call me with any questions at extension [phone]

## 2024-01-18 NOTE — PROGRESS NOTE ADULT - ASSESSMENT
1- anemia sig hb 7 s/p transfusion  w/p outpt showed PIPO ferr at 35.  s/p transfusion   start iv venofer 100mg qd for next 5 days    2. w/p as inpt showed colon mass, s/p biopsy   path pending  send for CEA  discussing surgery.  agree with resection .    3. will follow

## 2024-01-18 NOTE — CHART NOTE - NSCHARTNOTEFT_GEN_A_CORE
Spoke to pt's nephew, Collins, he is leaning towards surgery. Asking to speak to surgery. Will speak to his aunt tomorrow and reveal the diagnosis to her.

## 2024-01-18 NOTE — CONSULT NOTE ADULT - PROBLEM SELECTOR RECOMMENDATION 4
- will follow  ______________  Timothy Roque MD  Palliative Medicine  [facility] [place] [facility]   of Geriatric and Palliative Medicine  [phone]

## 2024-01-19 LAB
BASOPHILS # BLD AUTO: 0.04 K/UL — SIGNIFICANT CHANGE UP (ref 0–0.2)
BASOPHILS NFR BLD AUTO: 0.5 % — SIGNIFICANT CHANGE UP (ref 0–1)
BLD GP AB SCN SERPL QL: SIGNIFICANT CHANGE UP
EOSINOPHIL # BLD AUTO: 0 K/UL — SIGNIFICANT CHANGE UP (ref 0–0.7)
EOSINOPHIL NFR BLD AUTO: 0 % — SIGNIFICANT CHANGE UP (ref 0–8)
GLUCOSE BLDC GLUCOMTR-MCNC: 107 MG/DL — HIGH (ref 70–99)
HCT VFR BLD CALC: 27.7 % — LOW (ref 37–47)
HGB BLD-MCNC: 8.2 G/DL — LOW (ref 12–16)
IMM GRANULOCYTES NFR BLD AUTO: 0.9 % — HIGH (ref 0.1–0.3)
LYMPHOCYTES # BLD AUTO: 2.13 K/UL — SIGNIFICANT CHANGE UP (ref 1.2–3.4)
LYMPHOCYTES # BLD AUTO: 24.5 % — SIGNIFICANT CHANGE UP (ref 20.5–51.1)
MCHC RBC-ENTMCNC: 21 PG — LOW (ref 27–31)
MCHC RBC-ENTMCNC: 29.6 G/DL — LOW (ref 32–37)
MCV RBC AUTO: 71 FL — LOW (ref 81–99)
MONOCYTES # BLD AUTO: 0.78 K/UL — HIGH (ref 0.1–0.6)
MONOCYTES NFR BLD AUTO: 9 % — SIGNIFICANT CHANGE UP (ref 1.7–9.3)
NEUTROPHILS # BLD AUTO: 5.68 K/UL — SIGNIFICANT CHANGE UP (ref 1.4–6.5)
NEUTROPHILS NFR BLD AUTO: 65.1 % — SIGNIFICANT CHANGE UP (ref 42.2–75.2)
NRBC # BLD: 0 /100 WBCS — SIGNIFICANT CHANGE UP (ref 0–0)
PLATELET # BLD AUTO: 340 K/UL — SIGNIFICANT CHANGE UP (ref 130–400)
PMV BLD: 9.5 FL — SIGNIFICANT CHANGE UP (ref 7.4–10.4)
RBC # BLD: 3.9 M/UL — LOW (ref 4.2–5.4)
RBC # FLD: 21.6 % — HIGH (ref 11.5–14.5)
WBC # BLD: 8.71 K/UL — SIGNIFICANT CHANGE UP (ref 4.8–10.8)
WBC # FLD AUTO: 8.71 K/UL — SIGNIFICANT CHANGE UP (ref 4.8–10.8)

## 2024-01-19 PROCEDURE — 99232 SBSQ HOSP IP/OBS MODERATE 35: CPT

## 2024-01-19 RX ADMIN — IRON SUCROSE 210 MILLIGRAM(S): 20 INJECTION, SOLUTION INTRAVENOUS at 05:14

## 2024-01-19 RX ADMIN — PANTOPRAZOLE SODIUM 40 MILLIGRAM(S): 20 TABLET, DELAYED RELEASE ORAL at 05:14

## 2024-01-19 RX ADMIN — CHLORHEXIDINE GLUCONATE 1 APPLICATION(S): 213 SOLUTION TOPICAL at 05:14

## 2024-01-19 RX ADMIN — Medication 20 MILLIGRAM(S): at 21:23

## 2024-01-19 RX ADMIN — SERTRALINE 100 MILLIGRAM(S): 25 TABLET, FILM COATED ORAL at 12:15

## 2024-01-19 RX ADMIN — ATORVASTATIN CALCIUM 40 MILLIGRAM(S): 80 TABLET, FILM COATED ORAL at 21:23

## 2024-01-19 RX ADMIN — Medication 90 MILLIGRAM(S): at 05:14

## 2024-01-19 RX ADMIN — Medication 50 MILLIGRAM(S): at 05:15

## 2024-01-19 NOTE — PROGRESS NOTE ADULT - ATTENDING COMMENTS
78F with PMH of hiatal hernia s/p repair >10 years ago, hypertension, GERD, non obstructive CAD, depression, dementia, anemia presenting for anemia found to have 3 cm sigmoid colon mass on colonoscopy  CTAP on admission without metastatic disease  CTChest - No evidence of intrathoracic neoplastic disease.  CEA2.7  Pathology - adenocarcinoma    Plan  - advance diet as tolerated  - cardiac risk stratification - Patient is intermediate risk for intermediate risk procedure. Check EKG and TTE prior.  - f/u cardiac work up  - I had a long conversation with the patient and her nephew Collins at the beside.  The pathology was discussed in detail including images and the recommendation for robotic sigmoidectomy.  They are agreeable to surgical intervention.  Patient scheduled for OR on 1/24  - bowel prep 1/23  - surgery to follow

## 2024-01-19 NOTE — PROGRESS NOTE ADULT - SUBJECTIVE AND OBJECTIVE BOX
DARIEN BARRERA  78y  Female      Patient is a 78y old  Female who presents with Anemia (18 Jan 2024 10:59)      INTERVAL HPI/OVERNIGHT EVENTS:  Patient seen and examined earlier this morning  sitting in bed and eating breakfast  walking around the unit  asking to go home daily   denies any complaints  s/p BM today     REVIEW OF SYSTEMS:  CONSTITUTIONAL: No fever, weight loss, or fatigue  EYES: No eye pain, visual disturbances, or discharge  ENMT:  No difficulty hearing, tinnitus, vertigo; No sinus or throat pain  NECK: No pain or stiffness  RESPIRATORY: No cough, wheezing, chills or hemoptysis; No shortness of breath  CARDIOVASCULAR: No chest pain, palpitations, dizziness, or leg swelling  GASTROINTESTINAL: No abdominal or epigastric pain. No nausea, vomiting, or hematemesis; No diarrhea or constipation. No melena or hematochezia.  GENITOURINARY: No dysuria, frequency, hematuria, or incontinence  NEUROLOGICAL: No headaches, memory loss, loss of strength, numbness, or tremors  SKIN: No itching, burning, rashes, or lesions   LYMPH NODES: No enlarged glands  ENDOCRINE: No heat or cold intolerance; No hair loss  MUSCULOSKELETAL: No joint pain or swelling; No muscle, back, or extremity pain  PSYCHIATRIC: No depression, anxiety, mood swings, or difficulty sleeping  HEME/LYMPH: No easy bruising, or bleeding gums  ALLERY AND IMMUNOLOGIC: No hives or eczema    Vital Signs Last 24 Hrs  T(C): 36.7 (19 Jan 2024 08:05), Max: 36.7 (18 Jan 2024 15:45)  T(F): 98 (19 Jan 2024 08:05), Max: 98 (18 Jan 2024 15:45)  HR: 73 (19 Jan 2024 08:05) (68 - 77)  BP: 130/60 (19 Jan 2024 08:05) (119/58 - 133/63)  BP(mean): --  RR: 18 (19 Jan 2024 08:05) (18 - 18)  SpO2: --      PHYSICAL EXAM:  GENERAL: NAD, well-groomed,   HEAD:  Atraumatic, Normocephalic  EYES: conjunctiva and sclera clear  ENMT: Moist mucous membranes,  No visible lesions  NECK: Supple, No JVD, Normal thyroid  NERVOUS SYSTEM:  Alert & Oriented X3, Good concentration; moves all extremities   CHEST/LUNG: good air entry   HEART: Regular rate and rhythm; No murmurs, rubs, or gallops  ABDOMEN: Soft, Nontender, Nondistended; Bowel sounds present  EXTREMITIES:  2+ Peripheral Pulses, No clubbing, cyanosis, or edema  LYMPH: No lymphadenopathy noted  SKIN: No rashes or lesions    Consultant(s) Notes Reviewed:  [x ] YES  [ ] NO  Care Discussed with Consultants/Other Providers [ x] YES  [ ] NO    LAB:                                   8.2    8.71  )-----------( 340      ( 19 Jan 2024 06:12 )             27.7       01-18    140  |  105  |  32<H>  ----------------------------<  97  3.6   |  23  |  1.5    Ca    9.0      18 Jan 2024 07:18  Phos  2.6     01-18  Mg     1.7     01-18    TPro  5.7<L>  /  Alb  3.4<L>  /  TBili  <0.2  /  DBili  x   /  AST  18  /  ALT  11  /  AlkPhos  115  01-18    LIVER FUNCTIONS - ( 18 Jan 2024 07:18 )  Alb: 3.4 g/dL / Pro: 5.7 g/dL / ALK PHOS: 115 U/L / ALT: 11 U/L / AST: 18 U/L / GGT: x               Drug Dosing Weight  Height (cm): 152.4 (12 Jan 2024 11:39)  Weight (kg): 60.8 (12 Jan 2024 11:39)  BMI (kg/m2): 26.2 (12 Jan 2024 11:39)  BSA (m2): 1.57 (12 Jan 2024 11:39)      Urinalysis Basic - ( 18 Jan 2024 07:18 )    Color: x / Appearance: x / SG: x / pH: x  Gluc: 97 mg/dL / Ketone: x  / Bili: x / Urobili: x   Blood: x / Protein: x / Nitrite: x   Leuk Esterase: x / RBC: x / WBC x   Sq Epi: x / Non Sq Epi: x / Bacteria: x        RADIOLOGY & ADDITIONAL TESTS:  Imaging Personally Reviewed:  [x] YES  [ ] NO      MEDICATIONS  (STANDING):  atorvastatin 40 milliGRAM(s) Oral at bedtime  bisacodyl 20 milliGRAM(s) Oral at bedtime  chlorhexidine 2% Cloths 1 Application(s) Topical <User Schedule>  iron sucrose IVPB 100 milliGRAM(s) IV Intermittent every 24 hours  metoprolol succinate ER 50 milliGRAM(s) Oral daily  NIFEdipine XL 90 milliGRAM(s) Oral daily  pantoprazole    Tablet 40 milliGRAM(s) Oral before breakfast  sertraline 100 milliGRAM(s) Oral daily    MEDICATIONS  (PRN):  guaifenesin/dextromethorphan Oral Liquid 10 milliLiter(s) Oral every 6 hours PRN Cough

## 2024-01-19 NOTE — PROGRESS NOTE ADULT - SUBJECTIVE AND OBJECTIVE BOX
HPI:  The patient is a 78-year-old female with past medical history of hiatal hernia s/p repair >10 years back, hypertension, GERD, non obstructive CAD, depression,   dementia, anemia presenting for evaluation of low hemoglobin, measured outpatient to be 7.4.  Patient has had multiple episodes of nonbloody nonbilious vomiting over the last several months   with good appetite and almost 60 pound weight loss over the last few months.      Pt was seen in our office for anemia at hb 7.5 and ferr at 36. 1/5/24  Pt refuses GI w/p and was recommended for transfusion   In course of hospital, pt was found to have colon mass.  Biopsy pending.  Surgery had seen pt .     In the ED the pt received 1 PRBC  Labs were significant for a Cr of 1.6 (unknown baseline)  and a lactate    Cat scan was significant for  Large hiatal hernia containing partially included stomach with apparent   organoaxial rotation          PAST MEDICAL & SURGICAL HISTORY:  Hypertension      GERD (gastroesophageal reflux disease)      Hiatal hernia with GERD      Empyema lung      H/O CHF      History of repair of hiatal hernia      H/O pleural empyema  s/p decortication          FAMILY HISTORY:  Family history of cardiac pacemaker  3 siblings    Family history of hypertrophic cardiomyopathy  sister    .  No cardiovascular or pulmonary family history     REVIEW OF SYSTEMS:    All ROS are negative exept per HPI       Allergies    No Known Allergies    Intolerances      PHYSICAL EXAM  Vital Signs Last 24 Hrs  vss        CONSTITUTIONAL:  NAD    CARDIAC:   Normal rate,   regular rhythm.    no edema    RESPIRATORY:   basilar crackles    GASTROINTESTINAL:  Abdomen soft, non-tender,   No guarding,     MUSCULOSKELETAL:   Range of motion is not limited,  No clubbing, cyanosis    NEUROLOGICAL:   Alert and oriented           LABS:                          8.7    6.28  )-----------( 463      ( 11 Jan 2024 06:09 )             29.5     ferr 20                                          01-11    139  |  97<L>  |  19  ----------------------------<  89  3.4<L>   |  31  |  1.2    Ca    9.3      11 Jan 2024 06:09  Mg     2.2     01-11    TPro  6.0  /  Alb  3.5  /  TBili  0.4  /  DBili  x   /  AST  12  /  ALT  8   /  AlkPhos  101  01-11      PT/INR - ( 09 Jan 2024 23:22 )   PT: 12.40 sec;   INR: 1.09 ratio         PTT - ( 09 Jan 2024 23:22 )  PTT:26.5 sec                                       Urinalysis Basic - ( 11 Jan 2024 06:09 )      Alb: 3.5 g/dL / Pro: 6.0 g/dL / ALK PHOS: 101 U/L / ALT: 8 U/L / AST: 12 U/L / GGT: x                                                                                                MEDICATIONS  (STANDING):  ALPRAZolam 0.25 milliGRAM(s) Oral at bedtime  iron sucrose IVPB 200 milliGRAM(s) IV Intermittent once  lactated ringers. 1000 milliLiter(s) (75 mL/Hr) IV Continuous <Continuous>  metoprolol succinate ER 50 milliGRAM(s) Oral daily  NIFEdipine XL 60 milliGRAM(s) Oral daily  pantoprazole  Injectable 40 milliGRAM(s) IV Push two times a day  potassium chloride    Tablet ER 20 milliEquivalent(s) Oral every 2 hours  sertraline 100 milliGRAM(s) Oral daily    MEDICATIONS  (PRN):      X-Rays reviewed:    CXR interpreted by me:

## 2024-01-19 NOTE — PROGRESS NOTE ADULT - ASSESSMENT
ASSESSMENT:  78-year-old female with past medical history of hiatal hernia s/p repair >10 years back, L emypema s/p decortication 2018, hypertension, GERD, non obstructive CAD, depression, dementia, anemia presenting for evaluation of low hemoglobin, measured outpatient to be 7.4. The pt presented to the Ed for a blood transfusion that was recommended by the hematologist.    PLAN:  - Please finalize plans with family for surgical intervention today, nephew Collins will have a conversation with the patient about surgery today  - Cards risk strat noted  - CEA negative, CT Chest w/o any mets  - Rest of care per primary team  - Please recall surgery if the family has definitive plans for the surgery    [phone]

## 2024-01-19 NOTE — PROGRESS NOTE ADULT - SUBJECTIVE AND OBJECTIVE BOX
GENERAL SURGERY PROGRESS NOTE     DARIEN BARRERA  78y  Female  Hospital day :12d    T(F): 98 (01-19-24 @ 08:05), Max: 98 (01-18-24 @ 15:45)  HR: 73 (01-19-24 @ 08:05) (68 - 77)  BP: 130/60 (01-19-24 @ 08:05) (119/58 - 133/63)  RR: 18 (01-19-24 @ 08:05) (18 - 18)    DIET/FLUIDS: iron sucrose IVPB 100 milliGRAM(s) IV Intermittent every 24 hours      GI proph:  pantoprazole    Tablet 40 milliGRAM(s) Oral before breakfast    PHYSICAL EXAM:  GENERAL: NAD  CHEST/LUNG: Clear to auscultation bilaterally  HEART: Regular rate and rhythm  ABDOMEN: Soft, Nontender, Nondistended;     LABS  Labs:  CAPILLARY BLOOD GLUCOSE      POCT Blood Glucose.: 107 mg/dL (19 Jan 2024 07:53)  POCT Blood Glucose.: 113 mg/dL (18 Jan 2024 08:26)                          8.2    8.14  )-----------( 296      ( 18 Jan 2024 07:18 )             28.0         01-18    140  |  105  |  32<H>  ----------------------------<  97  3.6   |  23  |  1.5          LFTs:             5.7  | <0.2 | 18       ------------------[115     ( 18 Jan 2024 07:18 )  3.4  | x    | 11            Urinalysis Basic - ( 18 Jan 2024 07:18 )    Color: x / Appearance: x / SG: x / pH: x  Gluc: 97 mg/dL / Ketone: x  / Bili: x / Urobili: x   Blood: x / Protein: x / Nitrite: x   Leuk Esterase: x / RBC: x / WBC x   Sq Epi: x / Non Sq Epi: x / Bacteria: x      RADIOLOGY & ADDITIONAL TESTS:  No new imaging.

## 2024-01-19 NOTE — PROGRESS NOTE ADULT - ASSESSMENT
1- anemia sig hb 7 s/p transfusion>>> hb 8   w/p outpt showed PIPO ferr at 35.>> 20   s/p transfusion    iv venofer 100mg qd for 5 days    2. w/p as inpt showed colon mass, s/p biopsy   path pending  send for CEA  discussing surgery.  agree with resection .    3. will follow

## 2024-01-19 NOTE — CHART NOTE - NSCHARTNOTEFT_GEN_A_CORE
Collins discussed with pt the need for surgery - patient agreed. Called surgery at [phone] and informed them of decision. Asked surgery to speak to the patient as per the nephew's request

## 2024-01-19 NOTE — PROGRESS NOTE ADULT - SUBJECTIVE AND OBJECTIVE BOX
24H events:    Patient is a 78y old Female who presents with a chief complaint of Anemia (19 Jan 2024 08:07)    Primary diagnosis of Anemia    Today is hospital day 11d. This morning patient was seen and examined at bedside, resting comfortably in bed. Patient has no complaints, asking when she can go back home.   No acute or major events overnight. Hemodynamically stable, tolerating oral diet, voiding appropriately with appropriate bowel movements.     Code Status: Full code    Family communication:  Contact date: 01/18/23  Name of person contacted: Whit  Relationship to patient: Sister and nephew  Communication details: diagnosis and treatment    PAST MEDICAL & SURGICAL HISTORY  Hypertension  GERD (gastroesophageal reflux disease)  Hiatal hernia with GERD  Empyema lung  H/O CHF  History of repair of hiatal hernia  H/O pleural empyema s/p decortication    SOCIAL HISTORY:  Social History: Lives with her sister Sonali in the floor above her    ALLERGIES:  No Known Allergies    MEDICATIONS:  STANDING MEDICATIONS  atorvastatin 40 milliGRAM(s) Oral at bedtime  bisacodyl 20 milliGRAM(s) Oral at bedtime  chlorhexidine 2% Cloths 1 Application(s) Topical <User Schedule>  iron sucrose IVPB 100 milliGRAM(s) IV Intermittent every 24 hours  metoprolol succinate ER 50 milliGRAM(s) Oral daily  NIFEdipine XL 90 milliGRAM(s) Oral daily  pantoprazole    Tablet 40 milliGRAM(s) Oral before breakfast  sertraline 100 milliGRAM(s) Oral daily    PRN MEDICATIONS  guaifenesin/dextromethorphan Oral Liquid 10 milliLiter(s) Oral every 6 hours PRN    VITALS:   T(F): 98  HR: 73  BP: 130/60  RR: 18  SpO2: --    PHYSICAL EXAM:  GENERAL: NAD, lying in bed comfortably, cooperative, elderly, hard of hearing  HEAD: NCAD, no hematoma or laceration   NECK: Supple, no neck stiffness/nuchal rigidity, no JVD    HEART: Regular rate and rhythm, normal S1/S2  LUNGS: No acute respiratory distress, clear b/l breath sounds, no wheezing, rales, rhonchi  ABDOMEN:  soft, non-tender, non-distented   EXTREMITIES: no rashes, no cyanosis, no edema, ulcerations or ecchymosis  NERVOUS SYSTEM:  A&Ox3, CNII-XII intact, follows commands  SKIN: No rashes or lesions         LABS:                        8.2    8.71  )-----------( 340      ( 19 Jan 2024 06:12 )             27.7     01-18    140  |  105  |  32<H>  ----------------------------<  97  3.6   |  23  |  1.5    Ca    9.0      18 Jan 2024 07:18  Phos  2.6     01-18  Mg     1.7     01-18    TPro  5.7<L>  /  Alb  3.4<L>  /  TBili  <0.2  /  DBili  x   /  AST  18  /  ALT  11  /  AlkPhos  115  01-18    Urinalysis Basic - ( 18 Jan 2024 07:18 )    Color: x / Appearance: x / SG: x / pH: x  Gluc: 97 mg/dL / Ketone: x  / Bili: x / Urobili: x   Blood: x / Protein: x / Nitrite: x   Leuk Esterase: x / RBC: x / WBC x   Sq Epi: x / Non Sq Epi: x / Bacteria: x

## 2024-01-19 NOTE — PROGRESS NOTE ADULT - ASSESSMENT
79 y/o female with PMHx HTN, GERD, non-obstructive CAD, Dementia, anemia, MDD P/W Hb 7.4 OP, episodic vomiting for few months, 60lb weight loss. Found to have Hb 7.5 on admission.     #newly diagnosed adenocarcinoma sigmoid mass   #Microcytic anemia   - GI Consult --> EGD/Colonscopy done 1/12 - sigmoid mass found - biopsy taken  - 1/13 discussion with sister Sonail - no surgery at present, ok for Dr. Hussein to see the patient - Sonali doesn't want pt to know about mass currently   - 1/14 Sonali instructed that she wants surgery to be done asap, colorectal was informed  - 1/15 cardio consult appreciated - TTE, ekg ordered --Patient is intermediate risk for intermediate risk procedure.   - CT chest - no mets  - CEA negative   - heme/onc to provide treatment options other than surgery  - hcp is currently sick and hospitalized at ; Collins ( son of hcp /nephew to patient) will help make decisions # [phone]. - Collins will visit patient today and talk about her treatment going forward   - Sonali also states that Joel ( [phone]) who is sister in law can be updated with any information about the patient. Byronaris was at bedside of the patient.   - 1/18 - spoke to Sonali; she is deferring to her son Collins today  - palliative consulted on 1/17    #Vomiting likely 2/2 Type III hiatal hernia-resolved   - CT abdomen shows Large hiatal hernia containing partially included stomach with apparent organoaxial rotation.  - Currently no abdominal pain/nausea/vomiting    #Suspected CKD III  - cr stable  - avoid nephrotoxics    #HTN   - Lisinopril on hold due to CKD  - continue current tx    #HFpEF  - On lasix PO 40 BID at home  - TTE 10/1/20 --> EF 60-65%, mild concentric LVH, G2DD  - Holding lasix for now due to DAWIT and mild hypokalemia    #Asthma  - Follows dr. Hartley who had recommended symbicort 160-4.5 2 puff BID and combivent prn but pt does not take it  - Monitor     #Depression  #Mild dementia??  - C/w home meds  - patient resides in a two family home with her sister, Sonali, and reluctant to have her sister return as she doesn't go to her doctor's appointments     Progress Note Handoff  Pending Consults: surgery, palliative  Pending Tests:  cbc  Pending Results: cbc  Family Discussion: Discussed labs, meds, diet, consults and overall plan of care with pt and medical staff. Also spoke to pt's sister, Sonali and nephew, Collins in great detail on 1/18  Disposition: Home_____/SNF______/Other_____/Unknown at this time_x____  Spent over 55 min reviewing chart, speaking with patient/family and on coordinating patient care during interdisciplinary rounds     Please call me with any questions at extension [phone]

## 2024-01-19 NOTE — PROGRESS NOTE ADULT - SUBJECTIVE AND OBJECTIVE BOX
HPI: 78F with PMH of HTN, GERD, CAD, dementia, anemia, MDD here with anemia, s/p colonoscopy, and found to have adenocarcinoma. In discussions with family (sister Sonali), considering surgery but awaiting staging of the mass before further decisions. Also with vomiting, improving now. Course further complicated by DAWIT. Palliative care consulted for GOC.    INTERVAL EVENTS  1/19/24: patient comfortable    ADVANCE DIRECTIVES:    [ X] Full Code [ ] DNR  MOLST  [ ]  Living Will  [ ]   DECISION MAKER(s):  [ ] Health Care Proxy(s)  [ ] Surrogate(s)  [ ] Guardian           Name(s): Phone Number(s): sister Sonali [phone]; nephew Collins [phone]    BASELINE (I)ADL(s) (prior to admission):  [place]: [ ]Total  [ ] Moderate [ ]Dependent  Palliative Performance Status Version 2:         %    [email or web address]    Allergies    No Known Allergies    Intolerances    MEDICATIONS  (STANDING):  atorvastatin 40 milliGRAM(s) Oral at bedtime  bisacodyl 20 milliGRAM(s) Oral at bedtime  chlorhexidine 2% Cloths 1 Application(s) Topical <User Schedule>  iron sucrose IVPB 100 milliGRAM(s) IV Intermittent every 24 hours  metoprolol succinate ER 50 milliGRAM(s) Oral daily  NIFEdipine XL 90 milliGRAM(s) Oral daily  pantoprazole    Tablet 40 milliGRAM(s) Oral before breakfast  sertraline 100 milliGRAM(s) Oral daily    MEDICATIONS  (PRN):  guaifenesin/dextromethorphan Oral Liquid 10 milliLiter(s) Oral every 6 hours PRN Cough    PRESENT SYMPTOMS: [ ]Unable to obtain due to poor mentation   Source if other than patient:  [ ]Family   [ ]Team     Pain: [ ]yes [X ]no  QOL impact -   Location -                    Aggravating factors -  Quality -  Radiation -  Timing-  Severity (0-10 scale):  Minimal acceptable level (0-10 scale):     CPOT:    https://www.[facility].org/getattachment/[email or web address]/Critical-Care-Pain-Observation-Tool-(CPOT)    PAIN AD Score:   http://geriatrictoolkit.[email or web address]/cog/painad.pdf (press ctrl +  left click to view)    Dyspnea:                           [ ]None[ ]Mild [ ]Moderate [ ]Severe     Respiratory Distress Observation Scale (RDOS):   A score of 0 to 2 signifies little or no respiratory distress, 3 signifies mild distress, scores 4 to 6 indicate moderate distress, and scores greater than 7 signify severe distress  [email or web address]://[email or web address]/sites/default/files/PDFS/876897-oigykiffgrp-cscskcok-ytkrntzatkx-kkyfo.pdf    Anxiety:                             [ ]None[ ]Mild [ ]Moderate [ ]Severe   Fatigue:                             [ ]None[ ]Mild [ ]Moderate [ ]Severe   Nausea:                             [ ]None[ ]Mild [ ]Moderate [ ]Severe   Loss of appetite:              [ ]None[ ]Mild [ ]Moderate [ ]Severe   Constipation:                    [ ]None[ ]Mild [ ]Moderate [ ]Severe    Other Symptoms:  [ X]All other review of systems negative     Palliative Performance Status Version 2:         %    http://[email or web address]/files/news/palliative_performance_scale_[email or web address].pdf  PHYSICAL EXAM:  Vital Signs Last 24 Hrs  T(C): 36.7 (19 Jan 2024 15:45), Max: 36.7 (19 Jan 2024 08:05)  T(F): 98 (19 Jan 2024 15:45), Max: 98 (19 Jan 2024 08:05)  HR: 70 (19 Jan 2024 15:45) (68 - 73)  BP: 156/71 (19 Jan 2024 15:45) (119/58 - 156/71)  BP(mean): --  RR: 20 (19 Jan 2024 15:45) (18 - 20)  SpO2: --    GENERAL:  [X ] No acute distress [ ]Lethargic  [ ]Unarousable  [ ]Verbal  [ ]Non-Verbal [ ]Cachexia    BEHAVIORAL/PSYCH:  [ ]Alert and Oriented x  [ ] Anxiety [ ] Delirium [ ] Agitation [X ] Calm   EYES: [X ] No scleral icterus [ ] Scleral icterus [ ] Closed  ENMT:  [ ]Dry mouth  [ ]No external oral lesions [ X] No external ear or nose lesions  CARDIOVASCULAR:  [ ]Regular [ ]Irregular [ ]Tachy [ ]Not Tachy  [ ]Percy [ ] Edema [ ] No edema  PULMONARY:  [ ]Tachypnea  [ ]Audible excessive secretions [X ] No labored breathing [ ] labored breathing  GASTROINTESTINAL: [ ]Soft  [ ]Distended  [ X]Not distended [ ]Non tender [ ]Tender  MUSCULOSKELETAL: [ ]No clubbing [ ] clubbing  [ X] No cyanosis [ ] cyanosis  NEUROLOGIC: [ ]No focal deficits  [ X]Follows commands  [ ]Does not follow commands  [ ]Cognitive impairment  [ ]Dysphagia  [ ]Dysarthria  [ ]Paresis   SKIN: [ ] Jaundiced [X ] Non-jaundiced [ ]Rash [ ]No Rash [ ] Warm [ ] Dry  MISC/LINES: [ ] ET tube [ ] Trach [ ]NGT/OGT [ ]PEG [ ]Tapia    CRITICAL CARE:  [ ] Shock Present  [ ]Septic [ ]Cardiogenic [ ]Neurologic [ ]Hypovolemic  [ ]  Vasopressors [ ]  Inotropes   [ ]Respiratory failure present [ ]Mechanical ventilation [ ]Non-invasive ventilatory support [ ]High flow  [ ]Acute  [ ]Chronic [ ]Hypoxic  [ ]Hypercarbic [ ]Other  [ ]Other organ failure     LABS: reviewed by me                                   8.2    8.71  )-----------( 340      ( 19 Jan 2024 06:12 )             27.7     01-18    140  |  105  |  32<H>  ----------------------------<  97  3.6   |  23  |  1.5    Ca    9.0      18 Jan 2024 07:18  Phos  2.6     01-18  Mg     1.7     01-18        RADIOLOGY & ADDITIONAL STUDIES: reviewed by me  CT chest 1/12/24  No evidence of intrathoracic neoplastic disease.    Interval gastrostomy tube placement, with upper abdominal   pneumoperitoneum, which could be seen post gastrostomy placement;   correlation with procedure history suggested.    Lingula atelectasis with areas of bronchiectasis/scarring, similar to   2018 CT, likely representing sequela of prior inflammation.    Age-indeterminate, likely subacute or chronic left medial clavicular   displaced fracture, new since 8/27/2023 chest x-ray.    Scattered patchy left upper lobe airspace opacities, which may be   inflammatory or infectious in etiology.    PROTEIN CALORIE MALNUTRITION PRESENT: [ ]mild [ ]moderate [ ]severe [ ]underweight [ ]morbid obesity  https://www.andeal.org/vault/2440/web/files/ONC/Table_Clinical%20Characteristics%20to%20Document%20Malnutrition-White%20JV%20et%20al%202012.pdf    Height (cm): 152.4 (01-12-24 @ 11:39)  Weight (kg): 60.8 (01-12-24 @ 11:39), 86.2 (08-27-23 @ 19:29)  BMI (kg/m2): 26.2 (01-12-24 @ 11:39)    [ ]PPSV2 < or = to 30% [ ]significant weight loss  [ ]poor nutritional intake  [ ]anasarca      [ ]Artificial Nutrition      Palliative Care Spiritual/Emotional Screening Tool Question  Severity (0-4):                    OR                    [X ] Unable to determine/NA  Score of 2 or greater indicates recommendation of Chaplaincy referral  Chaplaincy Referral: [ ] Yes [ ] Refused [ ] Following     Caregiver [place]:  [ ] Yes [ ] No    OR    [x ] Unable to determine. Will assess at later time if appropriate.  Social Work Referral [ ]  Patient and Family Centered Care Referral [ ]    Anticipatory Grief Present: [ ] Yes [ ] No    OR     [ x] Unable to determine. Will assess at later time if appropriate.  Social Work Referral [ ]  Patient and Family Centered Care Referral [ ]    REFERRALS:   [ ]Chaplaincy  [ ]Hospice  [ ]Child Life  [ ]Social Work  [ ]Case management [ ]Holistic Therapy     Palliative care education provided to patient and/or family    Goals of Care Document:     ______________  Timothy Roque MD  Palliative Medicine  [facility]   of Geriatric and Palliative Medicine  [phone]

## 2024-01-20 LAB
BASOPHILS # BLD AUTO: 0.05 K/UL — SIGNIFICANT CHANGE UP (ref 0–0.2)
BASOPHILS NFR BLD AUTO: 0.6 % — SIGNIFICANT CHANGE UP (ref 0–1)
EOSINOPHIL # BLD AUTO: 0.01 K/UL — SIGNIFICANT CHANGE UP (ref 0–0.7)
EOSINOPHIL NFR BLD AUTO: 0.1 % — SIGNIFICANT CHANGE UP (ref 0–8)
HCT VFR BLD CALC: 30.2 % — LOW (ref 37–47)
HGB BLD-MCNC: 8.9 G/DL — LOW (ref 12–16)
IMM GRANULOCYTES NFR BLD AUTO: 1.1 % — HIGH (ref 0.1–0.3)
LYMPHOCYTES # BLD AUTO: 2.26 K/UL — SIGNIFICANT CHANGE UP (ref 1.2–3.4)
LYMPHOCYTES # BLD AUTO: 26.8 % — SIGNIFICANT CHANGE UP (ref 20.5–51.1)
MCHC RBC-ENTMCNC: 21.1 PG — LOW (ref 27–31)
MCHC RBC-ENTMCNC: 29.5 G/DL — LOW (ref 32–37)
MCV RBC AUTO: 71.6 FL — LOW (ref 81–99)
MONOCYTES # BLD AUTO: 0.67 K/UL — HIGH (ref 0.1–0.6)
MONOCYTES NFR BLD AUTO: 7.9 % — SIGNIFICANT CHANGE UP (ref 1.7–9.3)
NEUTROPHILS # BLD AUTO: 5.36 K/UL — SIGNIFICANT CHANGE UP (ref 1.4–6.5)
NEUTROPHILS NFR BLD AUTO: 63.5 % — SIGNIFICANT CHANGE UP (ref 42.2–75.2)
NRBC # BLD: 0 /100 WBCS — SIGNIFICANT CHANGE UP (ref 0–0)
PLATELET # BLD AUTO: 319 K/UL — SIGNIFICANT CHANGE UP (ref 130–400)
PMV BLD: 9.1 FL — SIGNIFICANT CHANGE UP (ref 7.4–10.4)
RBC # BLD: 4.22 M/UL — SIGNIFICANT CHANGE UP (ref 4.2–5.4)
RBC # FLD: 22.2 % — HIGH (ref 11.5–14.5)
WBC # BLD: 8.44 K/UL — SIGNIFICANT CHANGE UP (ref 4.8–10.8)
WBC # FLD AUTO: 8.44 K/UL — SIGNIFICANT CHANGE UP (ref 4.8–10.8)

## 2024-01-20 PROCEDURE — 99232 SBSQ HOSP IP/OBS MODERATE 35: CPT

## 2024-01-20 RX ADMIN — Medication 50 MILLIGRAM(S): at 06:16

## 2024-01-20 RX ADMIN — ATORVASTATIN CALCIUM 40 MILLIGRAM(S): 80 TABLET, FILM COATED ORAL at 21:02

## 2024-01-20 RX ADMIN — CHLORHEXIDINE GLUCONATE 1 APPLICATION(S): 213 SOLUTION TOPICAL at 06:18

## 2024-01-20 RX ADMIN — Medication 20 MILLIGRAM(S): at 21:02

## 2024-01-20 RX ADMIN — IRON SUCROSE 210 MILLIGRAM(S): 20 INJECTION, SOLUTION INTRAVENOUS at 04:18

## 2024-01-20 RX ADMIN — SERTRALINE 100 MILLIGRAM(S): 25 TABLET, FILM COATED ORAL at 12:49

## 2024-01-20 RX ADMIN — Medication 90 MILLIGRAM(S): at 06:16

## 2024-01-20 RX ADMIN — PANTOPRAZOLE SODIUM 40 MILLIGRAM(S): 20 TABLET, DELAYED RELEASE ORAL at 06:16

## 2024-01-20 NOTE — PROGRESS NOTE ADULT - SUBJECTIVE AND OBJECTIVE BOX
DRAIEN BARRERA  78y  Female      Patient is a 78y old Female who presents with Anemia (18 Jan 2024 10:59)      INTERVAL HPI/OVERNIGHT EVENTS:  Patient seen and examined earlier this morning  sitting in bed and eating breakfast  walking around the unit  denies any complaints  s/p BM today   await surgery on Wednesday     REVIEW OF SYSTEMS:  CONSTITUTIONAL: No fever, weight loss, or fatigue  EYES: No eye pain, visual disturbances, or discharge  ENMT:  No difficulty hearing, tinnitus, vertigo; No sinus or throat pain  NECK: No pain or stiffness  RESPIRATORY: No cough, wheezing, chills or hemoptysis; No shortness of breath  CARDIOVASCULAR: No chest pain, palpitations, dizziness, or leg swelling  GASTROINTESTINAL: No abdominal or epigastric pain. No nausea, vomiting, or hematemesis; No diarrhea or constipation. No melena or hematochezia.  GENITOURINARY: No dysuria, frequency, hematuria, or incontinence  NEUROLOGICAL: No headaches, memory loss, loss of strength, numbness, or tremors  SKIN: No itching, burning, rashes, or lesions   LYMPH NODES: No enlarged glands  ENDOCRINE: No heat or cold intolerance; No hair loss  MUSCULOSKELETAL: No joint pain or swelling; No muscle, back, or extremity pain  PSYCHIATRIC: No depression, anxiety, mood swings, or difficulty sleeping  HEME/LYMPH: No easy bruising, or bleeding gums  ALLERY AND IMMUNOLOGIC: No hives or eczema    Vital Signs Last 24 Hrs  T(C): 36.7 (20 Jan 2024 08:38), Max: 36.7 (19 Jan 2024 15:45)  T(F): 98.1 (20 Jan 2024 08:38), Max: 98.1 (20 Jan 2024 08:38)  HR: 74 (20 Jan 2024 08:38) (70 - 74)  BP: 158/70 (20 Jan 2024 08:38) (136/66 - 158/70)  BP(mean): --  RR: 18 (20 Jan 2024 00:00) (18 - 20)  SpO2: 98% (20 Jan 2024 08:38) (98% - 98%)    PHYSICAL EXAM:  GENERAL: NAD, well-groomed,   HEAD:  Atraumatic, Normocephalic  EYES: conjunctiva and sclera clear  ENMT: Moist mucous membranes,  No visible lesions  NECK: Supple, No JVD, Normal thyroid  NERVOUS SYSTEM:  Alert & Oriented X3, Good concentration; moves all extremities   CHEST/LUNG: good air entry   HEART: Regular rate and rhythm; No murmurs, rubs, or gallops  ABDOMEN: Soft, Nontender, Nondistended; Bowel sounds present  EXTREMITIES:  2+ Peripheral Pulses, No clubbing, cyanosis, or edema  LYMPH: No lymphadenopathy noted  SKIN: No rashes or lesions    Consultant(s) Notes Reviewed:  [x ] YES  [ ] NO  Care Discussed with Consultants/Other Providers [ x] YES  [ ] NO    LAB:                          8.9    8.44  )-----------( 319      ( 20 Jan 2024 08:18 )             30.2         Drug Dosing Weight  Height (cm): 152.4 (12 Jan 2024 11:39)  Weight (kg): 60.8 (12 Jan 2024 11:39)  BMI (kg/m2): 26.2 (12 Jan 2024 11:39)  BSA (m2): 1.57 (12 Jan 2024 11:39)      Urinalysis Basic - ( 18 Jan 2024 07:18 )    Color: x / Appearance: x / SG: x / pH: x  Gluc: 97 mg/dL / Ketone: x  / Bili: x / Urobili: x   Blood: x / Protein: x / Nitrite: x   Leuk Esterase: x / RBC: x / WBC x   Sq Epi: x / Non Sq Epi: x / Bacteria: x        RADIOLOGY & ADDITIONAL TESTS:  Imaging Personally Reviewed:  [x] YES  [ ] NO      MEDICATIONS  (STANDING):  atorvastatin 40 milliGRAM(s) Oral at bedtime  bisacodyl 20 milliGRAM(s) Oral at bedtime  chlorhexidine 2% Cloths 1 Application(s) Topical <User Schedule>  iron sucrose IVPB 100 milliGRAM(s) IV Intermittent every 24 hours  metoprolol succinate ER 50 milliGRAM(s) Oral daily  NIFEdipine XL 90 milliGRAM(s) Oral daily  pantoprazole    Tablet 40 milliGRAM(s) Oral before breakfast  sertraline 100 milliGRAM(s) Oral daily    MEDICATIONS  (PRN):  guaifenesin/dextromethorphan Oral Liquid 10 milliLiter(s) Oral every 6 hours PRN Cough

## 2024-01-20 NOTE — PROGRESS NOTE ADULT - SUBJECTIVE AND OBJECTIVE BOX
24H events:    Patient is a 78y old Female who presents with a chief complaint of Anemia (20 Jan 2024 02:20)    Primary diagnosis of Anemia      Day 1:  Day 2:  Day 3:     Today is hospital day 12d. This morning patient was seen and examined at bedside, resting comfortably in bed.    No acute or major events overnight. Hemodynamically stable, tolerating oral diet, voiding appropriately with appropriate bowel movements.     Code Status:    Family communication:  Contact date:  Name of person contacted:  Relationship to patient:  Communication details:  What matters most:    PAST MEDICAL & SURGICAL HISTORY  Hypertension    GERD (gastroesophageal reflux disease)    Hiatal hernia with GERD    Empyema lung    H/O CHF    History of repair of hiatal hernia    H/O pleural empyema  s/p decortication      SOCIAL HISTORY:  Social History:      ALLERGIES:  No Known Allergies    MEDICATIONS:  STANDING MEDICATIONS  atorvastatin 40 milliGRAM(s) Oral at bedtime  bisacodyl 20 milliGRAM(s) Oral at bedtime  chlorhexidine 2% Cloths 1 Application(s) Topical <User Schedule>  iron sucrose IVPB 100 milliGRAM(s) IV Intermittent every 24 hours  metoprolol succinate ER 50 milliGRAM(s) Oral daily  NIFEdipine XL 90 milliGRAM(s) Oral daily  pantoprazole    Tablet 40 milliGRAM(s) Oral before breakfast  sertraline 100 milliGRAM(s) Oral daily    PRN MEDICATIONS  guaifenesin/dextromethorphan Oral Liquid 10 milliLiter(s) Oral every 6 hours PRN    VITALS:   T(F): 98.1  HR: 74  BP: 158/70  RR: 18  SpO2: 98%    PHYSICAL EXAM:  GENERAL: NAD, lying in bed comfortably, cooperative, elderly  HEAD: NCAD, no hematoma or laceration   NECK: Supple, no neck stiffness/nuchal rigidity, no JVD    HEART: Regular rate and rhythm, normal S1/S2   LUNGS: No acute respiratory distress, clear b/l breath sounds   ABDOMEN:  soft, non-tender, non-distented, + BS, no hepatosplenomegaly   EXTREMITIES: no rashes, no cyanosis, no edema, ulcerations or ecchymosis  NERVOUS SYSTEM:  A&Ox3, CNII-XII intact, follows commands, answers questions appropriately   SKIN: No rashes or lesions       [facility] score:    LABS:                        8.2    8.71  )-----------( 340      ( 19 Jan 2024 06:12 )             27.7      24H events:    Patient is a 78y old Female who presents with a chief complaint of Anemia (20 Jan 2024 02:20)    Primary diagnosis of Anemia    Today is hospital day 12d. This morning patient was seen and examined at bedside, resting comfortably in bed. No complaints. Patient's nephew spoke with her yeserday, told her that she'll have surgery next week.  No acute or major events overnight. Hemodynamically stable, tolerating oral diet, voiding appropriately with appropriate bowel movements.     Code Status: Full code    PAST MEDICAL & SURGICAL HISTORY  Hypertension    GERD (gastroesophageal reflux disease)    Hiatal hernia with GERD    Empyema lung    H/O CHF    History of repair of hiatal hernia    H/O pleural empyema s/p decortication    ALLERGIES:  No Known Allergies    MEDICATIONS:  STANDING MEDICATIONS  atorvastatin 40 milliGRAM(s) Oral at bedtime  bisacodyl 20 milliGRAM(s) Oral at bedtime  chlorhexidine 2% Cloths 1 Application(s) Topical <User Schedule>  iron sucrose IVPB 100 milliGRAM(s) IV Intermittent every 24 hours  metoprolol succinate ER 50 milliGRAM(s) Oral daily  NIFEdipine XL 90 milliGRAM(s) Oral daily  pantoprazole    Tablet 40 milliGRAM(s) Oral before breakfast  sertraline 100 milliGRAM(s) Oral daily    PRN MEDICATIONS  guaifenesin/dextromethorphan Oral Liquid 10 milliLiter(s) Oral every 6 hours PRN    VITALS:   Vital Signs Last 24 Hrs  T(C): 36.7 (20 Jan 2024 08:38), Max: 36.7 (19 Jan 2024 15:45)  T(F): 98.1 (20 Jan 2024 08:38), Max: 98.1 (20 Jan 2024 08:38)  HR: 74 (20 Jan 2024 08:38) (70 - 74)  BP: 158/70 (20 Jan 2024 08:38) (136/66 - 158/70)  BP(mean): --  RR: 18 (20 Jan 2024 00:00) (18 - 20)  SpO2: 98% (20 Jan 2024 08:38) (98% - 98%)      PHYSICAL EXAM:  GENERAL: NAD, lying in bed comfortably, cooperative, elderly  HEAD: NCAD, no hematoma or laceration   NECK: Supple, no neck stiffness/nuchal rigidity, no JVD    HEART: Regular rate and rhythm, normal S1/S2   LUNGS: No acute respiratory distress, clear b/l breath sounds   ABDOMEN:  soft, non-tender, non-distented, + BS   EXTREMITIES: no rashes, no cyanosis, no edema, ulcerations or ecchymosis  NERVOUS SYSTEM:  A&Ox3, CNII-XII intact, follows commands, answers questions appropriately   SKIN: No rashes or lesions       [facility] score: 24    LABS:                        8.2    8.71  )-----------( 340      ( 19 Jan 2024 06:12 )             27.7

## 2024-01-20 NOTE — PROGRESS NOTE ADULT - ATTENDING COMMENTS
78F with PMH of hiatal hernia s/p repair >10 years ago, hypertension, GERD, non obstructive CAD, depression, dementia, anemia presenting for anemia found to have 3 cm sigmoid colon mass on colonoscopy  CTAP on admission without metastatic disease  CTChest - No evidence of intrathoracic neoplastic disease.  CEA2.7  Pathology - adenocarcinoma    Plan  - advance diet as tolerated  - cardiac risk stratification - Patient is intermediate risk for intermediate risk procedure. Check EKG and TTE prior.  - f/u cardiac work up  - I had a long conversation with the patient and her nephew Collins at the beside.  The pathology was discussed in detail including images and the recommendation for robotic sigmoidectomy.  They are agreeable to surgical intervention.  Patient scheduled for OR on 1/24  - bowel prep 1/23  - surgery to follow .

## 2024-01-20 NOTE — PROGRESS NOTE ADULT - ASSESSMENT
79 y/o female with PMHx HTN, GERD, non-obstructive CAD, Dementia, anemia, MDD P/W Hb 7.4 OP, episodic vomiting for few months, 60lb weight loss. Found to have Hb 7.5 on admission.     #newly diagnosed adenocarcinoma sigmoid mass   #Microcytic anemia   - GI Consult --> EGD/Colonscopy done 1/12 - sigmoid mass found - biopsy taken  - 1/13 discussion with sister Sonali - no surgery at present, ok for Dr. Hussein to see the patient  - 1/14 Sonali instructed that she wants surgery to be done asap, colorectal was informed  - 1/15 cardio consult appreciated - TTE, ekg ordered --Patient is intermediate risk for intermediate risk procedure.   - CT chest - no mets  - CEA negative   - heme/onc to provide treatment options other than surgery  - hcp is currently sick and hospitalized at ; Collins ( son of hcp /nephew to patient) will help make decisions # [phone]. - Collins will visit patient today and talk about her treatment going forward   - Sonali also states that Joel ( [phone]) who is sister in law can be updated with any information about the patient. Joel was at bedside of the patient.   - 1/18 - spoke to Sonali; she is deferring to her son Collins today  - palliative consulted on 1/17  - 1/19 Collins and the patient spoke - now aware of diagnosis and is agreeable to surgery - likely on Wedesday     #Vomiting likely 2/2 Type III hiatal hernia-resolved   - CT abdomen shows Large hiatal hernia containing partially included stomach with apparent organoaxial rotation.  - Currently no abdominal pain/nausea/vomiting    #Suspected CKD III  - cr stable  - avoid nephrotoxics    #HTN   - Lisinopril on hold due to CKD  - continue current tx    #HFpEF  - On lasix PO 40 BID at home  - TTE 10/1/20 --> EF 60-65%, mild concentric LVH, G2DD  - Holding lasix for now due to DAWIT and mild hypokalemia    #Asthma  - Follows dr. Hartley who had recommended symbicort 160-4.5 2 puff BID and combivent prn but pt does not take it  - Monitor     #Depression  #Mild dementia??  - C/w home meds  - patient resides in a two family home with her sister, Sonali, and reluctant to have her sister return as she doesn't go to her doctor's appointments     Progress Note Handoff  Pending Consults: surgery  Pending Tests:  cbc  Pending Results: cbc  Family Discussion: Discussed labs, meds, diet, consults and overall plan of care with pt and medical staff. Also spoke to pt's nephew, Collins in great detail on 1/19. Await surgery   Disposition: Home_____/SNF______/Other_____/Unknown at this time_x____  Spent over 55 min reviewing chart, speaking with patient/family and on coordinating patient care during interdisciplinary rounds     Please call me with any questions at extension [phone]

## 2024-01-20 NOTE — PROGRESS NOTE ADULT - ASSESSMENT
79 y/o female with PMHx HTN, GERD, non-obstructive CAD, Dementia, anemia, MDD P/W Hb 7.4 OP, episodic vomiting for few months, 60lb weight loss. Found to have Hb 7.5 on admission.     #Microcytic anemia (r/o GIB)  - Vitals on admission --> /67, , RR 19, T98, O2 98% RA  - Labs on admission --> Hb 7.5, MCV 66, Cr 1.6  - CTAP --> large hiatal hernia, b/l adrenal nodular thickening, b/l multifocal renal cortical thinning   - S/p 1u prbc in ED  - Admitted to medicine for further management  - GI Consult --> EGD/Colonoscopy done 1/12   - sigmoid mass found - biopsy taken --> identified as an adenocarcinoma  - CEA 2.7 /Ca 19-9 <2  - 1/13 discussion with sister Sonali - no surgery at present, ok for Dr. Hussein to see the patient - Sonali doesn't want pt to know about mass currently   - 1/14 Sonali instructed that she wants surgery to be done asap, colorectal was informed  - 1/15 cardio consult appreciated - TTE, EKG read--Patient is intermediate risk for intermediate risk procedure.   - 1/16 family is going to say for sure if they are ok with the surgery on 1/17  - 1/17: family wants to know the staging of the mass before making any decision  - 1/18: nephew leaning toward the surgery, will come on 01/19 to speak with the patient  - 1/19: decision to move forward with surgery, medicine and cardiology clearance already performed    #Vomiting likely 2/2 Type III hiatal hernia-resolved   - CT abdomen shows Large hiatal hernia containing partially included stomach with apparent organoaxial rotation.  - Currently no abdominal pain/nausea/vomiting. Tolerating water.     #DAWIT on CKD stage 3 vs. Progressive CKD - improving   - Serum Cr 1.4 currently. Baseline Cr 1.2 in March 2022.   - Renal US --> no hydro, echogenic kidneys can reflect medical renal disease  - Cr peaked at 1.7 then trended down to 1.5  - C/w IVF gentle hydration  - Creatinine back at baseline - no more routine BMP since 01/18    #HTN   - Lisinopril held  - C/w Nifedipine 60mg QD  - C/w metoprolol succinate 50mg QD    #HFpEF  - On lasix PO 40 BID at home  - TTE 10/1/20 --> EF 60-65%, mild concentric LVH, G2DD  - Holding lasix for now due to DAWIT and mild hypokalemia    #Asthma  - Follows Dr. Hartley who had recommended symbicort 160-4.5 2 puff BID and combivent prn but pt does not take it  - Monitor     #Depression  - C/w home meds    #MISC  DVT ppx: SCD  GI ppx: PPI  Diet: NPO  Activity: as tolerated  Pending: Colorectal surgery next week

## 2024-01-20 NOTE — PROGRESS NOTE ADULT - SUBJECTIVE AND OBJECTIVE BOX
GENERAL SURGERY PROGRESS NOTE     DARIEN BARRERA  78y  Female  Hospital day :13d    T(F): 98 (01-20-24 @ 00:00), Max: 98 (01-19-24 @ 08:05)  HR: 71 (01-20-24 @ 00:00) (70 - 73)  BP: 136/66 (01-20-24 @ 00:00) (130/60 - 156/71)  RR: 18 (01-20-24 @ 00:00) (18 - 20)    DIET/FLUIDS: iron sucrose IVPB 100 milliGRAM(s) IV Intermittent every 24 hours    GI proph:  pantoprazole    Tablet 40 milliGRAM(s) Oral before breakfast    PHYSICAL EXAM:  GENERAL: NAD  CHEST/LUNG: Clear to auscultation bilaterally  HEART: Regular rate and rhythm  ABDOMEN: Soft, Nontender, Nondistended;       LABS  Labs:  CAPILLARY BLOOD GLUCOSE      POCT Blood Glucose.: 107 mg/dL (19 Jan 2024 07:53)                          8.2    8.71  )-----------( 340      ( 19 Jan 2024 06:12 )             27.7       Auto Neutrophil %: 65.1 % (01-19-24 @ 06:12)  Auto Immature Granulocyte %: 0.9 % (01-19-24 @ 06:12)    01-18    140  |  105  |  32<H>  ----------------------------<  97  3.6   |  23  |  1.5          LFTs:             5.7  | <0.2 | 18       ------------------[115     ( 18 Jan 2024 07:18 )  3.4  | x    | 11            Urinalysis Basic - ( 18 Jan 2024 07:18 )    Color: x / Appearance: x / SG: x / pH: x  Gluc: 97 mg/dL / Ketone: x  / Bili: x / Urobili: x   Blood: x / Protein: x / Nitrite: x   Leuk Esterase: x / RBC: x / WBC x   Sq Epi: x / Non Sq Epi: x / Bacteria: x      RADIOLOGY & ADDITIONAL TESTS:  No new imaging.

## 2024-01-20 NOTE — PROGRESS NOTE ADULT - ASSESSMENT
ASSESSMENT:  78-year-old female with past medical history of hiatal hernia s/p repair >10 years back, L emypema s/p decortication 2018, hypertension, GERD, non obstructive CAD, depression, dementia, anemia presenting for evaluation of low hemoglobin, measured outpatient to be 7.4. The pt presented to the Ed for a blood transfusion that was recommended by the hematologist.    PLAN:  - Patient and family agreeable for surgery -> definitive OR plans to follow  - Cards risk stratification noted  - CEA negative, CT Chest without any mets  - Rest of care per primary team  - Surgery to follow    [phone]

## 2024-01-21 LAB
BASOPHILS # BLD AUTO: 0.04 K/UL — SIGNIFICANT CHANGE UP (ref 0–0.2)
BASOPHILS NFR BLD AUTO: 0.5 % — SIGNIFICANT CHANGE UP (ref 0–1)
EOSINOPHIL # BLD AUTO: 0.45 K/UL — SIGNIFICANT CHANGE UP (ref 0–0.7)
EOSINOPHIL NFR BLD AUTO: 5.8 % — SIGNIFICANT CHANGE UP (ref 0–8)
HCT VFR BLD CALC: 28.3 % — LOW (ref 37–47)
HGB BLD-MCNC: 8.3 G/DL — LOW (ref 12–16)
IMM GRANULOCYTES NFR BLD AUTO: 1.2 % — HIGH (ref 0.1–0.3)
LYMPHOCYTES # BLD AUTO: 2.11 K/UL — SIGNIFICANT CHANGE UP (ref 1.2–3.4)
LYMPHOCYTES # BLD AUTO: 27.1 % — SIGNIFICANT CHANGE UP (ref 20.5–51.1)
MCHC RBC-ENTMCNC: 21.1 PG — LOW (ref 27–31)
MCHC RBC-ENTMCNC: 29.3 G/DL — LOW (ref 32–37)
MCV RBC AUTO: 72 FL — LOW (ref 81–99)
MONOCYTES # BLD AUTO: 0.76 K/UL — HIGH (ref 0.1–0.6)
MONOCYTES NFR BLD AUTO: 9.8 % — HIGH (ref 1.7–9.3)
NEUTROPHILS # BLD AUTO: 4.34 K/UL — SIGNIFICANT CHANGE UP (ref 1.4–6.5)
NEUTROPHILS NFR BLD AUTO: 55.6 % — SIGNIFICANT CHANGE UP (ref 42.2–75.2)
NRBC # BLD: 0 /100 WBCS — SIGNIFICANT CHANGE UP (ref 0–0)
PLATELET # BLD AUTO: 262 K/UL — SIGNIFICANT CHANGE UP (ref 130–400)
PMV BLD: 9.2 FL — SIGNIFICANT CHANGE UP (ref 7.4–10.4)
RBC # BLD: 3.93 M/UL — LOW (ref 4.2–5.4)
RBC # FLD: 22.6 % — HIGH (ref 11.5–14.5)
WBC # BLD: 7.79 K/UL — SIGNIFICANT CHANGE UP (ref 4.8–10.8)
WBC # FLD AUTO: 7.79 K/UL — SIGNIFICANT CHANGE UP (ref 4.8–10.8)

## 2024-01-21 PROCEDURE — 99232 SBSQ HOSP IP/OBS MODERATE 35: CPT

## 2024-01-21 RX ORDER — HYDRALAZINE HCL 50 MG
25 TABLET ORAL
Refills: 0 | Status: DISCONTINUED | OUTPATIENT
Start: 2024-01-21 | End: 2024-01-22

## 2024-01-21 RX ADMIN — Medication 25 MILLIGRAM(S): at 17:07

## 2024-01-21 RX ADMIN — ATORVASTATIN CALCIUM 40 MILLIGRAM(S): 80 TABLET, FILM COATED ORAL at 21:06

## 2024-01-21 RX ADMIN — CHLORHEXIDINE GLUCONATE 1 APPLICATION(S): 213 SOLUTION TOPICAL at 05:12

## 2024-01-21 RX ADMIN — Medication 20 MILLIGRAM(S): at 21:06

## 2024-01-21 RX ADMIN — PANTOPRAZOLE SODIUM 40 MILLIGRAM(S): 20 TABLET, DELAYED RELEASE ORAL at 05:13

## 2024-01-21 RX ADMIN — Medication 90 MILLIGRAM(S): at 05:13

## 2024-01-21 RX ADMIN — IRON SUCROSE 210 MILLIGRAM(S): 20 INJECTION, SOLUTION INTRAVENOUS at 05:01

## 2024-01-21 RX ADMIN — Medication 50 MILLIGRAM(S): at 05:13

## 2024-01-21 RX ADMIN — SERTRALINE 100 MILLIGRAM(S): 25 TABLET, FILM COATED ORAL at 11:49

## 2024-01-21 NOTE — PROGRESS NOTE ADULT - SUBJECTIVE AND OBJECTIVE BOX
DARIEN BARRERA  78y  Female      Patient is a 78y old Female who presents with Anemia (18 Jan 2024 10:59)      INTERVAL HPI/OVERNIGHT EVENTS:  Patient seen and examined earlier this morning  sitting in the chair  denies any complaints  having regular BM's  await surgery on Wednesday     REVIEW OF SYSTEMS:  CONSTITUTIONAL: No fever, weight loss, or fatigue  EYES: No eye pain, visual disturbances, or discharge  ENMT:  No difficulty hearing, tinnitus, vertigo; No sinus or throat pain  NECK: No pain or stiffness  RESPIRATORY: No cough, wheezing, chills or hemoptysis; No shortness of breath  CARDIOVASCULAR: No chest pain, palpitations, dizziness, or leg swelling  GASTROINTESTINAL: No abdominal or epigastric pain. No nausea, vomiting, or hematemesis; No diarrhea or constipation. No melena or hematochezia.  GENITOURINARY: No dysuria, frequency, hematuria, or incontinence  NEUROLOGICAL: No headaches, memory loss, loss of strength, numbness, or tremors  SKIN: No itching, burning, rashes, or lesions   LYMPH NODES: No enlarged glands  ENDOCRINE: No heat or cold intolerance; No hair loss  MUSCULOSKELETAL: No joint pain or swelling; No muscle, back, or extremity pain  PSYCHIATRIC: No depression, anxiety, mood swings, or difficulty sleeping  HEME/LYMPH: No easy bruising, or bleeding gums  ALLERY AND IMMUNOLOGIC: No hives or eczema    Vital Signs Last 24 Hrs  T(C): 35.9 (21 Jan 2024 07:15), Max: 36.9 (20 Jan 2024 15:30)  T(F): 96.6 (21 Jan 2024 07:15), Max: 98.5 (20 Jan 2024 15:30)  HR: 69 (21 Jan 2024 07:15) (69 - 82)  BP: 179/77 (21 Jan 2024 07:15) (179/77 - 192/88)  BP(mean): --  RR: 18 (21 Jan 2024 07:15) (18 - 18)  SpO2: --    PHYSICAL EXAM:  GENERAL: NAD, well-groomed,   HEAD:  Atraumatic, Normocephalic  EYES: conjunctiva and sclera clear  ENMT: Moist mucous membranes,  No visible lesions, heard of hearing   NECK: Supple, No JVD, Normal thyroid  NERVOUS SYSTEM:  Alert & Oriented X3, Good concentration; moves all extremities   CHEST/LUNG: good air entry   HEART: Regular rate and rhythm; No murmurs, rubs, or gallops  ABDOMEN: Soft, Nontender, Nondistended; Bowel sounds present  EXTREMITIES:  2+ Peripheral Pulses, No clubbing, cyanosis, or edema  LYMPH: No lymphadenopathy noted  SKIN: No rashes or lesions    Consultant(s) Notes Reviewed:  [x ] YES  [ ] NO  Care Discussed with Consultants/Other Providers [ x] YES  [ ] NO    LAB:                          8.9    8.44  )-----------( 319      ( 20 Jan 2024 08:18 )             30.2         Drug Dosing Weight  Height (cm): 152.4 (12 Jan 2024 11:39)  Weight (kg): 60.8 (12 Jan 2024 11:39)  BMI (kg/m2): 26.2 (12 Jan 2024 11:39)  BSA (m2): 1.57 (12 Jan 2024 11:39)      Urinalysis Basic - ( 18 Jan 2024 07:18 )    Color: x / Appearance: x / SG: x / pH: x  Gluc: 97 mg/dL / Ketone: x  / Bili: x / Urobili: x   Blood: x / Protein: x / Nitrite: x   Leuk Esterase: x / RBC: x / WBC x   Sq Epi: x / Non Sq Epi: x / Bacteria: x        RADIOLOGY & ADDITIONAL TESTS:  Imaging Personally Reviewed:  [x] YES  [ ] NO      MEDICATIONS  (STANDING):  atorvastatin 40 milliGRAM(s) Oral at bedtime  bisacodyl 20 milliGRAM(s) Oral at bedtime  chlorhexidine 2% Cloths 1 Application(s) Topical <User Schedule>  hydrALAZINE 25 milliGRAM(s) Oral two times a day  iron sucrose IVPB 100 milliGRAM(s) IV Intermittent every 24 hours  metoprolol succinate ER 50 milliGRAM(s) Oral daily  NIFEdipine XL 90 milliGRAM(s) Oral daily  pantoprazole    Tablet 40 milliGRAM(s) Oral before breakfast  sertraline 100 milliGRAM(s) Oral daily    MEDICATIONS  (PRN):  guaifenesin/dextromethorphan Oral Liquid 10 milliLiter(s) Oral every 6 hours PRN Cough

## 2024-01-21 NOTE — PROGRESS NOTE ADULT - SUBJECTIVE AND OBJECTIVE BOX
GENERAL SURGERY PROGRESS NOTE     DARIEN BARRERA  [address] [place] [address] day :13d    PHYSICAL EXAM:  GENERAL: NAD, well-appearing  CHEST/LUNG: Clear to auscultation bilaterally  HEART: Regular rate and rhythm  ABDOMEN: Soft, Nontender, Nondistended;   EXTREMITIES:  No clubbing, cyanosis, or edema        T(F): 98.5 (01-20-24 @ 15:30), Max: 98.5 (01-20-24 @ 15:30)  HR: 72 (01-20-24 @ 15:30) (72 - 74)  BP: 179/80 (01-20-24 @ 15:30) (158/70 - 179/80)  ABP: --  ABP(mean): --  RR: 18 (01-20-24 @ 15:30) (18 - 18)  SpO2: 98% (01-20-24 @ 08:38) (98% - 98%)    IN'S / OUT's:    01-19-24 @ 07:01  -  01-20-24 @ 07:00  --------------------------------------------------------  IN:    Oral Fluid: 720 mL  Total IN: 720 mL    OUT:  Total OUT: 0 mL    Total NET: 720 mL          LABS  Labs:  CAPILLARY BLOOD GLUCOSE                              8.9    8.44  )-----------( 319      ( 20 Jan 2024 08:18 )             30.2       Auto Neutrophil %: 63.5 % (01-20-24 @ 08:18)  Auto Immature Granulocyte %: 1.1 % (01-20-24 @ 08:18)

## 2024-01-21 NOTE — PROGRESS NOTE ADULT - ASSESSMENT
1- anemia sig hb 7 s/p transfusion>>> hb 8   w/p outpt showed PIPO ferr at 35.>> 20   s/p transfusion    iv venofer 100mg qd for 5 days    2. w/p as inpt showed colon mass, s/p biopsy   path pending  send for CEA  discussing surgery.  agree with resection .    3. will follow        1- anemia sig hb 7 s/p transfusion>>> hb 8   w/p outpt showed PIPO ferr at 35.>> 20   s/p transfusion   pt only po fe ..no improvement in hb.. iv venofer qd     2. w/p as inpt showed colon mass, s/p biopsy   path pending  send for CEA  agree with resection next wed    3. will follow

## 2024-01-21 NOTE — PROGRESS NOTE ADULT - ASSESSMENT
A/P:  78-year-old female with past medical history of hiatal hernia s/p repair >10 years back, L emypema s/p decortication 2018, hypertension, GERD, non obstructive CAD, depression, dementia, anemia presenting for evaluation of low hemoglobin, measured outpatient to be 7.4. The pt presented to the Ed for a blood transfusion that was recommended by the hematologist.    PLAN:   Plan for OR Wednesday 1/24: preop patient Tuesday 1/23  card risk: interm  CEA neg  CT chest w/o mets  rest per primary        [employer] [id number]

## 2024-01-21 NOTE — PROGRESS NOTE ADULT - ASSESSMENT
79 y/o female with PMHx HTN, GERD, non-obstructive CAD, Dementia, anemia, MDD P/W Hb 7.4 OP, episodic vomiting for few months, 60lb weight loss. Found to have Hb 7.5 on admission.     #newly diagnosed adenocarcinoma sigmoid mass   #Microcytic anemia   - GI Consult --> EGD/Colonscopy done 1/12 - sigmoid mass found - biopsy taken  - 1/15 cardio consult appreciated -Patient is intermediate risk for intermediate risk procedure.   - CT chest - no mets  - CEA negative   - heme/onc following  - hcp is currently sick and hospitalized at ; Collins ( son of hcp /nephew to patient) will help make decisions # [phone]. - Collins will visit patient today and talk about her treatment going forward   - Sonali also states that Joel ( [phone]) who is sister in law can be updated with any information about the patient. Darinricky was at bedside of the patient.   - palliative following  - 1/19 Collins and the patient spoke - now aware of diagnosis and is agreeable to surgery - likely on Wednesday     #Vomiting likely 2/2 Type III hiatal hernia-resolved   - CT abdomen shows Large hiatal hernia containing partially included stomach with apparent organoaxial rotation.  - Currently no abdominal pain/nausea/vomiting  - tolerating diet and having BM's    #Suspected CKD III  - cr stable  - avoid nephrotoxics    #HTN - poorly controlled  - Lisinopril on hold due to CKD  - continue current tx  - start hydralazine 25mg po q12     #HFpEF  - On lasix PO 40 BID at home  - TTE 10/1/20 --> EF 60-65%, mild concentric LVH, G2DD  - Holding lasix for now due to DAWIT and mild hypokalemia - may need to restart in am if cr stable     #Asthma  - Follows dr. Hartley who had recommended symbicort 160-4.5 2 puff BID and combivent prn but pt does not take it  - Monitor     #Depression  #Mild dementia??  - C/w home meds  - patient resides in a two family home with her sister, Sonali, and reluctant to have her sister return as she doesn't go to her doctor's appointments     Progress Note Handoff  Pending Consults: surgery  Pending Tests:  cbc, bmp  Pending Results: cbc, bmp  Family Discussion: Discussed labs, meds, diet, consults and overall plan of care with pt and medical staff. Also spoke to pt's nephew, Collins in great detail on 1/19. Await surgery on Wednesday   Disposition: Home_____/SNF______/Other_____/Unknown at this time_x____  Spent over 55 min reviewing chart, speaking with patient/family and on coordinating patient care during interdisciplinary rounds     Please call me with any questions at extension [phone]

## 2024-01-21 NOTE — PROGRESS NOTE ADULT - ATTENDING COMMENTS
78F with PMH of hiatal hernia s/p repair >10 years ago, hypertension, GERD, non obstructive CAD, depression, dementia, anemia presenting for anemia found to have 3 cm sigmoid colon mass on colonoscopy  CTAP on admission without metastatic disease  CTChest - No evidence of intrathoracic neoplastic disease.  CEA2.7  Pathology - adenocarcinoma    Plan  - advance diet as tolerated  - cardiac risk stratification - Patient is intermediate risk for intermediate risk procedure. Check EKG and TTE prior.  - f/u cardiac work up  - I had a long conversation with the patient and her nephew Collins at the beside.  The pathology was discussed in detail including images and the recommendation for robotic sigmoidectomy.  They are agreeable to surgical intervention.  Patient scheduled for OR on 1/24  - bowel prep 1/23

## 2024-01-22 LAB
ANION GAP SERPL CALC-SCNC: 8 MMOL/L — SIGNIFICANT CHANGE UP (ref 7–14)
BASOPHILS # BLD AUTO: 0.05 K/UL — SIGNIFICANT CHANGE UP (ref 0–0.2)
BASOPHILS NFR BLD AUTO: 0.6 % — SIGNIFICANT CHANGE UP (ref 0–1)
BLD GP AB SCN SERPL QL: SIGNIFICANT CHANGE UP
BUN SERPL-MCNC: 34 MG/DL — HIGH (ref 10–20)
CALCIUM SERPL-MCNC: 9 MG/DL — SIGNIFICANT CHANGE UP (ref 8.4–10.5)
CHLORIDE SERPL-SCNC: 109 MMOL/L — SIGNIFICANT CHANGE UP (ref 98–110)
CO2 SERPL-SCNC: 24 MMOL/L — SIGNIFICANT CHANGE UP (ref 17–32)
CREAT SERPL-MCNC: 1.4 MG/DL — SIGNIFICANT CHANGE UP (ref 0.7–1.5)
EGFR: 38 ML/MIN/1.73M2 — LOW
EOSINOPHIL # BLD AUTO: 0 K/UL — SIGNIFICANT CHANGE UP (ref 0–0.7)
EOSINOPHIL NFR BLD AUTO: 0 % — SIGNIFICANT CHANGE UP (ref 0–8)
GLUCOSE SERPL-MCNC: 91 MG/DL — SIGNIFICANT CHANGE UP (ref 70–99)
HCT VFR BLD CALC: 27.1 % — LOW (ref 37–47)
HGB BLD-MCNC: 8.2 G/DL — LOW (ref 12–16)
IMM GRANULOCYTES NFR BLD AUTO: 1.2 % — HIGH (ref 0.1–0.3)
LYMPHOCYTES # BLD AUTO: 2.12 K/UL — SIGNIFICANT CHANGE UP (ref 1.2–3.4)
LYMPHOCYTES # BLD AUTO: 23.5 % — SIGNIFICANT CHANGE UP (ref 20.5–51.1)
MCHC RBC-ENTMCNC: 22.3 PG — LOW (ref 27–31)
MCHC RBC-ENTMCNC: 30.3 G/DL — LOW (ref 32–37)
MCV RBC AUTO: 73.8 FL — LOW (ref 81–99)
MONOCYTES # BLD AUTO: 0.9 K/UL — HIGH (ref 0.1–0.6)
MONOCYTES NFR BLD AUTO: 10 % — HIGH (ref 1.7–9.3)
NEUTROPHILS # BLD AUTO: 5.86 K/UL — SIGNIFICANT CHANGE UP (ref 1.4–6.5)
NEUTROPHILS NFR BLD AUTO: 64.7 % — SIGNIFICANT CHANGE UP (ref 42.2–75.2)
NRBC # BLD: 0 /100 WBCS — SIGNIFICANT CHANGE UP (ref 0–0)
PLATELET # BLD AUTO: 272 K/UL — SIGNIFICANT CHANGE UP (ref 130–400)
PMV BLD: 9.3 FL — SIGNIFICANT CHANGE UP (ref 7.4–10.4)
POTASSIUM SERPL-MCNC: 4.4 MMOL/L — SIGNIFICANT CHANGE UP (ref 3.5–5)
POTASSIUM SERPL-SCNC: 4.4 MMOL/L — SIGNIFICANT CHANGE UP (ref 3.5–5)
RBC # BLD: 3.67 M/UL — LOW (ref 4.2–5.4)
RBC # FLD: 23.4 % — HIGH (ref 11.5–14.5)
SODIUM SERPL-SCNC: 141 MMOL/L — SIGNIFICANT CHANGE UP (ref 135–146)
WBC # BLD: 9.04 K/UL — SIGNIFICANT CHANGE UP (ref 4.8–10.8)
WBC # FLD AUTO: 9.04 K/UL — SIGNIFICANT CHANGE UP (ref 4.8–10.8)

## 2024-01-22 PROCEDURE — 99233 SBSQ HOSP IP/OBS HIGH 50: CPT

## 2024-01-22 PROCEDURE — 99497 ADVNCD CARE PLAN 30 MIN: CPT

## 2024-01-22 RX ORDER — METRONIDAZOLE 500 MG
1000 TABLET ORAL ONCE
Refills: 0 | Status: COMPLETED | OUTPATIENT
Start: 2024-01-23 | End: 2024-01-23

## 2024-01-22 RX ORDER — NEOMYCIN SULFATE 500 MG/1
1000 TABLET ORAL ONCE
Refills: 0 | Status: COMPLETED | OUTPATIENT
Start: 2024-01-23 | End: 2024-01-23

## 2024-01-22 RX ORDER — IRON SUCROSE 20 MG/ML
1000 INJECTION, SOLUTION INTRAVENOUS EVERY 24 HOURS
Refills: 0 | Status: DISCONTINUED | OUTPATIENT
Start: 2024-01-22 | End: 2024-01-22

## 2024-01-22 RX ORDER — SOD SULF/SODIUM/NAHCO3/KCL/PEG
4000 SOLUTION, RECONSTITUTED, ORAL ORAL ONCE
Refills: 0 | Status: COMPLETED | OUTPATIENT
Start: 2024-01-23 | End: 2024-01-23

## 2024-01-22 RX ORDER — HYDRALAZINE HCL 50 MG
25 TABLET ORAL THREE TIMES A DAY
Refills: 0 | Status: DISCONTINUED | OUTPATIENT
Start: 2024-01-22 | End: 2024-01-23

## 2024-01-22 RX ORDER — IRON SUCROSE 20 MG/ML
300 INJECTION, SOLUTION INTRAVENOUS EVERY 24 HOURS
Refills: 0 | Status: DISCONTINUED | OUTPATIENT
Start: 2024-01-22 | End: 2024-01-22

## 2024-01-22 RX ORDER — IRON SUCROSE 20 MG/ML
200 INJECTION, SOLUTION INTRAVENOUS EVERY 24 HOURS
Refills: 0 | Status: DISCONTINUED | OUTPATIENT
Start: 2024-01-22 | End: 2024-01-22

## 2024-01-22 RX ORDER — LISINOPRIL 2.5 MG/1
40 TABLET ORAL DAILY
Refills: 0 | Status: DISCONTINUED | OUTPATIENT
Start: 2024-01-22 | End: 2024-01-26

## 2024-01-22 RX ORDER — ENOXAPARIN SODIUM 100 MG/ML
40 INJECTION SUBCUTANEOUS EVERY 24 HOURS
Refills: 0 | Status: DISCONTINUED | OUTPATIENT
Start: 2024-01-22 | End: 2024-01-25

## 2024-01-22 RX ORDER — IRON SUCROSE 20 MG/ML
100 INJECTION, SOLUTION INTRAVENOUS EVERY 24 HOURS
Refills: 0 | Status: COMPLETED | OUTPATIENT
Start: 2024-01-22 | End: 2024-01-25

## 2024-01-22 RX ADMIN — IRON SUCROSE 70 MILLIGRAM(S): 20 INJECTION, SOLUTION INTRAVENOUS at 18:47

## 2024-01-22 RX ADMIN — SERTRALINE 100 MILLIGRAM(S): 25 TABLET, FILM COATED ORAL at 17:38

## 2024-01-22 RX ADMIN — Medication 25 MILLIGRAM(S): at 05:08

## 2024-01-22 RX ADMIN — IRON SUCROSE 210 MILLIGRAM(S): 20 INJECTION, SOLUTION INTRAVENOUS at 05:07

## 2024-01-22 RX ADMIN — Medication 50 MILLIGRAM(S): at 05:08

## 2024-01-22 RX ADMIN — Medication 25 MILLIGRAM(S): at 23:29

## 2024-01-22 RX ADMIN — PANTOPRAZOLE SODIUM 40 MILLIGRAM(S): 20 TABLET, DELAYED RELEASE ORAL at 05:08

## 2024-01-22 RX ADMIN — CHLORHEXIDINE GLUCONATE 1 APPLICATION(S): 213 SOLUTION TOPICAL at 05:08

## 2024-01-22 RX ADMIN — ENOXAPARIN SODIUM 40 MILLIGRAM(S): 100 INJECTION SUBCUTANEOUS at 17:38

## 2024-01-22 RX ADMIN — Medication 90 MILLIGRAM(S): at 05:08

## 2024-01-22 RX ADMIN — Medication 20 MILLIGRAM(S): at 21:07

## 2024-01-22 RX ADMIN — ATORVASTATIN CALCIUM 40 MILLIGRAM(S): 80 TABLET, FILM COATED ORAL at 21:07

## 2024-01-22 RX ADMIN — LISINOPRIL 40 MILLIGRAM(S): 2.5 TABLET ORAL at 12:07

## 2024-01-22 NOTE — PROGRESS NOTE ADULT - ASSESSMENT
78F with PMH of HTN, GERD, CAD, dementia, anemia, MDD here with anemia, s/p colonoscopy, and found to have adenocarcinoma. In discussions wiht family (sister Sonali), considering surgery but awaiting staging of the mass before further decisions. Also with vomiting, improving now. Course further complicated by DAWIT. Palliative care consulted for GO.    Spoke to patient at bedside regarding goals of care and advanced directives. Brought up DNR/DNI with the patient and explained to patient in detail what it would mean for her. She stated she would want to continue treatment and doing everything for now but if her heart stops or if she would need to be intubated she would not want that and would just want to pass naturally. She did not want to be placed on Bipap either when discussing MOLST. Explained how it can be temporarily stopped if she went for a procedure and she was agreeable to that but otherwise would want to be DNR/DNI.

## 2024-01-22 NOTE — PROGRESS NOTE ADULT - SUBJECTIVE AND OBJECTIVE BOX
GENERAL SURGERY PROGRESS NOTE     DARIEN BARRERA  78y  Female  Hospital day :15d    T(F): 98.7 (01-21-24 @ 15:39), Max: 98.7 (01-21-24 @ 15:39)  HR: 76 (01-21-24 @ 15:39) (69 - 82)  BP: 148/67 (01-21-24 @ 15:39) (148/67 - 192/88)  RR: 18 (01-21-24 @ 15:39) (18 - 18)    DIET/FLUIDS: iron sucrose IVPB 100 milliGRAM(s) IV Intermittent every 24 hours    GI proph:  pantoprazole    Tablet 40 milliGRAM(s) Oral before breakfast    PHYSICAL EXAM:  GENERAL: NAD  CHEST/LUNG: Clear to auscultation bilaterally  HEART: Regular rate and rhythm  ABDOMEN: Soft, Nontender, Nondistended;       LABS  Labs:  CAPILLARY BLOOD GLUCOSE                              8.3    7.79  )-----------( 262      ( 21 Jan 2024 06:38 )             28.3       Auto Neutrophil %: 55.6 % (01-21-24 @ 06:38)  Auto Immature Granulocyte %: 1.2 % (01-21-24 @ 06:38)      RADIOLOGY & ADDITIONAL TESTS:  No new imaging

## 2024-01-22 NOTE — PROGRESS NOTE ADULT - CONVERSATION DETAILS
Spoke to patient at bedside regarding goals of care and advanced directives. Brought up DNR/DNI with the patient and explained to patient in detail what it would mean for her. She stated she would want to continue treatment and doing everything for now but if her heart stops or if she would need to be intubated she would not want that and would just want to pass naturally. She did not want to be placed on Bipap either when discussing MOLST. Explained how it can be temporarily stopped if she went for a procedure and she was agreeable to that but otherwise would want to be DNR/DNI.

## 2024-01-22 NOTE — PROGRESS NOTE ADULT - SUBJECTIVE AND OBJECTIVE BOX
DARIEN BARRERA 78y Female  MRN#: [id number]   Hospital Day: 14d    HPI:  The patient is a 78-year-old female with past medical history of hiatal hernia s/p repair >10 years back, hypertension, GERD, non obstructive CAD, depression, dementia, anemia presenting for evaluation of low hemoglobin, measured outpatient to be 7.4.  Patient has had multiple episodes of nonbloody nonbilious vomiting over the last several months with good appetite and almost 60 pound weight loss over the last few months. Patient denies fevers, chills, chest pain, shortness of breath, hematuria, melena, hematochezia. The pt presented to the Ed for a blood transfusion that was recommended by the hematologist.    In the ED  Vital Signs Last 24 Hrs  T(C): 36.9 (08 Jan 2024 19:06), Max: 36.9 (08 Jan 2024 19:06)  T(F): 98.4 (08 Jan 2024 19:06), Max: 98.4 (08 Jan 2024 19:06)  HR: 81 (08 Jan 2024 19:06) (70 - 100)  BP: 155/74 (08 Jan 2024 19:06) (130/60 - 196/89)  BP(mean): --  RR: 18 (08 Jan 2024 19:06) (18 - 19)  SpO2: 98% (08 Jan 2024 19:06) (98% - 100%)    Parameters below as of 08 Jan 2024 19:06  Patient On (Oxygen Delivery Method): room air    In the ED the pt received 1 PRBC  Labs were significant for a Cr of 1.6 (unknown baseline)  and a lactate    Cat scan was significant for  Large hiatal hernia containing partially included stomach with apparent   organoaxial rotation    The pt was admitted to medicine for anemia 2/2 possible GIB     (08 Jan 2024 21:25)      SUBJECTIVE  Patient is a 78y old Female who presents with a chief complaint of Anemia (22 Jan 2024 00:21)  Currently admitted to medicine with the primary diagnosis of Anemia      INTERVAL HPI AND OVERNIGHT EVENTS:  Patient was examined and seen at bedside. This morning she is resting comfortably in bed and reports no issues or overnight events. Pt is tolerating diet with no nausea or vomiting, she is able to ambulate. She denies abdominal pain, chest pain, SOB, constipation, diarrhea.     REVIEW OF SYMPTOMS:  CONSTITUTIONAL: No weakness, fevers or chills; No headaches  EYES: No visual changes, eye pain, or discharge  ENT: No vertigo; No ear pain or change in hearing; No sore throat or difficulty swallowing  NECK: No pain or stiffness  RESPIRATORY: No cough, wheezing, or hemoptysis; No shortness of breath  CARDIOVASCULAR: No chest pain or palpitations  GASTROINTESTINAL: No abdominal or epigastric pain; No nausea, vomiting, or hematemesis; No diarrhea or constipation; No melena or hematochezia  GENITOURINARY: No dysuria, frequency or hematuria  MUSCULOSKELETAL: No joint pain, no muscle pain, no weakness  NEUROLOGICAL: No numbness or weakness  SKIN: No itching or rashes    OBJECTIVE  PAST MEDICAL & SURGICAL HISTORY  Hypertension    GERD (gastroesophageal reflux disease)    Hiatal hernia with GERD    Empyema lung    H/O CHF    History of repair of hiatal hernia    H/O pleural empyema  s/p decortication      ALLERGIES:  No Known Allergies    MEDICATIONS:  STANDING MEDICATIONS  atorvastatin 40 milliGRAM(s) Oral at bedtime  bisacodyl 20 milliGRAM(s) Oral at bedtime  chlorhexidine 2% Cloths 1 Application(s) Topical <User Schedule>  iron sucrose IVPB 100 milliGRAM(s) IV Intermittent every 24 hours  lisinopril 40 milliGRAM(s) Oral daily  metoprolol succinate ER 50 milliGRAM(s) Oral daily  NIFEdipine XL 90 milliGRAM(s) Oral daily  pantoprazole    Tablet 40 milliGRAM(s) Oral before breakfast  sertraline 100 milliGRAM(s) Oral daily    PRN MEDICATIONS  guaifenesin/dextromethorphan Oral Liquid 10 milliLiter(s) Oral every 6 hours PRN  hydrALAZINE 25 milliGRAM(s) Oral three times a day PRN      VITAL SIGNS: Last 24 Hours  T(C): 36.7 (22 Jan 2024 07:00), Max: 37.1 (21 Jan 2024 15:39)  T(F): 98 (22 Jan 2024 07:00), Max: 98.7 (21 Jan 2024 15:39)  HR: 72 (22 Jan 2024 07:00) (72 - 83)  BP: 177/77 (22 Jan 2024 07:00) (148/67 - 177/77)  BP(mean): --  RR: 18 (22 Jan 2024 07:00) (18 - 18)  SpO2: --    LABS:                        8.2    9.04  )-----------( 272      ( 22 Jan 2024 06:41 )             27.1     01-22    141  |  109  |  34<H>  ----------------------------<  91  4.4   |  24  |  1.4    Ca    9.0      22 Jan 2024 06:41        Urinalysis Basic - ( 22 Jan 2024 06:41 )    Color: x / Appearance: x / SG: x / pH: x  Gluc: 91 mg/dL / Ketone: x  / Bili: x / Urobili: x   Blood: x / Protein: x / Nitrite: x   Leuk Esterase: x / RBC: x / WBC x   Sq Epi: x / Non Sq Epi: x / Bacteria: x                RADIOLOGY:      PHYSICAL EXAM:  CONSTITUTIONAL: No acute distress, well-developed, well-groomed, AAOx3  HEAD: Atraumatic, normocephalic  EYES: EOM intact, PERRLA, conjunctiva and sclera clear  ENT: Supple, no masses, no thyromegaly, no bruits, no JVD; moist mucous membranes  PULMONARY: Clear to auscultation bilaterally; no wheezes, rales, or rhonchi  CARDIOVASCULAR: Regular rate and rhythm; no murmurs, rubs, or gallops  GASTROINTESTINAL: Soft, non-tender, non-distended; bowel sounds present  MUSCULOSKELETAL: 2+ peripheral pulses; no clubbing, no cyanosis, no edema  NEUROLOGY: non-focal  SKIN: No rashes or lesions; warm and dry    ASSESSMENT & PLAN  #79 y/o female with PMHx HTN, GERD, non-obstructive CAD, Dementia, anemia, MDD P/W Hb 7.4 OP, episodic vomiting for few months, 60lb weight loss. Found to have Hb 7.5 on admission.     #newly diagnosed adenocarcinoma sigmoid mass   #Microcytic anemia   - GI Consult --> EGD/Colonscopy done 1/12 - sigmoid mass found - biopsy taken  - 1/15 cardio consult appreciated -Patient is intermediate risk for intermediate risk procedure.   - CT chest - no mets  - CEA negative   -Heme/onc recs: IV Venofer QD  - hcp is currently sick and hospitalized at ; Collins ( son of hcp /nephew to patient) will help make decisions # [phone].   - palliative following  - 1/19 Collins and the patient spoke - now aware of diagnosis and is agreeable to surgery  Surgery: Plan for OR Wednesday 1/24, start DVT ppx, Preop labs Tuesday 1/23      #Vomiting likely 2/2 Type III hiatal hernia-resolved   - CT abdomen shows Large hiatal hernia containing partially included stomach with apparent organoaxial rotation.  - Currently no abdominal pain/nausea/vomiting  - tolerating diet and having BM's    #Suspected CKD III  - cr stable 1.4  - avoid nephrotoxics    #HTN - poorly controlled  - Lisinopril resumed 40mg PO  - continue current tx  -Hydralazine 25mg po q8    #HFpEF  - On lasix PO 40 BID at home  - TTE 10/1/20 --> EF 60-65%, mild concentric LVH, G2DD  - Holding lasix for now due to DAWIT and mild hypokalemia - may need to restart in am if cr stable     #Asthma  - Follows dr. Hartley who had recommended symbicort 160-4.5 2 puff BID and combivent prn but pt does not take it  - Monitor     #Depression  #Mild dementia??  - C/w home meds

## 2024-01-22 NOTE — PROGRESS NOTE ADULT - ASSESSMENT
1- anemia sig hb 7 s/p transfusion>>> hb 8   w/p outpt showed PIPO ferr at 35.>> 20   s/p transfusion   pt only po fe ..no improvement in hb.. iv venofer qd     2. w/p as inpt showed colon mass, s/p biopsy   path pending  send for CEA  pending colon ca  surgery this wk    3. will follow

## 2024-01-22 NOTE — PROGRESS NOTE ADULT - SUBJECTIVE AND OBJECTIVE BOX
HPI: 78F with PMH of HTN, GERD, CAD, dementia, anemia, MDD here with anemia, s/p colonoscopy, and found to have adenocarcinoma. In discussions with family (sister Sonali), considering surgery but awaiting staging of the mass before further decisions. Also with vomiting, improving now. Course further complicated by DAWIT. Palliative care consulted for GOC.    INTERVAL EVENTS  1/19/24: patient comfortable  1/22/24: Patient comfortable, decided on DNR/DNI today    ADVANCE DIRECTIVES:    [ ] Full Code [x ] DNR  MOLST  [ ]  Living Will  [ ]   DECISION MAKER(s):  [ ] Health Care Proxy(s)  [ ] Surrogate(s)  [ ] Guardian           Name(s): Phone Number(s): sister Sonali [phone]; nephew Collins [phone]    BASELINE (I)ADL(s) (prior to admission):  [place]: [ ]Total  [ ] Moderate [ ]Dependent  Palliative Performance Status Version 2:         %    http://npcrc.org/files/news/palliative_performance_scale_[email or web address].pdf    Allergies    No Known Allergies    Intolerances    MEDICATIONS  (STANDING):  atorvastatin 40 milliGRAM(s) Oral at bedtime  bisacodyl 20 milliGRAM(s) Oral at bedtime  chlorhexidine 2% Cloths 1 Application(s) Topical <User Schedule>  iron sucrose IVPB 100 milliGRAM(s) IV Intermittent every 24 hours  lisinopril 40 milliGRAM(s) Oral daily  metoprolol succinate ER 50 milliGRAM(s) Oral daily  NIFEdipine XL 90 milliGRAM(s) Oral daily  pantoprazole    Tablet 40 milliGRAM(s) Oral before breakfast  sertraline 100 milliGRAM(s) Oral daily    MEDICATIONS  (PRN):  guaifenesin/dextromethorphan Oral Liquid 10 milliLiter(s) Oral every 6 hours PRN Cough  hydrALAZINE 25 milliGRAM(s) Oral three times a day PRN Systolic blood pressure >150      PRESENT SYMPTOMS: [ ]Unable to obtain due to poor mentation   Source if other than patient:  [ ]Family   [ ]Team     Pain: [ ]yes [X ]no  QOL impact -   Location -                    Aggravating factors -  Quality -  Radiation -  Timing-  Severity (0-10 scale):  Minimal acceptable level (0-10 scale):     CPOT:    https://www.sccm.org/getattachment/ile90s36-1t9h-3f1z-9i7l-0148v3133w8c/Critical-Care-Pain-Observation-Tool-(CPOT)    PAIN AD Score:   [email or web address]/[email or web address] (press ctrl +  left click to view)    Dyspnea:                           [ x]None[ ]Mild [ ]Moderate [ ]Severe     Respiratory Distress Observation Scale (RDOS):   A score of 0 to 2 signifies little or no respiratory distress, 3 signifies mild distress, scores 4 to 6 indicate moderate distress, and scores greater than 7 signify severe distress  [email or web address]/sites/default/files/PDFS/[email or web address]-hzuthddlsic-dmudaldm-gkvkcsgckrq-fllzi.pdf    Anxiety:                             [ ]None[ ]Mild [ ]Moderate [ ]Severe   Fatigue:                             [ ]None[ ]Mild [ ]Moderate [ ]Severe   Nausea:                             [ ]None[ ]Mild [ ]Moderate [ ]Severe   Loss of appetite:              [ ]None[ ]Mild [ ]Moderate [ ]Severe   Constipation:                    [ ]None[ ]Mild [ ]Moderate [ ]Severe    Other Symptoms:  [ X]All other review of systems negative     Palliative Performance Status Version 2:         %    [email or web address]://[email or web address]/files/news/palliative_performance_scale_[email or web address].pdf  PHYSICAL EXAM:    Vital Signs Last 24 Hrs  T(C): 36.7 (22 Jan 2024 07:00), Max: 37.1 (21 Jan 2024 15:39)  T(F): 98 (22 Jan 2024 07:00), Max: 98.7 (21 Jan 2024 15:39)  HR: 72 (22 Jan 2024 07:00) (72 - 83)  BP: 177/77 (22 Jan 2024 07:00) (148/67 - 177/77)  BP(mean): --  RR: 18 (22 Jan 2024 07:00) (18 - 18)  SpO2: --        GENERAL:  [X ] No acute distress [ ]Lethargic  [ ]Unarousable  [ ]Verbal  [ ]Non-Verbal [ ]Cachexia    BEHAVIORAL/PSYCH:  [x ]Alert and Oriented  [ ] Anxiety [ ] Delirium [ ] Agitation [X ] Calm   EYES: [X ] No scleral icterus [ ] Scleral icterus [ ] Closed  ENMT:  [ ]Dry mouth  [ ]No external oral lesions [ X] No external ear or nose lesions  CARDIOVASCULAR:  [ ]Regular [ ]Irregular [ ]Tachy [x ]Not Tachy  [ ]Percy [ ] Edema [ ] No edema  PULMONARY:  [ ]Tachypnea  [ ]Audible excessive secretions [X ] No labored breathing [ ] labored breathing  GASTROINTESTINAL: [ ]Soft  [ ]Distended  [ X]Not distended [ ]Non tender [ ]Tender  MUSCULOSKELETAL: [ ]No clubbing [ ] clubbing  [ X] No cyanosis [ ] cyanosis  NEUROLOGIC: [ ]No focal deficits  [ X]Follows commands  [ ]Does not follow commands  [ ]Cognitive impairment  [ ]Dysphagia  [ ]Dysarthria  [ ]Paresis   SKIN: [ ] Jaundiced [X ] Non-jaundiced [ ]Rash [ ]No Rash [ ] Warm [ ] Dry  MISC/LINES: [ ] ET tube [ ] Trach [ ]NGT/OGT [ ]PEG [ ]Tapia    CRITICAL CARE:  [ ] Shock Present  [ ]Septic [ ]Cardiogenic [ ]Neurologic [ ]Hypovolemic  [ ]  Vasopressors [ ]  Inotropes   [ ]Respiratory failure present [ ]Mechanical ventilation [ ]Non-invasive ventilatory support [ ]High flow  [ ]Acute  [ ]Chronic [ ]Hypoxic  [ ]Hypercarbic [ ]Other  [ ]Other organ failure     LABS: reviewed by me                                     8.2    9.04  )-----------( 272      ( 22 Jan 2024 06:41 )             27.1     01-22    141  |  109  |  34<H>  ----------------------------<  91  4.4   |  24  |  1.4    Ca    9.0      22 Jan 2024 06:41        Urinalysis Basic - ( 22 Jan 2024 06:41 )    Color: x / Appearance: x / SG: x / pH: x  Gluc: 91 mg/dL / Ketone: x  / Bili: x / Urobili: x   Blood: x / Protein: x / Nitrite: x   Leuk Esterase: x / RBC: x / WBC x   Sq Epi: x / Non Sq Epi: x / Bacteria: x                RADIOLOGY & ADDITIONAL STUDIES: reviewed by me  CT chest 1/12/24  No evidence of intrathoracic neoplastic disease.    Interval gastrostomy tube placement, with upper abdominal   pneumoperitoneum, which could be seen post gastrostomy placement;   correlation with procedure history suggested.    Lingula atelectasis with areas of bronchiectasis/scarring, similar to   2018 CT, likely representing sequela of prior inflammation.    Age-indeterminate, likely subacute or chronic left medial clavicular   displaced fracture, new since 8/27/2023 chest x-ray.    Scattered patchy left upper lobe airspace opacities, which may be   inflammatory or infectious in etiology.    PROTEIN CALORIE MALNUTRITION PRESENT: [ ]mild [ ]moderate [ ]severe [ ]underweight [ ]morbid obesity  https://www.andeal.org/vault/2440/web/files/ONC/Table_Clinical%20Characteristics%20to%20Document%20Malnutrition-White%20JV%20et%20al%202012.pdf    Height (cm): 152.4 (01-12-24 @ 11:39)  Weight (kg): 60.8 (01-12-24 @ 11:39), 86.2 (08-27-23 @ 19:29)  BMI (kg/m2): 26.2 (01-12-24 @ 11:39)    [ ]PPSV2 < or = to 30% [ ]significant weight loss  [ ]poor nutritional intake  [ ]anasarca      [ ]Artificial Nutrition      Palliative Care Spiritual/Emotional Screening Tool Question  Severity (0-4):                    OR                    [X ] Unable to determine/NA  Score of 2 or greater indicates recommendation of Chaplaincy referral  Chaplaincy Referral: [ ] Yes [ ] Refused [ ] Following     Caregiver [place]:  [ ] Yes [ ] No    OR    [x ] Unable to determine. Will assess at later time if appropriate.  Social Work Referral [ ]  Patient and Family Centered Care Referral [ ]    Anticipatory Grief Present: [ ] Yes [ ] No    OR     [ x] Unable to determine. Will assess at later time if appropriate.  Social Work Referral [ ]  Patient and Family Centered Care Referral [ ]    REFERRALS:   [ ]Chaplaincy  [ ]Hospice  [ ]Child Life  [ ]Social Work  [ ]Case management [ ]Holistic Therapy     Palliative care education provided to patient and/or family    Goals of Care Document:

## 2024-01-22 NOTE — H&P ADULT - LV FUNCTION ASSESSMENT
no [Chaperone Present] : A chaperone was present in the examining room during all aspects of the physical examination [No Acute Distress] : in no acute distress [Well developed] : well developed [Well Nourished] : ~L well nourished [Oriented x3] : oriented to person, place, and time [Cough] : no cough [No Edema] : ~T edema was not present [Tenderness] : ~T no ~M abdominal tenderness observed [Distended] : not distended [Warm and Dry] : was warm and dry to touch [Vulvar Atrophy] : vulvar atrophy [Labia Majora] : were normal [Labia Minora] : were normal [Normal Appearance] : general appearance was normal [Atrophy] : atrophy [Normal] : no abnormalities [Post Void Residual ____ml] : post void residual was [unfilled] ml unknown

## 2024-01-22 NOTE — PROGRESS NOTE ADULT - SUBJECTIVE AND OBJECTIVE BOX
DARIEN BARRERA  78y  Female      Patient is a 78y old Female who presents with Anemia (18 Jan 2024 10:59)      INTERVAL HPI/OVERNIGHT EVENTS:  Patient seen and examined earlier this morning  sitting in the chair  denies any complaints  having regular BM's  await surgery on Wednesday     REVIEW OF SYSTEMS:  CONSTITUTIONAL: No fever, weight loss, or fatigue  EYES: No eye pain, visual disturbances, or discharge  ENMT:  No difficulty hearing, tinnitus, vertigo; No sinus or throat pain  NECK: No pain or stiffness  RESPIRATORY: No cough, wheezing, chills or hemoptysis; No shortness of breath  CARDIOVASCULAR: No chest pain, palpitations, dizziness, or leg swelling  GASTROINTESTINAL: No abdominal or epigastric pain. No nausea, vomiting, or hematemesis; No diarrhea or constipation. No melena or hematochezia.  GENITOURINARY: No dysuria, frequency, hematuria, or incontinence  NEUROLOGICAL: No headaches, memory loss, loss of strength, numbness, or tremors  SKIN: No itching, burning, rashes, or lesions   LYMPH NODES: No enlarged glands  ENDOCRINE: No heat or cold intolerance; No hair loss  MUSCULOSKELETAL: No joint pain or swelling; No muscle, back, or extremity pain  PSYCHIATRIC: No depression, anxiety, mood swings, or difficulty sleeping  HEME/LYMPH: No easy bruising, or bleeding gums  ALLERY AND IMMUNOLOGIC: No hives or eczema    Vital Signs Last 24 Hrs  T(C): 36.7 (22 Jan 2024 07:00), Max: 37.1 (21 Jan 2024 15:39)  T(F): 98 (22 Jan 2024 07:00), Max: 98.7 (21 Jan 2024 15:39)  HR: 72 (22 Jan 2024 07:00) (72 - 83)  BP: 177/77 (22 Jan 2024 07:00) (148/67 - 177/77)  BP(mean): --  RR: 18 (22 Jan 2024 07:00) (18 - 18)  SpO2: --      PHYSICAL EXAM:  GENERAL: NAD, well-groomed,   HEAD:  Atraumatic, Normocephalic  EYES: conjunctiva and sclera clear  ENMT: Moist mucous membranes,  No visible lesions, heard of hearing   NECK: Supple, No JVD, Normal thyroid  NERVOUS SYSTEM:  Alert & Oriented X3, Good concentration; moves all extremities   CHEST/LUNG: good air entry   HEART: Regular rate and rhythm; No murmurs, rubs, or gallops  ABDOMEN: Soft, Nontender, Nondistended; Bowel sounds present  EXTREMITIES:  2+ Peripheral Pulses, No clubbing, cyanosis, or edema  LYMPH: No lymphadenopathy noted  SKIN: No rashes or lesions    Consultant(s) Notes Reviewed:  [x ] YES  [ ] NO  Care Discussed with Consultants/Other Providers [ x] YES  [ ] NO    LAB:                                   8.2    9.04  )-----------( 272      ( 22 Jan 2024 06:41 )             27.1     01-22    141  |  109  |  34<H>  ----------------------------<  91  4.4   |  24  |  1.4    Ca    9.0      22 Jan 2024 06:41        Drug Dosing Weight  Height (cm): 152.4 (12 Jan 2024 11:39)  Weight (kg): 60.8 (12 Jan 2024 11:39)  BMI (kg/m2): 26.2 (12 Jan 2024 11:39)  BSA (m2): 1.57 (12 Jan 2024 11:39)      Urinalysis Basic - ( 18 Jan 2024 07:18 )    Color: x / Appearance: x / SG: x / pH: x  Gluc: 97 mg/dL / Ketone: x  / Bili: x / Urobili: x   Blood: x / Protein: x / Nitrite: x   Leuk Esterase: x / RBC: x / WBC x   Sq Epi: x / Non Sq Epi: x / Bacteria: x        RADIOLOGY & ADDITIONAL TESTS:  Imaging Personally Reviewed:  [x] YES  [ ] NO      MEDICATIONS  (STANDING):  atorvastatin 40 milliGRAM(s) Oral at bedtime  bisacodyl 20 milliGRAM(s) Oral at bedtime  chlorhexidine 2% Cloths 1 Application(s) Topical <User Schedule>  iron sucrose IVPB 100 milliGRAM(s) IV Intermittent every 24 hours  lisinopril 40 milliGRAM(s) Oral daily  metoprolol succinate ER 50 milliGRAM(s) Oral daily  NIFEdipine XL 90 milliGRAM(s) Oral daily  pantoprazole    Tablet 40 milliGRAM(s) Oral before breakfast  sertraline 100 milliGRAM(s) Oral daily    MEDICATIONS  (PRN):  guaifenesin/dextromethorphan Oral Liquid 10 milliLiter(s) Oral every 6 hours PRN Cough  hydrALAZINE 25 milliGRAM(s) Oral three times a day PRN Systolic blood pressure >150

## 2024-01-22 NOTE — PROGRESS NOTE ADULT - ASSESSMENT
79 y/o female with PMHx HTN, GERD, non-obstructive CAD, Dementia, anemia, MDD P/W Hb 7.4 OP, episodic vomiting for few months, 60lb weight loss. Found to have Hb 7.5 on admission.     #newly diagnosed adenocarcinoma sigmoid mass   #Microcytic anemia   - GI Consult --> EGD/Colonscopy done 1/12 - sigmoid mass found - biopsy taken  - 1/15 cardio consult appreciated -Patient is intermediate risk for intermediate risk procedure.   - CT chest - no mets  - CEA negative   - heme/onc following  - hcp is currently sick and hospitalized at ; Collins ( son of hcp /nephew to patient) will help make decisions # [phone]. - Collins will visit patient today and talk about her treatment going forward   - Sonali also states that Joel ( [phone]) who is sister in law can be updated with any information about the patient. Joel was at bedside of the patient.   - palliative following  - 1/19 Collins and the patient spoke - now aware of diagnosis and is agreeable to surgery - scheduled for Wednesday   - prep to start tomorrow    #Vomiting likely 2/2 Type III hiatal hernia-resolved   - CT abdomen shows Large hiatal hernia containing partially included stomach with apparent organoaxial rotation.  - Currently no abdominal pain/nausea/vomiting  - tolerating diet and having BM's    #Suspected CKD III  - cr stable  - avoid nephrotoxics    #HTN - poorly controlled  - Lisinopril resumed today  - continue current tx  - PRN hydralazine 25mg po q12     #HFpEF  - On lasix PO 40 BID at home  - TTE 10/1/20 --> EF 60-65%, mild concentric LVH, G2DD  - Holding lasix for now due to DAWIT and mild hypokalemia      #Asthma  - Follows dr. Hartley who had recommended symbicort 160-4.5 2 puff BID and combivent prn but pt does not take it  - Monitor     #Depression  #Mild dementia??  - C/w home meds  - patient resides in a two family home with her sisterSonali    Progress Note Handoff  Pending Consults: surgery  Pending Tests:  cbc, bmp, INR, type and screen   Pending Results: cbc, bmp, INR, type and screen   Family Discussion: Discussed labs, meds, diet, consults and overall plan of care with pt and medical staff. Also spoke to pt's nephew, Collins in great detail last week. Await surgery on Wednesday   Disposition: Home_____/SNF______/Other_____/Unknown at this time_x____  Spent over 55 min reviewing chart, speaking with patient/family and on coordinating patient care during interdisciplinary rounds     Please call me with any questions at extension [phone]

## 2024-01-22 NOTE — PROGRESS NOTE ADULT - ASSESSMENT
ASSESSMENT:  78-year-old female with past medical history of hiatal hernia s/p repair >10 years back, L emypema s/p decortication 2018, hypertension, GERD, non obstructive CAD, depression, dementia, anemia presenting for evaluation of low hemoglobin, measured outpatient to be 7.4. The pt presented to the Ed for a blood transfusion that was recommended by the hematologist.    PLAN:   - Plan for OR Wednesday 1/24  - Preop patient Tuesday 1/23  - Cardiology: intermediate risk  - Heme/onc: IV Venofer QD  - CEA negative  - CT chest w/o mets  - Rest of care per primary team    [id number] ASSESSMENT:  78-year-old female with past medical history of hiatal hernia s/p repair >10 years back, L emypema s/p decortication 2018, hypertension, GERD, non obstructive CAD, depression, dementia, anemia presenting for evaluation of low hemoglobin, measured outpatient to be 7.4. The pt presented to the Ed for a blood transfusion that was recommended by the hematologist.    PLAN:   - Plan for OR Wednesday 1/24\  - Please start DVT ppx  - Preop patient Tuesday 1/23       - Preop labs including CBC, BMP, Mag, Phos, PT, PTT, INR, active type and screen 8pm 1/23       - Bowel prep 1/23: clear liquid diet, 4L G-Lytely, 2pm 1G Flagyl PO, 1G Neomycin PO, 3PM 1G Flagyl PO, 1G Neomycin PO, 11PM 1G Flagyl PO, 1G                            Neomycin PO,  - Cardiology: intermediate risk  - Heme/onc: IV Venofer QD  - CEA negative  - CT chest w/o mets  - Rest of care per primary team    [id number]

## 2024-01-23 LAB
ANION GAP SERPL CALC-SCNC: 8 MMOL/L — SIGNIFICANT CHANGE UP (ref 7–14)
APTT BLD: 30.9 SEC — SIGNIFICANT CHANGE UP (ref 27–39.2)
BASOPHILS # BLD AUTO: 0.06 K/UL — SIGNIFICANT CHANGE UP (ref 0–0.2)
BASOPHILS NFR BLD AUTO: 0.7 % — SIGNIFICANT CHANGE UP (ref 0–1)
BUN SERPL-MCNC: 24 MG/DL — HIGH (ref 10–20)
CALCIUM SERPL-MCNC: 9 MG/DL — SIGNIFICANT CHANGE UP (ref 8.4–10.5)
CHLORIDE SERPL-SCNC: 104 MMOL/L — SIGNIFICANT CHANGE UP (ref 98–110)
CO2 SERPL-SCNC: 23 MMOL/L — SIGNIFICANT CHANGE UP (ref 17–32)
CREAT SERPL-MCNC: 1.1 MG/DL — SIGNIFICANT CHANGE UP (ref 0.7–1.5)
EGFR: 51 ML/MIN/1.73M2 — LOW
EOSINOPHIL # BLD AUTO: 0 K/UL — SIGNIFICANT CHANGE UP (ref 0–0.7)
EOSINOPHIL NFR BLD AUTO: 0 % — SIGNIFICANT CHANGE UP (ref 0–8)
GLUCOSE SERPL-MCNC: 84 MG/DL — SIGNIFICANT CHANGE UP (ref 70–99)
HCT VFR BLD CALC: 27.5 % — LOW (ref 37–47)
HGB BLD-MCNC: 8 G/DL — LOW (ref 12–16)
IMM GRANULOCYTES NFR BLD AUTO: 1.3 % — HIGH (ref 0.1–0.3)
INR BLD: 1.04 RATIO — SIGNIFICANT CHANGE UP (ref 0.65–1.3)
LYMPHOCYTES # BLD AUTO: 1.72 K/UL — SIGNIFICANT CHANGE UP (ref 1.2–3.4)
LYMPHOCYTES # BLD AUTO: 20.2 % — LOW (ref 20.5–51.1)
MAGNESIUM SERPL-MCNC: 1.6 MG/DL — LOW (ref 1.8–2.4)
MCHC RBC-ENTMCNC: 21.6 PG — LOW (ref 27–31)
MCHC RBC-ENTMCNC: 29.1 G/DL — LOW (ref 32–37)
MCV RBC AUTO: 74.3 FL — LOW (ref 81–99)
MONOCYTES # BLD AUTO: 0.87 K/UL — HIGH (ref 0.1–0.6)
MONOCYTES NFR BLD AUTO: 10.2 % — HIGH (ref 1.7–9.3)
NEUTROPHILS # BLD AUTO: 5.75 K/UL — SIGNIFICANT CHANGE UP (ref 1.4–6.5)
NEUTROPHILS NFR BLD AUTO: 67.6 % — SIGNIFICANT CHANGE UP (ref 42.2–75.2)
NRBC # BLD: 0 /100 WBCS — SIGNIFICANT CHANGE UP (ref 0–0)
PLATELET # BLD AUTO: 256 K/UL — SIGNIFICANT CHANGE UP (ref 130–400)
PMV BLD: 9.2 FL — SIGNIFICANT CHANGE UP (ref 7.4–10.4)
POTASSIUM SERPL-MCNC: 3.9 MMOL/L — SIGNIFICANT CHANGE UP (ref 3.5–5)
POTASSIUM SERPL-SCNC: 3.9 MMOL/L — SIGNIFICANT CHANGE UP (ref 3.5–5)
PROTHROM AB SERPL-ACNC: 11.9 SEC — SIGNIFICANT CHANGE UP (ref 9.95–12.87)
RBC # BLD: 3.7 M/UL — LOW (ref 4.2–5.4)
RBC # FLD: 23.9 % — HIGH (ref 11.5–14.5)
SODIUM SERPL-SCNC: 135 MMOL/L — SIGNIFICANT CHANGE UP (ref 135–146)
WBC # BLD: 8.51 K/UL — SIGNIFICANT CHANGE UP (ref 4.8–10.8)
WBC # FLD AUTO: 8.51 K/UL — SIGNIFICANT CHANGE UP (ref 4.8–10.8)

## 2024-01-23 PROCEDURE — 99232 SBSQ HOSP IP/OBS MODERATE 35: CPT

## 2024-01-23 RX ORDER — HYDRALAZINE HCL 50 MG
25 TABLET ORAL THREE TIMES A DAY
Refills: 0 | Status: DISCONTINUED | OUTPATIENT
Start: 2024-01-23 | End: 2024-01-26

## 2024-01-23 RX ORDER — MAGNESIUM SULFATE 500 MG/ML
2 VIAL (ML) INJECTION
Refills: 0 | Status: COMPLETED | OUTPATIENT
Start: 2024-01-23 | End: 2024-01-23

## 2024-01-23 RX ADMIN — Medication 25 MILLIGRAM(S): at 21:02

## 2024-01-23 RX ADMIN — LISINOPRIL 40 MILLIGRAM(S): 2.5 TABLET ORAL at 05:13

## 2024-01-23 RX ADMIN — Medication 50 MILLIGRAM(S): at 05:13

## 2024-01-23 RX ADMIN — Medication 25 GRAM(S): at 17:23

## 2024-01-23 RX ADMIN — PANTOPRAZOLE SODIUM 40 MILLIGRAM(S): 20 TABLET, DELAYED RELEASE ORAL at 05:13

## 2024-01-23 RX ADMIN — ENOXAPARIN SODIUM 40 MILLIGRAM(S): 100 INJECTION SUBCUTANEOUS at 18:17

## 2024-01-23 RX ADMIN — SERTRALINE 100 MILLIGRAM(S): 25 TABLET, FILM COATED ORAL at 12:24

## 2024-01-23 RX ADMIN — ATORVASTATIN CALCIUM 40 MILLIGRAM(S): 80 TABLET, FILM COATED ORAL at 21:02

## 2024-01-23 RX ADMIN — IRON SUCROSE 70 MILLIGRAM(S): 20 INJECTION, SOLUTION INTRAVENOUS at 18:17

## 2024-01-23 RX ADMIN — NEOMYCIN SULFATE 1000 MILLIGRAM(S): 500 TABLET ORAL at 21:03

## 2024-01-23 RX ADMIN — Medication 25 MILLIGRAM(S): at 14:32

## 2024-01-23 RX ADMIN — Medication 1000 MILLIGRAM(S): at 21:03

## 2024-01-23 RX ADMIN — Medication 90 MILLIGRAM(S): at 05:13

## 2024-01-23 RX ADMIN — Medication 25 GRAM(S): at 12:25

## 2024-01-23 RX ADMIN — CHLORHEXIDINE GLUCONATE 1 APPLICATION(S): 213 SOLUTION TOPICAL at 05:13

## 2024-01-23 RX ADMIN — Medication 25 GRAM(S): at 18:16

## 2024-01-23 NOTE — PROGRESS NOTE ADULT - ASSESSMENT
1- anemia sig hb 7 s/p transfusion>>> hb 8   w/p outpt showed PIPO ferr at 35.>> 20   s/p transfusion   pt only po fe ..no improvement in hb.. iv venofer qd     2. w/p as inpt showed colon mass, s/p biopsy   path pending  send for CEA  pending colon ca  surgery tmw    3. will follow

## 2024-01-23 NOTE — PROGRESS NOTE ADULT - SUBJECTIVE AND OBJECTIVE BOX
DARIEN BARRERA 78y Female  MRN#: [id number]   Hospital Day: 15d    HPI:  The patient is a 78-year-old female with past medical history of hiatal hernia s/p repair >10 years back, hypertension, GERD, non obstructive CAD, depression, dementia, anemia presenting for evaluation of low hemoglobin, measured outpatient to be 7.4.  Patient has had multiple episodes of nonbloody nonbilious vomiting over the last several months with good appetite and almost 60 pound weight loss over the last few months.  Patient just started following up with hematologist Dr.Yumi Hussein and had recent blood work from 3 days ago that resulted today and showed a low hemoglobin of 7.4. Patient denies fevers, chills, chest pain, shortness of breath, hematuria, melena, hematochezia. The pt presented to the Ed for a blood transfusion that was recommended by the hematologist.    In the ED  Vital Signs Last 24 Hrs  T(C): 36.9 (08 Jan 2024 19:06), Max: 36.9 (08 Jan 2024 19:06)  T(F): 98.4 (08 Jan 2024 19:06), Max: 98.4 (08 Jan 2024 19:06)  HR: 81 (08 Jan 2024 19:06) (70 - 100)  BP: 155/74 (08 Jan 2024 19:06) (130/60 - 196/89)  BP(mean): --  RR: 18 (08 Jan 2024 19:06) (18 - 19)  SpO2: 98% (08 Jan 2024 19:06) (98% - 100%)    Parameters below as of 08 Jan 2024 19:06  Patient On (Oxygen Delivery Method): room air    In the ED the pt received 1 PRBC  Labs were significant for a Cr of 1.6 (unknown baseline)  and a lactate    Cat scan was significant for  Large hiatal hernia containing partially included stomach with apparent   organoaxial rotation    The pt was admitted to medicine for anemia 2/2 possible GIB     (08 Jan 2024 21:25)      SUBJECTIVE  Patient is a 78y old Female who presents with a chief complaint of Anemia (22 Jan 2024 18:11)  Currently admitted to medicine with the primary diagnosis of Anemia      INTERVAL HPI AND OVERNIGHT EVENTS:  Patient was examined and seen at bedside. This morning she is resting comfortably in bed and reports no issues or overnight events. Pt is tolerating diet with no nausea or vomiting. She is ambulating. Denies abdominal pain, chest pain, SOB, constipation, melena, hematochezia.    REVIEW OF SYMPTOMS:  CONSTITUTIONAL: No weakness, fevers or chills; No headaches  EYES: No visual changes, eye pain, or discharge  ENT: No vertigo; No ear pain or change in hearing; No sore throat or difficulty swallowing  NECK: No pain or stiffness  RESPIRATORY: No cough, wheezing, or hemoptysis; No shortness of breath  CARDIOVASCULAR: No chest pain or palpitations  GASTROINTESTINAL: No abdominal or epigastric pain; No nausea, vomiting, or hematemesis; No diarrhea or constipation; No melena or hematochezia  GENITOURINARY: No dysuria, frequency or hematuria  MUSCULOSKELETAL: No joint pain, no muscle pain, no weakness  NEUROLOGICAL: No numbness or weakness  SKIN: No itching or rashes    OBJECTIVE  PAST MEDICAL & SURGICAL HISTORY  Hypertension    GERD (gastroesophageal reflux disease)    Hiatal hernia with GERD    Empyema lung    H/O CHF    History of repair of hiatal hernia    H/O pleural empyema  s/p decortication      ALLERGIES:  No Known Allergies    MEDICATIONS:  STANDING MEDICATIONS  atorvastatin 40 milliGRAM(s) Oral at bedtime  bisacodyl 20 milliGRAM(s) Oral at bedtime  chlorhexidine 2% Cloths 1 Application(s) Topical <User Schedule>  enoxaparin Injectable 40 milliGRAM(s) SubCutaneous every 24 hours  hydrALAZINE 25 milliGRAM(s) Oral three times a day  iron sucrose IVPB 100 milliGRAM(s) IV Intermittent every 24 hours  lisinopril 40 milliGRAM(s) Oral daily  metoprolol succinate ER 50 milliGRAM(s) Oral daily  metroNIDAZOLE    Tablet 1000 milliGRAM(s) Oral once  metroNIDAZOLE    Tablet 1000 milliGRAM(s) Oral once  metroNIDAZOLE    Tablet 1000 milliGRAM(s) Oral once  neomycin 1000 milliGRAM(s) Oral once  neomycin 1000 milliGRAM(s) Oral once  neomycin 1000 milliGRAM(s) Oral once  NIFEdipine XL 90 milliGRAM(s) Oral daily  pantoprazole    Tablet 40 milliGRAM(s) Oral before breakfast  polyethylene glycol/electrolyte Solution. 4000 milliLiter(s) Oral once  sertraline 100 milliGRAM(s) Oral daily    PRN MEDICATIONS  guaifenesin/dextromethorphan Oral Liquid 10 milliLiter(s) Oral every 6 hours PRN      VITAL SIGNS: Last 24 Hours  T(C): 36.6 (23 Jan 2024 07:10), Max: 36.7 (22 Jan 2024 23:17)  T(F): 97.8 (23 Jan 2024 07:10), Max: 98 (22 Jan 2024 23:17)  HR: 68 (23 Jan 2024 07:10) (68 - 75)  BP: 175/75 (23 Jan 2024 07:10) (128/69 - 178/76)  BP(mean): --  RR: 18 (23 Jan 2024 07:10) (18 - 18)  SpO2: 97% (23 Jan 2024 07:10) (97% - 97%)    LABS:                        8.0    8.51  )-----------( 256      ( 23 Jan 2024 07:14 )             27.5     01-23    135  |  104  |  24<H>  ----------------------------<  84  3.9   |  23  |  1.1    Ca    9.0      23 Jan 2024 07:14  Mg     1.6     01-23      PT/INR - ( 23 Jan 2024 07:14 )   PT: 11.90 sec;   INR: 1.04 ratio         PTT - ( 23 Jan 2024 07:14 )  PTT:30.9 sec  Urinalysis Basic - ( 23 Jan 2024 07:14 )    Color: x / Appearance: x / SG: x / pH: x  Gluc: 84 mg/dL / Ketone: x  / Bili: x / Urobili: x   Blood: x / Protein: x / Nitrite: x   Leuk Esterase: x / RBC: x / WBC x   Sq Epi: x / Non Sq Epi: x / Bacteria: x                RADIOLOGY:      PHYSICAL EXAM:  CONSTITUTIONAL: No acute distress, well-developed, well-groomed, AAOx3  HEAD: Atraumatic, normocephalic  EYES: EOM intact, PERRLA, conjunctiva and sclera clear  ENT: Supple, no masses, no thyromegaly, no bruits, no JVD; moist mucous membranes  PULMONARY: Clear to auscultation bilaterally; no wheezes, rales, or rhonchi  CARDIOVASCULAR: Regular rate and rhythm; no murmurs, rubs, or gallops  GASTROINTESTINAL: Soft, non-tender, non-distended; bowel sounds present  MUSCULOSKELETAL: 2+ peripheral pulses; no clubbing, no cyanosis, no edema  NEUROLOGY: non-focal  SKIN: No rashes or lesions; warm and dry    ASSESSMENT & PLAN  ASSESSMENT & PLAN  #79 y/o female with PMHx HTN, GERD, non-obstructive CAD, Dementia, anemia, MDD P/W Hb 7.4 OP, episodic vomiting for few months, 60lb weight loss. Found to have Hb 7.5 on admission.     #newly diagnosed adenocarcinoma sigmoid mass   #Microcytic anemia   - GI Consult --> EGD/Colonscopy done 1/12 - sigmoid mass found - biopsy taken  - 1/15 cardio consult appreciated -Patient is intermediate risk for intermediate risk procedure.   - CT chest - no mets  - CEA negative   -Heme/onc recs: IV Venofer QD  - hcp is currently sick and hospitalized at ; Collins ( son of hcp /nephew to patient) will help make decisions # [phone].   - palliative following  - 1/19 Collins and the patient spoke - now aware of diagnosis and is agreeable to surgery  Surgery: Plan for OR Wednesday 1/24, start DVT ppx, Preop labs Today 1/23      #Vomiting likely 2/2 Type III hiatal hernia-resolved   - CT abdomen shows Large hiatal hernia containing partially included stomach with apparent organoaxial rotation.  - Currently no abdominal pain/nausea/vomiting  - tolerating diet and having BM's    #Suspected CKD III  - cr stable 1.4  - avoid nephrotoxics    #HTN - poorly controlled  - Lisinopril resumed 40mg PO  - continue current tx  -Hydralazine 25mg po q8    #HFpEF  - On lasix PO 40 BID at home  - TTE 10/1/20 --> EF 60-65%, mild concentric LVH, G2DD  - Holding lasix for now due to DAWIT and mild hypokalemia - may need to restart in am if cr stable     #Asthma  - Follows dr. Hartley who had recommended symbicort 160-4.5 2 puff BID and combivent prn but pt does not take it  - Monitor     #Depression  #Mild dementia??  - C/w home meds    #Misc  - Code Status: DNR/DNI    Dispo: Pending surgery 1/24

## 2024-01-23 NOTE — PROGRESS NOTE ADULT - SUBJECTIVE AND OBJECTIVE BOX
DARIEN BARRERA  78y  Female      Patient is a 78y old Female who presents with Anemia (18 Jan 2024 10:59)      INTERVAL HPI/OVERNIGHT EVENTS:  Patient seen and examined earlier this morning  sitting in the chair  denies any complaints  having regular BM's  await surgery tomorrow     REVIEW OF SYSTEMS:  CONSTITUTIONAL: No fever, weight loss, or fatigue  EYES: No eye pain, visual disturbances, or discharge  ENMT:  No difficulty hearing, tinnitus, vertigo; No sinus or throat pain  NECK: No pain or stiffness  RESPIRATORY: No cough, wheezing, chills or hemoptysis; No shortness of breath  CARDIOVASCULAR: No chest pain, palpitations, dizziness, or leg swelling  GASTROINTESTINAL: No abdominal or epigastric pain. No nausea, vomiting, or hematemesis; No diarrhea or constipation. No melena or hematochezia.  GENITOURINARY: No dysuria, frequency, hematuria, or incontinence  NEUROLOGICAL: No headaches, memory loss, loss of strength, numbness, or tremors  SKIN: No itching, burning, rashes, or lesions   LYMPH NODES: No enlarged glands  ENDOCRINE: No heat or cold intolerance; No hair loss  MUSCULOSKELETAL: No joint pain or swelling; No muscle, back, or extremity pain  PSYCHIATRIC: No depression, anxiety, mood swings, or difficulty sleeping  HEME/LYMPH: No easy bruising, or bleeding gums  ALLERY AND IMMUNOLOGIC: No hives or eczema    Vital Signs Last 24 Hrs  T(C): 36.6 (23 Jan 2024 07:10), Max: 36.7 (22 Jan 2024 23:17)  T(F): 97.8 (23 Jan 2024 07:10), Max: 98 (22 Jan 2024 23:17)  HR: 68 (23 Jan 2024 07:10) (68 - 75)  BP: 175/75 (23 Jan 2024 07:10) (128/69 - 178/76)  BP(mean): --  RR: 18 (23 Jan 2024 07:10) (18 - 18)  SpO2: 97% (23 Jan 2024 07:10) (97% - 97%)    Parameters below as of 23 Jan 2024 07:10  Patient On (Oxygen Delivery Method): room air    PHYSICAL EXAM:  GENERAL: NAD, well-groomed,   HEAD:  Atraumatic, Normocephalic  EYES: conjunctiva and sclera clear  ENMT: Moist mucous membranes,  No visible lesions, heard of hearing   NECK: Supple, No JVD, Normal thyroid  NERVOUS SYSTEM:  Alert & Oriented X3, Good concentration; moves all extremities   CHEST/LUNG: good air entry   HEART: Regular rate and rhythm; No murmurs, rubs, or gallops  ABDOMEN: Soft, Nontender, Nondistended; Bowel sounds present  EXTREMITIES:  2+ Peripheral Pulses, No clubbing, cyanosis, or edema  LYMPH: No lymphadenopathy noted  SKIN: No rashes or lesions    Consultant(s) Notes Reviewed:  [x ] YES  [ ] NO  Care Discussed with Consultants/Other Providers [ x] YES  [ ] NO    LAB:                                   8.0    8.51  )-----------( 256      ( 23 Jan 2024 07:14 )             27.5     01-23    135  |  104  |  24<H>  ----------------------------<  84  3.9   |  23  |  1.1    Ca    9.0      23 Jan 2024 07:14  Mg     1.6     01-23        Drug Dosing Weight  Height (cm): 152.4 (12 Jan 2024 11:39)  Weight (kg): 60.8 (12 Jan 2024 11:39)  BMI (kg/m2): 26.2 (12 Jan 2024 11:39)  BSA (m2): 1.57 (12 Jan 2024 11:39)      Urinalysis Basic - ( 18 Jan 2024 07:18 )    Color: x / Appearance: x / SG: x / pH: x  Gluc: 97 mg/dL / Ketone: x  / Bili: x / Urobili: x   Blood: x / Protein: x / Nitrite: x   Leuk Esterase: x / RBC: x / WBC x   Sq Epi: x / Non Sq Epi: x / Bacteria: x        RADIOLOGY & ADDITIONAL TESTS:  Imaging Personally Reviewed:  [x] YES  [ ] NO      MEDICATIONS  (STANDING):  atorvastatin 40 milliGRAM(s) Oral at bedtime  bisacodyl 20 milliGRAM(s) Oral at bedtime  chlorhexidine 2% Cloths 1 Application(s) Topical <User Schedule>  enoxaparin Injectable 40 milliGRAM(s) SubCutaneous every 24 hours  hydrALAZINE 25 milliGRAM(s) Oral three times a day  iron sucrose IVPB 100 milliGRAM(s) IV Intermittent every 24 hours  lisinopril 40 milliGRAM(s) Oral daily  magnesium sulfate  IVPB 2 Gram(s) IV Intermittent every 2 hours  metoprolol succinate ER 50 milliGRAM(s) Oral daily  metroNIDAZOLE    Tablet 1000 milliGRAM(s) Oral once  metroNIDAZOLE    Tablet 1000 milliGRAM(s) Oral once  metroNIDAZOLE    Tablet 1000 milliGRAM(s) Oral once  neomycin 1000 milliGRAM(s) Oral once  neomycin 1000 milliGRAM(s) Oral once  neomycin 1000 milliGRAM(s) Oral once  NIFEdipine XL 90 milliGRAM(s) Oral daily  pantoprazole    Tablet 40 milliGRAM(s) Oral before breakfast  polyethylene glycol/electrolyte Solution. 4000 milliLiter(s) Oral once  sertraline 100 milliGRAM(s) Oral daily    MEDICATIONS  (PRN):  guaifenesin/dextromethorphan Oral Liquid 10 milliLiter(s) Oral every 6 hours PRN Cough

## 2024-01-23 NOTE — CHART NOTE - NSCHARTNOTEFT_GEN_A_CORE
I was informed by surgery that patient is not going for surgery until Monday. She was tentatively planned for this Friday until a reschedule had to be made. Patient was on clear liquid diet in advance of colorectal surgery, but surgery requested that her diet be advanced in the meantime since her surgery was pushed back. Will advance diet and let primary team know of surgery date change. I was informed by surgery that patient is not going for surgery until Monday. She was tentatively planned for this Friday until a reschedule had to be made. Patient was on clear liquid diet in advance of colorectal surgery, but surgery requested that her diet be advanced in the meantime since her surgery was pushed back. Will advance diet and inform nurse.

## 2024-01-23 NOTE — PROGRESS NOTE ADULT - ASSESSMENT
79 y/o female with PMHx HTN, GERD, non-obstructive CAD, Dementia, anemia, MDD P/W Hb 7.4 OP, episodic vomiting for few months, 60lb weight loss. Found to have Hb 7.5 on admission.     #newly diagnosed adenocarcinoma sigmoid mass   #Microcytic anemia   - GI Consult --> EGD/Colonscopy done 1/12 - sigmoid mass found - biopsy taken  - 1/15 cardio consult appreciated -Patient is intermediate risk for intermediate risk procedure.   - CT chest - no mets  - CEA negative   - heme/onc following  - hcp is currently sick and hospitalized at ; Collins ( son of hcp /nephew to patient) will help make decisions # [phone]. - Collins will visit patient today and talk about her treatment going forward   - Sonali also states that Joel ( [phone]) who is sister in law can be updated with any information about the patient. Joel was at bedside of the patient.   - palliative following  - 1/19 Collins and the patient spoke - now aware of diagnosis and is agreeable to surgery - scheduled for Wednesday   - prep to start today     #Vomiting likely 2/2 Type III hiatal hernia-resolved   - CT abdomen shows Large hiatal hernia containing partially included stomach with apparent organoaxial rotation.  - Currently no abdominal pain/nausea/vomiting  - tolerating diet and having BM's    #Suspected CKD III  - cr stable  - avoid nephrotoxics    #HTN - poorly controlled  - Lisinopril resumed on 1/22  - continue current tx  - added hydralazine 25mg po q8     #HFpEF  - On lasix PO 40 BID at home  - TTE 10/1/20 --> EF 60-65%, mild concentric LVH, G2DD  - Holding lasix for now due to DAWIT and mild hypokalemia      #Asthma  - Follows dr. Hartley who had recommended symbicort 160-4.5 2 puff BID and combivent prn but pt does not take it  - Monitor     #Depression  #Mild dementia??  - C/w home meds  - patient resides in a two family home with her sisterSonali    Progress Note Handoff  Pending Consults: surgery  Pending Tests:  cbc, bmp, INR, type and screen at 8pm  Pending Results: cbc, bmp, INR, type and screen   Family Discussion: Discussed labs, meds, diet, consults and overall plan of care with pt and medical staff. Also spoke to pt's nephew, Collins in great detail last week. Await surgery on Wednesday   Disposition: Home_____/SNF______/Other_____/Unknown at this time_x____  Spent over 55 min reviewing chart, speaking with patient/family and on coordinating patient care during interdisciplinary rounds     Please call me with any questions at extension [phone]

## 2024-01-23 NOTE — PROGRESS NOTE ADULT - ASSESSMENT
78F with PMH of HTN, GERD, CAD, dementia, anemia, MDD here with anemia, s/p colonoscopy, and found to have adenocarcinoma. In discussions wiht family (sister Sonali), considering surgery but awaiting staging of the mass before further decisions. Also with vomiting, improving now. Course further complicated by DAWIT. Palliative care consulted for GOC.    Spoke to patient at bedside, denied pain or discomfort. Pending procedure tomorrow with prep today.

## 2024-01-23 NOTE — PROGRESS NOTE ADULT - SUBJECTIVE AND OBJECTIVE BOX
HPI: 78F with PMH of HTN, GERD, CAD, dementia, anemia, MDD here with anemia, s/p colonoscopy, and found to have adenocarcinoma. In discussions with family (sister Sonali), considering surgery but awaiting staging of the mass before further decisions. Also with vomiting, improving now. Course further complicated by DAWIT. Palliative care consulted for GOC.    INTERVAL EVENTS  1/19/24: patient comfortable  1/22/24: Patient comfortable, decided on DNR/DNI today    ADVANCE DIRECTIVES:    [ ] Full Code [x ] DNR  MOLST  [ ]  Living Will  [ ]   DECISION MAKER(s):  [ ] Health Care Proxy(s)  [ ] Surrogate(s)  [ ] Guardian           Name(s): Phone Number(s): sister Sonali [phone]; nephew Collins [phone]    BASELINE (I)ADL(s) (prior to admission):  [place]: [ ]Total  [ ] Moderate [ ]Dependent  Palliative Performance Status Version 2:         %    http://npcrc.org/files/news/palliative_performance_scale_ppsv2.pdf    Allergies    No Known Allergies    Intolerances    MEDICATIONS  (STANDING):  atorvastatin 40 milliGRAM(s) Oral at bedtime  bisacodyl 20 milliGRAM(s) Oral at bedtime  chlorhexidine 2% Cloths 1 Application(s) Topical <User Schedule>  enoxaparin Injectable 40 milliGRAM(s) SubCutaneous every 24 hours  hydrALAZINE 25 milliGRAM(s) Oral three times a day  iron sucrose IVPB 100 milliGRAM(s) IV Intermittent every 24 hours  lisinopril 40 milliGRAM(s) Oral daily  magnesium sulfate  IVPB 2 Gram(s) IV Intermittent every 2 hours  metoprolol succinate ER 50 milliGRAM(s) Oral daily  metroNIDAZOLE    Tablet 1000 milliGRAM(s) Oral once  metroNIDAZOLE    Tablet 1000 milliGRAM(s) Oral once  metroNIDAZOLE    Tablet 1000 milliGRAM(s) Oral once  neomycin 1000 milliGRAM(s) Oral once  neomycin 1000 milliGRAM(s) Oral once  neomycin 1000 milliGRAM(s) Oral once  NIFEdipine XL 90 milliGRAM(s) Oral daily  pantoprazole    Tablet 40 milliGRAM(s) Oral before breakfast  polyethylene glycol/electrolyte Solution. 4000 milliLiter(s) Oral once  sertraline 100 milliGRAM(s) Oral daily    MEDICATIONS  (PRN):  guaifenesin/dextromethorphan Oral Liquid 10 milliLiter(s) Oral every 6 hours PRN Cough        PRESENT SYMPTOMS: [ ]Unable to obtain due to poor mentation   Source if other than patient:  [ ]Family   [ ]Team     Pain: [ ]yes [X ]no  QOL impact -   Location -                    Aggravating factors -  Quality -  Radiation -  Timing-  Severity (0-10 scale):  Minimal acceptable level (0-10 scale):     CPOT:    https://www.[facility].org/getattachment/[email or web address]/Critical-Care-Pain-Observation-Tool-(CPOT)    PAIN AD Score:   http://geriatrictoolkit.[email or web address]/painad.pdf (press ctrl +  left click to view)    Dyspnea:                           [ x]None[ ]Mild [ ]Moderate [ ]Severe     Respiratory Distress Observation Scale (RDOS):   A score of 0 to 2 signifies little or no respiratory distress, 3 signifies mild distress, scores 4 to 6 indicate moderate distress, and scores greater than 7 signify severe distress  [email or web address]://[email or web address]/sites/default/files/PDFS/876250-favejrtrwxl-utuwmeyw-psldufjycxx-lnsoj.pdf    Anxiety:                             [ ]None[ ]Mild [ ]Moderate [ ]Severe   Fatigue:                             [ ]None[ ]Mild [ ]Moderate [ ]Severe   Nausea:                             [ ]None[ ]Mild [ ]Moderate [ ]Severe   Loss of appetite:              [ ]None[ ]Mild [ ]Moderate [ ]Severe   Constipation:                    [ ]None[ ]Mild [ ]Moderate [ ]Severe    Other Symptoms:  [ X]All other review of systems negative     Palliative Performance Status Version 2:         %    http://npcrc.org/files/news/palliative_performance_scale_ppsv2.pdf  PHYSICAL EXAM:    Vital Signs Last 24 Hrs  T(C): 36.6 (23 Jan 2024 07:10), Max: 36.7 (22 Jan 2024 23:17)  T(F): 97.8 (23 Jan 2024 07:10), Max: 98 (22 Jan 2024 23:17)  HR: 68 (23 Jan 2024 07:10) (68 - 75)  BP: 175/75 (23 Jan 2024 07:10) (128/69 - 178/76)  BP(mean): --  RR: 18 (23 Jan 2024 07:10) (18 - 18)  SpO2: 97% (23 Jan 2024 07:10) (97% - 97%)    Parameters below as of 23 Jan 2024 07:10  Patient On (Oxygen Delivery Method): room air        GENERAL:  [X ] No acute distress [ ]Lethargic  [ ]Unarousable  [ ]Verbal  [ ]Non-Verbal [ ]Cachexia    BEHAVIORAL/PSYCH:  [x ]Alert and Oriented  [ ] Anxiety [ ] Delirium [ ] Agitation [X ] Calm   EYES: [X ] No scleral icterus [ ] Scleral icterus [ ] Closed  ENMT:  [ ]Dry mouth  [ ]No external oral lesions [ X] No external ear or nose lesions  CARDIOVASCULAR:  [ ]Regular [ ]Irregular [ ]Tachy [x ]Not Tachy  [ ]Percy [ ] Edema [ ] No edema  PULMONARY:  [ ]Tachypnea  [ ]Audible excessive secretions [X ] No labored breathing [ ] labored breathing  GASTROINTESTINAL: [ ]Soft  [ ]Distended  [ X]Not distended [ ]Non tender [ ]Tender  MUSCULOSKELETAL: [ ]No clubbing [ ] clubbing  [ X] No cyanosis [ ] cyanosis  NEUROLOGIC: [ ]No focal deficits  [ X]Follows commands  [ ]Does not follow commands  [ ]Cognitive impairment  [ ]Dysphagia  [ ]Dysarthria  [ ]Paresis   SKIN: [ ] Jaundiced [X ] Non-jaundiced [ ]Rash [ ]No Rash [ ] Warm [ ] Dry  MISC/LINES: [ ] ET tube [ ] Trach [ ]NGT/OGT [ ]PEG [ ]Tapia    CRITICAL CARE:  [ ] Shock Present  [ ]Septic [ ]Cardiogenic [ ]Neurologic [ ]Hypovolemic  [ ]  Vasopressors [ ]  Inotropes   [ ]Respiratory failure present [ ]Mechanical ventilation [ ]Non-invasive ventilatory support [ ]High flow  [ ]Acute  [ ]Chronic [ ]Hypoxic  [ ]Hypercarbic [ ]Other  [ ]Other organ failure     LABS: reviewed by me                                       8.0    8.51  )-----------( 256      ( 23 Jan 2024 07:14 )             27.5     01-23    135  |  104  |  24<H>  ----------------------------<  84  3.9   |  23  |  1.1    Ca    9.0      23 Jan 2024 07:14  Mg     1.6     01-23      PT/INR - ( 23 Jan 2024 07:14 )   PT: 11.90 sec;   INR: 1.04 ratio         PTT - ( 23 Jan 2024 07:14 )  PTT:30.9 sec  Urinalysis Basic - ( 23 Jan 2024 07:14 )    Color: x / Appearance: x / SG: x / pH: x  Gluc: 84 mg/dL / Ketone: x  / Bili: x / Urobili: x   Blood: x / Protein: x / Nitrite: x   Leuk Esterase: x / RBC: x / WBC x   Sq Epi: x / Non Sq Epi: x / Bacteria: x                    RADIOLOGY & ADDITIONAL STUDIES: reviewed by me  CT chest 1/12/24  No evidence of intrathoracic neoplastic disease.    Interval gastrostomy tube placement, with upper abdominal   pneumoperitoneum, which could be seen post gastrostomy placement;   correlation with procedure history suggested.    Lingula atelectasis with areas of bronchiectasis/scarring, similar to   2018 CT, likely representing sequela of prior inflammation.    Age-indeterminate, likely subacute or chronic left medial clavicular   displaced fracture, new since 8/27/2023 chest x-ray.    Scattered patchy left upper lobe airspace opacities, which may be   inflammatory or infectious in etiology.    PROTEIN CALORIE MALNUTRITION PRESENT: [ ]mild [ ]moderate [ ]severe [ ]underweight [ ]morbid obesity  https://www.andeal.org/vault/2440/web/files/ONC/Table_Clinical%20Characteristics%20to%20Document%20Malnutrition-White%20JV%20et%20al%943114.pdf    Height (cm): 152.4 (01-12-24 @ 11:39)  Weight (kg): 60.8 (01-12-24 @ 11:39), 86.2 (08-27-23 @ 19:29)  BMI (kg/m2): 26.2 (01-12-24 @ 11:39)    [ ]PPSV2 < or = to 30% [ ]significant weight loss  [ ]poor nutritional intake  [ ]anasarca      [ ]Artificial Nutrition      Palliative Care Spiritual/Emotional Screening Tool Question  Severity (0-4):                    OR                    [X ] Unable to determine/NA  Score of 2 or greater indicates recommendation of Chaplaincy referral  Chaplaincy Referral: [ ] Yes [ ] Refused [ ] Following     Caregiver [place]:  [ ] Yes [ ] No    OR    [x ] Unable to determine. Will assess at later time if appropriate.  Social Work Referral [ ]  Patient and Family Centered Care Referral [ ]    Anticipatory Grief Present: [ ] Yes [ ] No    OR     [ x] Unable to determine. Will assess at later time if appropriate.  Social Work Referral [ ]  Patient and Family Centered Care Referral [ ]    REFERRALS:   [ ]Chaplaincy  [ ]Hospice  [ ]Child Life  [ ]Social Work  [ ]Case management [ ]Holistic Therapy     Palliative care education provided to patient and/or family    Goals of Care Document:

## 2024-01-24 LAB
ANION GAP SERPL CALC-SCNC: 11 MMOL/L — SIGNIFICANT CHANGE UP (ref 7–14)
ANION GAP SERPL CALC-SCNC: 12 MMOL/L — SIGNIFICANT CHANGE UP (ref 7–14)
APTT BLD: 40.4 SEC — HIGH (ref 27–39.2)
BASOPHILS # BLD AUTO: 0.04 K/UL — SIGNIFICANT CHANGE UP (ref 0–0.2)
BASOPHILS # BLD AUTO: 0.05 K/UL — SIGNIFICANT CHANGE UP (ref 0–0.2)
BASOPHILS NFR BLD AUTO: 0.4 % — SIGNIFICANT CHANGE UP (ref 0–1)
BASOPHILS NFR BLD AUTO: 0.4 % — SIGNIFICANT CHANGE UP (ref 0–1)
BUN SERPL-MCNC: 23 MG/DL — HIGH (ref 10–20)
BUN SERPL-MCNC: 27 MG/DL — HIGH (ref 10–20)
CALCIUM SERPL-MCNC: 8.6 MG/DL — SIGNIFICANT CHANGE UP (ref 8.4–10.5)
CALCIUM SERPL-MCNC: 8.9 MG/DL — SIGNIFICANT CHANGE UP (ref 8.4–10.5)
CHLORIDE SERPL-SCNC: 106 MMOL/L — SIGNIFICANT CHANGE UP (ref 98–110)
CHLORIDE SERPL-SCNC: 107 MMOL/L — SIGNIFICANT CHANGE UP (ref 98–110)
CO2 SERPL-SCNC: 20 MMOL/L — SIGNIFICANT CHANGE UP (ref 17–32)
CO2 SERPL-SCNC: 21 MMOL/L — SIGNIFICANT CHANGE UP (ref 17–32)
CREAT SERPL-MCNC: 1.2 MG/DL — SIGNIFICANT CHANGE UP (ref 0.7–1.5)
CREAT SERPL-MCNC: 1.6 MG/DL — HIGH (ref 0.7–1.5)
EGFR: 33 ML/MIN/1.73M2 — LOW
EGFR: 46 ML/MIN/1.73M2 — LOW
EOSINOPHIL # BLD AUTO: 0 K/UL — SIGNIFICANT CHANGE UP (ref 0–0.7)
EOSINOPHIL # BLD AUTO: 0 K/UL — SIGNIFICANT CHANGE UP (ref 0–0.7)
EOSINOPHIL NFR BLD AUTO: 0 % — SIGNIFICANT CHANGE UP (ref 0–8)
EOSINOPHIL NFR BLD AUTO: 0 % — SIGNIFICANT CHANGE UP (ref 0–8)
GLUCOSE SERPL-MCNC: 88 MG/DL — SIGNIFICANT CHANGE UP (ref 70–99)
GLUCOSE SERPL-MCNC: 94 MG/DL — SIGNIFICANT CHANGE UP (ref 70–99)
HCT VFR BLD CALC: 26.8 % — LOW (ref 37–47)
HCT VFR BLD CALC: 30.1 % — LOW (ref 37–47)
HGB BLD-MCNC: 8 G/DL — LOW (ref 12–16)
HGB BLD-MCNC: 8.7 G/DL — LOW (ref 12–16)
IMM GRANULOCYTES NFR BLD AUTO: 0.9 % — HIGH (ref 0.1–0.3)
IMM GRANULOCYTES NFR BLD AUTO: 1.1 % — HIGH (ref 0.1–0.3)
INR BLD: 1.09 RATIO — SIGNIFICANT CHANGE UP (ref 0.65–1.3)
LYMPHOCYTES # BLD AUTO: 1.49 K/UL — SIGNIFICANT CHANGE UP (ref 1.2–3.4)
LYMPHOCYTES # BLD AUTO: 1.62 K/UL — SIGNIFICANT CHANGE UP (ref 1.2–3.4)
LYMPHOCYTES # BLD AUTO: 13.5 % — LOW (ref 20.5–51.1)
LYMPHOCYTES # BLD AUTO: 14.3 % — LOW (ref 20.5–51.1)
MAGNESIUM SERPL-MCNC: 2.2 MG/DL — SIGNIFICANT CHANGE UP (ref 1.8–2.4)
MAGNESIUM SERPL-MCNC: 2.4 MG/DL — SIGNIFICANT CHANGE UP (ref 1.8–2.4)
MCHC RBC-ENTMCNC: 21.8 PG — LOW (ref 27–31)
MCHC RBC-ENTMCNC: 22.1 PG — LOW (ref 27–31)
MCHC RBC-ENTMCNC: 28.9 G/DL — LOW (ref 32–37)
MCHC RBC-ENTMCNC: 29.9 G/DL — LOW (ref 32–37)
MCV RBC AUTO: 74 FL — LOW (ref 81–99)
MCV RBC AUTO: 75.3 FL — LOW (ref 81–99)
MONOCYTES # BLD AUTO: 0.77 K/UL — HIGH (ref 0.1–0.6)
MONOCYTES # BLD AUTO: 0.83 K/UL — HIGH (ref 0.1–0.6)
MONOCYTES NFR BLD AUTO: 6.9 % — SIGNIFICANT CHANGE UP (ref 1.7–9.3)
MONOCYTES NFR BLD AUTO: 7.4 % — SIGNIFICANT CHANGE UP (ref 1.7–9.3)
NEUTROPHILS # BLD AUTO: 8.06 K/UL — HIGH (ref 1.4–6.5)
NEUTROPHILS # BLD AUTO: 9.39 K/UL — HIGH (ref 1.4–6.5)
NEUTROPHILS NFR BLD AUTO: 77 % — HIGH (ref 42.2–75.2)
NEUTROPHILS NFR BLD AUTO: 78.1 % — HIGH (ref 42.2–75.2)
NRBC # BLD: 0 /100 WBCS — SIGNIFICANT CHANGE UP (ref 0–0)
NRBC # BLD: 0 /100 WBCS — SIGNIFICANT CHANGE UP (ref 0–0)
PHOSPHATE SERPL-MCNC: 2.7 MG/DL — SIGNIFICANT CHANGE UP (ref 2.1–4.9)
PLATELET # BLD AUTO: 238 K/UL — SIGNIFICANT CHANGE UP (ref 130–400)
PLATELET # BLD AUTO: 280 K/UL — SIGNIFICANT CHANGE UP (ref 130–400)
PMV BLD: 9.3 FL — SIGNIFICANT CHANGE UP (ref 7.4–10.4)
PMV BLD: 9.7 FL — SIGNIFICANT CHANGE UP (ref 7.4–10.4)
POTASSIUM SERPL-MCNC: 3.9 MMOL/L — SIGNIFICANT CHANGE UP (ref 3.5–5)
POTASSIUM SERPL-MCNC: 4.4 MMOL/L — SIGNIFICANT CHANGE UP (ref 3.5–5)
POTASSIUM SERPL-SCNC: 3.9 MMOL/L — SIGNIFICANT CHANGE UP (ref 3.5–5)
POTASSIUM SERPL-SCNC: 4.4 MMOL/L — SIGNIFICANT CHANGE UP (ref 3.5–5)
PROTHROM AB SERPL-ACNC: 12.4 SEC — SIGNIFICANT CHANGE UP (ref 9.95–12.87)
RBC # BLD: 3.62 M/UL — LOW (ref 4.2–5.4)
RBC # BLD: 4 M/UL — LOW (ref 4.2–5.4)
RBC # FLD: 24.7 % — HIGH (ref 11.5–14.5)
RBC # FLD: 25.5 % — HIGH (ref 11.5–14.5)
SODIUM SERPL-SCNC: 137 MMOL/L — SIGNIFICANT CHANGE UP (ref 135–146)
SODIUM SERPL-SCNC: 140 MMOL/L — SIGNIFICANT CHANGE UP (ref 135–146)
WBC # BLD: 10.45 K/UL — SIGNIFICANT CHANGE UP (ref 4.8–10.8)
WBC # BLD: 12.02 K/UL — HIGH (ref 4.8–10.8)
WBC # FLD AUTO: 10.45 K/UL — SIGNIFICANT CHANGE UP (ref 4.8–10.8)
WBC # FLD AUTO: 12.02 K/UL — HIGH (ref 4.8–10.8)

## 2024-01-24 PROCEDURE — 99232 SBSQ HOSP IP/OBS MODERATE 35: CPT

## 2024-01-24 RX ADMIN — ENOXAPARIN SODIUM 40 MILLIGRAM(S): 100 INJECTION SUBCUTANEOUS at 17:12

## 2024-01-24 RX ADMIN — Medication 50 MILLIGRAM(S): at 05:12

## 2024-01-24 RX ADMIN — CHLORHEXIDINE GLUCONATE 1 APPLICATION(S): 213 SOLUTION TOPICAL at 05:11

## 2024-01-24 RX ADMIN — Medication 25 MILLIGRAM(S): at 13:46

## 2024-01-24 RX ADMIN — Medication 90 MILLIGRAM(S): at 05:12

## 2024-01-24 RX ADMIN — IRON SUCROSE 70 MILLIGRAM(S): 20 INJECTION, SOLUTION INTRAVENOUS at 17:13

## 2024-01-24 RX ADMIN — Medication 20 MILLIGRAM(S): at 21:56

## 2024-01-24 RX ADMIN — ATORVASTATIN CALCIUM 40 MILLIGRAM(S): 80 TABLET, FILM COATED ORAL at 21:56

## 2024-01-24 RX ADMIN — PANTOPRAZOLE SODIUM 40 MILLIGRAM(S): 20 TABLET, DELAYED RELEASE ORAL at 05:12

## 2024-01-24 RX ADMIN — LISINOPRIL 40 MILLIGRAM(S): 2.5 TABLET ORAL at 05:11

## 2024-01-24 RX ADMIN — SERTRALINE 100 MILLIGRAM(S): 25 TABLET, FILM COATED ORAL at 11:13

## 2024-01-24 RX ADMIN — Medication 25 MILLIGRAM(S): at 21:58

## 2024-01-24 RX ADMIN — Medication 25 MILLIGRAM(S): at 05:11

## 2024-01-24 NOTE — CHART NOTE - NSCHARTNOTEFT_GEN_A_CORE
Transfer from medicine to surgery    The patient is a 78-year-old female with past medical history of hiatal hernia s/p repair >10 years back, hypertension, GERD, non obstructive CAD, depression, dementia, anemia presenting for evaluation of low hemoglobin, measured outpatient to be 7.4.  Patient has had multiple episodes of nonbloody nonbilious vomiting over the last several months with good appetite and almost 60 pound weight loss over the last few months.  Patient just started following up with hematologist Dr.Yumi Hussein and had recent blood work from 3 days ago that resulted today and showed a low hemoglobin of 7.4. Patient denies fevers, chills, chest pain, shortness of breath, hematuria, melena, hematochezia. The pt presented to the Ed for a blood transfusion that was recommended by the hematologist.    In the ED  Vital Signs Last 24 Hrs  T(C): 36.9 (08 Jan 2024 19:06), Max: 36.9 (08 Jan 2024 19:06)  T(F): 98.4 (08 Jan 2024 19:06), Max: 98.4 (08 Jan 2024 19:06)  HR: 81 (08 Jan 2024 19:06) (70 - 100)  BP: 155/74 (08 Jan 2024 19:06) (130/60 - 196/89)  BP(mean): --  RR: 18 (08 Jan 2024 19:06) (18 - 19)  SpO2: 98% (08 Jan 2024 19:06) (98% - 100%)    Parameters below as of 08 Jan 2024 19:06  Patient On (Oxygen Delivery Method): room air    In the ED the pt received 1 PRBC  Labs were significant for a Cr of 1.6 (unknown baseline)  and a lactate    Cat scan was significant for  Large hiatal hernia containing partially included stomach with apparent   organoaxial rotation    The pt was admitted to medicine for anemia 2/2 possible GIB      #79 y/o female with PMHx HTN, GERD, non-obstructive CAD, Dementia, anemia, MDD P/W Hb 7.4 OP, episodic vomiting for few months, 60lb weight loss. Found to have Hb 7.5 on admission.     #newly diagnosed adenocarcinoma sigmoid mass   #Microcytic anemia   - GI Consult --> EGD/Colonscopy done 1/12 - sigmoid mass found - biopsy taken  - 1/15 cardio consult appreciated -Patient is intermediate risk for intermediate risk procedure.   - CT chest - no mets  - CEA negative   -Heme/onc recs: IV Venofer QD  - hcp is currently sick and hospitalized at ; Collins ( son of hcp /nephew to patient) will help make decisions # [phone].   - palliative following  - 1/19 Collins and the patient spoke - now aware of diagnosis and is agreeable to surgery  -1/23: CLD in the AM, advanced diet in PM after informed of rescheduled surgery  Surgery: Initial Plan for OR Wednesday 1/24 rescheduled, now plan for OR Monday 1/29    #Vomiting likely 2/2 Type III hiatal hernia-resolved   - CT abdomen shows Large hiatal hernia containing partially included stomach with apparent organoaxial rotation.  - Currently no abdominal pain/nausea/vomiting  - tolerating diet and having BM's    #Suspected CKD III  - cr stable 1.1  - avoid nephrotoxics    #HTN - poorly controlled  - Lisinopril resumed 40mg PO  -Hydralazine 25mg po q8  -Continue current treatment    #HFpEF  - On lasix PO 40 BID at home  - TTE 10/1/20 --> EF 60-65%, mild concentric LVH, G2DD  - Holding lasix for now due to DAWIT and mild hypokalemia - may need to restart in am if cr stable     #Asthma  - Follows dr. Hartley who had recommended symbicort 160-4.5 2 puff BID and combivent prn but pt does not take it  -Continue to Monitor     #Depression  #Mild dementia??  - C/w home meds    #Misc  - Code Status: DNR/DNI    Dispo: Pending surgery 1/29

## 2024-01-24 NOTE — PROGRESS NOTE ADULT - ASSESSMENT
78F with PMH of HTN, GERD, CAD, dementia, anemia, MDD here with anemia, s/p colonoscopy, and found to have adenocarcinoma. In discussions wiht family (sister Sonali), considering surgery but awaiting staging of the mass before further decisions. Also with vomiting, improving now. Course further complicated by DAWIT. Palliative care consulted for GOC.    Spoke to patient at bedside, denied pain or discomfort. Pending transfer to surgical unit for monitoring, planned for procedure monday next week 1/29.

## 2024-01-24 NOTE — PROGRESS NOTE ADULT - ASSESSMENT
79 y/o female with PMHx HTN, GERD, non-obstructive CAD, Dementia, anemia, MDD P/W Hb 7.4 OP, episodic vomiting for few months, 60lb weight loss. Found to have Hb 7.5 on admission.     #newly diagnosed adenocarcinoma sigmoid mass   #Microcytic anemia   - GI Consult --> EGD/Colonscopy done 1/12 - sigmoid mass found - biopsy taken  - 1/15 cardio consult appreciated -Patient is intermediate risk for intermediate risk procedure.   - CT chest - no mets  - CEA negative   -Heme/onc recs: IV Venofer QD  - hcp is currently sick and hospitalized at ; Collins ( son of hcp /nephew to patient) will help make decisions # [phone].   - palliative following  - 1/19 Collins and the patient spoke - now aware of diagnosis and is agreeable to surgery  -1/23: CLD in the AM, advanced diet in PM after informed of rescheduled surgery  Surgery: Initial Plan for OR Monday 1/29    #Vomiting likely 2/2 Type III hiatal hernia-resolved   - CT abdomen shows Large hiatal hernia containing partially included stomach with apparent organoaxial rotation.  - Currently no abdominal pain/nausea/vomiting  - tolerating diet and having BM's    #Suspected CKD III  - cr stable 1.1  - avoid nephrotoxics    #HTN - poorly controlled  - Lisinopril resumed 40mg PO  -Hydralazine 25mg po q8  -Continue current treatment    #HFpEF  - On lasix PO 40 BID at home  - TTE 10/1/20 --> EF 60-65%, mild concentric LVH, G2DD  - Holding lasix for now due to DAWIT and mild hypokalemia - may need to restart in am if cr stable     #Asthma  - Follows dr. Hartley who had recommended symbicort 160-4.5 2 puff BID and combivent prn but pt does not take it  -Continue to Monitor     #Depression  #Mild dementia??  - C/w home meds    #Misc  - Code Status: DNR/DNI    Dispo: Pending surgery 1/29  Patient to be transferred to the surgical service 1/24 for continued care

## 2024-01-24 NOTE — PROGRESS NOTE ADULT - SUBJECTIVE AND OBJECTIVE BOX
DARIEN BARRERA 78y Female  MRN#: [id number]   Hospital Day: 16d    HPI:  The patient is a 78-year-old female with past medical history of hiatal hernia s/p repair >10 years back, hypertension, GERD, non obstructive CAD, depression, dementia, anemia presenting for evaluation of low hemoglobin, measured outpatient to be 7.4.  Patient has had multiple episodes of nonbloody nonbilious vomiting over the last several months with good appetite and almost 60 pound weight loss over the last few months.  Patient just started following up with hematologist Dr.Yumi Hussein and had recent blood work from 3 days ago that resulted today and showed a low hemoglobin of 7.4. Patient denies fevers, chills, chest pain, shortness of breath, hematuria, melena, hematochezia. The pt presented to the Ed for a blood transfusion that was recommended by the hematologist.    In the ED  Vital Signs Last 24 Hrs  T(C): 36.9 (08 Jan 2024 19:06), Max: 36.9 (08 Jan 2024 19:06)  T(F): 98.4 (08 Jan 2024 19:06), Max: 98.4 (08 Jan 2024 19:06)  HR: 81 (08 Jan 2024 19:06) (70 - 100)  BP: 155/74 (08 Jan 2024 19:06) (130/60 - 196/89)  BP(mean): --  RR: 18 (08 Jan 2024 19:06) (18 - 19)  SpO2: 98% (08 Jan 2024 19:06) (98% - 100%)    Parameters below as of 08 Jan 2024 19:06  Patient On (Oxygen Delivery Method): room air    In the ED the pt received 1 PRBC  Labs were significant for a Cr of 1.6 (unknown baseline)  and a lactate    Cat scan was significant for  Large hiatal hernia containing partially included stomach with apparent   organoaxial rotation    The pt was admitted to medicine for anemia 2/2 possible GIB     (08 Jan 2024 21:25)      SUBJECTIVE  Patient is a 78y old Female who presents with a chief complaint of Anemia (23 Jan 2024 14:48)  Currently admitted to medicine with the primary diagnosis of Anemia      INTERVAL HPI AND OVERNIGHT EVENTS:  Patient was examined and seen at bedside. This morning she is resting comfortably in bed and reports no issues or overnight events. She is tolerating diet and ambulating well. Denies nausea, vomiting, abdominal pain.    REVIEW OF SYMPTOMS:  CONSTITUTIONAL: No weakness, fevers or chills; No headaches  EYES: No visual changes, eye pain, or discharge  ENT: No vertigo; No ear pain or change in hearing; No sore throat or difficulty swallowing  NECK: No pain or stiffness  RESPIRATORY: No cough, wheezing, or hemoptysis; No shortness of breath  CARDIOVASCULAR: No chest pain or palpitations  GASTROINTESTINAL: No abdominal or epigastric pain; No nausea, vomiting, or hematemesis; No diarrhea or constipation; No melena or hematochezia  GENITOURINARY: No dysuria, frequency or hematuria  MUSCULOSKELETAL: No joint pain, no muscle pain, no weakness  NEUROLOGICAL: No numbness or weakness  SKIN: No itching or rashes    OBJECTIVE  PAST MEDICAL & SURGICAL HISTORY  Hypertension    GERD (gastroesophageal reflux disease)    Hiatal hernia with GERD    Empyema lung    H/O CHF    History of repair of hiatal hernia    H/O pleural empyema  s/p decortication      ALLERGIES:  No Known Allergies    MEDICATIONS:  STANDING MEDICATIONS  atorvastatin 40 milliGRAM(s) Oral at bedtime  bisacodyl 20 milliGRAM(s) Oral at bedtime  chlorhexidine 2% Cloths 1 Application(s) Topical <User Schedule>  enoxaparin Injectable 40 milliGRAM(s) SubCutaneous every 24 hours  hydrALAZINE 25 milliGRAM(s) Oral three times a day  iron sucrose IVPB 100 milliGRAM(s) IV Intermittent every 24 hours  lisinopril 40 milliGRAM(s) Oral daily  metoprolol succinate ER 50 milliGRAM(s) Oral daily  NIFEdipine XL 90 milliGRAM(s) Oral daily  pantoprazole    Tablet 40 milliGRAM(s) Oral before breakfast  sertraline 100 milliGRAM(s) Oral daily    PRN MEDICATIONS  guaifenesin/dextromethorphan Oral Liquid 10 milliLiter(s) Oral every 6 hours PRN      VITAL SIGNS: Last 24 Hours  T(C): 36.6 (24 Jan 2024 07:00), Max: 36.6 (24 Jan 2024 07:00)  T(F): 97.9 (24 Jan 2024 07:00), Max: 97.9 (24 Jan 2024 07:00)  HR: 68 (24 Jan 2024 07:00) (63 - 69)  BP: 147/68 (24 Jan 2024 07:00) (143/64 - 159/70)  BP(mean): --  RR: 18 (24 Jan 2024 07:00) (18 - 20)  SpO2: --    LABS:                        8.0    10.45 )-----------( 238      ( 24 Jan 2024 06:17 )             26.8     01-24    137  |  106  |  23<H>  ----------------------------<  88  3.9   |  20  |  1.2    Ca    8.6      24 Jan 2024 06:17  Mg     2.4     01-24      PT/INR - ( 23 Jan 2024 07:14 )   PT: 11.90 sec;   INR: 1.04 ratio         PTT - ( 23 Jan 2024 07:14 )  PTT:30.9 sec  Urinalysis Basic - ( 24 Jan 2024 06:17 )    Color: x / Appearance: x / SG: x / pH: x  Gluc: 88 mg/dL / Ketone: x  / Bili: x / Urobili: x   Blood: x / Protein: x / Nitrite: x   Leuk Esterase: x / RBC: x / WBC x   Sq Epi: x / Non Sq Epi: x / Bacteria: x                RADIOLOGY:      PHYSICAL EXAM:  CONSTITUTIONAL: No acute distress, well-developed, well-groomed, AAOx3  HEAD: Atraumatic, normocephalic  EYES: EOM intact, PERRLA, conjunctiva and sclera clear  ENT: Supple, no masses, no thyromegaly, no bruits, no JVD; moist mucous membranes  PULMONARY: Clear to auscultation bilaterally; no wheezes, rales, or rhonchi  CARDIOVASCULAR: Regular rate and rhythm; no murmurs, rubs, or gallops  GASTROINTESTINAL: Soft, non-tender, non-distended; bowel sounds present  MUSCULOSKELETAL: 2+ peripheral pulses; no clubbing, no cyanosis, no edema  NEUROLOGY: non-focal  SKIN: No rashes or lesions; warm and dry    ASSESSMENT & PLAN  #79 y/o female with PMHx HTN, GERD, non-obstructive CAD, Dementia, anemia, MDD P/W Hb 7.4 OP, episodic vomiting for few months, 60lb weight loss. Found to have Hb 7.5 on admission.     #newly diagnosed adenocarcinoma sigmoid mass   #Microcytic anemia   - GI Consult --> EGD/Colonscopy done 1/12 - sigmoid mass found - biopsy taken  - 1/15 cardio consult appreciated -Patient is intermediate risk for intermediate risk procedure.   - CT chest - no mets  - CEA negative   -Heme/onc recs: IV Venofer QD  - hcp is currently sick and hospitalized at ; Collins ( son of hcp /nephew to patient) will help make decisions # [phone].   - palliative following  - 1/19 Collins and the patient spoke - now aware of diagnosis and is agreeable to surgery  -1/23: CLD in the AM, advanced diet in PM after informed of rescheduled surgery  Surgery: Initial Plan for OR Wednesday 1/24 rescheduled, now plan for OR Monday 1/29    #Vomiting likely 2/2 Type III hiatal hernia-resolved   - CT abdomen shows Large hiatal hernia containing partially included stomach with apparent organoaxial rotation.  - Currently no abdominal pain/nausea/vomiting  - tolerating diet and having BM's    #Suspected CKD III  - cr stable 1.1  - avoid nephrotoxics    #HTN - poorly controlled  - Lisinopril resumed 40mg PO  -Hydralazine 25mg po q8  -Continue current treatment    #HFpEF  - On lasix PO 40 BID at home  - TTE 10/1/20 --> EF 60-65%, mild concentric LVH, G2DD  - Holding lasix for now due to DAWIT and mild hypokalemia - may need to restart in am if cr stable     #Asthma  - Follows dr. Hartley who had recommended symbicort 160-4.5 2 puff BID and combivent prn but pt does not take it  -Continue to Monitor     #Depression  #Mild dementia??  - C/w home meds    #Misc  - Code Status: DNR/DNI    Dispo: Pending surgery 1/29

## 2024-01-24 NOTE — PROGRESS NOTE ADULT - SUBJECTIVE AND OBJECTIVE BOX
HPI: 78F with PMH of HTN, GERD, CAD, dementia, anemia, MDD here with anemia, s/p colonoscopy, and found to have adenocarcinoma. In discussions with family (sister Sonali), considering surgery but awaiting staging of the mass before further decisions. Also with vomiting, improving now. Course further complicated by DAWIT. Palliative care consulted for GOC.    INTERVAL EVENTS  1/19/24: patient comfortable  1/22/24: Patient comfortable, decided on DNR/DNI today    ADVANCE DIRECTIVES:    [ ] Full Code [x ] DNR  MOLST  [ ]  Living Will  [ ]   DECISION MAKER(s):  [ ] Health Care Proxy(s)  [ ] Surrogate(s)  [ ] Guardian           Name(s): Phone Number(s): sister Sonali [phone]; nephew Collins [phone]    BASELINE (I)ADL(s) (prior to admission):  Pleasants: [ ]Total  [ ] Moderate [ ]Dependent  Palliative Performance Status Version 2:         %    http://npcrc.org/files/news/palliative_performance_scale_ppsv2.pdf    Allergies    No Known Allergies    Intolerances    MEDICATIONS  (STANDING):  atorvastatin 40 milliGRAM(s) Oral at bedtime  bisacodyl 20 milliGRAM(s) Oral at bedtime  chlorhexidine 2% Cloths 1 Application(s) Topical <User Schedule>  enoxaparin Injectable 40 milliGRAM(s) SubCutaneous every 24 hours  hydrALAZINE 25 milliGRAM(s) Oral three times a day  iron sucrose IVPB 100 milliGRAM(s) IV Intermittent every 24 hours  lisinopril 40 milliGRAM(s) Oral daily  metoprolol succinate ER 50 milliGRAM(s) Oral daily  NIFEdipine XL 90 milliGRAM(s) Oral daily  pantoprazole    Tablet 40 milliGRAM(s) Oral before breakfast  sertraline 100 milliGRAM(s) Oral daily    MEDICATIONS  (PRN):  guaifenesin/dextromethorphan Oral Liquid 10 milliLiter(s) Oral every 6 hours PRN Cough          PRESENT SYMPTOMS: [ ]Unable to obtain due to poor mentation   Source if other than patient:  [ ]Family   [ ]Team     Pain: [ ]yes [X ]no  QOL impact -   Location -                    Aggravating factors -  Quality -  Radiation -  Timing-  Severity (0-10 scale):  Minimal acceptable level (0-10 scale):     CPOT:    https://[email or web address]/getattachment/[email or web address]/Critical-Care-Pain-Observation-Tool-(CPOT)    PAIN AD Score:   http://geriatrictoolkit.[email or web address]/painad.pdf (press ctrl +  left click to view)    Dyspnea:                           [ x]None[ ]Mild [ ]Moderate [ ]Severe     Respiratory Distress Observation Scale (RDOS):   A score of 0 to 2 signifies little or no respiratory distress, 3 signifies mild distress, scores 4 to 6 indicate moderate distress, and scores greater than 7 signify severe distress  [email or web address]://[email or web address]/sites/default/files/[email or web address]-kwqtpdvmrld-zcjpkzwg-nutwpnuaagm-crsrc.pdf    Anxiety:                             [ ]None[ ]Mild [ ]Moderate [ ]Severe   Fatigue:                             [ ]None[ ]Mild [ ]Moderate [ ]Severe   Nausea:                             [ ]None[ ]Mild [ ]Moderate [ ]Severe   Loss of appetite:              [ ]None[ ]Mild [ ]Moderate [ ]Severe   Constipation:                    [ ]None[ ]Mild [ ]Moderate [ ]Severe    Other Symptoms:  [ X]All other review of systems negative     Palliative Performance Status Version 2:         %    http://[email or web address].org/files/news/palliative_performance_scale_[email or web address].pdf  PHYSICAL EXAM:    Vital Signs Last 24 Hrs  T(C): 36.6 (24 Jan 2024 07:00), Max: 36.6 (24 Jan 2024 07:00)  T(F): 97.9 (24 Jan 2024 07:00), Max: 97.9 (24 Jan 2024 07:00)  HR: 68 (24 Jan 2024 07:00) (63 - 69)  BP: 147/68 (24 Jan 2024 07:00) (143/64 - 159/70)  BP(mean): --  RR: 18 (24 Jan 2024 07:00) (18 - 20)  SpO2: --            GENERAL:  [X ] No acute distress [ ]Lethargic  [ ]Unarousable  [ ]Verbal  [ ]Non-Verbal [ ]Cachexia    BEHAVIORAL/PSYCH:  [x ]Alert and Oriented  [ ] Anxiety [ ] Delirium [ ] Agitation [X ] Calm   EYES: [X ] No scleral icterus [ ] Scleral icterus [ ] Closed  ENMT:  [ ]Dry mouth  [ ]No external oral lesions [ X] No external ear or nose lesions  CARDIOVASCULAR:  [ ]Regular [ ]Irregular [ ]Tachy [x ]Not Tachy  [ ]Percy [ ] Edema [ ] No edema  PULMONARY:  [ ]Tachypnea  [ ]Audible excessive secretions [X ] No labored breathing [ ] labored breathing  GASTROINTESTINAL: [ ]Soft  [ ]Distended  [ X]Not distended [ ]Non tender [ ]Tender  MUSCULOSKELETAL: [ ]No clubbing [ ] clubbing  [ X] No cyanosis [ ] cyanosis  NEUROLOGIC: [ ]No focal deficits  [ X]Follows commands  [ ]Does not follow commands  [ ]Cognitive impairment  [ ]Dysphagia  [ ]Dysarthria  [ ]Paresis   SKIN: [ ] Jaundiced [X ] Non-jaundiced [ ]Rash [ ]No Rash [ ] Warm [ ] Dry  MISC/LINES: [ ] ET tube [ ] Trach [ ]NGT/OGT [ ]PEG [ ]Tapia    CRITICAL CARE:  [ ] Shock Present  [ ]Septic [ ]Cardiogenic [ ]Neurologic [ ]Hypovolemic  [ ]  Vasopressors [ ]  Inotropes   [ ]Respiratory failure present [ ]Mechanical ventilation [ ]Non-invasive ventilatory support [ ]High flow  [ ]Acute  [ ]Chronic [ ]Hypoxic  [ ]Hypercarbic [ ]Other  [ ]Other organ failure     LABS: reviewed by me                                       8.0    10.45 )-----------( 238      ( 24 Jan 2024 06:17 )             26.8     01-24    137  |  106  |  23<H>  ----------------------------<  88  3.9   |  20  |  1.2    Ca    8.6      24 Jan 2024 06:17  Mg     2.4     01-24      PT/INR - ( 23 Jan 2024 07:14 )   PT: 11.90 sec;   INR: 1.04 ratio         PTT - ( 23 Jan 2024 07:14 )  PTT:30.9 sec  Urinalysis Basic - ( 24 Jan 2024 06:17 )    Color: x / Appearance: x / SG: x / pH: x  Gluc: 88 mg/dL / Ketone: x  / Bili: x / Urobili: x   Blood: x / Protein: x / Nitrite: x   Leuk Esterase: x / RBC: x / WBC x   Sq Epi: x / Non Sq Epi: x / Bacteria: x                  RADIOLOGY & ADDITIONAL STUDIES: reviewed by me  CT chest 1/12/24  No evidence of intrathoracic neoplastic disease.    Interval gastrostomy tube placement, with upper abdominal   pneumoperitoneum, which could be seen post gastrostomy placement;   correlation with procedure history suggested.    Lingula atelectasis with areas of bronchiectasis/scarring, similar to   2018 CT, likely representing sequela of prior inflammation.    Age-indeterminate, likely subacute or chronic left medial clavicular   displaced fracture, new since 8/27/2023 chest x-ray.    Scattered patchy left upper lobe airspace opacities, which may be   inflammatory or infectious in etiology.    PROTEIN CALORIE MALNUTRITION PRESENT: [ ]mild [ ]moderate [ ]severe [ ]underweight [ ]morbid obesity  https://www.andeal.org/vault/2440/web/files/ONC/Table_Clinical%20Characteristics%20to%20Document%20Malnutrition-White%20JV%20et%20al%668321.pdf    Height (cm): 152.4 (01-12-24 @ 11:39)  Weight (kg): 60.8 (01-12-24 @ 11:39), 86.2 (08-27-23 @ 19:29)  BMI (kg/m2): 26.2 (01-12-24 @ 11:39)    [ ]PPSV2 < or = to 30% [ ]significant weight loss  [ ]poor nutritional intake  [ ]anasarca      [ ]Artificial Nutrition      Palliative Care Spiritual/Emotional Screening Tool Question  Severity (0-4):                    OR                    [X ] Unable to determine/NA  Score of 2 or greater indicates recommendation of Chaplaincy referral  Chaplaincy Referral: [ ] Yes [ ] Refused [ ] Following     Caregiver Kevil:  [ ] Yes [ ] No    OR    [x ] Unable to determine. Will assess at later time if appropriate.  Social Work Referral [ ]  Patient and Family Centered Care Referral [ ]    Anticipatory Grief Present: [ ] Yes [ ] No    OR     [ x] Unable to determine. Will assess at later time if appropriate.  Social Work Referral [ ]  Patient and Family Centered Care Referral [ ]    REFERRALS:   [ ]Chaplaincy  [ ]Hospice  [ ]Child Life  [ ]Social Work  [ ]Case management [ ]Holistic Therapy     Palliative care education provided to patient and/or family    Goals of Care Document:

## 2024-01-24 NOTE — PROGRESS NOTE ADULT - SUBJECTIVE AND OBJECTIVE BOX
Patient is a 78y old  Female who presents with a chief complaint of Anemia (24 Jan 2024 09:42)      Patient seen and examined at bedside.  Patient denies any chest pain or shortness of breath   ALLERGIES:  No Known Allergies    MEDICATIONS:  atorvastatin 40 milliGRAM(s) Oral at bedtime  bisacodyl 20 milliGRAM(s) Oral at bedtime  chlorhexidine 2% Cloths 1 Application(s) Topical <User Schedule>  enoxaparin Injectable 40 milliGRAM(s) SubCutaneous every 24 hours  guaifenesin/dextromethorphan Oral Liquid 10 milliLiter(s) Oral every 6 hours PRN  hydrALAZINE 25 milliGRAM(s) Oral three times a day  iron sucrose IVPB 100 milliGRAM(s) IV Intermittent every 24 hours  lisinopril 40 milliGRAM(s) Oral daily  metoprolol succinate ER 50 milliGRAM(s) Oral daily  NIFEdipine XL 90 milliGRAM(s) Oral daily  pantoprazole    Tablet 40 milliGRAM(s) Oral before breakfast  sertraline 100 milliGRAM(s) Oral daily    Vital Signs Last 24 Hrs  T(F): 97.9 (24 Jan 2024 07:00), Max: 97.9 (24 Jan 2024 07:00)  HR: 68 (24 Jan 2024 07:00) (63 - 69)  BP: 147/68 (24 Jan 2024 07:00) (143/64 - 159/70)  RR: 18 (24 Jan 2024 07:00) (18 - 20)  SpO2: --  I&O's Summary      PHYSICAL EXAM:  General: NAD, A/O x 3  ENT: MMM  Neck: Supple, No JVD  Lungs: Clear to auscultation bilaterally  Cardio: RRR, S1/S2, 2/6 systolic murmur   Abdomen: Soft, Nontender, Nondistended; Bowel sounds present  Extremities: No cyanosis, No edema    LABS:                        8.0    10.45 )-----------( 238      ( 24 Jan 2024 06:17 )             26.8     01-24    137  |  106  |  23  ----------------------------<  88  3.9   |  20  |  1.2    Ca    8.6      24 Jan 2024 06:17  Mg     2.4     01-24        PT/INR - ( 23 Jan 2024 07:14 )   PT: 11.90 sec;   INR: 1.04 ratio         PTT - ( 23 Jan 2024 07:14 )  PTT:30.9 sec        01-09 Chol 175 mg/dL LDL -- HDL 51 mg/dL Trig 167 mg/dL                  Urinalysis Basic - ( 24 Jan 2024 06:17 )    Color: x / Appearance: x / SG: x / pH: x  Gluc: 88 mg/dL / Ketone: x  / Bili: x / Urobili: x   Blood: x / Protein: x / Nitrite: x   Leuk Esterase: x / RBC: x / WBC x   Sq Epi: x / Non Sq Epi: x / Bacteria: x            RADIOLOGY & ADDITIONAL TESTS:    Care Discussed with Consultants/Other Providers:

## 2024-01-25 LAB
ALBUMIN SERPL ELPH-MCNC: 3.2 G/DL — LOW (ref 3.5–5.2)
ALLERGY+IMMUNOLOGY DIAG STUDY NOTE: SIGNIFICANT CHANGE UP
ALP SERPL-CCNC: 113 U/L — SIGNIFICANT CHANGE UP (ref 30–115)
ALT FLD-CCNC: 15 U/L — SIGNIFICANT CHANGE UP (ref 0–41)
ANION GAP SERPL CALC-SCNC: 11 MMOL/L — SIGNIFICANT CHANGE UP (ref 7–14)
ANION GAP SERPL CALC-SCNC: 12 MMOL/L — SIGNIFICANT CHANGE UP (ref 7–14)
ANION GAP SERPL CALC-SCNC: 13 MMOL/L — SIGNIFICANT CHANGE UP (ref 7–14)
APTT BLD: 31.3 SEC — SIGNIFICANT CHANGE UP (ref 27–39.2)
AST SERPL-CCNC: 19 U/L — SIGNIFICANT CHANGE UP (ref 0–41)
BASOPHILS # BLD AUTO: 0.05 K/UL — SIGNIFICANT CHANGE UP (ref 0–0.2)
BASOPHILS # BLD AUTO: 0.06 K/UL — SIGNIFICANT CHANGE UP (ref 0–0.2)
BASOPHILS NFR BLD AUTO: 0.4 % — SIGNIFICANT CHANGE UP (ref 0–1)
BASOPHILS NFR BLD AUTO: 0.6 % — SIGNIFICANT CHANGE UP (ref 0–1)
BILIRUB SERPL-MCNC: 0.3 MG/DL — SIGNIFICANT CHANGE UP (ref 0.2–1.2)
BLD GP AB SCN SERPL QL: SIGNIFICANT CHANGE UP
BUN SERPL-MCNC: 18 MG/DL — SIGNIFICANT CHANGE UP (ref 10–20)
BUN SERPL-MCNC: 22 MG/DL — HIGH (ref 10–20)
BUN SERPL-MCNC: 25 MG/DL — HIGH (ref 10–20)
CALCIUM SERPL-MCNC: 8.7 MG/DL — SIGNIFICANT CHANGE UP (ref 8.4–10.4)
CALCIUM SERPL-MCNC: 8.9 MG/DL — SIGNIFICANT CHANGE UP (ref 8.4–10.5)
CALCIUM SERPL-MCNC: 8.9 MG/DL — SIGNIFICANT CHANGE UP (ref 8.4–10.5)
CHLORIDE SERPL-SCNC: 101 MMOL/L — SIGNIFICANT CHANGE UP (ref 98–110)
CHLORIDE SERPL-SCNC: 105 MMOL/L — SIGNIFICANT CHANGE UP (ref 98–110)
CHLORIDE SERPL-SCNC: 107 MMOL/L — SIGNIFICANT CHANGE UP (ref 98–110)
CO2 SERPL-SCNC: 20 MMOL/L — SIGNIFICANT CHANGE UP (ref 17–32)
CO2 SERPL-SCNC: 22 MMOL/L — SIGNIFICANT CHANGE UP (ref 17–32)
CO2 SERPL-SCNC: 23 MMOL/L — SIGNIFICANT CHANGE UP (ref 17–32)
CREAT SERPL-MCNC: 1 MG/DL — SIGNIFICANT CHANGE UP (ref 0.7–1.5)
CREAT SERPL-MCNC: 1.1 MG/DL — SIGNIFICANT CHANGE UP (ref 0.7–1.5)
CREAT SERPL-MCNC: 1.3 MG/DL — SIGNIFICANT CHANGE UP (ref 0.7–1.5)
DIR ANTIGLOB POLYSPECIFIC INTERPRETATION: SIGNIFICANT CHANGE UP
EGFR: 42 ML/MIN/1.73M2 — LOW
EGFR: 51 ML/MIN/1.73M2 — LOW
EGFR: 57 ML/MIN/1.73M2 — LOW
EOSINOPHIL # BLD AUTO: 0 K/UL — SIGNIFICANT CHANGE UP (ref 0–0.7)
EOSINOPHIL # BLD AUTO: 0.41 K/UL — SIGNIFICANT CHANGE UP (ref 0–0.7)
EOSINOPHIL NFR BLD AUTO: 0 % — SIGNIFICANT CHANGE UP (ref 0–8)
EOSINOPHIL NFR BLD AUTO: 3.8 % — SIGNIFICANT CHANGE UP (ref 0–8)
GLUCOSE BLDC GLUCOMTR-MCNC: 101 MG/DL — HIGH (ref 70–99)
GLUCOSE SERPL-MCNC: 85 MG/DL — SIGNIFICANT CHANGE UP (ref 70–99)
GLUCOSE SERPL-MCNC: 91 MG/DL — SIGNIFICANT CHANGE UP (ref 70–99)
GLUCOSE SERPL-MCNC: 96 MG/DL — SIGNIFICANT CHANGE UP (ref 70–99)
HCT VFR BLD CALC: 27.1 % — LOW (ref 37–47)
HCT VFR BLD CALC: 30.7 % — LOW (ref 37–47)
HGB BLD-MCNC: 8 G/DL — LOW (ref 12–16)
HGB BLD-MCNC: 8.9 G/DL — LOW (ref 12–16)
IMM GRANULOCYTES NFR BLD AUTO: 0.9 % — HIGH (ref 0.1–0.3)
IMM GRANULOCYTES NFR BLD AUTO: 1 % — HIGH (ref 0.1–0.3)
INR BLD: 1.03 RATIO — SIGNIFICANT CHANGE UP (ref 0.65–1.3)
LYMPHOCYTES # BLD AUTO: 1.76 K/UL — SIGNIFICANT CHANGE UP (ref 1.2–3.4)
LYMPHOCYTES # BLD AUTO: 16.2 % — LOW (ref 20.5–51.1)
LYMPHOCYTES # BLD AUTO: 17.6 % — LOW (ref 20.5–51.1)
LYMPHOCYTES # BLD AUTO: 2.04 K/UL — SIGNIFICANT CHANGE UP (ref 1.2–3.4)
MAGNESIUM SERPL-MCNC: 1.8 MG/DL — SIGNIFICANT CHANGE UP (ref 1.8–2.4)
MAGNESIUM SERPL-MCNC: 1.9 MG/DL — SIGNIFICANT CHANGE UP (ref 1.8–2.4)
MCHC RBC-ENTMCNC: 21.9 PG — LOW (ref 27–31)
MCHC RBC-ENTMCNC: 22.1 PG — LOW (ref 27–31)
MCHC RBC-ENTMCNC: 29 G/DL — LOW (ref 32–37)
MCHC RBC-ENTMCNC: 29.5 G/DL — LOW (ref 32–37)
MCV RBC AUTO: 74.9 FL — LOW (ref 81–99)
MCV RBC AUTO: 75.4 FL — LOW (ref 81–99)
MONOCYTES # BLD AUTO: 0.87 K/UL — HIGH (ref 0.1–0.6)
MONOCYTES # BLD AUTO: 1.1 K/UL — HIGH (ref 0.1–0.6)
MONOCYTES NFR BLD AUTO: 8 % — SIGNIFICANT CHANGE UP (ref 1.7–9.3)
MONOCYTES NFR BLD AUTO: 9.5 % — HIGH (ref 1.7–9.3)
NEUTROPHILS # BLD AUTO: 7.66 K/UL — HIGH (ref 1.4–6.5)
NEUTROPHILS # BLD AUTO: 8.31 K/UL — HIGH (ref 1.4–6.5)
NEUTROPHILS NFR BLD AUTO: 70.5 % — SIGNIFICANT CHANGE UP (ref 42.2–75.2)
NEUTROPHILS NFR BLD AUTO: 71.5 % — SIGNIFICANT CHANGE UP (ref 42.2–75.2)
NRBC # BLD: 0 /100 WBCS — SIGNIFICANT CHANGE UP (ref 0–0)
NRBC # BLD: 0 /100 WBCS — SIGNIFICANT CHANGE UP (ref 0–0)
PHOSPHATE SERPL-MCNC: 2.9 MG/DL — SIGNIFICANT CHANGE UP (ref 2.1–4.9)
PLATELET # BLD AUTO: 259 K/UL — SIGNIFICANT CHANGE UP (ref 130–400)
PLATELET # BLD AUTO: 292 K/UL — SIGNIFICANT CHANGE UP (ref 130–400)
PMV BLD: 9.1 FL — SIGNIFICANT CHANGE UP (ref 7.4–10.4)
PMV BLD: 9.8 FL — SIGNIFICANT CHANGE UP (ref 7.4–10.4)
POTASSIUM SERPL-MCNC: 4 MMOL/L — SIGNIFICANT CHANGE UP (ref 3.5–5)
POTASSIUM SERPL-MCNC: 4.2 MMOL/L — SIGNIFICANT CHANGE UP (ref 3.5–5)
POTASSIUM SERPL-MCNC: 4.5 MMOL/L — SIGNIFICANT CHANGE UP (ref 3.5–5)
POTASSIUM SERPL-SCNC: 4 MMOL/L — SIGNIFICANT CHANGE UP (ref 3.5–5)
POTASSIUM SERPL-SCNC: 4.2 MMOL/L — SIGNIFICANT CHANGE UP (ref 3.5–5)
POTASSIUM SERPL-SCNC: 4.5 MMOL/L — SIGNIFICANT CHANGE UP (ref 3.5–5)
PROT SERPL-MCNC: 5.3 G/DL — LOW (ref 6–8)
PROTHROM AB SERPL-ACNC: 11.7 SEC — SIGNIFICANT CHANGE UP (ref 9.95–12.87)
RBC # BLD: 3.62 M/UL — LOW (ref 4.2–5.4)
RBC # BLD: 4.07 M/UL — LOW (ref 4.2–5.4)
RBC # FLD: 25.3 % — HIGH (ref 11.5–14.5)
RBC # FLD: 26.1 % — HIGH (ref 11.5–14.5)
SODIUM SERPL-SCNC: 136 MMOL/L — SIGNIFICANT CHANGE UP (ref 135–146)
SODIUM SERPL-SCNC: 139 MMOL/L — SIGNIFICANT CHANGE UP (ref 135–146)
SODIUM SERPL-SCNC: 139 MMOL/L — SIGNIFICANT CHANGE UP (ref 135–146)
WBC # BLD: 10.86 K/UL — HIGH (ref 4.8–10.8)
WBC # BLD: 11.62 K/UL — HIGH (ref 4.8–10.8)
WBC # FLD AUTO: 10.86 K/UL — HIGH (ref 4.8–10.8)
WBC # FLD AUTO: 11.62 K/UL — HIGH (ref 4.8–10.8)

## 2024-01-25 PROCEDURE — 99232 SBSQ HOSP IP/OBS MODERATE 35: CPT

## 2024-01-25 PROCEDURE — 86077 PHYS BLOOD BANK SERV XMATCH: CPT

## 2024-01-25 RX ORDER — ALBUMIN HUMAN 25 %
100 VIAL (ML) INTRAVENOUS ONCE
Refills: 0 | Status: DISCONTINUED | OUTPATIENT
Start: 2024-01-26 | End: 2024-01-26

## 2024-01-25 RX ORDER — METRONIDAZOLE 500 MG
1000 TABLET ORAL ONCE
Refills: 0 | Status: COMPLETED | OUTPATIENT
Start: 2024-01-25 | End: 2024-01-25

## 2024-01-25 RX ORDER — NEOMYCIN SULFATE 500 MG/1
1000 TABLET ORAL ONCE
Refills: 0 | Status: COMPLETED | OUTPATIENT
Start: 2024-01-25 | End: 2024-01-25

## 2024-01-25 RX ORDER — HEPARIN SODIUM 5000 [USP'U]/ML
5000 INJECTION INTRAVENOUS; SUBCUTANEOUS EVERY 8 HOURS
Refills: 0 | Status: DISCONTINUED | OUTPATIENT
Start: 2024-01-25 | End: 2024-01-26

## 2024-01-25 RX ORDER — SODIUM CHLORIDE 9 MG/ML
500 INJECTION, SOLUTION INTRAVENOUS ONCE
Refills: 0 | Status: COMPLETED | OUTPATIENT
Start: 2024-01-25 | End: 2024-01-25

## 2024-01-25 RX ORDER — ACETAMINOPHEN 500 MG
975 TABLET ORAL ONCE
Refills: 0 | Status: COMPLETED | OUTPATIENT
Start: 2024-01-26 | End: 2024-01-26

## 2024-01-25 RX ORDER — SODIUM CHLORIDE 9 MG/ML
1000 INJECTION, SOLUTION INTRAVENOUS
Refills: 0 | Status: DISCONTINUED | OUTPATIENT
Start: 2024-01-25 | End: 2024-01-26

## 2024-01-25 RX ORDER — SOD SULF/SODIUM/NAHCO3/KCL/PEG
4000 SOLUTION, RECONSTITUTED, ORAL ORAL ONCE
Refills: 0 | Status: COMPLETED | OUTPATIENT
Start: 2024-01-25 | End: 2024-01-25

## 2024-01-25 RX ORDER — GABAPENTIN 400 MG/1
300 CAPSULE ORAL ONCE
Refills: 0 | Status: COMPLETED | OUTPATIENT
Start: 2024-01-26 | End: 2024-01-26

## 2024-01-25 RX ADMIN — HEPARIN SODIUM 5000 UNIT(S): 5000 INJECTION INTRAVENOUS; SUBCUTANEOUS at 21:15

## 2024-01-25 RX ADMIN — SODIUM CHLORIDE 100 MILLILITER(S): 9 INJECTION, SOLUTION INTRAVENOUS at 11:00

## 2024-01-25 RX ADMIN — ATORVASTATIN CALCIUM 40 MILLIGRAM(S): 80 TABLET, FILM COATED ORAL at 21:17

## 2024-01-25 RX ADMIN — Medication 50 MILLIGRAM(S): at 05:11

## 2024-01-25 RX ADMIN — SODIUM CHLORIDE 500 MILLILITER(S): 9 INJECTION, SOLUTION INTRAVENOUS at 11:00

## 2024-01-25 RX ADMIN — SODIUM CHLORIDE 100 MILLILITER(S): 9 INJECTION, SOLUTION INTRAVENOUS at 06:54

## 2024-01-25 RX ADMIN — Medication 20 MILLIGRAM(S): at 21:17

## 2024-01-25 RX ADMIN — SODIUM CHLORIDE 100 MILLILITER(S): 9 INJECTION, SOLUTION INTRAVENOUS at 21:15

## 2024-01-25 RX ADMIN — CHLORHEXIDINE GLUCONATE 1 APPLICATION(S): 213 SOLUTION TOPICAL at 05:11

## 2024-01-25 RX ADMIN — NEOMYCIN SULFATE 1000 MILLIGRAM(S): 500 TABLET ORAL at 23:01

## 2024-01-25 RX ADMIN — LISINOPRIL 40 MILLIGRAM(S): 2.5 TABLET ORAL at 05:11

## 2024-01-25 RX ADMIN — Medication 4000 MILLILITER(S): at 06:58

## 2024-01-25 RX ADMIN — IRON SUCROSE 70 MILLIGRAM(S): 20 INJECTION, SOLUTION INTRAVENOUS at 17:07

## 2024-01-25 RX ADMIN — Medication 25 MILLIGRAM(S): at 05:11

## 2024-01-25 RX ADMIN — Medication 25 MILLIGRAM(S): at 16:56

## 2024-01-25 RX ADMIN — Medication 1000 MILLIGRAM(S): at 23:01

## 2024-01-25 RX ADMIN — PANTOPRAZOLE SODIUM 40 MILLIGRAM(S): 20 TABLET, DELAYED RELEASE ORAL at 05:11

## 2024-01-25 RX ADMIN — Medication 90 MILLIGRAM(S): at 05:11

## 2024-01-25 RX ADMIN — NEOMYCIN SULFATE 1000 MILLIGRAM(S): 500 TABLET ORAL at 16:56

## 2024-01-25 RX ADMIN — Medication 25 MILLIGRAM(S): at 21:16

## 2024-01-25 RX ADMIN — Medication 1000 MILLIGRAM(S): at 17:02

## 2024-01-25 RX ADMIN — Medication 63.75 MILLIMOLE(S): at 02:20

## 2024-01-25 NOTE — PROGRESS NOTE ADULT - ASSESSMENT
ASSESSMENT:  78-year-old female with past medical history of hiatal hernia s/p repair >10 years back, L emypema s/p decortication 2018, hypertension, GERD, non obstructive CAD, depression, dementia, anemia presenting for evaluation of low hemoglobin, measured outpatient to be 7.4. The pt presented to the Ed for a blood transfusion that was recommended by the hematologist.    PLAN:   - Plan for OR tomorrow 1/26 (laparoscopic sigmoid resection)  - F/U preop labs including CBC, BMP, Mg, Phos, PT, PTT, INR, active type and screen today  - Bowel prep today: clear liquid diet, 4L G-Lytely, 2pm 1G Flagyl PO, 1G Neomycin PO, 3PM 1G Flagyl PO, 1G Neomycin PO, 11PM 1G Flagyl PO, 1G Neomycin PO  - Cardiology: intermediate risk  - Heme/onc: IV Venofer QD  - CEA negative  - CT chest w/o mets    [phone]

## 2024-01-25 NOTE — PRE-ANESTHESIA EVALUATION ADULT - NSANTHADDINFOFT_GEN_ALL_CORE
-poor candidate for GETA  -needs 4 u pRBC on hold for OR  -needs albumin on hold for OR  -A-line  -possible CVC  -RSI d/t hiatal hernia  -plan for continued post-op mechanical ventilation d/t bronchiectasis and h/o empyema  -ACS Risk Calculator: Serious Complication (~31.3%), Mortality (~25.1%)  -POSSUM Risk Calculator: Morbidity (~90%), Mortality (~40.1%)  -DNR/DNI post-op

## 2024-01-25 NOTE — PROGRESS NOTE ADULT - PROBLEM SELECTOR PLAN 2
- supportive care  - nephew is acting as surrogate decision maker while patient's sister is in hospital

## 2024-01-25 NOTE — PROGRESS NOTE ADULT - ATTENDING COMMENTS
Patient transferred to our service. She has some lack of understanding of her medical condition although she does   know that she will need a surgical procedure. I explained it to her and discussed this with her nephew as well.   Bowel prep and clears today. May continue clears until 5am day of surgery.

## 2024-01-25 NOTE — PROGRESS NOTE ADULT - PROBLEM SELECTOR PLAN 4
- signing off, goals of care clear with pain well managed at this time
- will follow
- will follow
- will follow  ______________  Timothy Roque MD  Palliative Medicine  [facility]   of Geriatric and Palliative Medicine  [phone]
- will follow

## 2024-01-25 NOTE — PRE-OP CHECKLIST - SELECT TESTS ORDERED
BMP/CBC/PT/PTT/INR/POCT Blood Glucose
CBC/CMP/Results in MD note
CBC/CMP/PT/PTT/INR/Type and Cross/Type and Screen/EKG/CXR

## 2024-01-25 NOTE — PROGRESS NOTE ADULT - PROBLEM SELECTOR PLAN 1
- patient pending surgery 1/26  - f/u surgery recs  - monitor H/H
- patient now open to surgery, chart reviewed  - f/u surgery recs  - monitor H/H
- patient pending surgery 1/29  - f/u surgery recs  - monitor H/H
- patient pending surgery wednesday  - f/u surgery recs  - monitor H/H
- patient pending surgery wednesday  - f/u surgery recs  - monitor H/H

## 2024-01-25 NOTE — PROGRESS NOTE ADULT - PROBLEM/PLAN-3
DISPLAY PLAN FREE TEXT
Yes
DISPLAY PLAN FREE TEXT

## 2024-01-25 NOTE — PROGRESS NOTE ADULT - SUBJECTIVE AND OBJECTIVE BOX
HPI:  The patient is a 78-year-old female with past medical history of hiatal hernia s/p repair >10 years back, hypertension, GERD, non obstructive CAD, depression,   dementia, anemia presenting for evaluation of low hemoglobin, measured outpatient to be 7.4.  Patient has had multiple episodes of nonbloody nonbilious vomiting over the last several months   with good appetite and almost 60 pound weight loss over the last few months.      Pt was seen in our office for anemia at hb 7.5 and ferr at 36. 1/5/24  Pt refuses GI w/p and was recommended for transfusion   In course of hospital, pt was found to have colon mass.  Biopsy pending.  Surgery had seen pt .     In the ED the pt received 1 PRBC  Labs were significant for a Cr of 1.6 (unknown baseline)  and a lactate    Cat scan was significant for  Large hiatal hernia containing partially included stomach with apparent   organoaxial rotation          PAST MEDICAL & SURGICAL HISTORY:  Hypertension      GERD (gastroesophageal reflux disease)      Hiatal hernia with GERD      Empyema lung      H/O CHF      History of repair of hiatal hernia      H/O pleural empyema  s/p decortication          FAMILY HISTORY:  Family history of cardiac pacemaker  3 siblings    Family history of hypertrophic cardiomyopathy  sister    .  No cardiovascular or pulmonary family history     REVIEW OF SYSTEMS:    All ROS are negative exept per HPI       Allergies    No Known Allergies    Intolerances      PHYSICAL EXAM  Vital Signs Last 24 Hrs  vss    pt comfortable  family at bed side     CONSTITUTIONAL:  NAD    CARDIAC:   Normal rate,   regular rhythm.    no edema    RESPIRATORY:   basilar crackles    GASTROINTESTINAL:  Abdomen soft, non-tender,   No guarding,     MUSCULOSKELETAL:   Range of motion is not limited,  No clubbing, cyanosis    NEUROLOGICAL:   Alert and oriented           LABS:                          8.7    6.28  )-----------( 463      ( 11 Jan 2024 06:09 )             29.5     ferr 20                                          01-11    139  |  97<L>  |  19  ----------------------------<  89  3.4<L>   |  31  |  1.2    Ca    9.3      11 Jan 2024 06:09  Mg     2.2     01-11    TPro  6.0  /  Alb  3.5  /  TBili  0.4  /  DBili  x   /  AST  12  /  ALT  8   /  AlkPhos  101  01-11      PT/INR - ( 09 Jan 2024 23:22 )   PT: 12.40 sec;   INR: 1.09 ratio         PTT - ( 09 Jan 2024 23:22 )  PTT:26.5 sec                                       Urinalysis Basic - ( 11 Jan 2024 06:09 )      Alb: 3.5 g/dL / Pro: 6.0 g/dL / ALK PHOS: 101 U/L / ALT: 8 U/L / AST: 12 U/L / GGT: x                                                                                                MEDICATIONS  (STANDING):  ALPRAZolam 0.25 milliGRAM(s) Oral at bedtime  iron sucrose IVPB 200 milliGRAM(s) IV Intermittent once  lactated ringers. 1000 milliLiter(s) (75 mL/Hr) IV Continuous <Continuous>  metoprolol succinate ER 50 milliGRAM(s) Oral daily  NIFEdipine XL 60 milliGRAM(s) Oral daily  pantoprazole  Injectable 40 milliGRAM(s) IV Push two times a day  potassium chloride    Tablet ER 20 milliEquivalent(s) Oral every 2 hours  sertraline 100 milliGRAM(s) Oral daily    MEDICATIONS  (PRN):      X-Rays reviewed:    CXR interpreted by me:

## 2024-01-25 NOTE — PRE-ANESTHESIA EVALUATION ADULT - NSANTHPMHFT_GEN_ALL_CORE
PMHx: Bronchiectasis, Anemia, Depression, Dementia, DAWIT vs CKI, Colon Mass, Asthma  PSHx: s/p PEG, s/p lung decortication  S/p 1 u pRBC during this hospital admission

## 2024-01-25 NOTE — PROGRESS NOTE ADULT - PROBLEM SELECTOR PROBLEM 4
Encounter for palliative care

## 2024-01-25 NOTE — PROGRESS NOTE ADULT - ASSESSMENT
1- anemia sig hb 7 s/p transfusion>>> hb 8   w/p outpt showed PIPO ferr at 35.>> 20   s/p transfusion   pt only po fe ..no improvement in hb.. iv venofer qd     2. w/p as inpt showed colon mass, s/p biopsy c/w colon ca   CEA  2.7   pending colon ca  surgery ..delayed to Friday..    3. will follow

## 2024-01-25 NOTE — PROGRESS NOTE ADULT - SUBJECTIVE AND OBJECTIVE BOX
GENERAL SURGERY PROGRESS NOTE     DARIEN BARRERA  78y  Female  Hospital day :18d     OVERNIGHT EVENTS:  - No nausea or vomiting  - Passing gas and having bowel movement  - Tolerating diet  - Ambulating and voiding freely    T(F): 97.6 (01-24-24 @ 23:31), Max: 97.9 (01-24-24 @ 07:00)  HR: 67 (01-24-24 @ 23:31) (67 - 69)  BP: 138/63 (01-24-24 @ 23:31) (138/63 - 147/68)  RR: 18 (01-24-24 @ 23:31) (18 - 18)    DIET/FLUIDS: iron sucrose IVPB 100 milliGRAM(s) IV Intermittent every 24 hours  sodium phosphate 15 milliMole(s)/250 mL IVPB 15 milliMole(s) IV Intermittent once    GI proph:  pantoprazole    Tablet 40 milliGRAM(s) Oral before breakfast  AC/ proph: enoxaparin Injectable 40 milliGRAM(s) SubCutaneous every 24 hours    PHYSICAL EXAM:  GENERAL: NAD  CHEST/LUNG: Clear to auscultation bilaterally  HEART: Regular rate and rhythm  ABDOMEN: Soft, Nontender, Nondistended;       LABS  Labs:  CAPILLARY BLOOD GLUCOSE                              8.7    12.02 )-----------( 280      ( 24 Jan 2024 21:16 )             30.1       Auto Neutrophil %: 78.1 % (01-24-24 @ 21:16)  Auto Immature Granulocyte %: 1.1 % (01-24-24 @ 21:16)  Auto Neutrophil %: 77.0 % (01-24-24 @ 06:17)  Auto Immature Granulocyte %: 0.9 % (01-24-24 @ 06:17)    01-24    140  |  107  |  27<H>  ----------------------------<  94  4.4   |  21  |  1.6<H>      Calcium: 8.9 mg/dL (01-24-24 @ 21:16)      Coags:     12.40  ----< 1.09    ( 24 Jan 2024 21:16 )     40.4       Urinalysis Basic - ( 24 Jan 2024 21:16 )    Color: x / Appearance: x / SG: x / pH: x  Gluc: 94 mg/dL / Ketone: x  / Bili: x / Urobili: x   Blood: x / Protein: x / Nitrite: x   Leuk Esterase: x / RBC: x / WBC x   Sq Epi: x / Non Sq Epi: x / Bacteria: x      RADIOLOGY & ADDITIONAL TESTS:  No new imaging.

## 2024-01-25 NOTE — PROGRESS NOTE ADULT - PROBLEM SELECTOR PLAN 3
- would benefit from GOC discussion regarding code status, will plan to address next week
- Decided on DNR/DNI

## 2024-01-25 NOTE — PROGRESS NOTE ADULT - ASSESSMENT
78F with PMH of HTN, GERD, CAD, dementia, anemia, MDD here with anemia, s/p colonoscopy, and found to have adenocarcinoma. In discussions wiht family (sister Sonali), considering surgery but awaiting staging of the mass before further decisions. Also with vomiting, improving now. Course further complicated by DAWIT. Palliative care consulted for GOC.    Spoke to patient at bedside, denied pain or discomfort. Planned for procedure tomorrow next week 1/26.

## 2024-01-25 NOTE — PROGRESS NOTE ADULT - ASSESSMENT
1- anemia sig hb 7 s/p transfusion>>> hb 8   w/p outpt showed PIPO ferr at 35.>> 20   s/p transfusion   pt only po fe ..no improvement in hb.. iv venofer qd     2. w/p as inpt showed colon mass, s/p biopsy   path pending  send for CEA  pending colon ca  surgery ..delayed to Friday..    3. will follow        1- anemia sig hb 7 s/p transfusion>>> hb 8   w/p outpt showed PIPO ferr at 35.>> 20   s/p transfusion   pt only po fe ..no improvement in hb.. iv venofer qd     2. w/p as inpt showed colon mass, s/p biopsy c/w colon ca   CEA  2.7   pending colon ca  surgery ..delayed to Friday..    3. will follow

## 2024-01-25 NOTE — PROGRESS NOTE ADULT - SUBJECTIVE AND OBJECTIVE BOX
HPI: 78F with PMH of HTN, GERD, CAD, dementia, anemia, MDD here with anemia, s/p colonoscopy, and found to have adenocarcinoma. In discussions with family (sister Sonali), considering surgery but awaiting staging of the mass before further decisions. Also with vomiting, improving now. Course further complicated by DAWIT. Palliative care consulted for GOC.    INTERVAL EVENTS  1/19/24: patient comfortable  1/22/24: Patient comfortable, decided on DNR/DNI today  1/25/24: Patient comfortable, planned for OR tomorrow    ADVANCE DIRECTIVES:    [ ] Full Code [x ] DNR  MOLST  [ ]  Living Will  [ ]   DECISION MAKER(s):  [ ] Health Care Proxy(s)  [ ] Surrogate(s)  [ ] Guardian           Name(s): Phone Number(s): sister Sonali [phone]; nephew Collins [phone]    BASELINE (I)ADL(s) (prior to admission):  [place]: [ ]Total  [ ] Moderate [ ]Dependent  Palliative Performance Status Version 2:         %    http://npcrc.org/files/news/palliative_performance_scale_ppsv2.pdf    Allergies    No Known Allergies    Intolerances    MEDICATIONS  (STANDING):  atorvastatin 40 milliGRAM(s) Oral at bedtime  bisacodyl 20 milliGRAM(s) Oral at bedtime  chlorhexidine 2% Cloths 1 Application(s) Topical <User Schedule>  enoxaparin Injectable 40 milliGRAM(s) SubCutaneous every 24 hours  hydrALAZINE 25 milliGRAM(s) Oral three times a day  iron sucrose IVPB 100 milliGRAM(s) IV Intermittent every 24 hours  lactated ringers. 1000 milliLiter(s) (100 mL/Hr) IV Continuous <Continuous>  lisinopril 40 milliGRAM(s) Oral daily  metoprolol succinate ER 50 milliGRAM(s) Oral daily  metroNIDAZOLE    Tablet 1000 milliGRAM(s) Oral once  metroNIDAZOLE    Tablet 1000 milliGRAM(s) Oral once  metroNIDAZOLE    Tablet 1000 milliGRAM(s) Oral once  neomycin 1000 milliGRAM(s) Oral once  neomycin 1000 milliGRAM(s) Oral once  neomycin 1000 milliGRAM(s) Oral once  NIFEdipine XL 90 milliGRAM(s) Oral daily  pantoprazole    Tablet 40 milliGRAM(s) Oral before breakfast  sertraline 100 milliGRAM(s) Oral daily    MEDICATIONS  (PRN):  guaifenesin/dextromethorphan Oral Liquid 10 milliLiter(s) Oral every 6 hours PRN Cough            PRESENT SYMPTOMS: [ ]Unable to obtain due to poor mentation   Source if other than patient:  [ ]Family   [ ]Team     Pain: [ ]yes [X ]no  QOL impact -   Location -                    Aggravating factors -  Quality -  Radiation -  Timing-  Severity (0-10 scale):  Minimal acceptable level (0-10 scale):     CPOT:    https://www.[facility].org/getattachment/per77r13-9x2y-[email or web address]-[email or web address]/Critical-Care-Pain-Observation-Tool-(CPOT)    PAIN AD Score:   http://geriatrictoolkit.[facility]/cog/painad.pdf (press ctrl +  left click to view)    Dyspnea:                           [ x]None[ ]Mild [ ]Moderate [ ]Severe     Respiratory Distress Observation Scale (RDOS):   A score of 0 to 2 signifies little or no respiratory distress, 3 signifies mild distress, scores 4 to 6 indicate moderate distress, and scores greater than 7 signify severe distress  https://www.[facility].ca/sites/default/files/PDFS/617704-rykctmjaxwx-bpfyzvbt-kufoojakuoe-evxub.pdf    Anxiety:                             [ ]None[ ]Mild [ ]Moderate [ ]Severe   Fatigue:                             [ ]None[ ]Mild [ ]Moderate [ ]Severe   Nausea:                             [ ]None[ ]Mild [ ]Moderate [ ]Severe   Loss of appetite:              [ ]None[ ]Mild [ ]Moderate [ ]Severe   Constipation:                    [ ]None[ ]Mild [ ]Moderate [ ]Severe    Other Symptoms:  [ X]All other review of systems negative     Palliative Performance Status Version 2:         %    http://npcrc.org/files/news/palliative_performance_scale_ppsv2.pdf  PHYSICAL EXAM:    Vital Signs Last 24 Hrs  T(C): 36.4 (25 Jan 2024 07:20), Max: 36.6 (24 Jan 2024 16:37)  T(F): 97.5 (25 Jan 2024 07:20), Max: 97.8 (24 Jan 2024 16:37)  HR: 71 (25 Jan 2024 07:20) (67 - 74)  BP: 129/60 (25 Jan 2024 07:20) (129/60 - 144/64)  BP(mean): --  RR: 18 (24 Jan 2024 23:31) (18 - 18)  SpO2: --            GENERAL:  [X ] No acute distress [ ]Lethargic  [ ]Unarousable  [ ]Verbal  [ ]Non-Verbal [ ]Cachexia    BEHAVIORAL/PSYCH:  [x ]Alert and Oriented  [ ] Anxiety [ ] Delirium [ ] Agitation [X ] Calm   EYES: [X ] No scleral icterus [ ] Scleral icterus [ ] Closed  ENMT:  [ ]Dry mouth  [ ]No external oral lesions [ X] No external ear or nose lesions  CARDIOVASCULAR:  [ ]Regular [ ]Irregular [ ]Tachy [x ]Not Tachy  [ ]Percy [ ] Edema [ ] No edema  PULMONARY:  [ ]Tachypnea  [ ]Audible excessive secretions [X ] No labored breathing [ ] labored breathing  GASTROINTESTINAL: [ ]Soft  [ ]Distended  [ X]Not distended [ ]Non tender [ ]Tender  MUSCULOSKELETAL: [ ]No clubbing [ ] clubbing  [ X] No cyanosis [ ] cyanosis  NEUROLOGIC: [ ]No focal deficits  [ X]Follows commands  [ ]Does not follow commands  [ ]Cognitive impairment  [ ]Dysphagia  [ ]Dysarthria  [ ]Paresis   SKIN: [ ] Jaundiced [X ] Non-jaundiced [ ]Rash [ ]No Rash [ ] Warm [ ] Dry  MISC/LINES: [ ] ET tube [ ] Trach [ ]NGT/OGT [ ]PEG [ ]Tapia    CRITICAL CARE:  [ ] Shock Present  [ ]Septic [ ]Cardiogenic [ ]Neurologic [ ]Hypovolemic  [ ]  Vasopressors [ ]  Inotropes   [ ]Respiratory failure present [ ]Mechanical ventilation [ ]Non-invasive ventilatory support [ ]High flow  [ ]Acute  [ ]Chronic [ ]Hypoxic  [ ]Hypercarbic [ ]Other  [ ]Other organ failure     LABS: reviewed by me                                     8.0    10.86 )-----------( 259      ( 25 Jan 2024 05:51 )             27.1     01-25    139  |  105  |  22<H>  ----------------------------<  96  4.2   |  23  |  1.1    Ca    8.9      25 Jan 2024 12:07  Phos  2.7     01-24  Mg     1.9     01-25    TPro  5.3<L>  /  Alb  3.2<L>  /  TBili  0.3  /  DBili  x   /  AST  19  /  ALT  15  /  AlkPhos  113  01-25    PT/INR - ( 24 Jan 2024 21:16 )   PT: 12.40 sec;   INR: 1.09 ratio         PTT - ( 24 Jan 2024 21:16 )  PTT:40.4 sec  Urinalysis Basic - ( 25 Jan 2024 12:07 )    Color: x / Appearance: x / SG: x / pH: x  Gluc: 96 mg/dL / Ketone: x  / Bili: x / Urobili: x   Blood: x / Protein: x / Nitrite: x   Leuk Esterase: x / RBC: x / WBC x   Sq Epi: x / Non Sq Epi: x / Bacteria: x                RADIOLOGY & ADDITIONAL STUDIES: reviewed by me  CT chest 1/12/24  No evidence of intrathoracic neoplastic disease.    Interval gastrostomy tube placement, with upper abdominal   pneumoperitoneum, which could be seen post gastrostomy placement;   correlation with procedure history suggested.    Lingula atelectasis with areas of bronchiectasis/scarring, similar to   2018 CT, likely representing sequela of prior inflammation.    Age-indeterminate, likely subacute or chronic left medial clavicular   displaced fracture, new since 8/27/2023 chest x-ray.    Scattered patchy left upper lobe airspace opacities, which may be   inflammatory or infectious in etiology.    PROTEIN CALORIE MALNUTRITION PRESENT: [ ]mild [ ]moderate [ ]severe [ ]underweight [ ]morbid obesity  https://www.andeal.org/vault/2440/web/files/ONC/Table_Clinical%20Characteristics%20to%20Document%20Malnutrition-White%20JV%20et%20al%202012.pdf    Height (cm): 152.4 (01-12-24 @ 11:39)  Weight (kg): 60.8 (01-12-24 @ 11:39), 86.2 (08-27-23 @ 19:29)  BMI (kg/m2): 26.2 (01-12-24 @ 11:39)    [ ]PPSV2 < or = to 30% [ ]significant weight loss  [ ]poor nutritional intake  [ ]anasarca      [ ]Artificial Nutrition      Palliative Care Spiritual/Emotional Screening Tool Question  Severity (0-4):                    OR                    [X ] Unable to determine/NA  Score of 2 or greater indicates recommendation of Chaplaincy referral  Chaplaincy Referral: [ ] Yes [ ] Refused [ ] Following     Caregiver [place]:  [ ] Yes [ ] No    OR    [x ] Unable to determine. Will assess at later time if appropriate.  Social Work Referral [ ]  Patient and Family Centered Care Referral [ ]    Anticipatory Grief Present: [ ] Yes [ ] No    OR     [ x] Unable to determine. Will assess at later time if appropriate.  Social Work Referral [ ]  Patient and Family Centered Care Referral [ ]    REFERRALS:   [ ]Chaplaincy  [ ]Hospice  [ ]Child Life  [ ]Social Work  [ ]Case management [ ]Holistic Therapy     Palliative care education provided to patient and/or family    Goals of Care Document:

## 2024-01-25 NOTE — PRE-ANESTHESIA EVALUATION ADULT - NSRADCARDRESULTSFT_GEN_ALL_CORE
EKG:    Ventricular Rate 60 BPM    Atrial Rate 60 BPM    P-R Interval 164 ms    QRS Duration 78 ms    Q-T Interval 446 ms    QTC Calculation(Bazett) 446 ms    P Axis 29 degrees    R Axis -22 degrees    T Axis 44 degrees    Diagnosis Line Normal sinus rhythm  Possible Left atrial enlargement  Left ventricular hypertrophy  Nonspecific ST and T wave abnormality  Abnormal ECG    Confirmed by VLAD HARP, BENSON ([id number]) on 1/15/2024 11:08:58 PM    ECHO:    ACC: [id number] EXAM:  ECHO TTE WO CON COMP W DOPP                          PROCEDURE DATE:  01/15/2024          INTERPRETATION:   [place] [address]                Phone: [phone].   TRANSTHORACIC ECHOCARDIOGRAM REPORT        Patient Name:   DARIEN BARRERA Accession #: [id number]  Medical Rec #:  [id number]       Height:      60.0 in 152.4 cm  YOB: 1945       Weight:      134.0 lb 60.78 kg  Patient Age:    78 years        BSA:         1.57 m²  Patient Gender: F               BP:          137/64 mmHg      Date of Exam:        1/15/2024 3:55:51 PM  Referring Physician: Sabrina Montano  Sonographer:         Ashley Gillespie  Reading Physician:   Benson Hawkins MD.    Procedure:   2D Echo/Doppler/Color Doppler Complete.  Indications: I50.9 - Heart Failure, unspecified  Diagnosis:   I50.9 - Heart failure, unspecified        Summary:   1. Normal global left ventricular systolic function.   2. LV Ejection Fraction by Rubio's Method with a biplane EF of 61 %.   3. Spectral Doppler shows impaired relaxation pattern of left   ventricular myocardial filling (Grade I diastolic dysfunction).   4. Normal left atrial size.   5. Normal right atrial size.   6. Mild mitral valve regurgitation.   7. Mild tricuspid regurgitation.    CT-C:    ACC: [id number] EXAM:  CT CHEST IC   ORDERED BY: TATIANA REINOSO     PROCEDURE DATE:  01/12/2024          INTERPRETATION:  CLINICAL HISTORY / REASON FOR EXAM: Colonic mass..    TECHNIQUE: Multislice helical sections were obtained from the thoracic   inlet to the lung bases with 80 cc Omnipaque 350 intravenous contrast   administration. Coronal and sagittal images have been submitted for   evaluation. 3D (MIP) images obtained. 20 cc contrast discarded    COMPARISON: CT abdomen and pelvis 1/8/2024, CT chest 1/21/2018.. Chest   x-ray 8/27/2023    FINDINGS:    LUNGS, PLEURA, AIRWAYS: Lingula atelectasis with areas of   bronchiectasis/scarring, similar to 2018 CT, likely representing sequela   of prior inflammation. Bilateral subsegmental atelectasis or scarring.    No suspicious pulmonary nodule or mass identified. Trachea patent. No   pleural effusion or pneumothorax. Scattered patchy left upper lobe   airspace opacities, which may be inflammatory or infectious in etiology.    THORACIC NODES: No mediastinal, hilar, supraclavicular, or axillary   lymphadenopathy.    MEDIASTINUM/GREAT VESSELS: No pericardial effusion. Heart size is within   normal limits. The aorta and main pulmonary artery are of normal caliber.   Large hiatal hernia.    BONES/SOFT TISSUES: Age-indeterminate, likely subacute or chronic left   medial clavicular displaced fracture, new since 8/27/2023 chest x-ray.    VISUALIZED UPPER ABDOMEN: Interval gastrostomy tube placement, with upper   abdominal pneumoperitoneum, which could be seen post gastrostomy   placement; correlation with procedure history suggested. Unchanged   bilateral adrenal gland nodular thickening.      IMPRESSION:    No evidence of intrathoracic neoplastic disease.    Interval gastrostomy tube placement, with upper abdominal   pneumoperitoneum, which could be seen post gastrostomy placement;   correlation with procedure history suggested.    Lingula atelectasis with areas of bronchiectasis/scarring, similar to   2018 CT, likely representing sequela of prior inflammation.    Age-indeterminate, likely subacute or chronic left medial clavicular   displaced fracture, new since 8/27/2023 chest x-ray.    Scattered patchy left upper lobe airspace opacities, which may be   inflammatory or infectious in etiology.    --- End of Report ---

## 2024-01-26 ENCOUNTER — TRANSCRIPTION ENCOUNTER (OUTPATIENT)
Age: 79
End: 2024-01-26

## 2024-01-26 LAB
ALBUMIN SERPL ELPH-MCNC: 4 G/DL — SIGNIFICANT CHANGE UP (ref 3.5–5.2)
ALP SERPL-CCNC: 121 U/L — HIGH (ref 30–115)
ALT FLD-CCNC: 25 U/L — SIGNIFICANT CHANGE UP (ref 0–41)
ANION GAP SERPL CALC-SCNC: 13 MMOL/L — SIGNIFICANT CHANGE UP (ref 7–14)
APTT BLD: 30.8 SEC — SIGNIFICANT CHANGE UP (ref 27–39.2)
AST SERPL-CCNC: 49 U/L — HIGH (ref 0–41)
BASOPHILS # BLD AUTO: 0.03 K/UL — SIGNIFICANT CHANGE UP (ref 0–0.2)
BASOPHILS NFR BLD AUTO: 0.2 % — SIGNIFICANT CHANGE UP (ref 0–1)
BILIRUB DIRECT SERPL-MCNC: <0.2 MG/DL — SIGNIFICANT CHANGE UP (ref 0–0.3)
BILIRUB INDIRECT FLD-MCNC: SIGNIFICANT CHANGE UP MG/DL (ref 0.2–1.2)
BILIRUB SERPL-MCNC: <0.2 MG/DL — SIGNIFICANT CHANGE UP (ref 0.2–1.2)
BUN SERPL-MCNC: 14 MG/DL — SIGNIFICANT CHANGE UP (ref 10–20)
CALCIUM SERPL-MCNC: 8.6 MG/DL — SIGNIFICANT CHANGE UP (ref 8.4–10.5)
CHLORIDE SERPL-SCNC: 104 MMOL/L — SIGNIFICANT CHANGE UP (ref 98–110)
CK MB CFR SERPL CALC: 2.9 NG/ML — SIGNIFICANT CHANGE UP (ref 0.6–6.3)
CK SERPL-CCNC: 80 U/L — SIGNIFICANT CHANGE UP (ref 0–225)
CO2 SERPL-SCNC: 20 MMOL/L — SIGNIFICANT CHANGE UP (ref 17–32)
CREAT SERPL-MCNC: 1.1 MG/DL — SIGNIFICANT CHANGE UP (ref 0.7–1.5)
EGFR: 51 ML/MIN/1.73M2 — LOW
EOSINOPHIL # BLD AUTO: 0.04 K/UL — SIGNIFICANT CHANGE UP (ref 0–0.7)
EOSINOPHIL NFR BLD AUTO: 0.2 % — SIGNIFICANT CHANGE UP (ref 0–8)
GAS PNL BLDA: SIGNIFICANT CHANGE UP
GAS PNL BLDA: SIGNIFICANT CHANGE UP
GLUCOSE BLDC GLUCOMTR-MCNC: 118 MG/DL — HIGH (ref 70–99)
GLUCOSE BLDC GLUCOMTR-MCNC: 156 MG/DL — HIGH (ref 70–99)
GLUCOSE SERPL-MCNC: 130 MG/DL — HIGH (ref 70–99)
HCT VFR BLD CALC: 34.2 % — LOW (ref 37–47)
HGB BLD-MCNC: 10.4 G/DL — LOW (ref 12–16)
IMM GRANULOCYTES NFR BLD AUTO: 0.7 % — HIGH (ref 0.1–0.3)
INR BLD: 1.03 RATIO — SIGNIFICANT CHANGE UP (ref 0.65–1.3)
LYMPHOCYTES # BLD AUTO: 1.14 K/UL — LOW (ref 1.2–3.4)
LYMPHOCYTES # BLD AUTO: 6.3 % — LOW (ref 20.5–51.1)
MAGNESIUM SERPL-MCNC: 2 MG/DL — SIGNIFICANT CHANGE UP (ref 1.8–2.4)
MCHC RBC-ENTMCNC: 23.2 PG — LOW (ref 27–31)
MCHC RBC-ENTMCNC: 30.4 G/DL — LOW (ref 32–37)
MCV RBC AUTO: 76.3 FL — LOW (ref 81–99)
MONOCYTES # BLD AUTO: 0.24 K/UL — SIGNIFICANT CHANGE UP (ref 0.1–0.6)
MONOCYTES NFR BLD AUTO: 1.3 % — LOW (ref 1.7–9.3)
NEUTROPHILS # BLD AUTO: 16.61 K/UL — HIGH (ref 1.4–6.5)
NEUTROPHILS NFR BLD AUTO: 91.3 % — HIGH (ref 42.2–75.2)
NRBC # BLD: 0 /100 WBCS — SIGNIFICANT CHANGE UP (ref 0–0)
PHOSPHATE SERPL-MCNC: 4.4 MG/DL — SIGNIFICANT CHANGE UP (ref 2.1–4.9)
PLATELET # BLD AUTO: 285 K/UL — SIGNIFICANT CHANGE UP (ref 130–400)
PMV BLD: 9.1 FL — SIGNIFICANT CHANGE UP (ref 7.4–10.4)
POTASSIUM SERPL-MCNC: 4.4 MMOL/L — SIGNIFICANT CHANGE UP (ref 3.5–5)
POTASSIUM SERPL-SCNC: 4.4 MMOL/L — SIGNIFICANT CHANGE UP (ref 3.5–5)
PROT SERPL-MCNC: 6.2 G/DL — SIGNIFICANT CHANGE UP (ref 6–8)
PROTHROM AB SERPL-ACNC: 11.8 SEC — SIGNIFICANT CHANGE UP (ref 9.95–12.87)
RBC # BLD: 4.48 M/UL — SIGNIFICANT CHANGE UP (ref 4.2–5.4)
RBC # FLD: 24.6 % — HIGH (ref 11.5–14.5)
SODIUM SERPL-SCNC: 137 MMOL/L — SIGNIFICANT CHANGE UP (ref 135–146)
TROPONIN T, HIGH SENSITIVITY RESULT: 30 NG/L — HIGH (ref 6–13)
WBC # BLD: 18.19 K/UL — HIGH (ref 4.8–10.8)
WBC # FLD AUTO: 18.19 K/UL — HIGH (ref 4.8–10.8)

## 2024-01-26 PROCEDURE — 71045 X-RAY EXAM CHEST 1 VIEW: CPT | Mod: 26

## 2024-01-26 PROCEDURE — 99291 CRITICAL CARE FIRST HOUR: CPT

## 2024-01-26 PROCEDURE — S2900 ROBOTIC SURGICAL SYSTEM: CPT | Mod: NC

## 2024-01-26 PROCEDURE — 88305 TISSUE EXAM BY PATHOLOGIST: CPT | Mod: 26

## 2024-01-26 PROCEDURE — 43840 GSTRRPHY SUTR DUOL/GSTR ULCR: CPT

## 2024-01-26 PROCEDURE — 44204 LAPARO PARTIAL COLECTOMY: CPT | Mod: 53

## 2024-01-26 RX ORDER — HYDROMORPHONE HYDROCHLORIDE 2 MG/ML
0.5 INJECTION INTRAMUSCULAR; INTRAVENOUS; SUBCUTANEOUS
Refills: 0 | Status: DISCONTINUED | OUTPATIENT
Start: 2024-01-26 | End: 2024-01-26

## 2024-01-26 RX ORDER — FUROSEMIDE 40 MG
40 TABLET ORAL ONCE
Refills: 0 | Status: COMPLETED | OUTPATIENT
Start: 2024-01-26 | End: 2024-01-26

## 2024-01-26 RX ORDER — SODIUM CHLORIDE 9 MG/ML
1000 INJECTION, SOLUTION INTRAVENOUS
Refills: 0 | Status: DISCONTINUED | OUTPATIENT
Start: 2024-01-26 | End: 2024-01-27

## 2024-01-26 RX ORDER — GUAIFENESIN/DEXTROMETHORPHAN 600MG-30MG
10 TABLET, EXTENDED RELEASE 12 HR ORAL EVERY 6 HOURS
Refills: 0 | Status: DISCONTINUED | OUTPATIENT
Start: 2024-01-26 | End: 2024-01-26

## 2024-01-26 RX ORDER — NIFEDIPINE 30 MG
90 TABLET, EXTENDED RELEASE 24 HR ORAL DAILY
Refills: 0 | Status: DISCONTINUED | OUTPATIENT
Start: 2024-01-26 | End: 2024-01-26

## 2024-01-26 RX ORDER — ATORVASTATIN CALCIUM 80 MG/1
40 TABLET, FILM COATED ORAL AT BEDTIME
Refills: 0 | Status: DISCONTINUED | OUTPATIENT
Start: 2024-01-26 | End: 2024-01-26

## 2024-01-26 RX ORDER — ACETAMINOPHEN 500 MG
650 TABLET ORAL EVERY 6 HOURS
Refills: 0 | Status: DISCONTINUED | OUTPATIENT
Start: 2024-01-26 | End: 2024-01-26

## 2024-01-26 RX ORDER — CELECOXIB 200 MG/1
100 CAPSULE ORAL ONCE
Refills: 0 | Status: COMPLETED | OUTPATIENT
Start: 2024-01-26 | End: 2024-01-26

## 2024-01-26 RX ORDER — ACETAMINOPHEN 500 MG
1000 TABLET ORAL ONCE
Refills: 0 | Status: COMPLETED | OUTPATIENT
Start: 2024-01-26 | End: 2024-01-26

## 2024-01-26 RX ORDER — METOCLOPRAMIDE HCL 10 MG
10 TABLET ORAL ONCE
Refills: 0 | Status: DISCONTINUED | OUTPATIENT
Start: 2024-01-26 | End: 2024-01-26

## 2024-01-26 RX ORDER — ACETAMINOPHEN 500 MG
1000 TABLET ORAL ONCE
Refills: 0 | Status: DISCONTINUED | OUTPATIENT
Start: 2024-01-26 | End: 2024-01-26

## 2024-01-26 RX ORDER — LANOLIN ALCOHOL/MO/W.PET/CERES
5 CREAM (GRAM) TOPICAL AT BEDTIME
Refills: 0 | Status: DISCONTINUED | OUTPATIENT
Start: 2024-01-26 | End: 2024-02-01

## 2024-01-26 RX ORDER — MAGNESIUM SULFATE 500 MG/ML
1 VIAL (ML) INJECTION ONCE
Refills: 0 | Status: COMPLETED | OUTPATIENT
Start: 2024-01-26 | End: 2024-01-26

## 2024-01-26 RX ORDER — METOPROLOL TARTRATE 50 MG
5 TABLET ORAL EVERY 6 HOURS
Refills: 0 | Status: DISCONTINUED | OUTPATIENT
Start: 2024-01-26 | End: 2024-01-28

## 2024-01-26 RX ORDER — ONDANSETRON 8 MG/1
4 TABLET, FILM COATED ORAL ONCE
Refills: 0 | Status: DISCONTINUED | OUTPATIENT
Start: 2024-01-26 | End: 2024-01-26

## 2024-01-26 RX ORDER — SERTRALINE 25 MG/1
100 TABLET, FILM COATED ORAL DAILY
Refills: 0 | Status: DISCONTINUED | OUTPATIENT
Start: 2024-01-26 | End: 2024-01-26

## 2024-01-26 RX ORDER — PANTOPRAZOLE SODIUM 20 MG/1
40 TABLET, DELAYED RELEASE ORAL
Refills: 0 | Status: DISCONTINUED | OUTPATIENT
Start: 2024-01-26 | End: 2024-01-26

## 2024-01-26 RX ORDER — HEPARIN SODIUM 5000 [USP'U]/ML
5000 INJECTION INTRAVENOUS; SUBCUTANEOUS EVERY 8 HOURS
Refills: 0 | Status: DISCONTINUED | OUTPATIENT
Start: 2024-01-26 | End: 2024-02-01

## 2024-01-26 RX ORDER — METOPROLOL TARTRATE 50 MG
50 TABLET ORAL DAILY
Refills: 0 | Status: DISCONTINUED | OUTPATIENT
Start: 2024-01-26 | End: 2024-01-26

## 2024-01-26 RX ORDER — ACETAMINOPHEN 500 MG
650 TABLET ORAL EVERY 6 HOURS
Refills: 0 | Status: DISCONTINUED | OUTPATIENT
Start: 2024-01-26 | End: 2024-02-01

## 2024-01-26 RX ORDER — IPRATROPIUM/ALBUTEROL SULFATE 18-103MCG
3 AEROSOL WITH ADAPTER (GRAM) INHALATION ONCE
Refills: 0 | Status: COMPLETED | OUTPATIENT
Start: 2024-01-26 | End: 2024-01-26

## 2024-01-26 RX ORDER — LISINOPRIL 2.5 MG/1
40 TABLET ORAL DAILY
Refills: 0 | Status: DISCONTINUED | OUTPATIENT
Start: 2024-01-26 | End: 2024-01-26

## 2024-01-26 RX ORDER — HYDRALAZINE HCL 50 MG
25 TABLET ORAL THREE TIMES A DAY
Refills: 0 | Status: DISCONTINUED | OUTPATIENT
Start: 2024-01-26 | End: 2024-01-26

## 2024-01-26 RX ORDER — HYDRALAZINE HCL 50 MG
10 TABLET ORAL EVERY 6 HOURS
Refills: 0 | Status: DISCONTINUED | OUTPATIENT
Start: 2024-01-26 | End: 2024-01-27

## 2024-01-26 RX ORDER — ALBUTEROL 90 UG/1
2.5 AEROSOL, METERED ORAL ONCE
Refills: 0 | Status: COMPLETED | OUTPATIENT
Start: 2024-01-26 | End: 2024-01-27

## 2024-01-26 RX ORDER — PANTOPRAZOLE SODIUM 20 MG/1
40 TABLET, DELAYED RELEASE ORAL DAILY
Refills: 0 | Status: DISCONTINUED | OUTPATIENT
Start: 2024-01-27 | End: 2024-01-27

## 2024-01-26 RX ORDER — HYDROMORPHONE HYDROCHLORIDE 2 MG/ML
0.25 INJECTION INTRAMUSCULAR; INTRAVENOUS; SUBCUTANEOUS
Refills: 0 | Status: DISCONTINUED | OUTPATIENT
Start: 2024-01-26 | End: 2024-01-26

## 2024-01-26 RX ORDER — SODIUM CHLORIDE 9 MG/ML
1000 INJECTION, SOLUTION INTRAVENOUS
Refills: 0 | Status: DISCONTINUED | OUTPATIENT
Start: 2024-01-26 | End: 2024-01-26

## 2024-01-26 RX ORDER — ALBUTEROL 90 UG/1
2.5 AEROSOL, METERED ORAL ONCE
Refills: 0 | Status: DISCONTINUED | OUTPATIENT
Start: 2024-01-26 | End: 2024-01-26

## 2024-01-26 RX ADMIN — Medication 40 MILLIGRAM(S): at 16:30

## 2024-01-26 RX ADMIN — Medication 50 MILLIGRAM(S): at 05:09

## 2024-01-26 RX ADMIN — HEPARIN SODIUM 5000 UNIT(S): 5000 INJECTION INTRAVENOUS; SUBCUTANEOUS at 22:00

## 2024-01-26 RX ADMIN — Medication 975 MILLIGRAM(S): at 05:08

## 2024-01-26 RX ADMIN — SODIUM CHLORIDE 50 MILLILITER(S): 9 INJECTION, SOLUTION INTRAVENOUS at 20:21

## 2024-01-26 RX ADMIN — Medication 25 MILLIGRAM(S): at 05:09

## 2024-01-26 RX ADMIN — Medication 1000 MILLIGRAM(S): at 06:54

## 2024-01-26 RX ADMIN — HEPARIN SODIUM 5000 UNIT(S): 5000 INJECTION INTRAVENOUS; SUBCUTANEOUS at 14:47

## 2024-01-26 RX ADMIN — Medication 1000 MILLIGRAM(S): at 20:30

## 2024-01-26 RX ADMIN — GABAPENTIN 300 MILLIGRAM(S): 400 CAPSULE ORAL at 05:11

## 2024-01-26 RX ADMIN — Medication 3 MILLILITER(S): at 16:50

## 2024-01-26 RX ADMIN — Medication 400 MILLIGRAM(S): at 20:20

## 2024-01-26 RX ADMIN — LISINOPRIL 40 MILLIGRAM(S): 2.5 TABLET ORAL at 05:09

## 2024-01-26 RX ADMIN — HEPARIN SODIUM 5000 UNIT(S): 5000 INJECTION INTRAVENOUS; SUBCUTANEOUS at 05:12

## 2024-01-26 RX ADMIN — CELECOXIB 100 MILLIGRAM(S): 200 CAPSULE ORAL at 06:54

## 2024-01-26 RX ADMIN — PANTOPRAZOLE SODIUM 40 MILLIGRAM(S): 20 TABLET, DELAYED RELEASE ORAL at 05:11

## 2024-01-26 RX ADMIN — Medication 90 MILLIGRAM(S): at 05:08

## 2024-01-26 RX ADMIN — CHLORHEXIDINE GLUCONATE 1 APPLICATION(S): 213 SOLUTION TOPICAL at 05:05

## 2024-01-26 RX ADMIN — Medication 100 GRAM(S): at 05:05

## 2024-01-26 NOTE — CONSULT NOTE ADULT - ATTENDING COMMENTS
This patient has a high probability of sudden, clinically significant deterioration, which requires the highest level of physician preparedness to intervene urgently. I managed/supervised life or organ supporting interventions that required frequent physician assessment. I devoted my full attention in the ICU to the direct care of this patient for the period of time indicated below. Time I spent with the family or surrogate(s) is included only if the patient was incapable of providing the necessary information or participating in medical decision making. Time devoted to teaching and to any procedures I billed separately is not included.     Patient is examined and evaluated at the bedside with SICU team. Treatment plan discussed with SICU team, nurses and primary team.   Vital signs, laboratory work, and imaging reviewed during rounds.  Will continue hemodynamic monitoring as per protocol in SICU.    Neuro: lethargic but arousable  Medications -  IV tylenol for acute pain    Respiratory: respirations even and unlabored. currently on BiPAP 12/5 for respiratory acidosis, trend ABG    ABG - ( 26 Jan 2024 20:15 )  pH, Arterial: 7.30  pH, Blood: x     /  pCO2: 46    /  pO2: 58    / HCO3: 23    / Base Excess: -3.8  /  SaO2: 90.3    Medications - symbicort  CXR reviewed and interpreted by me - left-sided atelectasis with effusion    CV: monitor hemodynamics ***, MAP goal > 65  Medications - IV metoprolol for HFpEF, lasix given to improve respiratory mechanics and for HFpEF, lisinopril/nifedipine for hx of hypertension  Home medications -   Studies - Echo 1/24: EF 61%, mild MR/TR, G1DD    GI: abd soft, not distended, dressings C/D/I   Nutrition - NPO, aspiration precautions especially while on BiPAP  Medications - Protonix, senna/miralax when able  Ppx - Protonix (home med)      Renal: Continue I&Os monitoring, replete electrolytes as needed  01-26    137  |  104  |  14  ----------------------------<  130<H>  4.4   |  20  |  1.1    Ca    8.6      26 Jan 2024 16:12  Phos  4.4     01-26  Mg     2.0     01-26    TPro  6.2  /  Alb  4.0  /  TBili  <0.2  /  DBili  <0.2  /  AST  49<H>  /  ALT  25  /  AlkPhos  121<H>  01-26  Tapia four hourly output in critically ill patient    Heme: continue to evaluate for acute blood loss anemia- trend Hg/Hct                         10.4   18.19 )-----------( 285      ( 26 Jan 2024 16:12 )             34.2     Anticoagulation - SQH    ID: trend leukocytosis (likely post-surgical), monitor for fevers    Endocrine: Prevent and treat hyperglycemia with insulin as needed    PV: follow pulse exam    MSK: OOB and mobilize as able, PT eval    Skin: decub precautions    Lines: PIV, Tapia, L radial arterial line  DVT Prophylaxis: SQH  Stress Gastritis Prophylaxis: Protonix   Disposition: SICU - at high risk for respiratory distress/compromise    I saw and evaluated the patient personally. I have reviewed and agree with note above. Treatment plan discussed with SICU team, nurses and primary team at the time of the multidisciplinary rounds.     Michael Reyna MD  Trauma/ACS/SCC Attending This patient has a high probability of sudden, clinically significant deterioration, which requires the highest level of physician preparedness to intervene urgently. I managed/supervised life or organ supporting interventions that required frequent physician assessment. I devoted my full attention in the ICU to the direct care of this patient for the period of time indicated below. Time I spent with the family or surrogate(s) is included only if the patient was incapable of providing the necessary information or participating in medical decision making. Time devoted to teaching and to any procedures I billed separately is not included.     Patient is examined and evaluated at the bedside with SICU team. Treatment plan discussed with SICU team, nurses and primary team.   Vital signs, laboratory work, and imaging reviewed during rounds.  Will continue hemodynamic monitoring as per protocol in SICU.    Neuro: lethargic but arousable  Medications -  IV tylenol for acute pain    Respiratory: respirations even and unlabored. currently on BiPAP 12/5 for respiratory acidosis, trend ABG    ABG - ( 26 Jan 2024 20:15 )  pH, Arterial: 7.30  pH, Blood: x     /  pCO2: 46    /  pO2: 58    / HCO3: 23    / Base Excess: -3.8  /  SaO2: 90.3    Medications - symbicort  CXR reviewed and interpreted by me - left-sided atelectasis with effusion    CV: monitor hemodynamics, MAP goal > 65  Medications - IV metoprolol for HFpEF, lasix given to improve respiratory mechanics and for HFpEF, lisinopril/nifedipine for hx of hypertension  Studies - Echo 1/24: EF 61%, mild MR/TR, G1DD    GI: abd soft, not distended, dressings C/D/I   Nutrition - NPO, aspiration precautions especially while on BiPAP  Medications - Protonix, senna/miralax when able  Ppx - Protonix (home med)      Renal: Continue I&Os monitoring, replete electrolytes as needed  01-26    137  |  104  |  14  ----------------------------<  130<H>  4.4   |  20  |  1.1    Ca    8.6      26 Jan 2024 16:12  Phos  4.4     01-26  Mg     2.0     01-26    TPro  6.2  /  Alb  4.0  /  TBili  <0.2  /  DBili  <0.2  /  AST  49<H>  /  ALT  25  /  AlkPhos  121<H>  01-26  Tapia four hourly output in critically ill patient    Heme: continue to evaluate for acute blood loss anemia- trend Hg/Hct                         10.4   18.19 )-----------( 285      ( 26 Jan 2024 16:12 )             34.2     Anticoagulation - SQH    ID: trend leukocytosis (likely post-surgical), monitor for fevers    Endocrine: Prevent and treat hyperglycemia with insulin as needed    PV: follow pulse exam    MSK: OOB and mobilize as able, PT eval    Skin: decub precautions    Lines: PIV, Tapia, L radial arterial line  DVT Prophylaxis: SQH  Stress Gastritis Prophylaxis: Protonix   Disposition: SICU - at high risk for respiratory distress/compromise    I saw and evaluated the patient personally. I have reviewed and agree with note above. Treatment plan discussed with SICU team, nurses and primary team at the time of the multidisciplinary rounds.     Michael Reyna MD  Trauma/ACS/SCC Attending

## 2024-01-26 NOTE — CONSULT NOTE ADULT - ASSESSMENT
78y Female w/ pmhx of acute respiratory distress secondary to respiratory acidosis requiring NIV    NEUROLOGICAL:  #Acute pain    -IV Tylenol    -caution of opioids  #hx of axiety    -alprazolam 0.25mg q12    -sertraline 100mg qd    RESPIRATORY: DNI  #respiratory acidosis    -BiPAP  #Activity    -increase as tolerated  #  Symbicort 160 mcg-4.5 mcg/inh inhalation aerosol: 2 puff(s) inhaled 2 times a day    CARDS:   #  Imaging:   Labs:   furosemide 40 mg oral tablet: 1 tab(s) orally 2 times a day  lisinopril 40 mg oral tablet: 1 tab(s) orally once a day  Metoprolol Succinate ER 50 mg oral tablet, extended release: 1 tab(s) orally 2 times a day  Nifedical XL 60 mg oral tablet, extended release: 1 tab(s) orally once a day      GASTROINTESTINAL/NUTRITION:   #Diet, Clear Liquid    Diet, Clear Liquid [Active]         -if NG tube in place, document nare length and order q4 adherance    -aspiration precautions, HOB 30  #GI Prophylaxis    pantoprazole    Tablet 40 Oral before breakfast      -not indicated  #Bowel regimen    -senna/miralax if indicated    -last bowel movement      /RENAL:   #urine output in critically ill    -indwelling hutchison (placed     Labs          Electrolytes-Na 137 // K 4.4 // Mg 2.0 //  Phos 4.4 (01-26 @ 16:12)         HEME/ONC:   #DVT prophylaxis     -Chemical: heparin   Injectable 5000 Unit(s) SubCutaneous every 8 hours     -Mechanical: SCDs    -if DVT prophylaxis to be held, document and place VTE order        Labs: Hb/Hct:  8.9/30.7  (01-25 @ 20:20)  -->,  10.4/34.2  (01-26 @ 16:12)  -->                      Plts:  292  -->,  285  -->                 PTT/INR:  31.3/1.03  --->,  30.8/1.03  --->          T&S Expires:   Blood Consent-obtain if acute anemia, q6 CBC    ID:  #  #Antibiotics Course  #DTI  WBC- 10.86  --->>,  11.62  --->>,  18.19  --->>  Temp trend- 24hrs T(F): 98 (01-26 @ 15:30), Max: 98 (01-26 @ 07:00)  Current antibiotics-  Cultures:     ENDOCRINE:    -FSG q6 if NPO or Tube feeds    -Glucose goal 140-180. if above 180 start ISS    MSK:     Activity - Ambulate as Tolerated:     Time/Priority:  Routine (01-26-24 @ 11:08)  Activity - Ambulate as Tolerated:     Time/Priority:  Routine (01-26-24 @ 11:02)      LINES/DRAINS:  PIV, Hutchison    ADVANCED DIRECTIVES:  Full Code    HCP/Emergency Contact-    INDICATION FOR SICU/SDU: Weakness             DISPO:   Case discussed with attending   78y Female w/ pmhx of acute respiratory distress secondary to respiratory acidosis requiring NIV    NEUROLOGICAL:  #Acute pain    -IV Tylenol    -caution of opioids  #hx of anxiety    -alprazolam 0.25mg q12 HOLDING while on BiPAP/somolent    -sertraline 100mg qd HOLDING while on BiPAP/somolent    RESPIRATORY: DNI w/ trial of non invasive  #respiratory acidosis    -BiPAP 12/5, trend ABG    -symbicort 160-4.5mcg/inhalation 2x q12  #Activity    -increase as tolerated      CARDS: *DNR*  #hx of HFpEF     -Metoprolol ER 50mg ->  5mg q6 while on BiPAP    -Lasix 20mg IV (40mg PO at home)    - Echo 01/24: EF 61%, G1DD, mild MR, mild TR  #hx of HTN    -Lisinopril 40mg daily    -Nifedipine XL 60mg daily    GASTROINTESTINAL/NUTRITION:   #s/p attempted robotic sigmoid resection, aborted secondary to carcinoma implants  #s/p g-tube removed, gastropexy completed    -NPO x/rx once off BiPAP    -aspiration precautions, HOB 30  #hx of recurrent hiatal hernias  #GI Prophylaxis    pantoprazole IV (po at home)     -not indicated  #Bowel regimen    -senna/miralax if indicated    -last bowel movement      /RENAL:   #urine output in critically ill    -indwelling hutchison (placed     Labs          Electrolytes-Na 137 // K 4.4 // Mg 2.0 //  Phos 4.4 (01-26 @ 16:12)         HEME/ONC:   #DVT prophylaxis     -Chemical: heparin   Injectable 5000 Unit(s) SubCutaneous every 8 hours     -Mechanical: SCDs    -if DVT prophylaxis to be held, document and place VTE order        Labs: Hb/Hct:  8.9/30.7  (01-25 @ 20:20)  -->,  10.4/34.2  (01-26 @ 16:12)  -->                      Plts:  292  -->,  285  -->                 PTT/INR:  31.3/1.03  --->,  30.8/1.03  --->          T&S Expires:   Blood Consent-obtain if acute anemia, q6 CBC    ID:  #  #Antibiotics Course  #DTI  WBC- 10.86  --->>,  11.62  --->>,  18.19  --->>  Temp trend- 24hrs T(F): 98 (01-26 @ 15:30), Max: 98 (01-26 @ 07:00)  Current antibiotics-  Cultures:     ENDOCRINE:    -FSG q6 if NPO or Tube feeds    -Glucose goal 140-180. if above 180 start ISS    MSK:     Activity - Ambulate as Tolerated:     Time/Priority:  Routine (01-26-24 @ 11:08)  Activity - Ambulate as Tolerated:     Time/Priority:  Routine (01-26-24 @ 11:02)      LINES/DRAINS:  PIV, Hutchison    ADVANCED DIRECTIVES:  Full Code    HCP/Emergency Contact-    INDICATION FOR SICU/SDU: Weakness             DISPO:   Case discussed with attending   78y Female w/ pmhx of acute respiratory distress secondary to respiratory acidosis requiring NIV    NEUROLOGICAL:  #Acute pain    -IV Tylenol    -caution of opioids  #hx of anxiety    -alprazolam 0.25mg q12 HOLDING while on BiPAP/somolent    -sertraline 100mg qd HOLDING while on BiPAP/somolent    RESPIRATORY: DNI w/ trial of non invasive  #respiratory acidosis    -BiPAP 12/5, trend ABG    -symbicort 160-4.5mcg/inhalation 2x q12  #Activity    -increase as tolerated      CARDS: *DNR*  #hx of HFpEF     -Metoprolol ER 50mg ->  5mg q6 while on BiPAP    -Lasix 20mg IV (40mg PO at home)    - Echo 01/24: EF 61%, G1DD, mild MR, mild TR  #hx of HTN    -Lisinopril 40mg daily    -Nifedipine XL 60mg daily    GASTROINTESTINAL/NUTRITION:   #adenocarcinoma of sigmoid mass  #s/p attempted robotic sigmoid resection, aborted secondary to carcinoma implants 1/26  #s/p g-tube removed, gastropexy completed 1/26  #s/p colonoscopy w/ biopsy 1/12    -NPO x/rx once off BiPAP    -aspiration precautions, HOB 30  #hx of recurrent hiatal hernias  #GI Prophylaxis    pantoprazole IV (po at home)     -not indicated  #Bowel regimen    -senna/miralax if indicated    -last bowel movement      /RENAL:   #urine output in critically ill    -indwelling hutchison (placed 1/26)    Labs          Electrolytes-Na 137 // K 4.4 // Mg 2.0 //  Phos 4.4 (01-26 @ 16:12)         HEME/ONC:   #acute on chronic anemia   -1u PRBC during OR    -T&S Expires 1/27  #sigmoid mass adenocarcinoma  #DVT prophylaxis     -Chemical: heparin   Injectable 5000 Unit(s) SubCutaneous every 8 hours due to DAWIT     -Mechanical: SCDs    -if DVT prophylaxis to be held, document and place VTE order        Labs: Hb/Hct:  8.9/30.7  (01-25 @ 20:20)  -->,  10.4/34.2  (01-26 @ 16:12)  -->                      Plts:  292  -->,  285  -->                 PTT/INR:  31.3/1.03  --->,  30.8/1.03  --->       ID:  #none  #Antibiotics Course  #DTI none  WBC- 10.86  --->>,  11.62  --->>,  18.19  --->>  Temp trend- 24hrs T(F): 98 (01-26 @ 15:30), Max: 98 (01-26 @ 07:00)    ENDOCRINE:    -FSG q6 if NPO or Tube feeds    -Glucose goal 140-180. if above 180 start ISS    MSK:     Activity - Ambulate as Tolerated:     LINES/DRAINS:  PIV, Hutchison, Left radial arterial line (1/26)    ADVANCED DIRECTIVES:  Full Code  Sister Sonali (naveen) [phone] (HCP)  Nephew Collins [phone]    INDICATION FOR SICU: Respiratory distress requiring NIV    DISPO:   Case discussed with attending Dr. Reyna

## 2024-01-26 NOTE — CHART NOTE - NSCHARTNOTEFT_GEN_A_CORE
Post Operative Note  Patient: DARIEN BARRERA 78y (1945) Female   MRN: [id number]  Location: [facility] PACU 003 A  Visit: 01-08-24 Inpatient  Date: 01-26-24 @ 16:02    Procedure: Metastatic colon cancer to liver S/P Diagnostic laparoscopy, Robot-assisted gastropexy    Subjective: Patient lethargic, difficult to arouse, but spontaneously opens eyes and follows commands  Nausea: no, Vomiting: no, Ambulating: no    Objective:  Vitals: T(F): 97.8 (01-26-24 @ 12:00), Max: 98 (01-26-24 @ 07:00)  HR: 69 (01-26-24 @ 14:30)  BP: 146/72 (01-26-24 @ 14:30) (124/60 - 146/72)  RR: 17 (01-26-24 @ 14:30)  SpO2: 97% (01-26-24 @ 14:30)  Vent Settings:     In:   IV Fluids: albumin human 25% IVPB 100 milliLiter(s) IV Intermittent once  lactated ringers. 1000 milliLiter(s) (50 mL/Hr) IV Continuous <Continuous>    Out:   EBL: 100  Voided Urine:   Hutchison Catheter: no     Physical Examination:  General Appearance: lethargic, spontaneously opens eyes and follows commands  HEENT: EOMI, sclera non-icteric.  Heart: RRR  Lungs: saturating well on BIPAP, chest rise equal b/l  Abdomen:  Soft, mildly tender, nondistended. No rigidity, guarding, or rebound tenderness.   MSK/Extremities: Warm & well-perfused. Peripheral pulses intact.  Skin: Warm, dry. No jaundice.   Incisions/Wounds: Dressings in place, clean, dry and intact, no signs of infection/active bleeding/drainage    Medications: [Standing]  acetaminophen     Tablet .. 650 milliGRAM(s) Oral every 6 hours  acetaminophen   IVPB .. 1000 milliGRAM(s) IV Intermittent once  albumin human 25% IVPB 100 milliLiter(s) IV Intermittent once  albuterol    0.083%. 2.5 milliGRAM(s) Nebulizer once PRN  atorvastatin 40 milliGRAM(s) Oral at bedtime  guaifenesin/dextromethorphan Oral Liquid 10 milliLiter(s) Oral every 6 hours PRN  heparin   Injectable 5000 Unit(s) SubCutaneous every 8 hours  hydrALAZINE 25 milliGRAM(s) Oral three times a day  HYDROmorphone  Injectable 0.5 milliGRAM(s) IV Push every 30 minutes PRN  HYDROmorphone  Injectable 0.25 milliGRAM(s) IV Push every 30 minutes PRN  lactated ringers. 1000 milliLiter(s) IV Continuous <Continuous>  lisinopril 40 milliGRAM(s) Oral daily  metoclopramide Injectable 10 milliGRAM(s) IV Push once PRN  metoprolol succinate ER 50 milliGRAM(s) Oral daily  NIFEdipine XL 90 milliGRAM(s) Oral daily  ondansetron Injectable 4 milliGRAM(s) IV Push once PRN  pantoprazole    Tablet 40 milliGRAM(s) Oral before breakfast  sertraline 100 milliGRAM(s) Oral daily    Medications: [PRN]  acetaminophen     Tablet .. 650 milliGRAM(s) Oral every 6 hours  acetaminophen   IVPB .. 1000 milliGRAM(s) IV Intermittent once  albumin human 25% IVPB 100 milliLiter(s) IV Intermittent once  albuterol    0.083%. 2.5 milliGRAM(s) Nebulizer once PRN  atorvastatin 40 milliGRAM(s) Oral at bedtime  guaifenesin/dextromethorphan Oral Liquid 10 milliLiter(s) Oral every 6 hours PRN  heparin   Injectable 5000 Unit(s) SubCutaneous every 8 hours  hydrALAZINE 25 milliGRAM(s) Oral three times a day  HYDROmorphone  Injectable 0.25 milliGRAM(s) IV Push every 30 minutes PRN  HYDROmorphone  Injectable 0.5 milliGRAM(s) IV Push every 30 minutes PRN  lactated ringers. 1000 milliLiter(s) IV Continuous <Continuous>  lisinopril 40 milliGRAM(s) Oral daily  metoclopramide Injectable 10 milliGRAM(s) IV Push once PRN  metoprolol succinate ER 50 milliGRAM(s) Oral daily  NIFEdipine XL 90 milliGRAM(s) Oral daily  ondansetron Injectable 4 milliGRAM(s) IV Push once PRN  pantoprazole    Tablet 40 milliGRAM(s) Oral before breakfast  sertraline 100 milliGRAM(s) Oral daily    DVT PROPHYLAXIS: heparin   Injectable 5000 Unit(s) SubCutaneous every 8 hours    GI PROPHYLAXIS: pantoprazole    Tablet 40 milliGRAM(s) Oral before breakfast    Labs:                     8.9    11.62 )-----------( 292      ( 25 Jan 2024 20:20 )             30.7     01-25    136  |  101  |  18  ----------------------------<  91  4.0   |  22  |  1.0    Ca    8.9      25 Jan 2024 20:20  Phos  2.9     01-25  Mg     1.8     01-25    TPro  5.3<L>  /  Alb  3.2<L>  /  TBili  0.3  /  DBili  x   /  AST  19  /  ALT  15  /  AlkPhos  113  01-25    PT/INR - ( 25 Jan 2024 20:20 )   PT: 11.70 sec;   INR: 1.03 ratio         PTT - ( 25 Jan 2024 20:20 )  PTT:31.3 sec    Imaging:  No post-op imaging studies    Assessment:  78yF s/p Diagnostic laparoscopy, Robot-assisted gastropexy    Plan:  - SICU c/s  - Monitor vitals and respiratory status  - Monitor post-op labs and replete as necessary  - Pain control  - CLD w/ IVF  - F/u TOV (hutchison removed post-op) and monitor urine output  - DVT and GI Prophylaxis  - Monitor wound and dressing for changes, redress as needed.      Date/Time: 01-26-24 @ 16:02 Post Operative Note  Patient: DARIEN BARRERA 78y (1945) Female   MRN: [id number]  Location: [facility] PACU 003 A  Visit: 01-08-24 Inpatient  Date: 01-26-24 @ 16:02    Procedure: Metastatic colon cancer to liver S/P Diagnostic laparoscopy, Robot-assisted gastropexy    Subjective: Patient lethargic, difficult to arouse, but spontaneously opens eyes and follows commands  Nausea: no, Vomiting: no, Ambulating: no    Objective:  Vitals: T(F): 97.8 (01-26-24 @ 12:00), Max: 98 (01-26-24 @ 07:00)  HR: 69 (01-26-24 @ 14:30)  BP: 146/72 (01-26-24 @ 14:30) (124/60 - 146/72)  RR: 17 (01-26-24 @ 14:30)  SpO2: 97% (01-26-24 @ 14:30)  Vent Settings:     In:   IV Fluids: albumin human 25% IVPB 100 milliLiter(s) IV Intermittent once  lactated ringers. 1000 milliLiter(s) (50 mL/Hr) IV Continuous <Continuous>    Out:   EBL: 100  Voided Urine:   Tapia Catheter: YES    Physical Examination:  General Appearance: lethargic, spontaneously opens eyes and follows commands  HEENT: EOMI, sclera non-icteric.  Heart: RRR  Lungs: saturating well on BIPAP, chest rise equal b/l  Abdomen:  Soft, mildly tender, nondistended. No rigidity, guarding, or rebound tenderness.   MSK/Extremities: Warm & well-perfused. Peripheral pulses intact.  Skin: Warm, dry. No jaundice.   Incisions/Wounds: Dressings in place, clean, dry and intact, no signs of infection/active bleeding/drainage    Medications: [Standing]  acetaminophen     Tablet .. 650 milliGRAM(s) Oral every 6 hours  acetaminophen   IVPB .. 1000 milliGRAM(s) IV Intermittent once  albumin human 25% IVPB 100 milliLiter(s) IV Intermittent once  albuterol    0.083%. 2.5 milliGRAM(s) Nebulizer once PRN  atorvastatin 40 milliGRAM(s) Oral at bedtime  guaifenesin/dextromethorphan Oral Liquid 10 milliLiter(s) Oral every 6 hours PRN  heparin   Injectable 5000 Unit(s) SubCutaneous every 8 hours  hydrALAZINE 25 milliGRAM(s) Oral three times a day  HYDROmorphone  Injectable 0.5 milliGRAM(s) IV Push every 30 minutes PRN  HYDROmorphone  Injectable 0.25 milliGRAM(s) IV Push every 30 minutes PRN  lactated ringers. 1000 milliLiter(s) IV Continuous <Continuous>  lisinopril 40 milliGRAM(s) Oral daily  metoclopramide Injectable 10 milliGRAM(s) IV Push once PRN  metoprolol succinate ER 50 milliGRAM(s) Oral daily  NIFEdipine XL 90 milliGRAM(s) Oral daily  ondansetron Injectable 4 milliGRAM(s) IV Push once PRN  pantoprazole    Tablet 40 milliGRAM(s) Oral before breakfast  sertraline 100 milliGRAM(s) Oral daily    Medications: [PRN]  acetaminophen     Tablet .. 650 milliGRAM(s) Oral every 6 hours  acetaminophen   IVPB .. 1000 milliGRAM(s) IV Intermittent once  albumin human 25% IVPB 100 milliLiter(s) IV Intermittent once  albuterol    0.083%. 2.5 milliGRAM(s) Nebulizer once PRN  atorvastatin 40 milliGRAM(s) Oral at bedtime  guaifenesin/dextromethorphan Oral Liquid 10 milliLiter(s) Oral every 6 hours PRN  heparin   Injectable 5000 Unit(s) SubCutaneous every 8 hours  hydrALAZINE 25 milliGRAM(s) Oral three times a day  HYDROmorphone  Injectable 0.25 milliGRAM(s) IV Push every 30 minutes PRN  HYDROmorphone  Injectable 0.5 milliGRAM(s) IV Push every 30 minutes PRN  lactated ringers. 1000 milliLiter(s) IV Continuous <Continuous>  lisinopril 40 milliGRAM(s) Oral daily  metoclopramide Injectable 10 milliGRAM(s) IV Push once PRN  metoprolol succinate ER 50 milliGRAM(s) Oral daily  NIFEdipine XL 90 milliGRAM(s) Oral daily  ondansetron Injectable 4 milliGRAM(s) IV Push once PRN  pantoprazole    Tablet 40 milliGRAM(s) Oral before breakfast  sertraline 100 milliGRAM(s) Oral daily    DVT PROPHYLAXIS: heparin   Injectable 5000 Unit(s) SubCutaneous every 8 hours    GI PROPHYLAXIS: pantoprazole    Tablet 40 milliGRAM(s) Oral before breakfast    Labs:                     8.9    11.62 )-----------( 292      ( 25 Jan 2024 20:20 )             30.7     01-25    136  |  101  |  18  ----------------------------<  91  4.0   |  22  |  1.0    Ca    8.9      25 Jan 2024 20:20  Phos  2.9     01-25  Mg     1.8     01-25    TPro  5.3<L>  /  Alb  3.2<L>  /  TBili  0.3  /  DBili  x   /  AST  19  /  ALT  15  /  AlkPhos  113  01-25    PT/INR - ( 25 Jan 2024 20:20 )   PT: 11.70 sec;   INR: 1.03 ratio         PTT - ( 25 Jan 2024 20:20 )  PTT:31.3 sec    Imaging:  No post-op imaging studies    Assessment:  78yF s/p Diagnostic laparoscopy, Robot-assisted gastropexy    Plan:  - SICU c/s  - Monitor vitals and respiratory status  - Monitor post-op labs and replete as necessary  - Pain control  - CLD w/ IVF  - Tapia placed, monitor outputs  - IV lasix  - CXR  - DVT and GI Prophylaxis  - Monitor wound and dressing for changes, redress as needed.      Date/Time: 01-26-24 @ 16:02

## 2024-01-26 NOTE — CONSULT NOTE ADULT - SUBJECTIVE AND OBJECTIVE BOX
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 Procedure aborted. G-tube removed, robotic gastropexy performed.      OR Stats for 01-26-24    OR Time:    IV Fluids:  EBL:  Blood Products:  UOP:  Findings-     24 Hour Events  -Admission under SICU service    ============================       HPI   The patient is a 78-year-old female with past medical history of hiatal hernia s/p repair >10 years back, hypertension, GERD, non obstructive CAD, depression, dementia, anemia presenting for evaluation of low hemoglobin, measured outpatient to be 7.4.  Patient has had multiple episodes of nonbloody nonbilious vomiting over the last several months with good appetite and almost 60 pound weight loss over the last few months.  Patient just started following up with hematologist Dr.Yumi Hussein and had recent blood work from 3 days ago that resulted today and showed a low hemoglobin of 7.4. Patient denies fevers, chills, chest pain, shortness of breath, hematuria, melena, hematochezia. The pt presented to the Ed for a blood transfusion that was recommended by the hematologist.    ED  In the ED the pt received 1 PRBC, Labs were significant for a Cr of 1.6 (unknown baseline) and elevated lactate  CT Scan-large hiatal hernia containing partially included stomach with apparent organoaxial rotation  The pt was admitted to medicine for anemia 2/2 possible GIB    PACU      Spoke with patients HCP Sister Sonali and Nephew Collins on the phone to discuss patient GOC. Discussed MOLST form and discussions held with palliative medicine during inpatient stay. Collins and Sonali would like to respect patient's wishes and continue with DNR/DNI and trial on NIV which she is currently on, MOLST updated.  Sister Sonali [phone]  Nephew Collins [phone]      [X] A ten-point review of systems was otherwise negative except as noted above.  [  ] Due to altered mental status/intubation, subjective information was not attained from the patient. History was obtained, to the extent possible, from review of the chart and collateral sources of information.    Past Medical History and Surgical History  Hypertension    GERD (gastroesophageal reflux disease)    Hiatal hernia with GERD    Empyema lung    H/O CHF        Home Medications:  ALPRAZolam 0.25 mg oral tablet: 1 tab(s) orally 2 times a day  furosemide 40 mg oral tablet: 1 tab(s) orally 2 times a day  lisinopril 40 mg oral tablet: 1 tab(s) orally once a day  Metoprolol Succinate ER 50 mg oral tablet, extended release: 1 tab(s) orally 2 times a day  Nifedical XL 60 mg oral tablet, extended release: 1 tab(s) orally once a day  omeprazole 40 mg oral delayed release capsule: 1 cap(s) orally once a day  sertraline 100 mg oral tablet: 1 tab(s) orally once a day  Symbicort 160 mcg-4.5 mcg/inh inhalation aerosol: 2 puff(s) inhaled 2 times a day     Allergies  Allergies    No Known Allergies    Intolerances       Current Medications:  acetaminophen     Tablet .. 650 milliGRAM(s) Oral every 6 hours  acetaminophen   IVPB .. 1000 milliGRAM(s) IV Intermittent once  albumin human 25% IVPB 100 milliLiter(s) IV Intermittent once  albuterol    0.083%. 2.5 milliGRAM(s) Nebulizer once PRN Shortness of Breath and/or Wheezing  atorvastatin 40 milliGRAM(s) Oral at bedtime  guaifenesin/dextromethorphan Oral Liquid 10 milliLiter(s) Oral every 6 hours PRN Cough  heparin   Injectable 5000 Unit(s) SubCutaneous every 8 hours  hydrALAZINE 25 milliGRAM(s) Oral three times a day  HYDROmorphone  Injectable 0.5 milliGRAM(s) IV Push every 30 minutes PRN Severe Pain (7 - 10)  HYDROmorphone  Injectable 0.25 milliGRAM(s) IV Push every 30 minutes PRN Moderate Pain (4 - 6)  lactated ringers. 1000 milliLiter(s) (50 mL/Hr) IV Continuous <Continuous>  lisinopril 40 milliGRAM(s) Oral daily  metoclopramide Injectable 10 milliGRAM(s) IV Push once PRN Nausea and/or Vomiting  metoprolol succinate ER 50 milliGRAM(s) Oral daily  NIFEdipine XL 90 milliGRAM(s) Oral daily  ondansetron Injectable 4 milliGRAM(s) IV Push once PRN Nausea and/or Vomiting  pantoprazole    Tablet 40 milliGRAM(s) Oral before breakfast  sertraline 100 milliGRAM(s) Oral daily      Vital Sings, Intake/Output (Last 24Hours)  ICU Vital Signs Last 24 Hrs  T(C): 36.6 (26 Jan 2024 12:00), Max: 36.7 (26 Jan 2024 07:00)  T(F): 97.8 (26 Jan 2024 12:00), Max: 98 (26 Jan 2024 07:00)  HR: 69 (26 Jan 2024 14:30) (60 - 97)  BP: 146/72 (26 Jan 2024 14:30) (124/60 - 146/72)  BP(mean): --  ABP: --  ABP(mean): --  RR: 17 (26 Jan 2024 14:30) (17 - 18)  SpO2: 97% (26 Jan 2024 14:30) (95% - 98%)    O2 Parameters below as of 26 Jan 2024 14:30  Patient On (Oxygen Delivery Method): BiPAP/CPAP          I&O's Summary      Physical Exam:  ----------------------------------------------------------------------------------------------------------   GCS:    15  Exam: Alert    RESPIRATORY:  Normal expansion/effort    CARDIOVASCULAR:  non tachycardic  No peripheral edema    GASTROINTESTINAL:  Abdomen soft, non-tender, non-distended    MUSCULOSKELETAL:  Extremities warm, pink, well-perfused.    DERM:  No skin breakdown     :   Exam: Tapia catheter in place.     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 Procedure aborted. G-tube removed, robotic gastropexy performed.      OR Stats for 01-26-24    OR Time:   1.5hrs  IV Fluids:  1L, 100ccml  EBL: 100ml  Blood Products: 1u PRBC  UOP:  150ml       24 Hour Events  -Admission under SICU service    ============================     HPI   The patient is a 78-year-old female with past medical history of hiatal hernia s/p repair >10 years back, hypertension, GERD, non obstructive CAD, depression, dementia, anemia presenting for evaluation of low hemoglobin, measured outpatient to be 7.4.  Patient has had multiple episodes of nonbloody nonbilious vomiting over the last several months with good appetite and almost 60 pound weight loss over the last few months.  Patient just started following up with hematologist Dr.Yumi Hussein and had recent blood work from 3 days ago that resulted today and showed a low hemoglobin of 7.4. Patient denies fevers, chills, chest pain, shortness of breath, hematuria, melena, hematochezia. The pt presented to the Ed for a blood transfusion that was recommended by the hematologist.    ED  In the ED the pt received 1 PRBC, Labs were significant for a Cr of 1.6 (unknown baseline) and elevated lactate  CT Scan-large hiatal hernia containing partially included stomach with apparent organoaxial rotation  The pt was admitted to medicine for anemia 2/2 possible GIB    PACU    Spoke with patients HCP Sister Sonali and Nephew Collins on the phone to discuss patient GOC. Discussed MOLST form and discussions held with palliative medicine during inpatient stay. Collins and Sonali would like to respect patient's wishes and continue with DNR/DNI and trial on NIV which she is currently on MOLST updated. Patient placed on BiPAP 12/6, repeat ABG pending.  Sister Sonali [phone]  Nephew Collins [phone]      [X] A ten-point review of systems was otherwise negative except as noted above.  [  ] Due to altered mental status/intubation, subjective information was not attained from the patient. History was obtained, to the extent possible, from review of the chart and collateral sources of information.    Past Medical History and Surgical History  Hypertension    GERD (gastroesophageal reflux disease)    Hiatal hernia with GERD    Empyema lung    H/O CHF        Home Medications:  ALPRAZolam 0.25 mg oral tablet: 1 tab(s) orally 2 times a day  furosemide 40 mg oral tablet: 1 tab(s) orally 2 times a day  lisinopril 40 mg oral tablet: 1 tab(s) orally once a day  Metoprolol Succinate ER 50 mg oral tablet, extended release: 1 tab(s) orally 2 times a day  Nifedical XL 60 mg oral tablet, extended release: 1 tab(s) orally once a day  omeprazole 40 mg oral delayed release capsule: 1 cap(s) orally once a day  sertraline 100 mg oral tablet: 1 tab(s) orally once a day  Symbicort 160 mcg-4.5 mcg/inh inhalation aerosol: 2 puff(s) inhaled 2 times a day     Allergies  Allergies    No Known Allergies    Intolerances       Current Medications:  acetaminophen     Tablet .. 650 milliGRAM(s) Oral every 6 hours  acetaminophen   IVPB .. 1000 milliGRAM(s) IV Intermittent once  albumin human 25% IVPB 100 milliLiter(s) IV Intermittent once  albuterol    0.083%. 2.5 milliGRAM(s) Nebulizer once PRN Shortness of Breath and/or Wheezing  atorvastatin 40 milliGRAM(s) Oral at bedtime  guaifenesin/dextromethorphan Oral Liquid 10 milliLiter(s) Oral every 6 hours PRN Cough  heparin   Injectable 5000 Unit(s) SubCutaneous every 8 hours  hydrALAZINE 25 milliGRAM(s) Oral three times a day  HYDROmorphone  Injectable 0.5 milliGRAM(s) IV Push every 30 minutes PRN Severe Pain (7 - 10)  HYDROmorphone  Injectable 0.25 milliGRAM(s) IV Push every 30 minutes PRN Moderate Pain (4 - 6)  lactated ringers. 1000 milliLiter(s) (50 mL/Hr) IV Continuous <Continuous>  lisinopril 40 milliGRAM(s) Oral daily  metoclopramide Injectable 10 milliGRAM(s) IV Push once PRN Nausea and/or Vomiting  metoprolol succinate ER 50 milliGRAM(s) Oral daily  NIFEdipine XL 90 milliGRAM(s) Oral daily  ondansetron Injectable 4 milliGRAM(s) IV Push once PRN Nausea and/or Vomiting  pantoprazole    Tablet 40 milliGRAM(s) Oral before breakfast  sertraline 100 milliGRAM(s) Oral daily      Vital Sings, Intake/Output (Last 24Hours)  ICU Vital Signs Last 24 Hrs  T(C): 36.6 (26 Jan 2024 12:00), Max: 36.7 (26 Jan 2024 07:00)  T(F): 97.8 (26 Jan 2024 12:00), Max: 98 (26 Jan 2024 07:00)  HR: 69 (26 Jan 2024 14:30) (60 - 97)  BP: 146/72 (26 Jan 2024 14:30) (124/60 - 146/72)  BP(mean): --  ABP: --  ABP(mean): --  RR: 17 (26 Jan 2024 14:30) (17 - 18)  SpO2: 97% (26 Jan 2024 14:30) (95% - 98%)    O2 Parameters below as of 26 Jan 2024 14:30  Patient On (Oxygen Delivery Method): BiPAP/CPAP          I&O's Summary      Physical Exam:  ----------------------------------------------------------------------------------------------------------   GCS:    15  Exam: Alert, answering questions appropriately    RESPIRATORY:  Normal expansion/effort supplemented by BiPAP    CARDIOVASCULAR:  non tachycardic  No peripheral edema    GASTROINTESTINAL: 4 trochar sites, previous G tube insertion site w/ dressing, no active bleeding  Abdomen soft, non-tender, non-distended    MUSCULOSKELETAL:  Extremities warm, pink, well-perfused.    DERM:  No skin breakdown     :   Exam: Tapia catheter in place.     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 Procedure aborted. G-tube removed, robotic gastropexy performed.      OR Stats for 01-26-24    OR Time:   1.5hrs  IV Fluids:  1L, 100ccml  EBL: 100ml  Blood Products: 1u PRBC  UOP:  150ml       24 Hour Events  -Admission under SICU service    ============================     HPI   The patient is a 78-year-old female with past medical history of hiatal hernia s/p repair >10 years back, hypertension, GERD, non obstructive CAD, depression, dementia, anemia presenting for evaluation of low hemoglobin, measured outpatient to be 7.4.  Patient has had multiple episodes of nonbloody nonbilious vomiting over the last several months with good appetite and almost 60 pound weight loss over the last few months.  Patient just started following up with hematologist Dr.Yumi Hussein and had recent blood work from 3 days ago that resulted today and showed a low hemoglobin of 7.4. Patient denies fevers, chills, chest pain, shortness of breath, hematuria, melena, hematochezia. The pt presented to the Ed for a blood transfusion that was recommended by the hematologist.    ED  In the ED the pt received 1 PRBC, Labs were significant for a Cr of 1.6 (unknown baseline) and elevated lactate  CT Scan-large hiatal hernia containing partially included stomach with apparent organoaxial rotation  The pt was admitted to medicine for anemia 2/2 possible GIB    INPATIENT    Colonoscopy 01/12   A 3cm friable fungating ulcerated mass extending from 17cm to 20 cm from the anal verge was noted at the level of the sigmoid colon. The mass was obstructing around 75-90% of the lumen. The PCF scope could not traverse the mass, so it was switched with an upper endoscope. Using an upper endoscope, the mass was easily traversed. Multiple cold forceps biopsies were obtained for histology. Using the upper endoscope, the cecum was reached and the terminal ileum was intubated. Clean based cecal ulcer noted. Normal mucosa was otherwise noted in the remaining parts of the examined colon.  Internal hemorrhoids.      PACU    Spoke with patients HCP Sister Sonali and Nephew Collins on the phone to discuss patient GOC. Discussed MOLST form and discussions held with palliative medicine during inpatient stay. Kayla would like to respect patient's wishes and continue with DNR/DNI and trial on NIV which she is currently on MOLST updated. Patient placed on BiPAP 12/6, repeat ABG pending.  Sister Sonali [phone]  Nephew Collins [phone]      [X] A ten-point review of systems was otherwise negative except as noted above.  [  ] Due to altered mental status/intubation, subjective information was not attained from the patient. History was obtained, to the extent possible, from review of the chart and collateral sources of information.    Past Medical History and Surgical History  Hypertension  GERD (gastroesophageal reflux disease)  Hiatal hernia with GERD  Empyema lung  H/O CHF    Home Medications:  ALPRAZolam 0.25 mg oral tablet: 1 tab(s) orally 2 times a day  furosemide 40 mg oral tablet: 1 tab(s) orally 2 times a day  lisinopril 40 mg oral tablet: 1 tab(s) orally once a day  Metoprolol Succinate ER 50 mg oral tablet, extended release: 1 tab(s) orally 2 times a day  Nifedical XL 60 mg oral tablet, extended release: 1 tab(s) orally once a day  omeprazole 40 mg oral delayed release capsule: 1 cap(s) orally once a day  sertraline 100 mg oral tablet: 1 tab(s) orally once a day  Symbicort 160 mcg-4.5 mcg/inh inhalation aerosol: 2 puff(s) inhaled 2 times a day     Allergies  Allergies  Intolerances       Current Medications:  acetaminophen     Tablet .. 650 milliGRAM(s) Oral every 6 hours  acetaminophen   IVPB .. 1000 milliGRAM(s) IV Intermittent once  albumin human 25% IVPB 100 milliLiter(s) IV Intermittent once  albuterol    0.083%. 2.5 milliGRAM(s) Nebulizer once PRN Shortness of Breath and/or Wheezing  atorvastatin 40 milliGRAM(s) Oral at bedtime  guaifenesin/dextromethorphan Oral Liquid 10 milliLiter(s) Oral every 6 hours PRN Cough  heparin   Injectable 5000 Unit(s) SubCutaneous every 8 hours  hydrALAZINE 25 milliGRAM(s) Oral three times a day  HYDROmorphone  Injectable 0.5 milliGRAM(s) IV Push every 30 minutes PRN Severe Pain (7 - 10)  HYDROmorphone  Injectable 0.25 milliGRAM(s) IV Push every 30 minutes PRN Moderate Pain (4 - 6)  lactated ringers. 1000 milliLiter(s) (50 mL/Hr) IV Continuous <Continuous>  lisinopril 40 milliGRAM(s) Oral daily  metoclopramide Injectable 10 milliGRAM(s) IV Push once PRN Nausea and/or Vomiting  metoprolol succinate ER 50 milliGRAM(s) Oral daily  NIFEdipine XL 90 milliGRAM(s) Oral daily  ondansetron Injectable 4 milliGRAM(s) IV Push once PRN Nausea and/or Vomiting  pantoprazole    Tablet 40 milliGRAM(s) Oral before breakfast  sertraline 100 milliGRAM(s) Oral daily      Vital Sings, Intake/Output (Last 24Hours)  ICU Vital Signs Last 24 Hrs  T(C): 36.6 (26 Jan 2024 12:00), Max: 36.7 (26 Jan 2024 07:00)  T(F): 97.8 (26 Jan 2024 12:00), Max: 98 (26 Jan 2024 07:00)  HR: 69 (26 Jan 2024 14:30) (60 - 97)  BP: 146/72 (26 Jan 2024 14:30) (124/60 - 146/72)  BP(mean): --  ABP: --  ABP(mean): --  RR: 17 (26 Jan 2024 14:30) (17 - 18)  SpO2: 97% (26 Jan 2024 14:30) (95% - 98%)    O2 Parameters below as of 26 Jan 2024 14:30  Patient On (Oxygen Delivery Method): BiPAP/CPAP      I&O's Summary      Physical Exam:  ----------------------------------------------------------------------------------------------------------   GCS:    15  Exam: Alert, answering questions appropriately    RESPIRATORY:  Normal expansion/effort supplemented by BiPAP    CARDIOVASCULAR:  non tachycardic  No peripheral edema    GASTROINTESTINAL: 4 trochar sites, previous G tube insertion site w/ dressing, no active bleeding  Abdomen soft, non-tender, non-distended    MUSCULOSKELETAL:  Extremities warm, pink, well-perfused.    DERM:  No skin breakdown     :   Exam: Tapia catheter in place.     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 Procedure aborted. G-tube removed, robotic gastropexy performed.      OR Stats for 01-26-24    OR Time:   1.5hrs  IV Fluids:  1L LR, 500cc NS, 100cc Albumin  EBL: 100ml  Blood Products: 1u PRBC  UOP:  150ml       24 Hour Events  -Admission under SICU service    ============================     HPI   The patient is a 78-year-old female with past medical history of hiatal hernia s/p repair >10 years back, hypertension, GERD, non obstructive CAD, depression, dementia, anemia presenting for evaluation of low hemoglobin, measured outpatient to be 7.4.  Patient has had multiple episodes of nonbloody nonbilious vomiting over the last several months with good appetite and almost 60 pound weight loss over the last few months.  Patient just started following up with hematologist Dr.Yumi Hussein and had recent blood work from 3 days ago that resulted today and showed a low hemoglobin of 7.4. Patient denies fevers, chills, chest pain, shortness of breath, hematuria, melena, hematochezia. The pt presented to the Ed for a blood transfusion that was recommended by the hematologist.    ED  In the ED the pt received 1 PRBC, Labs were significant for a Cr of 1.6 (unknown baseline) and elevated lactate  CT Scan-large hiatal hernia containing partially included stomach with apparent organoaxial rotation  The pt was admitted to medicine for anemia 2/2 possible GIB    INPATIENT    Colonoscopy 01/12   A 3cm friable fungating ulcerated mass extending from 17cm to 20 cm from the anal verge was noted at the level of the sigmoid colon. The mass was obstructing around 75-90% of the lumen. The PCF scope could not traverse the mass, so it was switched with an upper endoscope. Using an upper endoscope, the mass was easily traversed. Multiple cold forceps biopsies were obtained for histology. Using the upper endoscope, the cecum was reached and the terminal ileum was intubated. Clean based cecal ulcer noted. Normal mucosa was otherwise noted in the remaining parts of the examined colon.  Internal hemorrhoids.      PACU    Spoke with patients HCP Sister Sonali and Nephew Collins on the phone to discuss patient GOC. Discussed MOLST form and discussions held with palliative medicine during inpatient stay. Collins and Sonali would like to respect patient's wishes and continue with DNR/DNI and trial on NIV which she is currently on MOLST updated. Patient placed on BiPAP 12/6, repeat ABG pending.  Sister Sonali [phone]  Nephew Collins [phone]      [X] A ten-point review of systems was otherwise negative except as noted above.  [  ] Due to altered mental status/intubation, subjective information was not attained from the patient. History was obtained, to the extent possible, from review of the chart and collateral sources of information.    Past Medical History and Surgical History  Hypertension  GERD (gastroesophageal reflux disease)  Hiatal hernia with GERD  Empyema lung  H/O CHF    Home Medications:  ALPRAZolam 0.25 mg oral tablet: 1 tab(s) orally 2 times a day  furosemide 40 mg oral tablet: 1 tab(s) orally 2 times a day  lisinopril 40 mg oral tablet: 1 tab(s) orally once a day  Metoprolol Succinate ER 50 mg oral tablet, extended release: 1 tab(s) orally 2 times a day  Nifedical XL 60 mg oral tablet, extended release: 1 tab(s) orally once a day  omeprazole 40 mg oral delayed release capsule: 1 cap(s) orally once a day  sertraline 100 mg oral tablet: 1 tab(s) orally once a day  Symbicort 160 mcg-4.5 mcg/inh inhalation aerosol: 2 puff(s) inhaled 2 times a day     Allergies  Allergies  Intolerances       Current Medications:  acetaminophen     Tablet .. 650 milliGRAM(s) Oral every 6 hours  acetaminophen   IVPB .. 1000 milliGRAM(s) IV Intermittent once  albumin human 25% IVPB 100 milliLiter(s) IV Intermittent once  albuterol    0.083%. 2.5 milliGRAM(s) Nebulizer once PRN Shortness of Breath and/or Wheezing  atorvastatin 40 milliGRAM(s) Oral at bedtime  guaifenesin/dextromethorphan Oral Liquid 10 milliLiter(s) Oral every 6 hours PRN Cough  heparin   Injectable 5000 Unit(s) SubCutaneous every 8 hours  hydrALAZINE 25 milliGRAM(s) Oral three times a day  HYDROmorphone  Injectable 0.5 milliGRAM(s) IV Push every 30 minutes PRN Severe Pain (7 - 10)  HYDROmorphone  Injectable 0.25 milliGRAM(s) IV Push every 30 minutes PRN Moderate Pain (4 - 6)  lactated ringers. 1000 milliLiter(s) (50 mL/Hr) IV Continuous <Continuous>  lisinopril 40 milliGRAM(s) Oral daily  metoclopramide Injectable 10 milliGRAM(s) IV Push once PRN Nausea and/or Vomiting  metoprolol succinate ER 50 milliGRAM(s) Oral daily  NIFEdipine XL 90 milliGRAM(s) Oral daily  ondansetron Injectable 4 milliGRAM(s) IV Push once PRN Nausea and/or Vomiting  pantoprazole    Tablet 40 milliGRAM(s) Oral before breakfast  sertraline 100 milliGRAM(s) Oral daily      Vital Sings, Intake/Output (Last 24Hours)  ICU Vital Signs Last 24 Hrs  T(C): 36.6 (26 Jan 2024 12:00), Max: 36.7 (26 Jan 2024 07:00)  T(F): 97.8 (26 Jan 2024 12:00), Max: 98 (26 Jan 2024 07:00)  HR: 69 (26 Jan 2024 14:30) (60 - 97)  BP: 146/72 (26 Jan 2024 14:30) (124/60 - 146/72)  BP(mean): --  ABP: --  ABP(mean): --  RR: 17 (26 Jan 2024 14:30) (17 - 18)  SpO2: 97% (26 Jan 2024 14:30) (95% - 98%)    O2 Parameters below as of 26 Jan 2024 14:30  Patient On (Oxygen Delivery Method): BiPAP/CPAP      I&O's Summary      Physical Exam:  ----------------------------------------------------------------------------------------------------------   GCS:    15  Exam: Alert, answering questions appropriately    RESPIRATORY:  Normal expansion/effort supplemented by BiPAP    CARDIOVASCULAR:  non tachycardic  No peripheral edema    GASTROINTESTINAL: 4 trochar sites, previous G tube insertion site w/ dressing, no active bleeding  Abdomen soft, non-tender, non-distended    MUSCULOSKELETAL:  Extremities warm, pink, well-perfused.    DERM:  No skin breakdown     :   Exam: Tapia catheter in place.     Tubes/Lines/Drains   ----------------------------------------------------------------------------------------------------------  [x] Peripheral IV  [ ] Urinary Catheter Tapia                                               [ ] Central Venous Line                   [ X] Arterial Line		      Left radial

## 2024-01-26 NOTE — CHART NOTE - NSCHARTNOTEFT_GEN_A_CORE
PACU ANESTHESIA ADMISSION NOTE      Procedure: Diagnostic laparoscopy    Robot-assisted gastropexy      Post op diagnosis:  Metastatic colon cancer to liver        ____  Intubated  TV:______       Rate: ______      FiO2: ______    __x__  Patent Airway    _x___  Full return of protective reflexes    __x__  Full recovery from anesthesia / back to baseline     Vitals:   T: 97.1F          R:     20             BP:  136/63           Sat:      96%            P:  61      Mental Status:  __x__ Awake   __x___ Alert   __x___ Drowsy   _____ Sedated    Nausea/Vomiting:  ___x_ NO  ______Yes,   See Post - Op Orders          Pain Scale (0-10):  _0____    Treatment: ____ None    __x__ See Post - Op/PCA Orders     Post - Operative Fluids:   ____ Oral   _x___ See Post - Op Orders    Plan: Discharge:   ___Home       ___x__Floor     _____Critical Care    _____  Other:_________________    Comments: No complications.  S/p aborted robo sigmoid rsxn d/t invasive nature.  Pt with respiratory acidosis (pCO2 of 55) - baseline pulmonary issues.  Decision made to extubate with pt maintain sats of 90-93% on RA and 95% on 4L NC.  Baseline mental status.  s/p 1 u pRBC, albumin 25% 100ml, 1L NS, and 500 ml of LR  EBL = 100ml  UOP = 150ml  Ordered albuterol nebs PRN

## 2024-01-26 NOTE — PROGRESS NOTE ADULT - ASSESSMENT
78y F w/ PMHx of hiatal hernia s/p repair >10 years back, L emypema s/p decortication 2018, hypertension, GERD, non obstructive CAD, depression, dementia, anemia presenting for evaluation of low hemoglobin, measured outpatient to be 7.4. The pt presented to the Ed for a blood transfusion that was recommended by the hematologist.    PLAN:   - Plan for OR today, will follow up  - Cardiology: intermediate risk  - Heme/onc: IV Venofer QD  - CEA negative  - CT chest w/o mets    BLUE SURGERY SPECTRA: [id number]

## 2024-01-26 NOTE — HOSPICE CARE NOTE - CONVESATION DETAILS
Call placed to patient's sister Sonali to introduce hospice services. All information provided and all questions answered.   Discussed home hospice versus SNF placement. Sonali requesting time to consider all options. Hospice to follow up Monday.

## 2024-01-26 NOTE — PROGRESS NOTE ADULT - SUBJECTIVE AND OBJECTIVE BOX
GENERAL SURGERY PROGRESS NOTE    Patient: DARIEN BARRERA , 78y ([birth year])Female   MRN: [id number]  Location: [address] ([address]) 016 A  Visit: 01-08-24 Inpatient  Date: 01-26-24 @ 08:16    Hospital Day #: 19    Procedure/Dx/Injuries: hiatal hernia w/ organoaxial rotation, s/p EGD and colonoscopy with 3.0 cm friable fungated ulcerated mass    Events of past 24 hours: Plan for OR today    PAST MEDICAL & SURGICAL HISTORY:  Hypertension  GERD (gastroesophageal reflux disease)  Hiatal hernia with GERD  Empyema lung  H/O CHF  History of repair of hiatal hernia  H/O pleural empyema  s/p decortication    Vitals:   T(F): 98 (01-26-24 @ 07:00), Max: 98 (01-26-24 @ 07:00)  HR: 60 (01-26-24 @ 07:00)  BP: 132/64 (01-26-24 @ 07:00)  RR: 18 (01-26-24 @ 07:00)  SpO2: 96% (01-26-24 @ 07:00)    PHYSICAL EXAM:  General: NAD, AAOx3  Cardiac: RRR  Respiratory: Chest rise equal bilaterally, no labored breathing  Abdomen: Soft, non-distended, non-tender  Extremities: Warm, soft, well-perfused  Skin: Warm/dry, normal color, no jaundice    MEDICATIONS  (STANDING):  albumin human 25% IVPB 100 milliLiter(s) IV Intermittent once    LAB/STUDIES:  Labs:  POCT Blood Glucose.: 118 mg/dL (26 Jan 2024 06:48)                          8.9    11.62 )-----------( 292      ( 25 Jan 2024 20:20 )             30.7       Auto Neutrophil %: 71.5 % (01-25-24 @ 20:20)  Auto Immature Granulocyte %: 1.0 % (01-25-24 @ 20:20)    01-25    136  |  101  |  18  ----------------------------<  91  4.0   |  22  |  1.0    Calcium: 8.9 mg/dL (01-25-24 @ 20:20)    LFTs:             5.3  | 0.3  | 19       ------------------[113     ( 25 Jan 2024 05:51 )  3.2  | x    | 15          Coags:     11.70  ----< 1.03    ( 25 Jan 2024 20:20 )     31.3    Urinalysis Basic - ( 25 Jan 2024 20:20 )    Color: x / Appearance: x / SG: x / pH: x  Gluc: 91 mg/dL / Ketone: x  / Bili: x / Urobili: x   Blood: x / Protein: x / Nitrite: x   Leuk Esterase: x / RBC: x / WBC x   Sq Epi: x / Non Sq Epi: x / Bacteria: x      ACCESS/ DEVICES:  [x] Peripheral IV

## 2024-01-26 NOTE — CONSULT NOTE ADULT - CONSULT REASON
colon mass
Anemia
acute respiratory distress secondary to respiratory acidosis requiring NIV
hiatal hernia
Pre op clearance
pre op
GOC

## 2024-01-26 NOTE — BRIEF OPERATIVE NOTE - OPERATION/FINDINGS
Diagnostic laparoscopy with findings of numerous malignant-appearing implants in peritoneum, liver, and small bowel. Procedure aborted. G-tube removed, robotic gastropexy performed.

## 2024-01-26 NOTE — BRIEF OPERATIVE NOTE - NSICDXBRIEFPROCEDURE_GEN_ALL_CORE_FT
PROCEDURES:  Diagnostic laparoscopy 26-Jan-2024 10:39:40  Cecilia Oshea  Robot-assisted gastropexy 26-Jan-2024 10:39:46  Cecilia Oshea

## 2024-01-27 LAB
ANION GAP SERPL CALC-SCNC: 10 MMOL/L — SIGNIFICANT CHANGE UP (ref 7–14)
ANION GAP SERPL CALC-SCNC: 11 MMOL/L — SIGNIFICANT CHANGE UP (ref 7–14)
BASOPHILS # BLD AUTO: 0.07 K/UL — SIGNIFICANT CHANGE UP (ref 0–0.2)
BASOPHILS NFR BLD AUTO: 0.3 % — SIGNIFICANT CHANGE UP (ref 0–1)
BUN SERPL-MCNC: 17 MG/DL — SIGNIFICANT CHANGE UP (ref 10–20)
BUN SERPL-MCNC: 21 MG/DL — HIGH (ref 10–20)
CALCIUM SERPL-MCNC: 8.6 MG/DL — SIGNIFICANT CHANGE UP (ref 8.4–10.5)
CALCIUM SERPL-MCNC: 9.1 MG/DL — SIGNIFICANT CHANGE UP (ref 8.4–10.5)
CHLORIDE SERPL-SCNC: 104 MMOL/L — SIGNIFICANT CHANGE UP (ref 98–110)
CHLORIDE SERPL-SCNC: 105 MMOL/L — SIGNIFICANT CHANGE UP (ref 98–110)
CO2 SERPL-SCNC: 23 MMOL/L — SIGNIFICANT CHANGE UP (ref 17–32)
CO2 SERPL-SCNC: 23 MMOL/L — SIGNIFICANT CHANGE UP (ref 17–32)
CREAT SERPL-MCNC: 1.2 MG/DL — SIGNIFICANT CHANGE UP (ref 0.7–1.5)
CREAT SERPL-MCNC: 1.4 MG/DL — SIGNIFICANT CHANGE UP (ref 0.7–1.5)
EGFR: 38 ML/MIN/1.73M2 — LOW
EGFR: 46 ML/MIN/1.73M2 — LOW
EOSINOPHIL # BLD AUTO: 0 K/UL — SIGNIFICANT CHANGE UP (ref 0–0.7)
EOSINOPHIL NFR BLD AUTO: 0 % — SIGNIFICANT CHANGE UP (ref 0–8)
GAS PNL BLDA: SIGNIFICANT CHANGE UP
GLUCOSE SERPL-MCNC: 103 MG/DL — HIGH (ref 70–99)
GLUCOSE SERPL-MCNC: 83 MG/DL — SIGNIFICANT CHANGE UP (ref 70–99)
HCT VFR BLD CALC: 29.4 % — LOW (ref 37–47)
HCT VFR BLD CALC: 30.9 % — LOW (ref 37–47)
HGB BLD-MCNC: 9 G/DL — LOW (ref 12–16)
HGB BLD-MCNC: 9.7 G/DL — LOW (ref 12–16)
IMM GRANULOCYTES NFR BLD AUTO: 0.7 % — HIGH (ref 0.1–0.3)
LYMPHOCYTES # BLD AUTO: 1.79 K/UL — SIGNIFICANT CHANGE UP (ref 1.2–3.4)
LYMPHOCYTES # BLD AUTO: 8.5 % — LOW (ref 20.5–51.1)
MAGNESIUM SERPL-MCNC: 1.6 MG/DL — LOW (ref 1.8–2.4)
MAGNESIUM SERPL-MCNC: 2.3 MG/DL — SIGNIFICANT CHANGE UP (ref 1.8–2.4)
MCHC RBC-ENTMCNC: 23.3 PG — LOW (ref 27–31)
MCHC RBC-ENTMCNC: 23.8 PG — LOW (ref 27–31)
MCHC RBC-ENTMCNC: 30.6 G/DL — LOW (ref 32–37)
MCHC RBC-ENTMCNC: 31.4 G/DL — LOW (ref 32–37)
MCV RBC AUTO: 75.9 FL — LOW (ref 81–99)
MCV RBC AUTO: 76.2 FL — LOW (ref 81–99)
MONOCYTES # BLD AUTO: 1.02 K/UL — HIGH (ref 0.1–0.6)
MONOCYTES NFR BLD AUTO: 4.9 % — SIGNIFICANT CHANGE UP (ref 1.7–9.3)
NEUTROPHILS # BLD AUTO: 17.99 K/UL — HIGH (ref 1.4–6.5)
NEUTROPHILS NFR BLD AUTO: 85.6 % — HIGH (ref 42.2–75.2)
NRBC # BLD: 0 /100 WBCS — SIGNIFICANT CHANGE UP (ref 0–0)
NRBC # BLD: 0 /100 WBCS — SIGNIFICANT CHANGE UP (ref 0–0)
PHOSPHATE SERPL-MCNC: 2.3 MG/DL — SIGNIFICANT CHANGE UP (ref 2.1–4.9)
PHOSPHATE SERPL-MCNC: 4.2 MG/DL — SIGNIFICANT CHANGE UP (ref 2.1–4.9)
PLATELET # BLD AUTO: 266 K/UL — SIGNIFICANT CHANGE UP (ref 130–400)
PLATELET # BLD AUTO: 290 K/UL — SIGNIFICANT CHANGE UP (ref 130–400)
PMV BLD: 8.8 FL — SIGNIFICANT CHANGE UP (ref 7.4–10.4)
PMV BLD: 9.3 FL — SIGNIFICANT CHANGE UP (ref 7.4–10.4)
POTASSIUM SERPL-MCNC: 3.8 MMOL/L — SIGNIFICANT CHANGE UP (ref 3.5–5)
POTASSIUM SERPL-MCNC: 4 MMOL/L — SIGNIFICANT CHANGE UP (ref 3.5–5)
POTASSIUM SERPL-SCNC: 3.8 MMOL/L — SIGNIFICANT CHANGE UP (ref 3.5–5)
POTASSIUM SERPL-SCNC: 4 MMOL/L — SIGNIFICANT CHANGE UP (ref 3.5–5)
RBC # BLD: 3.86 M/UL — LOW (ref 4.2–5.4)
RBC # BLD: 4.07 M/UL — LOW (ref 4.2–5.4)
RBC # FLD: 24.7 % — HIGH (ref 11.5–14.5)
RBC # FLD: 25.6 % — HIGH (ref 11.5–14.5)
SODIUM SERPL-SCNC: 137 MMOL/L — SIGNIFICANT CHANGE UP (ref 135–146)
SODIUM SERPL-SCNC: 139 MMOL/L — SIGNIFICANT CHANGE UP (ref 135–146)
WBC # BLD: 18.29 K/UL — HIGH (ref 4.8–10.8)
WBC # BLD: 21.01 K/UL — HIGH (ref 4.8–10.8)
WBC # FLD AUTO: 18.29 K/UL — HIGH (ref 4.8–10.8)
WBC # FLD AUTO: 21.01 K/UL — HIGH (ref 4.8–10.8)

## 2024-01-27 PROCEDURE — 99291 CRITICAL CARE FIRST HOUR: CPT

## 2024-01-27 PROCEDURE — 71045 X-RAY EXAM CHEST 1 VIEW: CPT | Mod: 26

## 2024-01-27 RX ORDER — CHLORHEXIDINE GLUCONATE 213 G/1000ML
1 SOLUTION TOPICAL
Refills: 0 | Status: DISCONTINUED | OUTPATIENT
Start: 2024-01-27 | End: 2024-02-01

## 2024-01-27 RX ORDER — SALICYLIC ACID 0.5 %
1 CLEANSER (GRAM) TOPICAL
Refills: 0 | Status: DISCONTINUED | OUTPATIENT
Start: 2024-01-27 | End: 2024-02-01

## 2024-01-27 RX ORDER — SODIUM CHLORIDE 9 MG/ML
1000 INJECTION, SOLUTION INTRAVENOUS
Refills: 0 | Status: DISCONTINUED | OUTPATIENT
Start: 2024-01-27 | End: 2024-01-28

## 2024-01-27 RX ORDER — SENNA PLUS 8.6 MG/1
2 TABLET ORAL AT BEDTIME
Refills: 0 | Status: DISCONTINUED | OUTPATIENT
Start: 2024-01-27 | End: 2024-02-01

## 2024-01-27 RX ORDER — IRON SUCROSE 20 MG/ML
200 INJECTION, SOLUTION INTRAVENOUS EVERY 24 HOURS
Refills: 0 | Status: COMPLETED | OUTPATIENT
Start: 2024-01-27 | End: 2024-01-29

## 2024-01-27 RX ORDER — BUDESONIDE AND FORMOTEROL FUMARATE DIHYDRATE 160; 4.5 UG/1; UG/1
2 AEROSOL RESPIRATORY (INHALATION)
Refills: 0 | Status: DISCONTINUED | OUTPATIENT
Start: 2024-01-27 | End: 2024-02-01

## 2024-01-27 RX ORDER — POTASSIUM CHLORIDE 20 MEQ
20 PACKET (EA) ORAL ONCE
Refills: 0 | Status: COMPLETED | OUTPATIENT
Start: 2024-01-27 | End: 2024-01-27

## 2024-01-27 RX ORDER — ALBUMIN HUMAN 25 %
250 VIAL (ML) INTRAVENOUS ONCE
Refills: 0 | Status: DISCONTINUED | OUTPATIENT
Start: 2024-01-27 | End: 2024-01-28

## 2024-01-27 RX ORDER — MAGNESIUM SULFATE 500 MG/ML
2 VIAL (ML) INJECTION ONCE
Refills: 0 | Status: COMPLETED | OUTPATIENT
Start: 2024-01-27 | End: 2024-01-27

## 2024-01-27 RX ORDER — PANTOPRAZOLE SODIUM 20 MG/1
40 TABLET, DELAYED RELEASE ORAL
Refills: 0 | Status: DISCONTINUED | OUTPATIENT
Start: 2024-01-27 | End: 2024-02-01

## 2024-01-27 RX ADMIN — Medication 5 MILLIGRAM(S): at 17:41

## 2024-01-27 RX ADMIN — CHLORHEXIDINE GLUCONATE 1 APPLICATION(S): 213 SOLUTION TOPICAL at 06:10

## 2024-01-27 RX ADMIN — Medication 25 GRAM(S): at 06:08

## 2024-01-27 RX ADMIN — Medication 50 MILLIEQUIVALENT(S): at 06:09

## 2024-01-27 RX ADMIN — Medication 5 MILLIGRAM(S): at 00:54

## 2024-01-27 RX ADMIN — Medication 5 MILLIGRAM(S): at 06:08

## 2024-01-27 RX ADMIN — HEPARIN SODIUM 5000 UNIT(S): 5000 INJECTION INTRAVENOUS; SUBCUTANEOUS at 06:08

## 2024-01-27 RX ADMIN — HEPARIN SODIUM 5000 UNIT(S): 5000 INJECTION INTRAVENOUS; SUBCUTANEOUS at 14:36

## 2024-01-27 RX ADMIN — ALBUTEROL 2.5 MILLIGRAM(S): 90 AEROSOL, METERED ORAL at 08:42

## 2024-01-27 NOTE — PHYSICAL THERAPY INITIAL EVALUATION ADULT - PERTINENT HX OF CURRENT PROBLEM, REHAB EVAL
Patient went to the OR yesterday w/ plan for Robotic sigmoidectomy for colonic mass, aborted due to diffuse peritoneal carcinomatosis. Procedure performed included diagnostic laparoscopy, G tube removal, robotic gastropexy. Postoperatively patient was difficult to arouse, concern for CO2 retention.
The patient is a 78-year-old female with past medical history of hiatal hernia s/p repair >10 years back, hypertension, GERD, non obstructive CAD, depression, dementia, anemia presenting for evaluation of low hemoglobin, measured outpatient to be 7.4.  Patient has had multiple episodes of nonbloody nonbilious vomiting over the last several months with good appetite and almost 60 pound weight loss over the last few months.  Patient just started following up with hematologist Dr.Yumi Hussein and had recent blood work from 3 days ago that resulted today and showed a low hemoglobin of 7.4. Patient denies fevers, chills, chest pain, shortness of breath, hematuria, melena, hematochezia. The pt presented to the Ed for a blood transfusion that was recommended by the hematologist.

## 2024-01-27 NOTE — PHYSICAL THERAPY INITIAL EVALUATION ADULT - ADDITIONAL COMMENTS
Pt reports that PTA she lived in a private apartment with 3 WHITLEY, her sister lives above her. Pt reports being fully independent and using a RW at home.
Patient lives with family in house. Was independent in ADLS and ambulation no AD

## 2024-01-27 NOTE — PROGRESS NOTE ADULT - ASSESSMENT
78y Female w/ pmhx of acute respiratory distress secondary to respiratory acidosis requiring NIV    NEUROLOGICAL:  #Acute pain    -IV Tylenol  #hx of anxiety    -alprazolam 0.25mg q12 HOLDING while on BiPAP/somolent    -sertraline 100mg qd HOLDING while on BiPAP/somolent    RESPIRATORY:   #respiratory acidosis    -BiPAP 12/6, FiO2 50%  01-26 @ 20:15--7.30 / 46 / 58 / 23 / 90.3  O2 90.3  Lac 0.6    01-26 @ 17:33--7.27 / 51 / 119 / 23 / 98.0  O2 98.0  Lac 0.4      -symbicort 160-4.5mcg/inhalation    - AM CXR ordered  #Activity    -increase as tolerated      CARDS: *DNR*  #hx of HFpEF     -Metoprolol ER 50mg ->  IV 5mg q6 while on BiPAP    -Lasix 20mg IV (40mg PO at home)    - Echo 01/24: EF 61%, G1DD, mild MR, mild TR  #hx of HTN    -Lisinopril 40mg daily    -Nifedipine XL 60mg daily    GASTROINTESTINAL/NUTRITION:   #adenocarcinoma of sigmoid mass  #s/p attempted robotic sigmoid resection, aborted secondary to carcinoma implants 1/26  #s/p g-tube removed, gastropexy completed 1/26  #s/p colonoscopy w/ biopsy 1/12    -NPO x/rx once off BiPAP    -aspiration precautions, HOB 30  #hx of recurrent hiatal hernias  #GI Prophylaxis    pantoprazole IV (po at home)     -not indicated  #Bowel regimen    -senna/miralax if indicated    -last bowel movement      /RENAL:   #urine output in critically ill    -indwelling hutchison (placed 1/26)    Labs                   HEME/ONC:   #acute on chronic anemia   -1u PRBC during OR    -T&S Expires 1/27  #sigmoid mass adenocarcinoma  #DVT prophylaxis     -Chemical: heparin   Injectable 5000 Unit(s) SubCutaneous every 8 hours due to DAWIT     -Mechanical: SCDs       Labs:   ID:  #monitor for leukocytosis/fever  WBC- 10.86  --->>,  11.62  --->>,  18.19  --->>  Temp trend- 24hrs T(F): 98 (01-26 @ 15:30), Max: 98 (01-26 @ 07:00)    ENDOCRINE:    -FSG q6 if NPO or Tube feeds    -Glucose goal 140-180. if above 180 start ISS    MSK:     Activity - Ambulate as Tolerated:     LINES/DRAINS:  PIV, Hutchison, Left radial arterial line (1/26)    ADVANCED DIRECTIVES:  Full Code  Sister Sonali (naveen) [phone] (HCP)  Nephew Collins [phone]    INDICATION FOR SICU: Respiratory distress requiring NIV    DISPO:   SICU     78y Female w/ pmhx of acute respiratory distress secondary to respiratory acidosis requiring NIV    NEUROLOGICAL:  #Acute pain    -IV Tylenol  #hx of anxiety    -alprazolam 0.25mg q12 HOLDING while on BiPAP/somolent    -sertraline 100mg qd HOLDING while on BiPAP/somolent    RESPIRATORY:   #respiratory acidosis    -BiPAP 12/6, FiO2 50%  01-27 @ 05:14--7.36 / 39 / 122 / 22 / 99.1  O2 99.1  Lac 0.8    01-26 @ 20:15--7.30 / 46 / 58 / 23 / 90.3  O2 90.3  Lac 0.6        -symbicort 160-4.5mcg/inhalation    - AM CXR ordered  #Activity    -increase as tolerated      CARDS: *DNR*  #hx of HFpEF     -Metoprolol ER 50mg ->  IV 5mg q6 while on BiPAP    -Lasix 20mg IV (40mg PO at home)    - Echo 01/24: EF 61%, G1DD, mild MR, mild TR  #hx of HTN    -Lisinopril 40mg daily    -Nifedipine XL 60mg daily    GASTROINTESTINAL/NUTRITION:   #adenocarcinoma of sigmoid mass  #s/p attempted robotic sigmoid resection, aborted secondary to carcinoma implants 1/26  #s/p g-tube removed, gastropexy completed 1/26  #s/p colonoscopy w/ biopsy 1/12    -NPO x/rx once off BiPAP    -aspiration precautions, HOB 30  #hx of recurrent hiatal hernias  #GI Prophylaxis    pantoprazole IV (po at home)     -not indicated  #Bowel regimen    -senna/miralax if indicated    -last bowel movement      /RENAL:   #urine output in critically ill    -indwelling hutchison (placed 1/26)    Labs:          BUN/Cr- 14/1.1  -->,  17/1.2  -->          Electrolytes-Na 139 // K 3.8 // Mg 1.6 //  Phos 4.2 (01-27 @ 04:47)  #hypomagnesemia-repleted #hypokaelmia-repleted       HEME/ONC:   #acute on chronic anemia   -1u PRBC during OR    -T&S Expires 1/27  #sigmoid mass adenocarcinoma  #DVT prophylaxis     -Chemical: heparin   Injectable 5000 Unit(s) SubCutaneous every 8 hours due to DAWIT     -Mechanical: SCDs    Labs: Hb/Hct:  10.4/34.2  -->,  9.0/29.4  -->                      Plts:  285  -->,  266  -->                 PTT/INR:  30.8/1.03  --->     ID:  #monitor for leukocytosis/fever  WBC- 11.62  --->>,  18.19  --->>,  18.29  --->>  Temp trend- 24hrs T(F): 98 (01-27 @ 04:00), Max: 98.3 (01-27 @ 00:00)    ENDOCRINE:    -FSG q6 if NPO or Tube feeds    -Glucose goal 140-180. if above 180 start ISS    MSK:     Activity - Ambulate as Tolerated:     LINES/DRAINS:  PIV, Hutchison, Left radial arterial line (1/26)    ADVANCED DIRECTIVES:  Full Code  Sister Sonali hanley) [phone] (HCP)  Nephew Collins [phone]    INDICATION FOR SICU: Respiratory distress requiring NIV    DISPO:   SICU     78y Female w/ pmhx of acute respiratory distress secondary to respiratory acidosis requiring NIV    NEUROLOGICAL:  #Acute pain    -IV Tylenol  #hx of anxiety    -alprazolam 0.25mg q12 HOLDING while on BiPAP/somolent    -sertraline 100mg qd HOLDING while on BiPAP/somolent    RESPIRATORY:   #respiratory acidosis    -BiPAP 12/6, FiO2 50%  01-27 @ 05:14--7.36 / 39 / 122 / 22 / 99.1  O2 99.1  Lac 0.8    01-26 @ 20:15--7.30 / 46 / 58 / 23 / 90.3  O2 90.3  Lac 0.6      -symbicort 160-4.5mcg/inhalation    - AM CXR ordered  #Activity    -increase as tolerated    CARDS: *DNR*  #hx of HFpEF     -Metoprolol ER 50mg ->  IV 5mg q6 while on BiPAP    -Lasix 20mg IV (40mg PO at home)    - Echo 01/24: EF 61%, G1DD, mild MR, mild TR  #hx of HTN    -Lisinopril 40mg daily    -Nifedipine XL 60mg daily    GASTROINTESTINAL/NUTRITION:   #adenocarcinoma of sigmoid mass  #s/p attempted robotic sigmoid resection, aborted secondary to carcinoma implants 1/26  #s/p g-tube removed, gastropexy completed 1/26  #s/p colonoscopy w/ biopsy 1/12    -NPO x/rx once off BiPAP    -aspiration precautions, HOB 30  #hx of recurrent hiatal hernias  #GI Prophylaxis    pantoprazole IV (po at home)     -not indicated  #Bowel regimen    -senna/miralax if indicated    -last bowel movement      /RENAL:   #urine output in critically ill    -indwelling hutchison (placed 1/26)    Labs:          BUN/Cr- 14/1.1  -->,  17/1.2  -->          Electrolytes-Na 139 // K 3.8 // Mg 1.6 //  Phos 4.2 (01-27 @ 04:47)  #hypomagnesemia-repleted #hypokaelmia-repleted       HEME/ONC:   #acute on chronic anemia   -1u PRBC during OR    -T&S Expires 1/27  #sigmoid mass adenocarcinoma  #DVT prophylaxis     -Chemical: heparin   Injectable 5000 Unit(s) SubCutaneous every 8 hours due to DAWIT     -Mechanical: SCDs    Labs: Hb/Hct:  10.4/34.2  -->,  9.0/29.4  -->                      Plts:  285  -->,  266  -->                 PTT/INR:  30.8/1.03  --->     ID:  #monitor for leukocytosis/fever  WBC- 11.62  --->>,  18.19  --->>,  18.29  --->>  Temp trend- 24hrs T(F): 98 (01-27 @ 04:00), Max: 98.3 (01-27 @ 00:00)    ENDOCRINE:    -FSG q6 if NPO or Tube feeds    -Glucose goal 140-180. if above 180 start ISS    MSK:     Activity - Ambulate as Tolerated:     LINES/DRAINS:  PIV, Hutchison, Left radial arterial line (1/26)    ADVANCED DIRECTIVES:  Full Code  Sister Soanli hanley) [phone] (HCP)  Nephew Collins [phone]    INDICATION FOR SICU: Respiratory distress requiring NIV    DISPO:   SICU

## 2024-01-27 NOTE — PHYSICAL THERAPY INITIAL EVALUATION ADULT - TRANSFER SAFETY CONCERNS NOTED: SIT/STAND, REHAB EVAL
decreased safety awareness/decreased sequencing ability/decreased weight-shifting ability
decreased balance during turns/decreased step length

## 2024-01-27 NOTE — PHYSICAL THERAPY INITIAL EVALUATION ADULT - GENERAL OBSERVATIONS, REHAB EVAL
Patient encountered lying in bed. On O2 via ventimask 15LPM, 50% O2. O2 sat 95% at rest. Agreed to participate in therapy.
Pt encountered sitting in b/s chair +alarms, in NAD agreeable to PT IE. Pt tolerates sit to stand transfers, ambulation, and stair training. Pt left as found in b/s chair +alarms in NAD. RN aware.

## 2024-01-27 NOTE — PHYSICAL THERAPY INITIAL EVALUATION ADULT - ASSISTIVE DEVICE, REHAB EVAL
Service: Orthopaedics     Subjective Data:   SCARLETT AKHTAR is a 66 year old Male who is Hospital Day # 2 and POD #1 for 1. Left Hip Total Arthroplasty;     Doing well this AM, pain in the hip otherwise no new N/T/Weakness. Did not see therapy yesterday. Up to the side of the bed on his own. Denies OB/CP/N/V.    Objective Data:     Objective Information:      T   P  R  BP   MAP  SpO2   Value  36  68  17  153/71      95%  Date/Time 4/4 19:10 4/4 22:59 4/4 22:59 4/4 22:59    4/4 22:59  Range  (36C - 36C )  (68 - 69 )  (17 - 17 )  (128 - 153 )/ (63 - 71 )    (95% - 97% )      Pain reported at 4/5 5:50: 8 = Severe    ---- Intake and Output  -----  Mn/Dy/Year Time  Intake   Output  Net  Apr 5, 2023 6:00 am  0   700  -700  Apr 4, 2023 10:00 pm  1425   581  765    The Intake and Output Totals for the last 24 hours are:      Intake   Output  Net      1427   1360  65    Physical Exam by System:    Constitutional: Well developed, awake/alert/oriented  x3, no distress, alert and cooperative   Eyes: Anicteric   Head/Neck: Atraumatic, normocephalic   Respiratory/Thorax: Symmetric chest rise, nonlabored  on room air   Cardiovascular: RRR on peripheral palpation   Musculoskeletal: Left lower extremity:  - Mepilex dressing CDI over the left hip without strikethrough  - Tender to palpation over hip appropriately  - 5/5 EHL/DF/PF.  - SILT in [place]/Sa/SP/DP/T distribution.  - 2+ DP pulse, < 2 seconds capillary refill.   Psychological: Appropriate affect   Skin: Warm and dry, no lesions, no rashes     Recent Lab Results:    Results:    CBC: 4/5/2023 05:11              \     Hgb     /                              \     11.7 L    /  WBC  ----------------  Plt               6.9       ----------------    202              /     Hct     \                              /     37.7 L    \            RBC: 3.46 L    MCV: 109 H    Neutrophil  %: 68.1  Coagulation: 4/5/2023 05:11  PT  /                    12.7  /  -------<    INR           ----------<      1.1  PTT\                    25 L \                       Assessment and Plan:   Comorbidities:  ·  Comorbidity obesity   ·  Obesity obesity (BMI 35-39.9)   ·  BMI 39.05     Code Status:  ·  Code Status Full Code     Advance Care Planning:  Advance Care Planning: I evaluated the patient  and determined the patient's capacity to understand the risks, benefits and alternatives to treatment. I elicited the patient's goals for treatment and reviewed advance directives and medical orders for life sustaining treatment. The patient was given  an opportunity to review a blank advance directive as appropriate.     Assessment:    66M with left hip OA s/p L DIANE with Dr. Coon on 4/4.    Plan:  - CLD, ADAT  - WBAT LLE with posterior hip precautions  - PT/OT  - DVT ppx: Eliquis 2.5mg BID, SCDs, LIAN, ambulation; Hx of PA1 mutation  - Encourage aggressive IS  - Ancef 3.0 g IV q6h x 3 doses  - PO and IV pain regimen  - No drain, no sewell  - Continue home meds  - f/u AM labs    - Dispo: pending PT, anticipate DC home with home care today    Lucas Haase, MD  Orthopaedic Surgery, PGY-4  Pager: [phone]  Available via [employer] (preferred)         Please page [phone] (ortho on-call) after 6pm and on weekends        Attestation:   Note Completion:  I am a:  Resident/Fellow   Attending Attestation I saw and evaluated the patient.  I personally obtained the key and critical portions of the history and physical exam or was physically present for key and  critical portions performed by the resident/fellow. I reviewed the resident/fellow?s documentation and discussed the patient with the resident/fellow.  I agree with the resident/fellow?s medical decision making as documented in the note.     I personally evaluated the patient on 05-Apr-2023         Electronic Signatures:  Andrea Coon)  (Signed 17-Apr-2023 06:50)   Authored: Note Completion   Co-Signer: Andrea Coon)    bed rails

## 2024-01-27 NOTE — PROGRESS NOTE ADULT - SUBJECTIVE AND OBJECTIVE BOX
DARIEN BARRERA   [id number]   [birth year]  78yF  ============================   SICU Consult: acute respiratory distress secondary to respiratory acidosis requiring NIV s/p Diagnostic laparoscopy with findings of numerous malignant-appearing implants in peritoneum, liver, and small bowel. Procedure aborted. G-tube removed, robotic gastropexy performed.      OR Stats for 01-26-24    OR Time:   1.5hrs  IV Fluids:  1L LR, 500cc NS, 100cc Albumin  EBL: 100ml  Blood Products: 1u PRBC  UOP:  150ml       24 Hour Events  -Admission under SICU service  1/26  NIGHT  -PM Rounds: BiPAP 12/6 @ 50%, abdomen soft NT, ND, laparoscopic port sites C/D/I, diminished breath sounds on the left side  -IV Acetaminophen given      DAY   trend ABG    -bipap 12/6    ============================    DARIEN BARRERA   [id number]   [birth year]  78yF  ============================   SICU Consult: acute respiratory distress secondary to respiratory acidosis requiring NIV s/p Diagnostic laparoscopy with findings of numerous malignant-appearing implants in peritoneum, liver, and small bowel. Procedure aborted. G-tube removed, robotic gastropexy performed.      OR Stats for 01-26-24    OR Time:   1.5hrs  IV Fluids:  1L LR, 500cc NS, 100cc Albumin  EBL: 100ml  Blood Products: 1u PRBC  UOP:  150ml       24 Hour Events  -Admission under SICU service  1/26  NIGHT  -PM Rounds: BiPAP 12/6 @ 50%, abdomen soft NT, ND, laparoscopic port sites C/D/I, diminished breath sounds on the left side, following commands  -IV Acetaminophen given  -mg repleted  -AM ABG 7.36/39/122/22      DAY   trend ABG    -bipap 12/6    ============================    DARIEN BARRERA   [id number]   [birth year]  78yF  ============================   SICU Consult: acute respiratory distress secondary to respiratory acidosis requiring NIV s/p Diagnostic laparoscopy with findings of numerous malignant-appearing implants in peritoneum, liver, and small bowel. Procedure aborted. G-tube removed, robotic gastropexy performed.      OR Stats for 01-26-24    OR Time:   1.5hrs  IV Fluids:  1L LR, 500cc NS, 100cc Albumin  EBL: 100ml  Blood Products: 1u PRBC  UOP:  150ml       24 Hour Events  -Admission under SICU service  1/26  NIGHT  -PM Rounds: BiPAP 12/6 @ 50%, abdomen soft NT, ND, laparoscopic port sites C/D/I, diminished breath sounds on the left side, following commands  -IV Acetaminophen given  -mg repleted  -AM ABG 7.36/39/122/22      DAY   trend ABG    -bipap 12/6    ============================        CURRENT MEDS:   Neurologic Medications  acetaminophen     Tablet .. 650 milliGRAM(s) Oral every 6 hours PRN Temp greater or equal to 38C (100.4F), Mild Pain (1 - 3)  melatonin 5 milliGRAM(s) Oral at bedtime PRN Insomnia    Respiratory Medications  albuterol    0.083%. 2.5 milliGRAM(s) Nebulizer once PRN Shortness of Breath and/or Wheezing    Cardiovascular Medications  hydrALAZINE Injectable 10 milliGRAM(s) IV Push every 6 hours PRN SBP >160  metoprolol tartrate Injectable 5 milliGRAM(s) IV Push every 6 hours    Gastrointestinal Medications  lactated ringers. 1000 milliLiter(s) IV Continuous <Continuous>  pantoprazole  Injectable 40 milliGRAM(s) IV Push daily    Genitourinary Medications    Hematologic/Oncologic Medications  heparin   Injectable 5000 Unit(s) SubCutaneous every 8 hours    Antimicrobial/Immunologic Medications    Endocrine/Metabolic Medications    Topical/Other Medications  chlorhexidine 2% Cloths 1 Application(s) Topical <User Schedule>    ICU Vital Signs Last 24 Hrs  T(C): 36.7 (27 Jan 2024 04:00), Max: 36.8 (27 Jan 2024 00:00)  T(F): 98 (27 Jan 2024 04:00), Max: 98.3 (27 Jan 2024 00:00)  HR: 70 (27 Jan 2024 07:00) (64 - 97)  BP: 149/68 (27 Jan 2024 06:00) (113/56 - 155/67)  BP(mean): 98 (27 Jan 2024 06:00) (81 - 100)  ABP: 144/51 (27 Jan 2024 07:00) (121/50 - 160/55)  ABP(mean): 82 (27 Jan 2024 07:00) (71 - 95)  RR: 23 (27 Jan 2024 07:00) (17 - 23)  SpO2: 96% (27 Jan 2024 07:00) (90% - 100%)    O2 Parameters below as of 27 Jan 2024 04:00  Patient On (Oxygen Delivery Method): BiPAP/CPAP    O2 Concentration (%): 50        01-27 @ 05:14--7.36 / 39 / 122 / 22 / [id number] 99.1 / Lactate 0.8 / iCal 1.28   01-26 @ 20:15--7.30 / 46 / 58 / 23 / [id number] 90.3 / Lactate 0.6 / iCal 1.27     I&O's Summary    26 Jan 2024 07:01  -  27 Jan 2024 07:00  --------------------------------------------------------  IN: 850 mL / OUT: 2040 mL / NET: -1190 mL      I&O's Detail    26 Jan 2024 07:01  -  27 Jan 2024 07:00  --------------------------------------------------------  IN:    IV PiggyBack: 250 mL    Lactated Ringers: 600 mL  Total IN: 850 mL    OUT:    Indwelling Catheter - Urethral (mL): 2040 mL  Total OUT: 2040 mL    Total NET: -1190 mL           PHYSICAL EXAM:      a&ox3, hard of hearing  follows commands, KUMAR  on bipap  equal chest rise b/l  abdomen soft, trochar sites w/o bleeding  extremities soft  urinary cath in place

## 2024-01-27 NOTE — PHYSICAL THERAPY INITIAL EVALUATION ADULT - ASSISTIVE DEVICE FOR TRANSFER: SIT/STAND, REHAB EVAL
August 16, 2023        Patient: Dano Kelley   YOB: 2013   Date of Visit: 8/16/2023       To Whom It May Concern:    PARENT AUTHORIZATION TO ADMINISTER MEDICATION AT SCHOOL    I hereby authorize school staff to administer the medication described below to my child, Dano Kelley.    I understand that the teacher or other school personnel will administer only the medication described below. If the prescription is changed, a new form for parental consent and a new physician's order must be completed before the school staff can administer the new medication.    Signature:_______________________________  Date:__________                    Parent/Guardian Signature      HEALTHCARE PROVIDER AUTHORIZATION TO ADMINISTER MEDICATION AT SCHOOL    As of today, 8/16/2023, the following medication has been prescribed for Dano for the treatment of asthma. In my opinion, this medication is necessary during the school day.     Please give:         Medication: Albuterol inhaler       Dosage: 1-2 puffs       Time: as needed every 4-6 hrs       Common side effects can include: dizziness or light-headedness, rapid heart rate.        Sincerely,        LARRY Mejía.  Electronically Signed     
rolling walker
rolling walker

## 2024-01-27 NOTE — PROGRESS NOTE ADULT - ATTENDING COMMENTS
This patient has a high probability of sudden, clinically significant deterioration, which requires the highest level of physician preparedness to intervene urgently. I managed/supervised life or organ supporting interventions that required frequent physician assessment. I devoted my full attention in the ICU to the direct care of this patient for the period of time indicated below. Time I spent with the family or surrogate(s) is included only if the patient was incapable of providing the necessary information or participating in medical decision making. Time devoted to teaching and to any procedures I billed separately is not included.     Patient is examined and evaluated at the bedside with SICU team. A medically appropriate exam was performed, and pertinent findings are detailed below. Treatment plan discussed with SICU team, nurses and primary team.   Vital signs, laboratory work, and imaging reviewed during rounds.  Will continue hemodynamic monitoring as per protocol in SICU.    24h events: improving respiratory acidosis, taken off BiPAP this morning, ambulated around unit    Neuro: oriented to person and person, ambulating, moving all extremities  Medications -  IV tylenol for acute pain, melatonin for sleep difficulty    Respiratory: respirations even and unlabored, BiPAP overnight FiO2 50%    ABG - ( 27 Jan 2024 05:14 )  pH, Arterial: 7.36  pH, Blood: x     /  pCO2: 39    /  pO2: 122   / HCO3: 22    / Base Excess: -3.2  /  SaO2: 99.1    Medications - symbicort for respiratory insufficiency, give lasix for pulmonary edema and L pleral effusion and history of CHF  CXR reviewed and interpreted by me - improved aeration L lung with moderate effusion    CV: monitor hemodynamics, MAP goal > 65  Medications - IV metoprolol for HFpEF, hydralazine PRN for SBP < 160, holding lisinopril and nifedipine     GI: abd soft, not distended, appropriately tender around incisions, dressings C/D/I  Last BM recorded 1/13  Nutrition - NPO, perform bedside swallow to see if she can take meds and follow up with surgery regarding initiating liquid diet  Medications -  follow up with surgery re: starting bowel regimen in setting of sigmoid mass  Ppx - Protonix (home med)      Renal: Continue I&Os monitoring, replete electrolytes as needed, magnesium sulfate given for hypomagnesemia, potassium given to prevent hypokalemia  01-27    139  |  105  |  17  ----------------------------<  83  3.8   |  23  |  1.2    Ca    8.6      27 Jan 2024 04:47  Phos  4.2     01-27  Mg     1.6     01-27    TPro  6.2  /  Alb  4.0  /  TBili  <0.2  /  DBili  <0.2  /  AST  49<H>  /  ALT  25  /  AlkPhos  121<H>  01-26    UOP - 2L  I/O - 850/2040  Tapia catheter - plan to D/C tonight  IVF - LR @ 50 --> HLIVF if tolerating diet    Heme: continue to evaluate for acute blood loss anemia- trend Hg/Hct                         9.0    18.29 )-----------( 266      ( 27 Jan 2024 04:47 )             29.4     Anticoagulation - SQH    ID: trend leukocytosis, monitor for fevers, no signs of infection    Endocrine: Prevent and treat hyperglycemia with insulin as needed    PV: follow pulse exam    MSK: OOB and mobilize as able, PT eval    Skin: decub precautions, start ointment for chapped lips    Lines: L radial arterial line, Tapia, PIVs  DVT Prophylaxis: SQH  Stress Gastritis Prophylaxis: Protonix (home med)   Disposition: SICU - at risk for respiratory distress, re-evaluate later this afternoon for possible downgrade to floor    I saw and evaluated the patient personally. I have reviewed and agree with note above. Treatment plan discussed with SICU team, nurses and primary team at the time of the multidisciplinary rounds.     Michael Reyna MD  Trauma/ACS/SCC Attending

## 2024-01-27 NOTE — PHYSICAL THERAPY INITIAL EVALUATION ADULT - GAIT DEVIATIONS NOTED, PT EVAL
O2 follow and IV pole follow/decreased carlene/increased time in double stance
decreased carlene/decreased velocity of limb motion/decreased step length/decreased stride length

## 2024-01-27 NOTE — PHYSICAL THERAPY INITIAL EVALUATION ADULT - LEVEL OF INDEPENDENCE, REHAB EVAL
moderate assist (50% patients effort)
Pt encountered in b/s chair, declines going back to bed requests to continue sitting in b/s chair/unable to perform

## 2024-01-27 NOTE — PROGRESS NOTE ADULT - ASSESSMENT
78y F w/ PMHx of hiatal hernia s/p repair >10 years back, L emypema s/p decortication 2018, hypertension, GERD, non obstructive CAD, depression, dementia, anemia presenting for evaluation of low hemoglobin, measured outpatient to be 7.4.  The pt presented to the Ed for a blood transfusion .  a 3.0 cm friable sigmoid colon mass was identified on colonoscopy.   She was for Robotic sigmoidectomy for colonic mass>aborted due to diffuse peritoneal carcinomatosis.   Procedure performed included diagnostic laparoscopy, G tube removal, robotic gastropexy.   PIPO sec to GIB  w/p outpt showed  ferr at 35.>> 20   s/p transfusion    iv venofer qd x 2 days   cont other supportive care  further discussion about Mn of  metastatic colon can as outpt when recovered  will f/u

## 2024-01-27 NOTE — PROGRESS NOTE ADULT - ASSESSMENT
78y F w/ PMHx of hiatal hernia s/p repair >10 years back, L emypema s/p decortication 2018, hypertension, GERD, non obstructive CAD, depression, dementia, anemia presenting for evaluation of low hemoglobin, measured outpatient to be 7.4. The pt presented to the Ed for a blood transfusion that was recommended by the hematologist. a 3.0 cm friable sigmoid colon mass was identified on colonoscopy. She is now s/p Robotic sigmoidectomy for colonic mass, aborted due to diffuse peritoneal carcinomatosis. Procedure performed included diagnostic laparoscopy, G tube remocal, robotic gastropexy. Postoperatively patient was difficult to arouse, concern for CO2 retention. Family contacted as patient is DNR/DNI, agreed to trial NIV, now on Bipap.    PLAN:   - DVT ppx  - NPO, IVF  - Wean Bipap as tolerated  - hutchison in place, monitor UOP  - Diuresis, monitor volume status  - Daily labs, replete electrolytes as needed, monitor Hgb  - Rest of care per SICU    BLUE SURGERY SPECTRA: 2033

## 2024-01-27 NOTE — PROGRESS NOTE ADULT - SUBJECTIVE AND OBJECTIVE BOX
Patient is a 78y old  Female who presents with a chief complaint of Anemia (27 Jan 2024 03:18)       Pt is seen and examined  pt is awake and out of bed to chair  pt seems comfortable         ROS:  Negative except for:abdominal discomfort     MEDICATIONS  (STANDING):  chlorhexidine 2% Cloths 1 Application(s) Topical <User Schedule>  heparin   Injectable 5000 Unit(s) SubCutaneous every 8 hours  lactated ringers. 1000 milliLiter(s) (50 mL/Hr) IV Continuous <Continuous>  metoprolol tartrate Injectable 5 milliGRAM(s) IV Push every 6 hours  pantoprazole    Tablet 40 milliGRAM(s) Oral before breakfast    MEDICATIONS  (PRN):  acetaminophen     Tablet .. 650 milliGRAM(s) Oral every 6 hours PRN Temp greater or equal to 38C (100.4F), Mild Pain (1 - 3)  hydrALAZINE Injectable 10 milliGRAM(s) IV Push every 6 hours PRN SBP >160  melatonin 5 milliGRAM(s) Oral at bedtime PRN Insomnia  vitamin A &amp; D Ointment 1 Application(s) Topical four times a day PRN chapped lips      Allergies    No Known Allergies    Intolerances        Vital Signs Last 24 Hrs  T(C): 37.3 (27 Jan 2024 08:00), Max: 37.3 (27 Jan 2024 08:00)  T(F): 99.2 (27 Jan 2024 08:00), Max: 99.2 (27 Jan 2024 08:00)  HR: 69 (27 Jan 2024 11:00) (64 - 75)  BP: 110/57 (27 Jan 2024 11:00) (110/57 - 155/67)  BP(mean): 78 (27 Jan 2024 11:00) (78 - 100)  RR: 20 (27 Jan 2024 11:00) (17 - 23)  SpO2: 95% (27 Jan 2024 11:00) (90% - 100%)    Parameters below as of 27 Jan 2024 11:00  Patient On (Oxygen Delivery Method): mask, Venturi        PHYSICAL EXAM  General: adult in NAD  HEENT: clear oropharynx, anicteric sclera, pink conjunctiva  Neck: supple  CV: normal S1/S2 with no murmur rubs or gallops  Lungs: positive air movement b/l ant lungs,clear to auscultation, no wheezes, no rales  Abdomen: soft non-tender non-distended,   Ext: no clubbing cyanosis or edema  Skin: no rashes and no petechiae  Neuro: alert and oriented X 4, no focal deficits  LABS:                          9.0    18.29 )-----------( 266      ( 27 Jan 2024 04:47 )             29.4         Mean Cell Volume : 76.2 fL  Mean Cell Hemoglobin : 23.3 pg  Mean Cell Hemoglobin Concentration : 30.6 g/dL  Auto Neutrophil # : x  Auto Lymphocyte # : x  Auto Monocyte # : x  Auto Eosinophil # : x  Auto Basophil # : x  Auto Neutrophil % : x  Auto Lymphocyte % : x  Auto Monocyte % : x  Auto Eosinophil % : x  Auto Basophil % : x    Serial CBC's  01-27 @ 04:47  Hct-29.4 / Hgb-9.0 / Plat-266 / RBC-3.86 / WBC-18.29          Serial CBC's  01-26 @ 16:12  Hct-34.2 / Hgb-10.4 / Plat-285 / RBC-4.48 / WBC-18.19          Serial CBC's  01-25 @ 20:20  Hct-30.7 / Hgb-8.9 / Plat-292 / RBC-4.07 / WBC-11.62          Serial CBC's  01-25 @ 05:51  Hct-27.1 / Hgb-8.0 / Plat-259 / RBC-3.62 / WBC-10.86          Serial CBC's  01-24 @ 21:16  Hct-30.1 / Hgb-8.7 / Plat-280 / RBC-4.00 / WBC-12.02            01-27    139  |  105  |  17  ----------------------------<  83  3.8   |  23  |  1.2    Ca    8.6      27 Jan 2024 04:47  Phos  4.2     01-27  Mg     1.6     01-27    TPro  6.2  /  Alb  4.0  /  TBili  <0.2  /  DBili  <0.2  /  AST  49<H>  /  ALT  25  /  AlkPhos  121<H>  01-26      PT/INR - ( 26 Jan 2024 16:12 )   PT: 11.80 sec;   INR: 1.03 ratio         PTT - ( 26 Jan 2024 16:12 )  PTT:30.8 sec    WBC Count: 18.29 K/uL (01-27-24 @ 04:47)  Hemoglobin: 9.0 g/dL (01-27-24 @ 04:47)  Hematocrit: 29.4 % (01-27-24 @ 04:47)  Platelet Count - Automated: 266 K/uL (01-27-24 @ 04:47)  WBC Count: 18.19 K/uL (01-26-24 @ 16:12)  Hemoglobin: 10.4 g/dL (01-26-24 @ 16:12)  Hematocrit: 34.2 % (01-26-24 @ 16:12)  Platelet Count - Automated: 285 K/uL (01-26-24 @ 16:12)  WBC Count: 11.62 K/uL (01-25-24 @ 20:20)  Hemoglobin: 8.9 g/dL (01-25-24 @ 20:20)  Hematocrit: 30.7 % (01-25-24 @ 20:20)  Platelet Count - Automated: 292 K/uL (01-25-24 @ 20:20)  WBC Count: 10.86 K/uL (01-25-24 @ 05:51)  Hemoglobin: 8.0 g/dL (01-25-24 @ 05:51)  Hematocrit: 27.1 % (01-25-24 @ 05:51)  Platelet Count - Automated: 259 K/uL (01-25-24 @ 05:51)  WBC Count: 12.02 K/uL (01-24-24 @ 21:16)  Hemoglobin: 8.7 g/dL (01-24-24 @ 21:16)  Hematocrit: 30.1 % (01-24-24 @ 21:16)  Platelet Count - Automated: 280 K/uL (01-24-24 @ 21:16)  WBC Count: 10.45 K/uL (01-24-24 @ 06:17)  Hemoglobin: 8.0 g/dL (01-24-24 @ 06:17)  Hematocrit: 26.8 % (01-24-24 @ 06:17)  Platelet Count - Automated: 238 K/uL (01-24-24 @ 06:17)  WBC Count: 8.51 K/uL (01-23-24 @ 07:14)  Hemoglobin: 8.0 g/dL (01-23-24 @ 07:14)  Hematocrit: 27.5 % (01-23-24 @ 07:14)  Platelet Count - Automated: 256 K/uL (01-23-24 @ 07:14)  WBC Count: 9.04 K/uL (01-22-24 @ 06:41)  Hemoglobin: 8.2 g/dL (01-22-24 @ 06:41)  Hematocrit: 27.1 % (01-22-24 @ 06:41)  Platelet Count - Automated: 272 K/uL (01-22-24 @ 06:41)  WBC Count: 7.79 K/uL (01-21-24 @ 06:38)  Hemoglobin: 8.3 g/dL (01-21-24 @ 06:38)  Hematocrit: 28.3 % (01-21-24 @ 06:38)  Platelet Count - Automated: 262 K/uL (01-21-24 @ 06:38)  WBC Count: 8.44 K/uL (01-20-24 @ 08:18)  Hemoglobin: 8.9 g/dL (01-20-24 @ 08:18)  Hematocrit: 30.2 % (01-20-24 @ 08:18)  Platelet Count - Automated: 319 K/uL (01-20-24 @ 08:18)  WBC Count: 8.71 K/uL (01-19-24 @ 06:12)  Hemoglobin: 8.2 g/dL (01-19-24 @ 06:12)  Hematocrit: 27.7 % (01-19-24 @ 06:12)  Platelet Count - Automated: 340 K/uL (01-19-24 @ 06:12)  WBC Count: 8.14 K/uL (01-18-24 @ 07:18)  Hemoglobin: 8.2 g/dL (01-18-24 @ 07:18)  Hematocrit: 28.0 % (01-18-24 @ 07:18)  Platelet Count - Automated: 296 K/uL (01-18-24 @ 07:18)                BLOOD SMEAR INTERPRETATION:       RADIOLOGY & ADDITIONAL STUDIES:

## 2024-01-27 NOTE — PHYSICAL THERAPY INITIAL EVALUATION ADULT - ORIENTATION, REHAB EVAL
oriented to person, place, time and situation
[place]/oriented to person, place, time and situation

## 2024-01-27 NOTE — PROGRESS NOTE ADULT - SUBJECTIVE AND OBJECTIVE BOX
GENERAL SURGERY PROGRESS NOTE     DARIEN BARRERA  78y  Female  Hospital day :19d  POD: 1  Procedure: Diagnostic laparoscopy    Robot-assisted gastropexy      OVERNIGHT EVENTS: Patient went to the OR yesterday w/ plan for Robotic sigmoidectomy for colonic mass, aborted due to diffuse peritoneal carcinomatosis. Procedure performed included diagnostic laparoscopy, G tube remocal, robotic gastropexy. Postoperatively patient was difficult to arouse, concern for CO2 retention. Family contacted as patient is DNR/DNI, agreed to trial NIV, now on Bipap. Received one unit of prbc's intraoperatively, albumin. concern for Fluid overload, now receiving IV lasix 20mg daily.     T(F): 98.3 (01-27-24 @ 00:00), Max: 98.3 (01-27-24 @ 00:00)  HR: 73 (01-27-24 @ 00:00) (60 - 97)  BP: 149/68 (01-27-24 @ 00:00) (124/60 - 155/67)  ABP: 156/51 (01-27-24 @ 00:00) (147/62 - 160/55)  ABP(mean): 87 (01-27-24 @ 00:00) (87 - 95)  RR: 17 (01-26-24 @ 16:30) (17 - 18)  SpO2: 96% (01-27-24 @ 00:00) (90% - 100%)    DIET/FLUIDS: lactated ringers. 1000 milliLiter(s) IV Continuous <Continuous>    NG:                                                                                DRAINS:     BM:     EMESIS:     URINE:    Indwelling Urethral Catheter:     Connect To:  Straight Drainage/Gravity    Indication:  Urine Output Monitoring in Critically Ill (01-26-24 @ 15:07)    GI proph:  pantoprazole  Injectable 40 milliGRAM(s) IV Push daily    AC/ proph: heparin   Injectable 5000 Unit(s) SubCutaneous every 8 hours    ABx:     PHYSICAL EXAM:  GENERAL: ill appearing  CHEST/LUNG: Equal chest rise bilaterally, on BIPAP  HEART: Regular rate  ABDOMEN: Soft, diffusely tender to palpation surrounding surgical incision sites, Nondistended;   EXTREMITIES:  No clubbing, cyanosis, or edema      LABS  Labs:  CAPILLARY BLOOD GLUCOSE      POCT Blood Glucose.: 156 mg/dL (26 Jan 2024 11:23)  POCT Blood Glucose.: 118 mg/dL (26 Jan 2024 06:48)                          10.4   18.19 )-----------( 285      ( 26 Jan 2024 16:12 )             34.2       Auto Neutrophil %: 91.3 % (01-26-24 @ 16:12)  Auto Immature Granulocyte %: 0.7 % (01-26-24 @ 16:12)    01-26    137  |  104  |  14  ----------------------------<  130<H>  4.4   |  20  |  1.1      Calcium: 8.6 mg/dL (01-26-24 @ 16:12)      LFTs:             6.2  | <0.2 | 49       ------------------[121     ( 26 Jan 2024 16:12 )  4.0  | <0.2 | 25          Lipase:x      Amylase:x         Blood Gas Arterial, Lactate: 0.6 mmol/L (01-26-24 @ 20:15)  Blood Gas Arterial, Lactate: 0.4 mmol/L (01-26-24 @ 17:33)    ABG - ( 26 Jan 2024 20:15 )  pH: 7.30  /  pCO2: 46    /  pO2: 58    / HCO3: 23    / Base Excess: -3.8  /  SaO2: 90.3            ABG - ( 26 Jan 2024 17:33 )  pH: 7.27  /  pCO2: 51    /  pO2: 119   / HCO3: 23    / Base Excess: -4.0  /  SaO2: 98.0              Coags:     11.80  ----< 1.03    ( 26 Jan 2024 16:12 )     30.8        CARDIAC MARKERS ( 26 Jan 2024 16:12 )  x     / x     / 80 U/L / x     / 2.9 ng/mL          Urinalysis Basic - ( 26 Jan 2024 16:12 )    Color: x / Appearance: x / SG: x / pH: x  Gluc: 130 mg/dL / Ketone: x  / Bili: x / Urobili: x   Blood: x / Protein: x / Nitrite: x   Leuk Esterase: x / RBC: x / WBC x   Sq Epi: x / Non Sq Epi: x / Bacteria: x            RADIOLOGY & ADDITIONAL TESTS:  < from: Xray Chest 1 View- PORTABLE-Urgent (Xray Chest 1 View- PORTABLE-Urgent .) (01.26.24 @ 15:46) >  Leftlower lobe opacity/pleural effusion unchanged. No air leak.    < end of copied text >

## 2024-01-28 LAB
ANION GAP SERPL CALC-SCNC: 15 MMOL/L — HIGH (ref 7–14)
BASOPHILS # BLD AUTO: 0.05 K/UL — SIGNIFICANT CHANGE UP (ref 0–0.2)
BASOPHILS NFR BLD AUTO: 0.4 % — SIGNIFICANT CHANGE UP (ref 0–1)
BUN SERPL-MCNC: 14 MG/DL — SIGNIFICANT CHANGE UP (ref 10–20)
CALCIUM SERPL-MCNC: 8.5 MG/DL — SIGNIFICANT CHANGE UP (ref 8.4–10.5)
CHLORIDE SERPL-SCNC: 103 MMOL/L — SIGNIFICANT CHANGE UP (ref 98–110)
CO2 SERPL-SCNC: 23 MMOL/L — SIGNIFICANT CHANGE UP (ref 17–32)
CREAT ?TM UR-MCNC: 99 MG/DL — SIGNIFICANT CHANGE UP
CREAT SERPL-MCNC: 0.8 MG/DL — SIGNIFICANT CHANGE UP (ref 0.7–1.5)
EGFR: 75 ML/MIN/1.73M2 — SIGNIFICANT CHANGE UP
EOSINOPHIL # BLD AUTO: 0.03 K/UL — SIGNIFICANT CHANGE UP (ref 0–0.7)
EOSINOPHIL NFR BLD AUTO: 0.2 % — SIGNIFICANT CHANGE UP (ref 0–8)
GLUCOSE BLDC GLUCOMTR-MCNC: 114 MG/DL — HIGH (ref 70–99)
GLUCOSE BLDC GLUCOMTR-MCNC: 131 MG/DL — HIGH (ref 70–99)
GLUCOSE BLDC GLUCOMTR-MCNC: 135 MG/DL — HIGH (ref 70–99)
GLUCOSE SERPL-MCNC: 82 MG/DL — SIGNIFICANT CHANGE UP (ref 70–99)
HCT VFR BLD CALC: 30.8 % — LOW (ref 37–47)
HGB BLD-MCNC: 9.5 G/DL — LOW (ref 12–16)
IMM GRANULOCYTES NFR BLD AUTO: 0.7 % — HIGH (ref 0.1–0.3)
LYMPHOCYTES # BLD AUTO: 15.6 % — LOW (ref 20.5–51.1)
LYMPHOCYTES # BLD AUTO: 2.12 K/UL — SIGNIFICANT CHANGE UP (ref 1.2–3.4)
MAGNESIUM SERPL-MCNC: 1.7 MG/DL — LOW (ref 1.8–2.4)
MCHC RBC-ENTMCNC: 23.4 PG — LOW (ref 27–31)
MCHC RBC-ENTMCNC: 30.8 G/DL — LOW (ref 32–37)
MCV RBC AUTO: 75.9 FL — LOW (ref 81–99)
MONOCYTES # BLD AUTO: 0.66 K/UL — HIGH (ref 0.1–0.6)
MONOCYTES NFR BLD AUTO: 4.9 % — SIGNIFICANT CHANGE UP (ref 1.7–9.3)
NEUTROPHILS # BLD AUTO: 10.6 K/UL — HIGH (ref 1.4–6.5)
NEUTROPHILS NFR BLD AUTO: 78.2 % — HIGH (ref 42.2–75.2)
NRBC # BLD: 0 /100 WBCS — SIGNIFICANT CHANGE UP (ref 0–0)
PHOSPHATE SERPL-MCNC: 2.4 MG/DL — SIGNIFICANT CHANGE UP (ref 2.1–4.9)
PLATELET # BLD AUTO: 285 K/UL — SIGNIFICANT CHANGE UP (ref 130–400)
PMV BLD: 9.1 FL — SIGNIFICANT CHANGE UP (ref 7.4–10.4)
POTASSIUM SERPL-MCNC: 3.6 MMOL/L — SIGNIFICANT CHANGE UP (ref 3.5–5)
POTASSIUM SERPL-SCNC: 3.6 MMOL/L — SIGNIFICANT CHANGE UP (ref 3.5–5)
RBC # BLD: 4.06 M/UL — LOW (ref 4.2–5.4)
RBC # FLD: 26.3 % — HIGH (ref 11.5–14.5)
SODIUM SERPL-SCNC: 141 MMOL/L — SIGNIFICANT CHANGE UP (ref 135–146)
SODIUM UR-SCNC: 35 MMOL/L — SIGNIFICANT CHANGE UP
WBC # BLD: 13.56 K/UL — HIGH (ref 4.8–10.8)
WBC # FLD AUTO: 13.56 K/UL — HIGH (ref 4.8–10.8)

## 2024-01-28 PROCEDURE — 71045 X-RAY EXAM CHEST 1 VIEW: CPT | Mod: 26

## 2024-01-28 PROCEDURE — 99232 SBSQ HOSP IP/OBS MODERATE 35: CPT | Mod: 24

## 2024-01-28 RX ORDER — HYDRALAZINE HCL 50 MG
25 TABLET ORAL EVERY 8 HOURS
Refills: 0 | Status: DISCONTINUED | OUTPATIENT
Start: 2024-01-28 | End: 2024-01-28

## 2024-01-28 RX ORDER — CALCIUM CARBONATE 500(1250)
1 TABLET ORAL DAILY
Refills: 0 | Status: DISCONTINUED | OUTPATIENT
Start: 2024-01-28 | End: 2024-02-01

## 2024-01-28 RX ORDER — IPRATROPIUM/ALBUTEROL SULFATE 18-103MCG
3 AEROSOL WITH ADAPTER (GRAM) INHALATION ONCE
Refills: 0 | Status: COMPLETED | OUTPATIENT
Start: 2024-01-28 | End: 2024-01-28

## 2024-01-28 RX ORDER — HYDRALAZINE HCL 50 MG
25 TABLET ORAL EVERY 8 HOURS
Refills: 0 | Status: DISCONTINUED | OUTPATIENT
Start: 2024-01-28 | End: 2024-01-29

## 2024-01-28 RX ORDER — HYDRALAZINE HCL 50 MG
10 TABLET ORAL ONCE
Refills: 0 | Status: DISCONTINUED | OUTPATIENT
Start: 2024-01-28 | End: 2024-01-28

## 2024-01-28 RX ORDER — FUROSEMIDE 40 MG
20 TABLET ORAL DAILY
Refills: 0 | Status: DISCONTINUED | OUTPATIENT
Start: 2024-01-28 | End: 2024-01-28

## 2024-01-28 RX ORDER — METOPROLOL TARTRATE 50 MG
12.5 TABLET ORAL EVERY 12 HOURS
Refills: 0 | Status: DISCONTINUED | OUTPATIENT
Start: 2024-01-28 | End: 2024-01-31

## 2024-01-28 RX ORDER — SERTRALINE 25 MG/1
100 TABLET, FILM COATED ORAL DAILY
Refills: 0 | Status: DISCONTINUED | OUTPATIENT
Start: 2024-01-28 | End: 2024-02-01

## 2024-01-28 RX ORDER — FUROSEMIDE 40 MG
40 TABLET ORAL DAILY
Refills: 0 | Status: DISCONTINUED | OUTPATIENT
Start: 2024-01-29 | End: 2024-01-31

## 2024-01-28 RX ADMIN — Medication 25 MILLIGRAM(S): at 17:54

## 2024-01-28 RX ADMIN — PANTOPRAZOLE SODIUM 40 MILLIGRAM(S): 20 TABLET, DELAYED RELEASE ORAL at 09:05

## 2024-01-28 RX ADMIN — Medication 20 MILLIGRAM(S): at 11:46

## 2024-01-28 RX ADMIN — Medication 5 MILLIGRAM(S): at 06:27

## 2024-01-28 RX ADMIN — SENNA PLUS 2 TABLET(S): 8.6 TABLET ORAL at 21:20

## 2024-01-28 RX ADMIN — BUDESONIDE AND FORMOTEROL FUMARATE DIHYDRATE 2 PUFF(S): 160; 4.5 AEROSOL RESPIRATORY (INHALATION) at 16:58

## 2024-01-28 RX ADMIN — HEPARIN SODIUM 5000 UNIT(S): 5000 INJECTION INTRAVENOUS; SUBCUTANEOUS at 06:27

## 2024-01-28 RX ADMIN — Medication 12.5 MILLIGRAM(S): at 16:56

## 2024-01-28 RX ADMIN — Medication 5 MILLIGRAM(S): at 11:44

## 2024-01-28 RX ADMIN — HEPARIN SODIUM 5000 UNIT(S): 5000 INJECTION INTRAVENOUS; SUBCUTANEOUS at 14:53

## 2024-01-28 RX ADMIN — SENNA PLUS 2 TABLET(S): 8.6 TABLET ORAL at 00:27

## 2024-01-28 RX ADMIN — Medication 85 MILLIMOLE(S): at 00:30

## 2024-01-28 RX ADMIN — Medication 1 TABLET(S): at 15:42

## 2024-01-28 RX ADMIN — SERTRALINE 100 MILLIGRAM(S): 25 TABLET, FILM COATED ORAL at 15:42

## 2024-01-28 RX ADMIN — SODIUM CHLORIDE 50 MILLILITER(S): 9 INJECTION, SOLUTION INTRAVENOUS at 09:06

## 2024-01-28 RX ADMIN — CHLORHEXIDINE GLUCONATE 1 APPLICATION(S): 213 SOLUTION TOPICAL at 06:27

## 2024-01-28 RX ADMIN — IRON SUCROSE 173.33 MILLIGRAM(S): 20 INJECTION, SOLUTION INTRAVENOUS at 00:30

## 2024-01-28 RX ADMIN — HEPARIN SODIUM 5000 UNIT(S): 5000 INJECTION INTRAVENOUS; SUBCUTANEOUS at 21:20

## 2024-01-28 RX ADMIN — Medication 25 MILLIGRAM(S): at 21:20

## 2024-01-28 RX ADMIN — Medication 5 MILLIGRAM(S): at 00:30

## 2024-01-28 RX ADMIN — HEPARIN SODIUM 5000 UNIT(S): 5000 INJECTION INTRAVENOUS; SUBCUTANEOUS at 00:31

## 2024-01-28 NOTE — PROGRESS NOTE ADULT - ASSESSMENT
78y F w/ PMHx of hiatal hernia s/p repair >10 years back, L emypema s/p decortication 2018, hypertension, GERD, non obstructive CAD, depression, dementia, anemia presenting for evaluation of low hemoglobin, measured outpatient to be 7.4.  The pt presented to the Ed for a blood transfusion .  a 3.0 cm friable sigmoid colon mass was identified on colonoscopy.   She was for Robotic sigmoidectomy for colonic mass>aborted due to diffuse peritoneal carcinomatosis.   Procedure performed included diagnostic laparoscopy, G tube removal, robotic gastropexy.   PIPO sec to GIB  w/p outpt showed  ferr at 35.>> 20   s/p transfusion    iv venofer qd x 2 days   Leukocytosis likely is reactive .  cont other supportive care.  further discussion about Mn of  metastatic colon can as outpt when recovered  d/c plan as per primary team   will f/u

## 2024-01-28 NOTE — PROGRESS NOTE ADULT - ATTENDING COMMENTS
This patient has a high probability of sudden, clinically significant deterioration, which requires the highest level of physician preparedness to intervene urgently. I managed/supervised life or organ supporting interventions that required frequent physician assessment. I devoted my full attention in the ICU to the direct care of this patient for the period of time indicated below. Time I spent with the family or surrogate(s) is included only if the patient was incapable of providing the necessary information or participating in medical decision making. Time devoted to teaching and to any procedures I billed separately is not included.     Patient is examined and evaluated at the bedside with SICU team. A medically appropriate exam was performed by me, and pertinent findings are detailed below. Treatment plan discussed with SICU team, nurses and primary team.   Vital signs, laboratory work, and imaging reviewed during rounds.  Will continue hemodynamic monitoring as per protocol in SICU.    Neuro: awake, oriented to person, moving all extremities  Medications - Tylenol for postop pain, melatonin, start sertraline for hx of depression    Respiratory: respirations even and unlabored on 5L NC  Medications - symbicort for respiratory insufficiency, lasix restarted for pulmonary edema  CXR reviewed and interpreted by me - worsening L sided opacities, worsening L effusion    CV: monitor hemodynamics, MAP goal > 65  Medications - metoprolol PO started for hypertension, hydralazine PRN for SBP < 160, holding lisinpril and nifedipine    GI: abd s/nd, appropriately tender around incisions. incision dressings C/D/I  Last BM yesterday  Nutrition - cleared for regular diet  Medications - senna  Ppx - Protonix (home med)    Renal: Continue I&Os monitoring, replete electrolytes as needed  01-27    137  |  104  |  21<H>  ----------------------------<  103<H>  4.0   |  23  |  1.4    Ca    9.1      27 Jan 2024 19:58  Phos  2.3     01-27  Mg     2.3     01-27    TPro  6.2  /  Alb  4.0  /  TBili  <0.2  /  DBili  <0.2  /  AST  49<H>  /  ALT  25  /  AlkPhos  121<H>  01-26    UOP - 1.48L  I/O - 1050/1480  Hutchison catheter - D/C tonight  IVF - HLIVF if tolerating diet    Heme: continue to evaluate for acute blood loss anemia- trend Hg/Hct                         9.7    21.01 )-----------( 290      ( 27 Jan 2024 19:58 )             30.9     Anticoagulation - SQH    ID: trend leukocytosis, likely reactive per heme/onc, monitor for fevers  Outpatient follow up for metastatic colon cancer    Endocrine: Prevent and treat hyperglycemia with insulin as needed    PV: follow pulse exam    MSK: OOB and mobilize as able, PT eval    Skin: decub precautions    Lines: L radial arterial line, Hutchison, PIVs --> d/c radial arterial line, d/c hutchison tonight  DVT Prophylaxis: SQH   Stress Gastritis Prophylaxis: Protonix (home med)   Disposition: Transfer to SDU    I saw and evaluated the patient personally. I have reviewed and agree with note above. Treatment plan discussed with SICU team, nurses and primary team at the time of the multidisciplinary rounds.     Michael Reyna MD  Trauma/ACS/SCC Attending This patient has a high probability of sudden, clinically significant deterioration, which requires the highest level of physician preparedness to intervene urgently. I managed/supervised life or organ supporting interventions that required frequent physician assessment. I devoted my full attention in the ICU to the direct care of this patient for the period of time indicated below. Time I spent with the family or surrogate(s) is included only if the patient was incapable of providing the necessary information or participating in medical decision making. Time devoted to teaching and to any procedures I billed separately is not included.     Patient is examined and evaluated at the bedside with SICU team. A medically appropriate exam was performed by me, and pertinent findings are detailed below. Treatment plan discussed with SICU team, nurses and primary team.   Vital signs, laboratory work, and imaging reviewed during rounds.  Will continue hemodynamic monitoring as per protocol in SICU.    Neuro: awake, oriented to person, moving all extremities  Medications - Tylenol for postop pain, melatonin, start sertraline for hx of depression    Respiratory: respirations even and unlabored on 5L NC  Medications - symbicort for respiratory insufficiency, lasix restarted for pulmonary edema  CXR reviewed and interpreted by me - worsening L sided opacities, worsening L effusion    CV: monitor hemodynamics, MAP goal > 65  Medications - metoprolol PO started for hypertension, hydralazine PRN for SBP < 160, holding lisinpril and nifedipine    GI: abd s/nd, appropriately tender around incisions. incision dressings C/D/I  Last BM yesterday  Nutrition - cleared for regular diet  Medications - senna  Ppx - Protonix (home med)    Renal: Continue I&Os monitoring, replete electrolytes as needed  01-27    137  |  104  |  21<H>  ----------------------------<  103<H>  4.0   |  23  |  1.4    Ca    9.1      27 Jan 2024 19:58  Phos  2.3     01-27  Mg     2.3     01-27    TPro  6.2  /  Alb  4.0  /  TBili  <0.2  /  DBili  <0.2  /  AST  49<H>  /  ALT  25  /  AlkPhos  121<H>  01-26    UOP - 1.48L  I/O - 1050/1480  Hutchison catheter - D/C tonight  IVF - HLIVF if tolerating diet    Heme: continue to evaluate for acute blood loss anemia- trend Hg/Hct                         9.7    21.01 )-----------( 290      ( 27 Jan 2024 19:58 )             30.9     Anticoagulation - SQH    ID: trend leukocytosis, likely reactive per heme/onc, monitor for fevers  Outpatient follow up for metastatic colon cancer    Endocrine: Prevent and treat hyperglycemia with insulin as needed    PV: follow pulse exam    MSK: OOB and mobilize as able, PT eval    Skin: decub precautions    Lines: L radial arterial line, Hutchison, PIVs --> d/c radial arterial line, d/c hutchison tonight  DVT Prophylaxis: SQH   Stress Gastritis Prophylaxis: Protonix (home med)   Disposition: Transfer to SDU    I saw and evaluated the patient personally. I have reviewed and agree with note above. Treatment plan discussed with SICU team, nurses and primary team at the time of the multidisciplinary rounds.     Addendum:  Re-evaluated patient. Now on 2L NC. Ok for transfer to floor.    Michael Reyna MD  Trauma/ACS/SCC Attending This patient has a high probability of sudden, clinically significant deterioration, which requires the highest level of physician preparedness to intervene urgently. I managed/supervised life or organ supporting interventions that required frequent physician assessment. I devoted my full attention in the ICU to the direct care of this patient for the period of time indicated below. Time I spent with the family or surrogate(s) is included only if the patient was incapable of providing the necessary information or participating in medical decision making. Time devoted to teaching and to any procedures I billed separately is not included.     Patient is examined and evaluated at the bedside with SICU team. A medically appropriate exam was performed by me, and pertinent findings are detailed below. Treatment plan discussed with SICU team, nurses and primary team.   Vital signs, laboratory work, and imaging reviewed during rounds.  Will continue hemodynamic monitoring as per protocol in SICU.    Neuro: awake, oriented to person, moving all extremities  Medications - Tylenol for postop pain, melatonin, start sertraline for hx of depression    Respiratory: respirations even and unlabored on 5L NC  Medications - symbicort for respiratory insufficiency, lasix restarted for pulmonary edema  CXR reviewed and interpreted by me - worsening L sided opacities, worsening L effusion    CV: monitor hemodynamics, MAP goal > 65  Medications - metoprolol PO started for HFpEF, hydralazine PRN for SBP < 160, holding lisinpril and nifedipine for soft blood pressures    GI: abd s/nd, appropriately tender around incisions. incision dressings C/D/I  Last BM yesterday  Nutrition - cleared for regular diet  Medications - senna  Ppx - Protonix (home med)    Renal: Continue I&Os monitoring, replete electrolytes as needed  01-27    137  |  104  |  21<H>  ----------------------------<  103<H>  4.0   |  23  |  1.4    Ca    9.1      27 Jan 2024 19:58  Phos  2.3     01-27  Mg     2.3     01-27    TPro  6.2  /  Alb  4.0  /  TBili  <0.2  /  DBili  <0.2  /  AST  49<H>  /  ALT  25  /  AlkPhos  121<H>  01-26    UOP - 1.48L  I/O - 1050/1480  Hutchison catheter - D/C tonight  IVF - HLIVF if tolerating diet    Heme: continue to evaluate for acute blood loss anemia- trend Hg/Hct                         9.7    21.01 )-----------( 290      ( 27 Jan 2024 19:58 )             30.9     Anticoagulation - SQH    ID: trend leukocytosis, likely reactive per heme/onc, monitor for fevers  Outpatient follow up for metastatic colon cancer    Endocrine: Prevent and treat hyperglycemia with insulin as needed    PV: follow pulse exam    MSK: OOB and mobilize as able, PT eval    Skin: decub precautions    Lines: L radial arterial line, Hutchison, PIVs --> d/c radial arterial line, d/c hutchison tonight  DVT Prophylaxis: SQH   Stress Gastritis Prophylaxis: Protonix (home med)   Disposition: Transfer to SDU    I saw and evaluated the patient personally. I have reviewed and agree with note above. Treatment plan discussed with SICU team, nurses and primary team at the time of the multidisciplinary rounds.     Addendum:  Re-evaluated patient. Now on 2L NC. Ok for transfer to floor.    Michael Reyna MD  Trauma/ACS/SCC Attending

## 2024-01-28 NOTE — PROGRESS NOTE ADULT - SUBJECTIVE AND OBJECTIVE BOX
GENERAL SURGERY PROGRESS NOTE    Patient: DARIEN BARRERA , 78y ([birth year])Female   MRN: [id number]  Location: [address]  Visit: 01-08-24 Inpatient  Date: 01-28-24 @ 04:25    Hospital Day #: 21  Post-Op Day #: 2    Procedure/Dx/Injuries: Diagnostic laparoscopy, Robot-assisted gastropexy    Events of past 24 hours:  -passed SLP, FLD started w/ bowel reg   -walking with PT, OOBTC      PAST MEDICAL & SURGICAL HISTORY:  Hypertension      GERD (gastroesophageal reflux disease)      Hiatal hernia with GERD      Empyema lung      H/O CHF      History of repair of hiatal hernia      H/O pleural empyema  s/p decortication          Vitals:   T(F): 97.6 (01-27-24 @ 16:00), Max: 99.2 (01-27-24 @ 08:00)  HR: 70 (01-28-24 @ 02:00)  BP: 114/59 (01-28-24 @ 02:00)  RR: 31 (01-28-24 @ 02:00)  SpO2: 96% (01-28-24 @ 02:00)      Diet, Full Liquid:   Low Sodium      Fluids: lactated ringers.: Solution, 1000 milliLiter(s) infuse at 50 mL/Hr      I & O's:    01-26-24 @ 07:01  -  01-27-24 @ 07:00  --------------------------------------------------------  IN:    IV PiggyBack: 250 mL    Lactated Ringers: 600 mL  Total IN: 850 mL    OUT:    Indwelling Catheter - Urethral (mL): 2040 mL  Total OUT: 2040 mL    Total NET: -1190 mL          PHYSICAL EXAM:  GENERAL: ill appearing  CHEST/LUNG: Equal chest rise bilaterally, on BIPAP  HEART: Regular rate  ABDOMEN: Soft, diffusely tender to palpation surrounding surgical incision sites, Nondistended;   EXTREMITIES:  No clubbing, cyanosis, or edema    MEDICATIONS  (STANDING):  budesonide 160 MICROgram(s)/formoterol 4.5 MICROgram(s) Inhaler 2 Puff(s) Inhalation two times a day  chlorhexidine 2% Cloths 1 Application(s) Topical <User Schedule>  heparin   Injectable 5000 Unit(s) SubCutaneous every 8 hours  iron sucrose IVPB 200 milliGRAM(s) IV Intermittent every 24 hours  lactated ringers. 1000 milliLiter(s) (50 mL/Hr) IV Continuous <Continuous>  metoprolol tartrate Injectable 5 milliGRAM(s) IV Push every 6 hours  pantoprazole    Tablet 40 milliGRAM(s) Oral before breakfast  senna 2 Tablet(s) Oral at bedtime    MEDICATIONS  (PRN):  acetaminophen     Tablet .. 650 milliGRAM(s) Oral every 6 hours PRN Temp greater or equal to 38C (100.4F), Mild Pain (1 - 3)  melatonin 5 milliGRAM(s) Oral at bedtime PRN Insomnia  vitamin A &amp; D Ointment 1 Application(s) Topical four times a day PRN chapped lips      DVT PROPHYLAXIS: heparin   Injectable 5000 Unit(s) SubCutaneous every 8 hours    GI PROPHYLAXIS: pantoprazole    Tablet 40 milliGRAM(s) Oral before breakfast    ANTICOAGULATION:   ANTIBIOTICS:            LAB/STUDIES:  Labs:  CAPILLARY BLOOD GLUCOSE                              9.7    21.01 )-----------( 290      ( 27 Jan 2024 19:58 )             30.9       Auto Neutrophil %: 85.6 % (01-27-24 @ 19:58)  Auto Immature Granulocyte %: 0.7 % (01-27-24 @ 19:58)    01-27    137  |  104  |  21<H>  ----------------------------<  103<H>  4.0   |  23  |  1.4      Calcium: 9.1 mg/dL (01-27-24 @ 19:58)      LFTs:             6.2  | <0.2 | 49       ------------------[121     ( 26 Jan 2024 16:12 )  4.0  | <0.2 | 25          Lipase:x      Amylase:x         Blood Gas Arterial, Lactate: 0.8 mmol/L (01-27-24 @ 05:14)  Blood Gas Arterial, Lactate: 0.6 mmol/L (01-26-24 @ 20:15)  Blood Gas Arterial, Lactate: 0.4 mmol/L (01-26-24 @ 17:33)    ABG - ( 27 Jan 2024 05:14 )  pH: 7.36  /  pCO2: 39    /  pO2: 122   / HCO3: 22    / Base Excess: -3.2  /  SaO2: 99.1            ABG - ( 26 Jan 2024 20:15 )  pH: 7.30  /  pCO2: 46    /  pO2: 58    / HCO3: 23    / Base Excess: -3.8  /  SaO2: 90.3            ABG - ( 26 Jan 2024 17:33 )  pH: 7.27  /  pCO2: 51    /  pO2: 119   / HCO3: 23    / Base Excess: -4.0  /  SaO2: 98.0              Coags:     11.80  ----< 1.03    ( 26 Jan 2024 16:12 )     30.8        CARDIAC MARKERS ( 26 Jan 2024 16:12 )  x     / x     / 80 U/L / x     / 2.9 ng/mL          Urinalysis Basic - ( 27 Jan 2024 19:58 )    Color: x / Appearance: x / SG: x / pH: x  Gluc: 103 mg/dL / Ketone: x  / Bili: x / Urobili: x   Blood: x / Protein: x / Nitrite: x   Leuk Esterase: x / RBC: x / WBC x   Sq Epi: x / Non Sq Epi: x / Bacteria: x

## 2024-01-28 NOTE — PROGRESS NOTE ADULT - ASSESSMENT
78y F w/ PMHx of hiatal hernia s/p repair >10 years back, L emypema s/p decortication 2018, hypertension, GERD, non obstructive CAD, depression, dementia, anemia presenting for evaluation of low hemoglobin, measured outpatient to be 7.4. The pt presented to the Ed for a blood transfusion that was recommended by the hematologist. a 3.0 cm friable sigmoid colon mass was identified on colonoscopy. She is now s/p Robotic sigmoidectomy for colonic mass, aborted due to diffuse peritoneal carcinomatosis. Procedure performed included diagnostic laparoscopy, G tube remocal, robotic gastropexy. Postoperatively patient was difficult to arouse, concern for CO2 retention. Family contacted as patient is DNR/DNI, agreed to trial NIV, now on Bipap.    PLAN:   - DVT ppx  - FLD  - Wean Bipap as tolerated  - hutchison in place, monitor UOP  - Diuresis, monitor volume status  - Daily labs, replete electrolytes as needed, monitor Hgb  - Rest of care per SICU    BLUE SURGERY SPECTRA: 1255

## 2024-01-28 NOTE — PROGRESS NOTE ADULT - ASSESSMENT
Assessment and Plan  78y Female w/ pmhx of acute respiratory distress secondary to respiratory acidosis requiring NIV    NEUROLOGICAL:  #Acute pain    -IV Tylenol  #hx of anxiety    -alprazolam 0.25mg q12 HOLDING while on BiPAP/somolent    -sertraline 100mg qd HOLDING while on BiPAP/somolent    RESPIRATORY:   #respiratory acidosis - resolved  - off BiPAP  - on 6L NC  - symbicort 160-4.5mcg/inhalation  #Activity    -increase as tolerated  #Left pleural effusion/ Left lower lobe opacity - improving    CARDS: *DNR*  #hx of HFpEF     - Metoprolol ER 50mg ->  IV 5mg q6 while on BiPAP    - Lasix 20mg IV (40mg PO at home)    - Echo 01/24: EF 61%, G1DD, mild MR, mild TR  #hx of HTN    - Lisinopril 40mg daily HOLDING    - Nifedipine XL 60mg daily HOLDING    GASTROINTESTINAL/NUTRITION:   #adenocarcinoma of sigmoid mass  #s/p attempted robotic sigmoid resection, aborted secondary to carcinoma implants 1/26  #s/p g-tube removed, gastropexy completed 1/26  #s/p colonoscopy w/ biopsy 1/12    - NPO x/rx once off BiPAP    - aspiration precautions, HOB 30  #hx of recurrent hiatal hernias  #GI Prophylaxis    - continue w/ home pantoprazole 40 mg Oral before breakfast  #Bowel regimen    - senna 2 Tablet(s) Oral at bedtime    - last bowel movement     /RENAL:   #urine output in critically ill    - indwelling hutchison (placed 1/26)    Labs:          BUN/Cr- 17/1.2  -->,  21/1.4  -->          Electrolytes-Na 137 // K 4.0 // Mg 2.3 //  Phos 2.3 (01-27 @ 19:58)   #IV fluids   - LR @ 50 cc/hr       HEME/ONC:   #acute on chronic anemia   -1u PRBC during OR   - T&S Expires 1/27   - Venofer per heme  #sigmoid mass adenocarcinoma  #DVT prophylaxis     -Chemical: heparin   Injectable 5000 Unit(s) SubCutaneous every 8 hours due to DAWIT     -Mechanical: SCDs    Labs: Hb/Hct:  9.0/29.4  -->,  9.7/30.9  -->                      Plts:  266  -->,  290  -->                 PTT/INR:        ID:  #monitor for leukocytosis/fever  WBC- 18.19  --->>,  18.29  --->>,  21.01  --->>  Temp trend- 24hrs T(F): 97.6 (01-27 @ 16:00), Max: 99.2 (01-27 @ 08:00)    ENDOCRINE:    -FSG q6     -Glucose goal 140-180. if above 180 start ISS    MSK:     Activity - Ambulate as Tolerated:     LINES/DRAINS:  PIV, Hutchison, Left radial arterial line (1/26)    ADVANCED DIRECTIVES:  Full Code  Sister Sonali (naveen) [phone] (HCP)  Nephdaylin Guadalupe [phone]    INDICATION FOR SICU: Respiratory distress requiring NIV    DISPO:   SICU

## 2024-01-28 NOTE — PROGRESS NOTE ADULT - SUBJECTIVE AND OBJECTIVE BOX
DARIEN BARRERA   [id number]   [birth year]  78yF  ============================   SICU Consult: acute respiratory distress secondary to respiratory acidosis requiring NIV s/p Diagnostic laparoscopy with findings of numerous malignant-appearing implants in peritoneum, liver, and small bowel. Procedure aborted. G-tube removed, robotic gastropexy performed.      OR Stats for 01-26-24    OR Time:   1.5hrs  IV Fluids:  1L LR, 500cc NS, 100cc Albumin  EBL: 100ml  Blood Products: 1u PRBC  UOP:  150ml      24 Hour Events  1/27  NIGHT  - PM rounds: 6L NC, a/ox2, awake and alert, moving all extremities, lung sounds clear bilaterally, normotensive, no respiratory distress, speaking in full sentences  - low UOP 10-15 cc/hr, keeping low dose maintenance fluid LR @ 50 cc/hr and keeping hutchison for now, will continue to monitor, encouraged PO intake  - phos 2.3 --> 30 mmol NaPhos repleted  - takes symbicort at home? --> added home dose symbicort as listed in med recc  - 5% albumin 250cc bolus x1 for low UOP    DAY  -passed bedside swallow, advanced to full liquid diet  -added senna, ok per primary team  -d/c hutchison @ [place]  -per heme onc --> venofer QD x 2days    [X] A ten-point review of systems was otherwise negative except as noted above.  [  ] Due to altered mental status/intubation, subjective information was not attained from the patient. History was obtained, to the extent possible, from review of the chart and collateral sources of information.   DARIEN BARRERA   [id number]   [birth year]  78yF  ============================   SICU Consult: acute respiratory distress secondary to respiratory acidosis requiring NIV s/p Diagnostic laparoscopy with findings of numerous malignant-appearing implants in peritoneum, liver, and small bowel. Procedure aborted. G-tube removed, robotic gastropexy performed.      OR Stats for 01-26-24    OR Time:   1.5hrs  IV Fluids:  1L LR, 500cc NS, 100cc Albumin  EBL: 100ml  Blood Products: 1u PRBC  UOP:  150ml      24 Hour Events  1/27  NIGHT  - PM rounds: 6L NC, a/ox2, awake and alert, moving all extremities, lung sounds clear bilaterally, normotensive, no respiratory distress, speaking in full sentences  - low UOP 10-15 cc/hr, keeping low dose maintenance fluid LR @ 50 cc/hr and keeping hutchison for now, will continue to monitor, encouraged PO intake  - phos 2.3 --> 30 mmol NaPhos repleted  - takes symbicort at home? --> added home dose symbicort as listed in med recc  - 5% albumin 250cc bolus x1 for low UOP    DAY  -passed bedside swallow, advanced to full liquid diet  -added senna, ok per primary team  -d/c hutchison @ [place]  -per heme onc --> venofer QD x 2days    [X] A ten-point review of systems was otherwise negative except as noted above.  [  ] Due to altered mental status/intubation, subjective information was not attained from the patient. History was obtained, to the extent possible, from review of the chart and collateral sources of information.       CURRENT MEDS:   Neurologic Medications  acetaminophen     Tablet .. 650 milliGRAM(s) Oral every 6 hours PRN Temp greater or equal to 38C (100.4F), Mild Pain (1 - 3)  melatonin 5 milliGRAM(s) Oral at bedtime PRN Insomnia    Respiratory Medications  albuterol/ipratropium for Nebulization. 3 milliLiter(s) Nebulizer once  budesonide 160 MICROgram(s)/formoterol 4.5 MICROgram(s) Inhaler 2 Puff(s) Inhalation two times a day    Cardiovascular Medications  metoprolol tartrate Injectable 5 milliGRAM(s) IV Push every 6 hours    Gastrointestinal Medications  iron sucrose IVPB 200 milliGRAM(s) IV Intermittent every 24 hours  lactated ringers. 1000 milliLiter(s) IV Continuous <Continuous>  pantoprazole    Tablet 40 milliGRAM(s) Oral before breakfast  senna 2 Tablet(s) Oral at bedtime    Genitourinary Medications    Hematologic/Oncologic Medications  heparin   Injectable 5000 Unit(s) SubCutaneous every 8 hours    Antimicrobial/Immunologic Medications    Endocrine/Metabolic Medications    Topical/Other Medications  chlorhexidine 2% Cloths 1 Application(s) Topical <User Schedule>  vitamin A &amp; D Ointment 1 Application(s) Topical four times a day PRN chapped lips    ICU Vital Signs Last 24 Hrs  T(C): 36.5 (28 Jan 2024 04:00), Max: 37.3 (27 Jan 2024 08:00)  T(F): 97.7 (28 Jan 2024 04:00), Max: 99.2 (27 Jan 2024 08:00)  HR: 77 (28 Jan 2024 06:00) (66 - 90)  BP: 146/68 (28 Jan 2024 06:00) (110/57 - 146/68)  BP(mean): 98 (28 Jan 2024 06:00) (78 - 121)  ABP: 156/52 (28 Jan 2024 06:00) (90/62 - 158/54)  ABP(mean): 93 (28 Jan 2024 06:00) (55 - 93)  RR: 21 (28 Jan 2024 06:00) (20 - 36)  SpO2: 90% (28 Jan 2024 06:00) (90% - 98%)    O2 Parameters below as of 28 Jan 2024 06:00  Patient On (Oxygen Delivery Method): nasal cannula  O2 Flow (L/min): 5            I&O's Summary    27 Jan 2024 07:01  -  28 Jan 2024 07:00  --------------------------------------------------------  IN: 1050 mL / OUT: 1480 mL / NET: -430 mL      I&O's Detail    27 Jan 2024 07:01  -  28 Jan 2024 07:00  --------------------------------------------------------  IN:    IV PiggyBack: 200 mL    Lactated Ringers: 300 mL    Lactated Ringers: 550 mL  Total IN: 1050 mL    OUT:    Indwelling Catheter - Urethral (mL): 1480 mL  Total OUT: 1480 mL    Total NET: -430 mL           PHYSICAL EXAM:      a&ox1 (person) (baseline dementia)  answering questions   follows commands, KUMAR  no acute distress, 6L  equal chest rise b/l  abdomen soft, trochar sits c/d/i  extremities soft  urinary cath in place

## 2024-01-28 NOTE — PROGRESS NOTE ADULT - SUBJECTIVE AND OBJECTIVE BOX
Patient is a 78y old  Female who presents with a chief complaint of Anemia (28 Jan 2024 04:24)       Pt is seen and examined  pt is awake and lying in bed  pt seems comfortable and denies any complaints at this time          ROS:  Negative   MEDICATIONS  (STANDING):  albuterol/ipratropium for Nebulization. 3 milliLiter(s) Nebulizer once  budesonide 160 MICROgram(s)/formoterol 4.5 MICROgram(s) Inhaler 2 Puff(s) Inhalation two times a day  chlorhexidine 2% Cloths 1 Application(s) Topical <User Schedule>  heparin   Injectable 5000 Unit(s) SubCutaneous every 8 hours  iron sucrose IVPB 200 milliGRAM(s) IV Intermittent every 24 hours  lactated ringers. 1000 milliLiter(s) (50 mL/Hr) IV Continuous <Continuous>  metoprolol tartrate Injectable 5 milliGRAM(s) IV Push every 6 hours  pantoprazole    Tablet 40 milliGRAM(s) Oral before breakfast  senna 2 Tablet(s) Oral at bedtime    MEDICATIONS  (PRN):  acetaminophen     Tablet .. 650 milliGRAM(s) Oral every 6 hours PRN Temp greater or equal to 38C (100.4F), Mild Pain (1 - 3)  melatonin 5 milliGRAM(s) Oral at bedtime PRN Insomnia  vitamin A &amp; D Ointment 1 Application(s) Topical four times a day PRN chapped lips      Allergies    No Known Allergies    Intolerances        Vital Signs Last 24 Hrs  T(C): 36 (28 Jan 2024 08:00), Max: 36.7 (27 Jan 2024 12:00)  T(F): 96.8 (28 Jan 2024 08:00), Max: 98.1 (27 Jan 2024 20:00)  HR: 77 (28 Jan 2024 09:00) (66 - 90)  BP: 137/65 (28 Jan 2024 09:00) (110/57 - 152/70)  BP(mean): 93 (28 Jan 2024 09:00) (78 - 121)  RR: 25 (28 Jan 2024 09:00) (20 - 36)  SpO2: 94% (28 Jan 2024 09:00) (90% - 97%)    Parameters below as of 28 Jan 2024 09:00  Patient On (Oxygen Delivery Method): nasal cannula  O2 Flow (L/min): 6      PHYSICAL EXAM  General: adult in NAD  HEENT: clear oropharynx, anicteric sclera, pink conjunctiva  Neck: supple  CV: normal S1/S2 with no murmur rubs or gallops  Lungs: positive air movement b/l ant lungs,clear to auscultation, no wheezes, no rales  Abdomen: soft non-tender non-distended, no hepatosplenomegaly  Ext: no clubbing cyanosis or edema  Skin: no rashes and no petechiae  Neuro: alert and oriented X 2, no focal deficits  LABS:                          9.7    21.01 )-----------( 290      ( 27 Jan 2024 19:58 )             30.9         Mean Cell Volume : 75.9 fL  Mean Cell Hemoglobin : 23.8 pg  Mean Cell Hemoglobin Concentration : 31.4 g/dL  Auto Neutrophil # : 17.99 K/uL  Auto Lymphocyte # : 1.79 K/uL  Auto Monocyte # : 1.02 K/uL  Auto Eosinophil # : 0.00 K/uL  Auto Basophil # : 0.07 K/uL  Auto Neutrophil % : 85.6 %  Auto Lymphocyte % : 8.5 %  Auto Monocyte % : 4.9 %  Auto Eosinophil % : 0.0 %  Auto Basophil % : 0.3 %    Serial CBC's  01-27 @ 19:58  Hct-30.9 / Hgb-9.7 / Plat-290 / RBC-4.07 / WBC-21.01          Serial CBC's  01-27 @ 04:47  Hct-29.4 / Hgb-9.0 / Plat-266 / RBC-3.86 / WBC-18.29          Serial CBC's  01-26 @ 16:12  Hct-34.2 / Hgb-10.4 / Plat-285 / RBC-4.48 / WBC-18.19          Serial CBC's  01-25 @ 20:20  Hct-30.7 / Hgb-8.9 / Plat-292 / RBC-4.07 / WBC-11.62          Serial CBC's  01-25 @ 05:51  Hct-27.1 / Hgb-8.0 / Plat-259 / RBC-3.62 / WBC-10.86            01-27    137  |  104  |  21<H>  ----------------------------<  103<H>  4.0   |  23  |  1.4    Ca    9.1      27 Jan 2024 19:58  Phos  2.3     01-27  Mg     2.3     01-27    TPro  6.2  /  Alb  4.0  /  TBili  <0.2  /  DBili  <0.2  /  AST  49<H>  /  ALT  25  /  AlkPhos  121<H>  01-26      PT/INR - ( 26 Jan 2024 16:12 )   PT: 11.80 sec;   INR: 1.03 ratio         PTT - ( 26 Jan 2024 16:12 )  PTT:30.8 sec    WBC Count: 21.01 K/uL (01-27-24 @ 19:58)  Hemoglobin: 9.7 g/dL (01-27-24 @ 19:58)  Hematocrit: 30.9 % (01-27-24 @ 19:58)  Platelet Count - Automated: 290 K/uL (01-27-24 @ 19:58)  WBC Count: 18.29 K/uL (01-27-24 @ 04:47)  Hemoglobin: 9.0 g/dL (01-27-24 @ 04:47)  Hematocrit: 29.4 % (01-27-24 @ 04:47)  Platelet Count - Automated: 266 K/uL (01-27-24 @ 04:47)  WBC Count: 18.19 K/uL (01-26-24 @ 16:12)  Hemoglobin: 10.4 g/dL (01-26-24 @ 16:12)  Hematocrit: 34.2 % (01-26-24 @ 16:12)  Platelet Count - Automated: 285 K/uL (01-26-24 @ 16:12)  WBC Count: 11.62 K/uL (01-25-24 @ 20:20)  Hemoglobin: 8.9 g/dL (01-25-24 @ 20:20)  Hematocrit: 30.7 % (01-25-24 @ 20:20)  Platelet Count - Automated: 292 K/uL (01-25-24 @ 20:20)  WBC Count: 10.86 K/uL (01-25-24 @ 05:51)  Hemoglobin: 8.0 g/dL (01-25-24 @ 05:51)  Hematocrit: 27.1 % (01-25-24 @ 05:51)  Platelet Count - Automated: 259 K/uL (01-25-24 @ 05:51)  WBC Count: 12.02 K/uL (01-24-24 @ 21:16)  Hemoglobin: 8.7 g/dL (01-24-24 @ 21:16)  Hematocrit: 30.1 % (01-24-24 @ 21:16)  Platelet Count - Automated: 280 K/uL (01-24-24 @ 21:16)  WBC Count: 10.45 K/uL (01-24-24 @ 06:17)  Hemoglobin: 8.0 g/dL (01-24-24 @ 06:17)  Hematocrit: 26.8 % (01-24-24 @ 06:17)  Platelet Count - Automated: 238 K/uL (01-24-24 @ 06:17)  WBC Count: 8.51 K/uL (01-23-24 @ 07:14)  Hemoglobin: 8.0 g/dL (01-23-24 @ 07:14)  Hematocrit: 27.5 % (01-23-24 @ 07:14)  Platelet Count - Automated: 256 K/uL (01-23-24 @ 07:14)  WBC Count: 9.04 K/uL (01-22-24 @ 06:41)  Hemoglobin: 8.2 g/dL (01-22-24 @ 06:41)  Hematocrit: 27.1 % (01-22-24 @ 06:41)  Platelet Count - Automated: 272 K/uL (01-22-24 @ 06:41)  WBC Count: 7.79 K/uL (01-21-24 @ 06:38)  Hemoglobin: 8.3 g/dL (01-21-24 @ 06:38)  Hematocrit: 28.3 % (01-21-24 @ 06:38)  Platelet Count - Automated: 262 K/uL (01-21-24 @ 06:38)  WBC Count: 8.44 K/uL (01-20-24 @ 08:18)  Hemoglobin: 8.9 g/dL (01-20-24 @ 08:18)  Hematocrit: 30.2 % (01-20-24 @ 08:18)  Platelet Count - Automated: 319 K/uL (01-20-24 @ 08:18)  WBC Count: 8.71 K/uL (01-19-24 @ 06:12)  Hemoglobin: 8.2 g/dL (01-19-24 @ 06:12)  Hematocrit: 27.7 % (01-19-24 @ 06:12)  Platelet Count - Automated: 340 K/uL (01-19-24 @ 06:12)                BLOOD SMEAR INTERPRETATION:       RADIOLOGY & ADDITIONAL STUDIES:

## 2024-01-28 NOTE — CHART NOTE - NSCHARTNOTEFT_GEN_A_CORE
SICU Transfer Note    DARIEN BARRERA  78y (1945)  [id number]      Transfer from: SICU  Transfer to: Surgery-      SICU COURSE:  78y Female HD# 20d    s/p Diagnostic laparoscopy  Robot-assisted gastropexy    HPI   The patient is a 78-year-old female with past medical history of hiatal hernia s/p repair >10 years back, hypertension, GERD, non obstructive CAD, depression, dementia, anemia presenting for evaluation of low hemoglobin, measured outpatient to be 7.4.  Patient has had multiple episodes of nonbloody nonbilious vomiting over the last several months with good appetite and almost 60 pound weight loss over the last few months.  Patient just started following up with hematologist Dr.Yumi Hussein and had recent blood work from 3 days ago that resulted today and showed a low hemoglobin of 7.4. Patient denies fevers, chills, chest pain, shortness of breath, hematuria, melena, hematochezia. The pt presented to the Ed for a blood transfusion that was recommended by the hematologist.    ED  In the ED the pt received 1 PRBC, Labs were significant for a Cr of 1.6 (unknown baseline) and elevated lactate  CT Scan-large hiatal hernia containing partially included stomach with apparent organoaxial rotation  The pt was admitted to medicine for anemia 2/2 possible GIB    INPATIENT    Colonoscopy 01/12   A 3cm friable fungating ulcerated mass extending from 17cm to 20 cm from the anal verge was noted at the level of the sigmoid colon. The mass was obstructing around 75-90% of the lumen. The PCF scope could not traverse the mass, so it was switched with an upper endoscope. Using an upper endoscope, the mass was easily traversed. Multiple cold forceps biopsies were obtained for histology. Using the upper endoscope, the cecum was reached and the terminal ileum was intubated. Clean based cecal ulcer noted. Normal mucosa was otherwise noted in the remaining parts of the examined colon.  Internal hemorrhoids.      PACU  Spoke with patients HCP Sister Sonali and Nephew Collins on the phone to discuss patient GOC. Discussed MOLST form and discussions held with palliative medicine during inpatient stay. Collins and Sonali would like to respect patient's wishes and continue with DNR/DNI and trial on NIV which she is currently on MOLST updated. Patient placed on BiPAP 12/6.  Sister Sonali [phone]  Nephew Collins [phone]    PAST MEDICAL & SURGICAL HISTORY:  Hypertension  GERD (gastroesophageal reflux disease)  Hiatal hernia with GERD  Empyema lung  H/O CHF  History of repair of hiatal hernia  H/O pleural empyema s/p decortication      Allergies  No Known Allergies  Intolerances      MEDICATIONS  (STANDING):  budesonide 160 MICROgram(s)/formoterol 4.5 MICROgram(s) Inhaler 2 Puff(s) Inhalation two times a day  chlorhexidine 2% Cloths 1 Application(s) Topical <User Schedule>  heparin   Injectable 5000 Unit(s) SubCutaneous every 8 hours  iron sucrose IVPB 200 milliGRAM(s) IV Intermittent every 24 hours  metoprolol tartrate 12.5 milliGRAM(s) Oral every 12 hours  pantoprazole    Tablet 40 milliGRAM(s) Oral before breakfast  senna 2 Tablet(s) Oral at bedtime  sertraline 100 milliGRAM(s) Oral daily    MEDICATIONS  (PRN):  acetaminophen     Tablet .. 650 milliGRAM(s) Oral every 6 hours PRN Temp greater or equal to 38C (100.4F), Mild Pain (1 - 3)  calcium carbonate    500 mG (Tums) Chewable 1 Tablet(s) Chew daily PRN Heartburn  melatonin 5 milliGRAM(s) Oral at bedtime PRN Insomnia  vitamin A &amp; D Ointment 1 Application(s) Topical four times a day PRN chapped lips      Vital Signs Last 24 Hrs  T(C): 36.1 (28 Jan 2024 16:00), Max: 36.7 (27 Jan 2024 20:00)  T(F): 96.9 (28 Jan 2024 16:00), Max: 98.1 (27 Jan 2024 20:00)  HR: 78 (28 Jan 2024 16:00) (64 - 78)  BP: 164/73 (28 Jan 2024 16:00) (114/59 - 173/74)  BP(mean): 105 (28 Jan 2024 16:00) (84 - 107)  RR: 22 (28 Jan 2024 16:00) (20 - 36)  SpO2: 97% (28 Jan 2024 16:00) (90% - 100%)    Parameters below as of 28 Jan 2024 16:00  Patient On (Oxygen Delivery Method): nasal cannula  O2 Flow (L/min): 2    I&O's Summary    27 Jan 2024 07:01  -  28 Jan 2024 07:00  --------------------------------------------------------  IN: 1050 mL / OUT: 1480 mL / NET: -430 mL    28 Jan 2024 07:01  -  28 Jan 2024 17:08  --------------------------------------------------------  IN: 200 mL / OUT: 1225 mL / NET: -1025 mL        LABS:                        9.7    21.01 )-----------( 290      ( 27 Jan 2024 19:58 )             30.9       01-27    137  |  104  |  21<H>  ----------------------------<  103<H>  4.0   |  23  |  1.4    Ca    9.1      27 Jan 2024 19:58  Phos  2.3     01-27  Mg     2.3     01-27        Assessment & Plan:  78y Female w/ pmhx of acute respiratory distress secondary to respiratory acidosis requiring NIV.     NEUROLOGICAL:  #Acute pain    - Tylenol PO prn     #hx of anxiety    -alprazolam 0.25mg q12 HOLDING     -sertraline 100mg qd restarted     #h/o insomnia  - melatonin qhs     RESPIRATORY:   #respiratory acidosis - resolved  - off BiPAP  - on 2L NC  - symbicort 160-4.5mcg/inhalation  #Activity    -increase as tolerated  #Left pleural effusion/ Left lower lobe opacity - improving    CARDS: *DNR*  #hx of HFpEF     - takes Metoprolol ER 50mg > PO Metoprolol 12.5 q12     - Lasix 20mg IV (40mg PO at home)    - Restarted Lasix 40 PO     - Echo 01/24: EF 61%, G1DD, mild MR, mild TR  #hx of HTN    - Lisinopril 40mg daily HOLDING    - Nifedipine XL 60mg daily HOLDING    GASTROINTESTINAL/NUTRITION:   #adenocarcinoma of sigmoid mass  #s/p attempted robotic sigmoid resection, aborted secondary to carcinoma implants 1/26  #s/p g-tube removed, gastropexy completed 1/26  #s/p colonoscopy w/ biopsy 1/12    - Regular with thins and ensure     - aspiration precautions, HOB 30  #hx of recurrent hiatal hernias  #GI Prophylaxis    - continue w/ home pantoprazole 40 mg Oral before breakfast  #Bowel regimen    - senna 2 Tablet(s) Oral at bedtime    - last bowel movement 1/27    /RENAL:   #urine output in critically ill    - indwelling hutchison (placed 1/26) > hutchison discontinued, TOV   #IV fluids   - IVL     Labs:          BUN/Cr- 17/1.2  -->,  21/1.4  -->          Electrolytes-Na 137 // K 4.0 // Mg 2.3 //  Phos 2.3 (01-27 @ 19:58)      HEME/ONC:   #acute on chronic anemia   -1u PRBC during OR   - T&S Expires 1/27   - Venofer per heme x 2 days   #sigmoid mass adenocarcinoma  #DVT prophylaxis     -Chemical: heparin   Injectable 5000 Unit(s) SubCutaneous every 8 hours due to DAWIT     -Mechanical: SCDs  Labs: Hb/Hct:  9.0/29.4  -->,  9.7/30.9  -->                      Plts:  266  -->,  290  -->                 PTT/INR:          ID:  #monitor for leukocytosis/fever  WBC- 18.19  --->>,  18.29  --->>,  21.01  --->>  Temp trend- 24hrs T(F): 96.9 (01-28 @ 16:00), Max: 98.1 (01-27 @ 20:00)      ENDOCRINE:    -FSG q6     -Glucose goal 140-180. if above 180 start ISS    MSK:     Activity - Ambulate as Tolerated:     LINES/DRAINS:  PIV, Hutchison, Left radial arterial line (1/26-1/28)    ADVANCED DIRECTIVES:  Full Code  Sister Sonali hanley) [phone] (HCP)  Nephew Collins [phone]    INDICATION FOR SICU: Respiratory distress requiring NIV    DISPO: DG to floor         Follow Up:  - Hospice follow up   -TOV   -2000 labs        Signed out to:  Date:  Time: SICU Transfer Note    DARIEN BARRERA  78y (1945)  [id number]      Transfer from: SICU  Transfer to: Surgery-      SICU COURSE:  78y Female HD# 20d    s/p Diagnostic laparoscopy  Robot-assisted gastropexy    HPI   The patient is a 78-year-old female with past medical history of hiatal hernia s/p repair >10 years back, hypertension, GERD, non obstructive CAD, depression, dementia, anemia presenting for evaluation of low hemoglobin, measured outpatient to be 7.4.  Patient has had multiple episodes of nonbloody nonbilious vomiting over the last several months with good appetite and almost 60 pound weight loss over the last few months.  Patient just started following up with hematologist Dr.Yumi Hussein and had recent blood work from 3 days ago that resulted today and showed a low hemoglobin of 7.4. Patient denies fevers, chills, chest pain, shortness of breath, hematuria, melena, hematochezia. The pt presented to the Ed for a blood transfusion that was recommended by the hematologist.    ED  In the ED the pt received 1 PRBC, Labs were significant for a Cr of 1.6 (unknown baseline) and elevated lactate  CT Scan-large hiatal hernia containing partially included stomach with apparent organoaxial rotation  The pt was admitted to medicine for anemia 2/2 possible GIB    INPATIENT    Colonoscopy 01/12   A 3cm friable fungating ulcerated mass extending from 17cm to 20 cm from the anal verge was noted at the level of the sigmoid colon. The mass was obstructing around 75-90% of the lumen. The PCF scope could not traverse the mass, so it was switched with an upper endoscope. Using an upper endoscope, the mass was easily traversed. Multiple cold forceps biopsies were obtained for histology. Using the upper endoscope, the cecum was reached and the terminal ileum was intubated. Clean based cecal ulcer noted. Normal mucosa was otherwise noted in the remaining parts of the examined colon.  Internal hemorrhoids.      PACU  Spoke with patients HCP Sister Sonali and Nephew Collins on the phone to discuss patient GOC. Discussed MOLST form and discussions held with palliative medicine during inpatient stay. Collins and Sonali would like to respect patient's wishes and continue with DNR/DNI and trial on NIV which she is currently on MOLST updated. Patient placed on BiPAP 12/6.  Sister Sonali [phone]  Nephew Collins [phone]    In SICU, patient was placed on bipap for respiratory acidosis, which has since improved. Patient has been saturating well on NC. Patient passed bedside swallow and is tolerating diet. Patient is stable for downgrade to surgical floor.     PAST MEDICAL & SURGICAL HISTORY:  Hypertension  GERD (gastroesophageal reflux disease)  Hiatal hernia with GERD  Empyema lung  H/O CHF  History of repair of hiatal hernia  H/O pleural empyema s/p decortication      Allergies  No Known Allergies  Intolerances      MEDICATIONS  (STANDING):  budesonide 160 MICROgram(s)/formoterol 4.5 MICROgram(s) Inhaler 2 Puff(s) Inhalation two times a day  chlorhexidine 2% Cloths 1 Application(s) Topical <User Schedule>  heparin   Injectable 5000 Unit(s) SubCutaneous every 8 hours  iron sucrose IVPB 200 milliGRAM(s) IV Intermittent every 24 hours  metoprolol tartrate 12.5 milliGRAM(s) Oral every 12 hours  pantoprazole    Tablet 40 milliGRAM(s) Oral before breakfast  senna 2 Tablet(s) Oral at bedtime  sertraline 100 milliGRAM(s) Oral daily    MEDICATIONS  (PRN):  acetaminophen     Tablet .. 650 milliGRAM(s) Oral every 6 hours PRN Temp greater or equal to 38C (100.4F), Mild Pain (1 - 3)  calcium carbonate    500 mG (Tums) Chewable 1 Tablet(s) Chew daily PRN Heartburn  melatonin 5 milliGRAM(s) Oral at bedtime PRN Insomnia  vitamin A &amp; D Ointment 1 Application(s) Topical four times a day PRN chapped lips      Vital Signs Last 24 Hrs  T(C): 36.1 (28 Jan 2024 16:00), Max: 36.7 (27 Jan 2024 20:00)  T(F): 96.9 (28 Jan 2024 16:00), Max: 98.1 (27 Jan 2024 20:00)  HR: 78 (28 Jan 2024 16:00) (64 - 78)  BP: 164/73 (28 Jan 2024 16:00) (114/59 - 173/74)  BP(mean): 105 (28 Jan 2024 16:00) (84 - 107)  RR: 22 (28 Jan 2024 16:00) (20 - 36)  SpO2: 97% (28 Jan 2024 16:00) (90% - 100%)    Parameters below as of 28 Jan 2024 16:00  Patient On (Oxygen Delivery Method): nasal cannula  O2 Flow (L/min): 2    I&O's Summary    27 Jan 2024 07:01  -  28 Jan 2024 07:00  --------------------------------------------------------  IN: 1050 mL / OUT: 1480 mL / NET: -430 mL    28 Jan 2024 07:01  -  28 Jan 2024 17:08  --------------------------------------------------------  IN: 200 mL / OUT: 1225 mL / NET: -1025 mL        LABS:                        9.7    21.01 )-----------( 290      ( 27 Jan 2024 19:58 )             30.9       01-27    137  |  104  |  21<H>  ----------------------------<  103<H>  4.0   |  23  |  1.4    Ca    9.1      27 Jan 2024 19:58  Phos  2.3     01-27  Mg     2.3     01-27        Assessment & Plan:  78y Female w/ pmhx of acute respiratory distress secondary to respiratory acidosis requiring NIV.     NEUROLOGICAL:  #Acute pain    - Tylenol PO prn     #hx of anxiety    -alprazolam 0.25mg q12 HOLDING     -sertraline 100mg qd restarted     #h/o insomnia  - melatonin qhs     RESPIRATORY:   #respiratory acidosis - resolved  - off BiPAP  - on 2L NC  - symbicort 160-4.5mcg/inhalation  #Activity    -increase as tolerated  #Left pleural effusion/ Left lower lobe opacity - improving    CARDS: *DNR*  #hx of HFpEF     - takes Metoprolol ER 50mg > PO Metoprolol 12.5 q12 > continue to uptitrate to home dose     - Lasix 40 mg PO    - Restarted Lasix 40 PO     - Echo 01/24: EF 61%, G1DD, mild MR, mild TR  #hx of HTN    - Lisinopril 40mg daily HOLDING    - Nifedipine XL 60mg daily HOLDING    GASTROINTESTINAL/NUTRITION:   #adenocarcinoma of sigmoid mass  #s/p attempted robotic sigmoid resection, aborted secondary to carcinoma implants 1/26  #s/p g-tube removed, gastropexy completed 1/26  #s/p colonoscopy w/ biopsy 1/12    - Regular with thins and ensure     - aspiration precautions, HOB 30  #hx of recurrent hiatal hernias  #GI Prophylaxis    - continue w/ home pantoprazole 40 mg Oral before breakfast  #Bowel regimen    - senna 2 Tablet(s) Oral at bedtime    - last bowel movement 1/27    /RENAL:   #urine output in critically ill    - indwelling hutchison (placed 1/26) > hutchison discontinued, TOV   #IV fluids   - IVL     Labs:          BUN/Cr- 17/1.2  -->,  21/1.4  -->          Electrolytes-Na 137 // K 4.0 // Mg 2.3 //  Phos 2.3 (01-27 @ 19:58)      HEME/ONC:   #acute on chronic anemia   -1u PRBC during OR   - T&S Expires 1/27   - Venofer per heme x 2 days   #sigmoid mass adenocarcinoma  #DVT prophylaxis     -Chemical: HSQ q8 due to DAWIT     -Mechanical: SCDs  Labs: Hb/Hct:  9.0/29.4  -->,  9.7/30.9  -->                      Plts:  266  -->,  290  -->                 PTT/INR:          ID:  #monitor for leukocytosis/fever  WBC- 18.19  --->>,  18.29  --->>,  21.01  --->>  Temp trend- 24hrs T(F): 96.9 (01-28 @ 16:00), Max: 98.1 (01-27 @ 20:00)      ENDOCRINE:    -FSG q6     -Glucose goal 140-180. if above 180 start ISS    MSK:     Activity - Ambulate as Tolerated:     LINES/DRAINS:  PIV, Hutchison, Left radial arterial line (1/26-1/28)    ADVANCED DIRECTIVES:  Full Code  Sister Sonali hanley) [phone] (HCP)  Nephew Collins [phone]    INDICATION FOR SICU: Respiratory distress requiring NIV    DISPO: DG to floor         Follow Up:  - Hospice follow up   - TOV - 2000 labs  - restart home BP meds as tolerated       Signed out to:  Date:  Time: SICU Transfer Note    DARIEN BARRERA  78y (1945)  [id number]      Transfer from: SICU  Transfer to: Surgery-      SICU COURSE:  78y Female HD# 20d    s/p Diagnostic laparoscopy  Robot-assisted gastropexy    HPI   The patient is a 78-year-old female with past medical history of hiatal hernia s/p repair >10 years back, hypertension, GERD, non obstructive CAD, depression, dementia, anemia presenting for evaluation of low hemoglobin, measured outpatient to be 7.4.  Patient has had multiple episodes of nonbloody nonbilious vomiting over the last several months with good appetite and almost 60 pound weight loss over the last few months.  Patient just started following up with hematologist Dr.Yumi Hussein and had recent blood work from 3 days ago that resulted today and showed a low hemoglobin of 7.4. Patient denies fevers, chills, chest pain, shortness of breath, hematuria, melena, hematochezia. The pt presented to the Ed for a blood transfusion that was recommended by the hematologist.    ED  In the ED the pt received 1 PRBC, Labs were significant for a Cr of 1.6 (unknown baseline) and elevated lactate  CT Scan-large hiatal hernia containing partially included stomach with apparent organoaxial rotation  The pt was admitted to medicine for anemia 2/2 possible GIB    INPATIENT    Colonoscopy 01/12   A 3cm friable fungating ulcerated mass extending from 17cm to 20 cm from the anal verge was noted at the level of the sigmoid colon. The mass was obstructing around 75-90% of the lumen. The PCF scope could not traverse the mass, so it was switched with an upper endoscope. Using an upper endoscope, the mass was easily traversed. Multiple cold forceps biopsies were obtained for histology. Using the upper endoscope, the cecum was reached and the terminal ileum was intubated. Clean based cecal ulcer noted. Normal mucosa was otherwise noted in the remaining parts of the examined colon.  Internal hemorrhoids.      PACU  Spoke with patients HCP Sister Sonali and Nephew Collins on the phone to discuss patient GOC. Discussed MOLST form and discussions held with palliative medicine during inpatient stay. Collins and Sonali would like to respect patient's wishes and continue with DNR/DNI and trial on NIV which she is currently on MOLST updated. Patient placed on BiPAP 12/6.  Sister Sonali [phone]  Nephew Collins [phone]    In SICU, patient was placed on bipap for respiratory acidosis, which has since improved. Patient has been saturating well on NC. Patient passed bedside swallow and is tolerating diet. Patient is stable for downgrade to surgical floor.     PAST MEDICAL & SURGICAL HISTORY:  Hypertension  GERD (gastroesophageal reflux disease)  Hiatal hernia with GERD  Empyema lung  H/O CHF  History of repair of hiatal hernia  H/O pleural empyema s/p decortication      Allergies  No Known Allergies  Intolerances      MEDICATIONS  (STANDING):  budesonide 160 MICROgram(s)/formoterol 4.5 MICROgram(s) Inhaler 2 Puff(s) Inhalation two times a day  chlorhexidine 2% Cloths 1 Application(s) Topical <User Schedule>  heparin   Injectable 5000 Unit(s) SubCutaneous every 8 hours  iron sucrose IVPB 200 milliGRAM(s) IV Intermittent every 24 hours  metoprolol tartrate 12.5 milliGRAM(s) Oral every 12 hours  pantoprazole    Tablet 40 milliGRAM(s) Oral before breakfast  senna 2 Tablet(s) Oral at bedtime  sertraline 100 milliGRAM(s) Oral daily    MEDICATIONS  (PRN):  acetaminophen     Tablet .. 650 milliGRAM(s) Oral every 6 hours PRN Temp greater or equal to 38C (100.4F), Mild Pain (1 - 3)  calcium carbonate    500 mG (Tums) Chewable 1 Tablet(s) Chew daily PRN Heartburn  melatonin 5 milliGRAM(s) Oral at bedtime PRN Insomnia  vitamin A &amp; D Ointment 1 Application(s) Topical four times a day PRN chapped lips      Vital Signs Last 24 Hrs  T(C): 36.1 (28 Jan 2024 16:00), Max: 36.7 (27 Jan 2024 20:00)  T(F): 96.9 (28 Jan 2024 16:00), Max: 98.1 (27 Jan 2024 20:00)  HR: 78 (28 Jan 2024 16:00) (64 - 78)  BP: 164/73 (28 Jan 2024 16:00) (114/59 - 173/74)  BP(mean): 105 (28 Jan 2024 16:00) (84 - 107)  RR: 22 (28 Jan 2024 16:00) (20 - 36)  SpO2: 97% (28 Jan 2024 16:00) (90% - 100%)    Parameters below as of 28 Jan 2024 16:00  Patient On (Oxygen Delivery Method): nasal cannula  O2 Flow (L/min): 2    I&O's Summary    27 Jan 2024 07:01  -  28 Jan 2024 07:00  --------------------------------------------------------  IN: 1050 mL / OUT: 1480 mL / NET: -430 mL    28 Jan 2024 07:01  -  28 Jan 2024 17:08  --------------------------------------------------------  IN: 200 mL / OUT: 1225 mL / NET: -1025 mL        LABS:                        9.7    21.01 )-----------( 290      ( 27 Jan 2024 19:58 )             30.9       01-27    137  |  104  |  21<H>  ----------------------------<  103<H>  4.0   |  23  |  1.4    Ca    9.1      27 Jan 2024 19:58  Phos  2.3     01-27  Mg     2.3     01-27        Assessment & Plan:  78y Female w/ pmhx of acute respiratory distress secondary to respiratory acidosis requiring NIV.     NEUROLOGICAL:  #Acute pain    - Tylenol PO prn     #hx of anxiety    -alprazolam 0.25mg q12 HOLDING     -sertraline 100mg qd restarted     #h/o insomnia  - melatonin qhs     RESPIRATORY:   #respiratory acidosis - resolved  - off BiPAP  - on 2L NC  - symbicort 160-4.5mcg/inhalation  #Activity    -increase as tolerated  #Left pleural effusion/ Left lower lobe opacity - improving    CARDS: *DNR*  #hx of HFpEF     - takes Metoprolol ER 50mg > PO Metoprolol 12.5 q12 > continue to uptitrate to home dose     - Hydralazine 25 mg q8 restarted     - Restarted Lasix 40 PO     - Echo 01/24: EF 61%, G1DD, mild MR, mild TR  #hx of HTN    - Lisinopril 40mg daily HOLDING    - Nifedipine XL 60mg daily HOLDING    GASTROINTESTINAL/NUTRITION:   #adenocarcinoma of sigmoid mass  #s/p attempted robotic sigmoid resection, aborted secondary to carcinoma implants 1/26  #s/p g-tube removed, gastropexy completed 1/26  #s/p colonoscopy w/ biopsy 1/12    - Regular with thins and ensure     - aspiration precautions, HOB 30  #hx of recurrent hiatal hernias  #GI Prophylaxis    - continue w/ home pantoprazole 40 mg Oral before breakfast  #Bowel regimen    - senna 2 Tablet(s) Oral at bedtime    - last bowel movement 1/27    /RENAL:   #urine output in critically ill    - indwelling hutchison (placed 1/26) > hutchison discontinued, TOV   #IV fluids   - IVL     Labs:          BUN/Cr- 17/1.2  -->,  21/1.4  -->          Electrolytes-Na 137 // K 4.0 // Mg 2.3 //  Phos 2.3 (01-27 @ 19:58)      HEME/ONC:   #acute on chronic anemia   -1u PRBC during OR   - T&S Expires 1/27   - Venofer per heme x 2 days   #sigmoid mass adenocarcinoma  #DVT prophylaxis     -Chemical: HSQ q8 due to DAWIT     -Mechanical: SCDs  Labs: Hb/Hct:  9.0/29.4  -->,  9.7/30.9  -->                      Plts:  266  -->,  290  -->                 PTT/INR:          ID:  #monitor for leukocytosis/fever  WBC- 18.19  --->>,  18.29  --->>,  21.01  --->>  Temp trend- 24hrs T(F): 96.9 (01-28 @ 16:00), Max: 98.1 (01-27 @ 20:00)      ENDOCRINE:    -FSG q6     -Glucose goal 140-180. if above 180 start ISS    MSK:     Activity - Ambulate as Tolerated:     LINES/DRAINS:  PIV, Hutchison, Left radial arterial line (1/26-1/28)    ADVANCED DIRECTIVES:  Full Code  Sister Sonali (naveen) [phone] (HCP)  Nephew Collins [phone]    INDICATION FOR SICU: Respiratory distress requiring NIV    DISPO: DG to floor         Follow Up:  - Hospice follow up   - TOV - 2000 labs  - restart home BP meds as tolerated       Signed out to: Jaspreet Landis    Date: 1/28/24  Time: 18:45

## 2024-01-28 NOTE — SWALLOW BEDSIDE ASSESSMENT ADULT - SWALLOW EVAL: DIAGNOSIS
EMERGENCY DEPARTMENT ENCOUNTER      Pt Name: Otis Connors  MRN: [id number]  Birthdate 1966  Date of evaluation: 1/25/2024  Provider: Jerrod Fontanez DO    CHIEF COMPLAINT       Chief Complaint   Patient presents with    Hand Injury     HISTORY OF PRESENT ILLNESS    Otis Connors is a 57 y.o. year old male who presents to the ER for left hand injury.  States that at 1130 he dropped a half full propane tank on his left hand, the bottom corner of it hit the dorsolateral aspect of his hand.  He is right-hand dominant.  He has not taken anything for his pain.  He has noticed limited flexion and extension of his hand as well as some decrease sensation on the back of his hand from approximate leg mid palm to PIP of first 3 fingers    Past Medical History:   Diagnosis Date    CAD (coronary artery disease)     CKD (chronic kidney disease)     Coronary artery disease involving autologous artery coronary bypass graft     Dyslipidemia     HTN (hypertension)     LEXIS (obstructive sleep apnea)     Pacemaker     PTSD (post-traumatic stress disorder)     SSS (sick sinus syndrome) (CMS/[facility])      CURRENT MEDICATIONS       Previous Medications    ACETAMINOPHEN (TYLENOL) 325 MG TABLET    Take 1-2 tablets (325-650 mg) by mouth every 4 hours if needed.    ALBUTEROL 90 MCG/ACTUATION INHALER    Inhale 2 puffs every 4 hours if needed for wheezing.    AMLODIPINE (NORVASC) 2.5 MG TABLET    Take 1 tablet (2.5 mg) by mouth once daily in the morning.    ASPIRIN 81 MG CHEWABLE TABLET    Chew 1 tablet (81 mg) once daily in the morning.    BUSPIRONE (BUSPAR) 15 MG TABLET    Take 2 tablets (30 mg) by mouth 2 times a day.  2 tab(s) orally once a day (in the evening)    CHOLECALCIFEROL (VITAMIN D-3) 50 MCG (2,000 UNIT) CAPSULE    Take 1 capsule (50 mcg) by mouth once daily in the morning.    ESOMEPRAZOLE (NEXIUM) 40 MG DR CAPSULE    Take 1 capsule (40 mg) by mouth once daily as needed (heartburn).    EZETIMIBE (ZETIA) 10 MG TABLET    Take  1 tablet (10 mg) by mouth once daily.    FLUOXETINE (PROZAC) 20 MG CAPSULE    Take 3 capsules (60 mg) by mouth once daily in the morning.    FLUTICASONE (FLONASE) 50 MCG/ACTUATION NASAL SPRAY    Administer 1 spray into each nostril once daily as needed for allergies.    FUROSEMIDE (LASIX) 20 MG TABLET    Take 1 tablet (20 mg) by mouth once daily as needed.    LORAZEPAM (ATIVAN) 1 MG TABLET    Take 1 tablet (1 mg) by mouth once daily as needed for anxiety.    METOPROLOL TARTRATE (LOPRESSOR) 25 MG TABLET    Take 0.5 tablets (12.5 mg) by mouth 2 times a day.    MULTIVITAMIN WITH MINERALS (MULTIVIT-MIN-IRON FUM-FOLIC AC) TABLET    Take 1 tablet by mouth once daily.    NITROGLYCERIN (NITROSTAT) 0.4 MG SL TABLET    DISSOLVE 1 TABLET UNDER THE TONGUE AS NEEDED FOR CHEST PAIN- MAY REPEAT EVERY 5 MINUTES IF NEEDED ( MAX 3 DOSES.- IF NO RELIEF CALL 911)    PANTOPRAZOLE (PROTONIX) 40 MG EC TABLET    Take 1 tablet (40 mg) by mouth once daily.    ROSUVASTATIN (CRESTOR) 40 MG TABLET    Take 1 tablet (40 mg) by mouth once daily at bedtime.     SURGICAL HISTORY       Past Surgical History:   Procedure Laterality Date    CARDIAC CATHETERIZATION N/A 12/13/2023    Procedure: Left Heart Cath, No LV;  Surgeon: Zach Gomez MD;  Location: [facility] Cardiac Cath Lab;  Service: Cardiovascular;  Laterality: N/A;    CARDIAC CATHETERIZATION N/A 12/13/2023    Procedure: PCI;  Surgeon: Zach Gomez MD;  Location: [facility] Cardiac Cath Lab;  Service: Cardiovascular;  Laterality: N/A;     ALLERGIES     Ace inhibitors  FAMILY HISTORY       Family History   Problem Relation Name Age of Onset    Other (cardiac disorder) Father      Hypertension Father       SOCIAL HISTORY       Social History     Tobacco Use    Smoking status: Former     Packs/day: 1.5     Types: Cigarettes    Smokeless tobacco: Never   Vaping Use    Vaping Use: Never used   Substance Use Topics    Alcohol use: Not Currently    Drug use: Never     PHYSICAL EXAM  (up to 7 for level 4, 8  or more for level 5)     ED Triage Vitals [01/25/24 1310]   Temperature Heart Rate Respirations BP   36.6 °C (97.9 °F) 74 18 143/69      Pulse Ox Temp Source Heart Rate Source Patient Position   100 % Temporal Monitor Sitting      BP Location FiO2 (%)     Right arm --       Physical Exam  Vitals and nursing note reviewed.   Constitutional:       General: He is not in acute distress.     Appearance: Normal appearance. He is not ill-appearing.   HENT:      Head: Normocephalic and atraumatic. No contusion or laceration.      Jaw: No trismus or malocclusion.      Right Ear: External ear normal.      Left Ear: External ear normal.      Mouth/Throat:      Mouth: Mucous membranes are moist.      Pharynx: Oropharynx is clear.   Eyes:      Extraocular Movements: Extraocular movements intact.      Pupils: Pupils are equal, round, and reactive to light.   Neck:      Trachea: No tracheal deviation.   Cardiovascular:      Rate and Rhythm: Normal rate and regular rhythm.      Pulses: Normal pulses.   Pulmonary:      Effort: No respiratory distress.      Breath sounds: No wheezing, rhonchi or rales.   Chest:      Chest wall: No tenderness.   Abdominal:      General: Abdomen is flat. There is no distension.      Palpations: Abdomen is soft. There is no mass.      Tenderness: There is no abdominal tenderness. There is no right CVA tenderness or left CVA tenderness.   Musculoskeletal:         General: No signs of injury.      Right wrist: Normal. No swelling, lacerations or snuff box tenderness. Normal range of motion.      Left wrist: Snuff box tenderness present. No swelling, deformity or lacerations. Normal range of motion.      Right hand: Normal. No swelling or lacerations. Normal range of motion. Normal strength. Normal strength of finger abduction, thumb/finger opposition and wrist extension. Normal sensation. Normal capillary refill. Normal pulse.      Left hand: Swelling (compartments soft) and tenderness (dorsal first 3  "Metacarpals) present. No lacerations. Decreased range of motion (flex/ext/opposition, not abduction/adduction, first 3 fingers only, incomplete loss). Decreased strength. Normal strength of finger abduction, thumb/finger opposition and wrist extension. Decreased sensation of the radial distribution. Normal sensation of the ulnar distribution and median distribution. Normal capillary refill. Normal pulse.      Cervical back: Normal range of motion. No tenderness.      Right lower leg: No edema.      Left lower leg: No edema.   Skin:     Coloration: Skin is not jaundiced or pale.      Findings: Bruising present. No rash or wound.   Neurological:      General: No focal deficit present.      Mental Status: He is alert. Mental status is at baseline.   Psychiatric:         Speech: Speech normal.         Behavior: Behavior normal.         Thought Content: Thought content does not include homicidal or suicidal ideation.         Judgment: Judgment normal.        DIAGNOSTIC RESULTS   LABS:  Labs Reviewed - No data to display  All other labs were within normal range or not returned as of this dictation.  Imaging  XR hand left 3+ views   Final Result   No acute findings.        MACRO:   None        Signed by: Acosta Allan 1/25/2024 1:53 PM   Dictation workstation:   [id number]         Procedure  Procedures  EMERGENCY DEPARTMENT COURSE/MDM:   Medical Decision Making    Vitals:    Vitals:    01/25/24 1310   BP: 143/69   BP Location: Right arm   Patient Position: Sitting   Pulse: 74   Resp: 18   Temp: 36.6 °C (97.9 °F)   TempSrc: Temporal   SpO2: 100%   Weight: 98.4 kg (217 lb)   Height: 1.702 m (5' 7\")     Otis Connors is a male 57 y.o. who presents to the ER for left (nondominant) hand injury. On arrival the patients vital signs were: Afebrile, Reguar HR, Normotensive, Regular RR, and Oxygenating well on room air. History obtained from: patient.  No overt signs of open fracture, no s/s of infection, no Kanavel signs, " compartment soft, workup included x-ray.  X-ray performed, per my interpretation of imaging there is no acute fracture.  Given the patient does have snuffbox tenderness he is provided thumb spica splint and follow-up with hand surgery for repeat imaging in approximately 1 week.  He was provided Tylenol and lidocaine patches for analgesia in the ED as well as lidocaine patch Rx for home use.    Diagnoses as of 01/25/24 1515   Hand injury, left, initial encounter       Consultant discussions: None  Diagnostic testing considered but not performed: None  External Records Reviewed: I reviewed recent and relevant outside records including inpatient notes, outpatient records  Social Determinants Affecting Care: Patient does not have prior orthopedic care established  Prescription Drug Consideration: Lidocaine patches prescribed for home use.    Shared decision making for disposition  Patient and/or patient´s representative was counseled regarding labs, imaging, likely diagnosis. All questions were answered. Recommendation was made   for discharge home. The patient agreed and was discharged home in stable condition with appropriate relevant educational materials. Return precautions were provided which included Increasing pain, weakness, loss of motion, numbness, tingling, pain out of proportion to light touch, significant temperature or color difference between sick limb and normal limb,  or worsening of your current condition despite current medical management plan..     ED Medications administered this visit:    Medications   lidocaine 4 % patch 1 patch (1 patch transdermal Medication Applied 1/25/24 1505)   acetaminophen (Tylenol) tablet 975 mg (975 mg oral Given 1/25/24 1504)       New Prescriptions from this visit:    New Prescriptions    LIDOCAINE 4 % PATCH    Place 1 patch over 12 hours on the skin once daily. Remove & discard patch within 12 hours or as directed by MD.       Follow-up:  Chris Burch MD  [id number]  [facility] [address]  [place] [id number]  [phone]              Final Impression:   1. Hand injury, left, initial encounter          I reviewed the case with the attending ED physician. The attending ED physician agrees with the plan.   Jerrod Fontanez DO  PGY-2  Emergency Medicine      Please excuse any misspellings or unintended errors related to the Dragon speech recognition software used to dictate this note.       Jerrod Fontanez DO  Resident  01/25/24 9804     +toleration of regular, thins w/no overt s/s of aspiration/penetration

## 2024-01-29 LAB
ANION GAP SERPL CALC-SCNC: 10 MMOL/L — SIGNIFICANT CHANGE UP (ref 7–14)
BUN SERPL-MCNC: 14 MG/DL — SIGNIFICANT CHANGE UP (ref 10–20)
CALCIUM SERPL-MCNC: 8.4 MG/DL — SIGNIFICANT CHANGE UP (ref 8.4–10.4)
CHLORIDE SERPL-SCNC: 101 MMOL/L — SIGNIFICANT CHANGE UP (ref 98–110)
CO2 SERPL-SCNC: 26 MMOL/L — SIGNIFICANT CHANGE UP (ref 17–32)
CREAT SERPL-MCNC: 0.9 MG/DL — SIGNIFICANT CHANGE UP (ref 0.7–1.5)
EGFR: 65 ML/MIN/1.73M2 — SIGNIFICANT CHANGE UP
GLUCOSE SERPL-MCNC: 87 MG/DL — SIGNIFICANT CHANGE UP (ref 70–99)
HCT VFR BLD CALC: 30.3 % — LOW (ref 37–47)
HGB BLD-MCNC: 9.3 G/DL — LOW (ref 12–16)
MAGNESIUM SERPL-MCNC: 1.8 MG/DL — SIGNIFICANT CHANGE UP (ref 1.8–2.4)
MCHC RBC-ENTMCNC: 23.6 PG — LOW (ref 27–31)
MCHC RBC-ENTMCNC: 30.7 G/DL — LOW (ref 32–37)
MCV RBC AUTO: 76.9 FL — LOW (ref 81–99)
NRBC # BLD: 0 /100 WBCS — SIGNIFICANT CHANGE UP (ref 0–0)
PHOSPHATE SERPL-MCNC: 2.6 MG/DL — SIGNIFICANT CHANGE UP (ref 2.1–4.9)
PLATELET # BLD AUTO: 280 K/UL — SIGNIFICANT CHANGE UP (ref 130–400)
PMV BLD: 9.1 FL — SIGNIFICANT CHANGE UP (ref 7.4–10.4)
POTASSIUM SERPL-MCNC: 4 MMOL/L — SIGNIFICANT CHANGE UP (ref 3.5–5)
POTASSIUM SERPL-SCNC: 4 MMOL/L — SIGNIFICANT CHANGE UP (ref 3.5–5)
RBC # BLD: 3.94 M/UL — LOW (ref 4.2–5.4)
RBC # FLD: 26.6 % — HIGH (ref 11.5–14.5)
SODIUM SERPL-SCNC: 137 MMOL/L — SIGNIFICANT CHANGE UP (ref 135–146)
WBC # BLD: 12.62 K/UL — HIGH (ref 4.8–10.8)
WBC # FLD AUTO: 12.62 K/UL — HIGH (ref 4.8–10.8)

## 2024-01-29 PROCEDURE — 93971 EXTREMITY STUDY: CPT | Mod: 26,RT

## 2024-01-29 RX ORDER — MAGNESIUM SULFATE 500 MG/ML
2 VIAL (ML) INJECTION ONCE
Refills: 0 | Status: COMPLETED | OUTPATIENT
Start: 2024-01-29 | End: 2024-01-29

## 2024-01-29 RX ORDER — NIFEDIPINE 30 MG
60 TABLET, EXTENDED RELEASE 24 HR ORAL AT BEDTIME
Refills: 0 | Status: DISCONTINUED | OUTPATIENT
Start: 2024-01-29 | End: 2024-01-29

## 2024-01-29 RX ORDER — NIFEDIPINE 30 MG
60 TABLET, EXTENDED RELEASE 24 HR ORAL AT BEDTIME
Refills: 0 | Status: DISCONTINUED | OUTPATIENT
Start: 2024-01-29 | End: 2024-02-01

## 2024-01-29 RX ORDER — POTASSIUM PHOSPHATE, MONOBASIC POTASSIUM PHOSPHATE, DIBASIC 236; 224 MG/ML; MG/ML
30 INJECTION, SOLUTION INTRAVENOUS ONCE
Refills: 0 | Status: COMPLETED | OUTPATIENT
Start: 2024-01-29 | End: 2024-01-29

## 2024-01-29 RX ADMIN — Medication 12.5 MILLIGRAM(S): at 05:42

## 2024-01-29 RX ADMIN — CHLORHEXIDINE GLUCONATE 1 APPLICATION(S): 213 SOLUTION TOPICAL at 05:44

## 2024-01-29 RX ADMIN — SENNA PLUS 2 TABLET(S): 8.6 TABLET ORAL at 21:07

## 2024-01-29 RX ADMIN — Medication 60 MILLIGRAM(S): at 21:07

## 2024-01-29 RX ADMIN — Medication 12.5 MILLIGRAM(S): at 17:24

## 2024-01-29 RX ADMIN — HEPARIN SODIUM 5000 UNIT(S): 5000 INJECTION INTRAVENOUS; SUBCUTANEOUS at 05:42

## 2024-01-29 RX ADMIN — Medication 25 GRAM(S): at 00:59

## 2024-01-29 RX ADMIN — BUDESONIDE AND FORMOTEROL FUMARATE DIHYDRATE 2 PUFF(S): 160; 4.5 AEROSOL RESPIRATORY (INHALATION) at 05:43

## 2024-01-29 RX ADMIN — Medication 40 MILLIGRAM(S): at 05:43

## 2024-01-29 RX ADMIN — HEPARIN SODIUM 5000 UNIT(S): 5000 INJECTION INTRAVENOUS; SUBCUTANEOUS at 21:07

## 2024-01-29 RX ADMIN — POTASSIUM PHOSPHATE, MONOBASIC POTASSIUM PHOSPHATE, DIBASIC 83.33 MILLIMOLE(S): 236; 224 INJECTION, SOLUTION INTRAVENOUS at 01:52

## 2024-01-29 RX ADMIN — HEPARIN SODIUM 5000 UNIT(S): 5000 INJECTION INTRAVENOUS; SUBCUTANEOUS at 13:05

## 2024-01-29 RX ADMIN — Medication 25 MILLIGRAM(S): at 05:43

## 2024-01-29 RX ADMIN — BUDESONIDE AND FORMOTEROL FUMARATE DIHYDRATE 2 PUFF(S): 160; 4.5 AEROSOL RESPIRATORY (INHALATION) at 17:24

## 2024-01-29 RX ADMIN — IRON SUCROSE 110 MILLIGRAM(S): 20 INJECTION, SOLUTION INTRAVENOUS at 00:04

## 2024-01-29 RX ADMIN — SERTRALINE 100 MILLIGRAM(S): 25 TABLET, FILM COATED ORAL at 12:15

## 2024-01-29 RX ADMIN — PANTOPRAZOLE SODIUM 40 MILLIGRAM(S): 20 TABLET, DELAYED RELEASE ORAL at 05:46

## 2024-01-29 NOTE — PROGRESS NOTE ADULT - ASSESSMENT
ASSESSMENT:  78y F w/ PMHx of hiatal hernia s/p repair >10 years back, L emypema s/p decortication 2018, hypertension, GERD, non obstructive CAD, depression, dementia, anemia presenting for evaluation of low hemoglobin, measured outpatient to be 7.4. The pt presented to the Ed for a blood transfusion that was recommended by the hematologist. a 3.0 cm friable sigmoid colon mass was identified on colonoscopy. She is now s/p Robotic sigmoidectomy for colonic mass, aborted due to diffuse peritoneal carcinomatosis. Procedure performed included diagnostic laparoscopy, G tube remocal, robotic gastropexy. Postoperatively patient was difficult to arouse, concern for CO2 retention. Family contacted as patient is DNR/DNI, agreed to trial NIV, now on Bipap.    PLAN:   - Hemodynamic monitoring  - Metoprolol, hydralazine, and lasix restarted  - Tapia removed -> F/U trial of void  - F/U hospice conversation today  - Daily labs, replete electrolytes as needed  - Code status: DNR/DNI/trial NIV  - Monitor oxygen requirements    [id number]

## 2024-01-29 NOTE — PROGRESS NOTE ADULT - SUBJECTIVE AND OBJECTIVE BOX
GENERAL SURGERY PROGRESS NOTE     DARIEN BARRERA  78y  Female  Hospital day :22d  POD:3d  Procedure: Diagnostic laparoscopy    Robot-assisted gastropexy    OVERNIGHT EVENTS:  - Downgraded to the floor  - Tapia removed -> failed trial of void -> bladder scan 109cc -> F/U in 2 hours  - Saturating well on 2 liter oxygen through nasal cannula  - Metoprolol, hydralazine, and lasix restarted  - 30 Kphos and 2g Mg repleted    T(F): 98.4 (01-29-24 @ 00:15), Max: 98.4 (01-29-24 @ 00:15)  HR: 77 (01-29-24 @ 00:15) (64 - 80)  BP: 162/61 (01-29-24 @ 00:15) (114/59 - 177/63)  ABP: 167/51 (01-28-24 @ 15:00) (137/47 - 167/51)  ABP(mean): 93 (01-28-24 @ 15:00) (80 - 93)  RR: 18 (01-29-24 @ 00:15) (18 - 31)  SpO2: 97% (01-29-24 @ 00:15) (90% - 100%)    DIET/FLUIDS: magnesium sulfate  IVPB 2 Gram(s) IV Intermittent once  potassium phosphate IVPB 30 milliMole(s) IV Intermittent once      URINE:   01-27-24 @ 07:01  -  01-28-24 @ 07:00  --------------------------------------------------------  OUT: 1480 mL       GI proph:  pantoprazole    Tablet 40 milliGRAM(s) Oral before breakfast    AC/ proph: heparin   Injectable 5000 Unit(s) SubCutaneous every 8 hours      PHYSICAL EXAM:  GENERAL: NAD  CHEST/LUNG: Clear to auscultation bilaterally; On 2 liter oxygen through nasal cannula  HEART: Regular rate and rhythm  ABDOMEN: Soft, Nontender, Nondistended;       LABS  Labs:  CAPILLARY BLOOD GLUCOSE      POCT Blood Glucose.: 114 mg/dL (28 Jan 2024 11:48)  POCT Blood Glucose.: 135 mg/dL (28 Jan 2024 06:40)  POCT Blood Glucose.: 131 mg/dL (28 Jan 2024 06:15)                          9.5    13.56 )-----------( 285      ( 28 Jan 2024 20:00 )             30.8       Auto Neutrophil %: 78.2 % (01-28-24 @ 20:00)  Auto Immature Granulocyte %: 0.7 % (01-28-24 @ 20:00)    01-28    141  |  103  |  14  ----------------------------<  82  3.6   |  23  |  0.8      Calcium: 8.5 mg/dL (01-28-24 @ 20:00)      LFTs:     Blood Gas Arterial, Lactate: 0.8 mmol/L (01-27-24 @ 05:14)  Blood Gas Arterial, Lactate: 0.6 mmol/L (01-26-24 @ 20:15)  Blood Gas Arterial, Lactate: 0.4 mmol/L (01-26-24 @ 17:33)    ABG - ( 27 Jan 2024 05:14 )  pH: 7.36  /  pCO2: 39    /  pO2: 122   / HCO3: 22    / Base Excess: -3.2  /  SaO2: 99.1            ABG - ( 26 Jan 2024 20:15 )  pH: 7.30  /  pCO2: 46    /  pO2: 58    / HCO3: 23    / Base Excess: -3.8  /  SaO2: 90.3            ABG - ( 26 Jan 2024 17:33 )  pH: 7.27  /  pCO2: 51    /  pO2: 119   / HCO3: 23    / Base Excess: -4.0  /  SaO2: 98.0              Coags:            Urinalysis Basic - ( 28 Jan 2024 20:00 )    Color: x / Appearance: x / SG: x / pH: x  Gluc: 82 mg/dL / Ketone: x  / Bili: x / Urobili: x   Blood: x / Protein: x / Nitrite: x   Leuk Esterase: x / RBC: x / WBC x   Sq Epi: x / Non Sq Epi: x / Bacteria: x            RADIOLOGY & ADDITIONAL TESTS:  < from: Xray Chest 1 View AP/PA (01.28.24 @ 08:08) >  Findings/  impression:  Partial lordotic view  Support devices: None  Cardiac/ Mediastinum: Stable position  Lungs/ Pleura: Increasing left, stable right lung opacities. No   pneumothorax  Skeletal/ soft tissues: Stable

## 2024-01-29 NOTE — PROGRESS NOTE ADULT - ASSESSMENT
1- anemia sig hb 7 s/p transfusion>>> hb 8   w/p outpt showed PIPO ferr at 35.>> 20   s/p transfusion   pt only po fe ..no improvement in hb.. iv venofer qd     2. w/p as inpt showed colon mass, s/p biopsy c/w colon ca   CEA  2.7   pending colon ca  surgery ..delayed to Friday..peritoneal dx and further surg aborted.      3.  d/c planning  discussed chemo as outpt.

## 2024-01-30 RX ORDER — MAGNESIUM SULFATE 500 MG/ML
2 VIAL (ML) INJECTION ONCE
Refills: 0 | Status: COMPLETED | OUTPATIENT
Start: 2024-01-30 | End: 2024-01-30

## 2024-01-30 RX ADMIN — Medication 12.5 MILLIGRAM(S): at 05:16

## 2024-01-30 RX ADMIN — CHLORHEXIDINE GLUCONATE 1 APPLICATION(S): 213 SOLUTION TOPICAL at 05:17

## 2024-01-30 RX ADMIN — HEPARIN SODIUM 5000 UNIT(S): 5000 INJECTION INTRAVENOUS; SUBCUTANEOUS at 21:22

## 2024-01-30 RX ADMIN — Medication 63.75 MILLIMOLE(S): at 02:35

## 2024-01-30 RX ADMIN — Medication 25 GRAM(S): at 01:20

## 2024-01-30 RX ADMIN — HEPARIN SODIUM 5000 UNIT(S): 5000 INJECTION INTRAVENOUS; SUBCUTANEOUS at 13:32

## 2024-01-30 RX ADMIN — Medication 40 MILLIGRAM(S): at 05:16

## 2024-01-30 RX ADMIN — PANTOPRAZOLE SODIUM 40 MILLIGRAM(S): 20 TABLET, DELAYED RELEASE ORAL at 05:16

## 2024-01-30 RX ADMIN — HEPARIN SODIUM 5000 UNIT(S): 5000 INJECTION INTRAVENOUS; SUBCUTANEOUS at 05:16

## 2024-01-30 RX ADMIN — Medication 60 MILLIGRAM(S): at 21:23

## 2024-01-30 RX ADMIN — BUDESONIDE AND FORMOTEROL FUMARATE DIHYDRATE 2 PUFF(S): 160; 4.5 AEROSOL RESPIRATORY (INHALATION) at 05:17

## 2024-01-30 RX ADMIN — Medication 12.5 MILLIGRAM(S): at 17:02

## 2024-01-30 RX ADMIN — BUDESONIDE AND FORMOTEROL FUMARATE DIHYDRATE 2 PUFF(S): 160; 4.5 AEROSOL RESPIRATORY (INHALATION) at 21:23

## 2024-01-30 RX ADMIN — SERTRALINE 100 MILLIGRAM(S): 25 TABLET, FILM COATED ORAL at 11:26

## 2024-01-30 RX ADMIN — SENNA PLUS 2 TABLET(S): 8.6 TABLET ORAL at 21:23

## 2024-01-30 NOTE — PROGRESS NOTE ADULT - ASSESSMENT
1- anemia sig hb 7 s/p transfusion>>> hb 8   w/p outpt showed PIPO ferr at 35.>> 20   s/p transfusion   pt only po fe ..no improvement in hb.. iv venofer qd     2. w/p as inpt showed colon mass, s/p biopsy c/w colon ca   CEA  2.7   peritoneal dx and further surg aborted.    3. pt with dementia  concerning for CNS mets..  will discuss with resident for brain imaging      4. pt most likely not able to tolerate chemotherapy.

## 2024-01-30 NOTE — PROGRESS NOTE ADULT - ASSESSMENT
78y F w/ PMHx of hiatal hernia s/p repair >10 years back, L emypema s/p decortication 2018, hypertension, GERD, non obstructive CAD, depression, dementia, anemia presenting for evaluation of low hemoglobin, measured outpatient to be 7.4. The pt presented to the Ed for a blood transfusion that was recommended by the hematologist. a 3.0 cm friable sigmoid colon mass was identified on colonoscopy. She is now s/p Robotic sigmoidectomy for colonic mass, aborted due to diffuse peritoneal carcinomatosis. Procedure performed included diagnostic laparoscopy, G tube remocal, robotic gastropexy. Postoperatively patient was difficult to arouse, concern for CO2 retention. Family contacted as patient is DNR/DNI, agreed to trial NIV, now on Bipap.    PLAN:   - Hemodynamic monitoring  - Metoprolol, nifedipine, and lasix restarted, follow up restarting full home BP control regimen  - Per case management documentation, family wants to bring patient home for home hospice care.  - Daily labs, replete electrolytes as needed  - Code status: DNR/DNI/trial NIV  - Monitor oxygen requirements  - Encourage IS, Ambulation  - DVT ppx  - Follow up consults  - Continue warm packs, elevation RUE  - Follow up final duplex U/S read  - follow up echo pending    [id number]

## 2024-01-30 NOTE — HOSPICE CARE NOTE - CONVESATION DETAILS
Follow up call placed to Sonali to further discuss goals of care and hospice services. Sonali now states she needs a few more days to prepare the patient home for her discharge with hospice services. Sonali requesting CT of Brain to determine if cancer has spread. Hospice intake available at 1604 to discuss further. Hospice dispo ongoing.

## 2024-01-30 NOTE — HOSPICE CARE NOTE - CONVESATION DETAILS
Follow up call placed to patient’s sister Sonali to further discuss a D/C plan with hospice services. Sonali now reconsidering hospice care as she reports that her sister is improving. Further discussed home hospice versus SNF placement. Sonali verbalized she cannot manage her sister at home as she is the only caregiver. There are no finances for private hire. Reinforced that hospice is a support service and we provide an RN visit 1 x a week with 24/7 triage RN phone support. All care for patient is the responsibility of the family. Discussed SNF placement with hospice care and screen for Medicaid. Sonali uncertain if patient will qualify for Medicaid with room and board benefit. Discussed possible STR to continue working on strength building. Sonali requesting call back this afternoon. Update provided to AL Sam. Hospice dispo ongoing.

## 2024-01-30 NOTE — PROGRESS NOTE ADULT - SUBJECTIVE AND OBJECTIVE BOX
GENERAL SURGERY PROGRESS NOTE     DARIEN BARRERA  78y  Female  Hospital day :22d  POD:  Procedure: Diagnostic laparoscopy    Robot-assisted gastropexy      OVERNIGHT EVENTS: No Acute events overnight. Patient complaining of swollen RUE. Duplex RUE ultrasound ordered, patient preliminarily found to have thrombosed Cephalic vein. Currently Being treated with Warm compresses and R arm elevation. BP elevated overnight, slowly resuming Home BP medications. Magnesium and phos repleted. Patient passing gas, bowel movements, voiding, OOB to chair.     T(F): 98.6 (01-30-24 @ 00:52), Max: 98.9 (01-29-24 @ 17:22)  HR: 73 (01-30-24 @ 00:52) (70 - 79)  BP: 167/73 (01-30-24 @ 00:52) (141/67 - 168/75)  ABP: --  ABP(mean): --  RR: 18 (01-30-24 @ 00:52) (18 - 19)  SpO2: 95% (01-30-24 @ 00:52) (92% - 97%)    DIET/FLUIDS: sodium phosphate 15 milliMole(s)/250 mL IVPB 15 milliMole(s) IV Intermittent once    NG:                                                                                DRAINS:     BM:     EMESIS:     URINE:   01-28-24 @ 07:01  -  01-29-24 @ 07:00  --------------------------------------------------------  OUT: 1325 mL       GI proph:  pantoprazole    Tablet 40 milliGRAM(s) Oral before breakfast    AC/ proph: heparin   Injectable 5000 Unit(s) SubCutaneous every 8 hours    ABx:     PHYSICAL EXAM:  GENERAL: NAD, well-appearing  CHEST/LUNG: Clear to auscultation bilaterally  HEART: Regular rate and rhythm  ABDOMEN: Soft, minimally tender, Nondistended;   EXTREMITIES:  No clubbing, cyanosis, or edema      LABS  Labs:  CAPILLARY BLOOD GLUCOSE                              9.3    12.62 )-----------( 280      ( 29 Jan 2024 20:00 )             30.3         01-29    137  |  101  |  14  ----------------------------<  87  4.0   |  26  |  0.9      Calcium: 8.4 mg/dL (01-29-24 @ 20:00)      LFTs:     Blood Gas Arterial, Lactate: 0.8 mmol/L (01-27-24 @ 05:14)    ABG - ( 27 Jan 2024 05:14 )  pH: 7.36  /  pCO2: 39    /  pO2: 122   / HCO3: 22    / Base Excess: -3.2  /  SaO2: 99.1            ABG - ( 26 Jan 2024 20:15 )  pH: 7.30  /  pCO2: 46    /  pO2: 58    / HCO3: 23    / Base Excess: -3.8  /  SaO2: 90.3            ABG - ( 26 Jan 2024 17:33 )  pH: 7.27  /  pCO2: 51    /  pO2: 119   / HCO3: 23    / Base Excess: -4.0  /  SaO2: 98.0              Coags:            Urinalysis Basic - ( 29 Jan 2024 20:00 )    Color: x / Appearance: x / SG: x / pH: x  Gluc: 87 mg/dL / Ketone: x  / Bili: x / Urobili: x   Blood: x / Protein: x / Nitrite: x   Leuk Esterase: x / RBC: x / WBC x   Sq Epi: x / Non Sq Epi: x / Bacteria: x            RADIOLOGY & ADDITIONAL TESTS:      no new imaging

## 2024-01-31 ENCOUNTER — TRANSCRIPTION ENCOUNTER (OUTPATIENT)
Age: 79
End: 2024-01-31

## 2024-01-31 RX ORDER — FUROSEMIDE 40 MG
40 TABLET ORAL EVERY 12 HOURS
Refills: 0 | Status: DISCONTINUED | OUTPATIENT
Start: 2024-01-31 | End: 2024-02-01

## 2024-01-31 RX ORDER — METOPROLOL TARTRATE 50 MG
25 TABLET ORAL EVERY 12 HOURS
Refills: 0 | Status: DISCONTINUED | OUTPATIENT
Start: 2024-01-31 | End: 2024-02-01

## 2024-01-31 RX ORDER — LABETALOL HCL 100 MG
10 TABLET ORAL ONCE
Refills: 0 | Status: COMPLETED | OUTPATIENT
Start: 2024-01-31 | End: 2024-01-31

## 2024-01-31 RX ADMIN — HEPARIN SODIUM 5000 UNIT(S): 5000 INJECTION INTRAVENOUS; SUBCUTANEOUS at 22:35

## 2024-01-31 RX ADMIN — Medication 25 MILLIGRAM(S): at 17:07

## 2024-01-31 RX ADMIN — Medication 60 MILLIGRAM(S): at 22:35

## 2024-01-31 RX ADMIN — SENNA PLUS 2 TABLET(S): 8.6 TABLET ORAL at 22:35

## 2024-01-31 RX ADMIN — Medication 40 MILLIGRAM(S): at 17:07

## 2024-01-31 RX ADMIN — CHLORHEXIDINE GLUCONATE 1 APPLICATION(S): 213 SOLUTION TOPICAL at 05:23

## 2024-01-31 RX ADMIN — HEPARIN SODIUM 5000 UNIT(S): 5000 INJECTION INTRAVENOUS; SUBCUTANEOUS at 05:20

## 2024-01-31 RX ADMIN — Medication 40 MILLIGRAM(S): at 05:21

## 2024-01-31 RX ADMIN — BUDESONIDE AND FORMOTEROL FUMARATE DIHYDRATE 2 PUFF(S): 160; 4.5 AEROSOL RESPIRATORY (INHALATION) at 05:23

## 2024-01-31 RX ADMIN — Medication 12.5 MILLIGRAM(S): at 05:21

## 2024-01-31 RX ADMIN — PANTOPRAZOLE SODIUM 40 MILLIGRAM(S): 20 TABLET, DELAYED RELEASE ORAL at 05:21

## 2024-01-31 RX ADMIN — SERTRALINE 100 MILLIGRAM(S): 25 TABLET, FILM COATED ORAL at 11:09

## 2024-01-31 RX ADMIN — Medication 10 MILLIGRAM(S): at 02:12

## 2024-01-31 NOTE — DISCHARGE NOTE PROVIDER - NSDCMRMEDTOKEN_GEN_ALL_CORE_FT
ALPRAZolam 0.25 mg oral tablet: 1 tab(s) orally 2 times a day  furosemide 40 mg oral tablet: 1 tab(s) orally 2 times a day  lisinopril 40 mg oral tablet: 1 tab(s) orally once a day  Metoprolol Succinate ER 50 mg oral tablet, extended release: 1 tab(s) orally 2 times a day  Nifedical XL 60 mg oral tablet, extended release: 1 tab(s) orally once a day  omeprazole 40 mg oral delayed release capsule: 1 cap(s) orally once a day  sertraline 100 mg oral tablet: 1 tab(s) orally once a day  Symbicort 160 mcg-4.5 mcg/inh inhalation aerosol: 2 puff(s) inhaled 2 times a day

## 2024-01-31 NOTE — DISCHARGE NOTE PROVIDER - CARE PROVIDER_API CALL
Velasquez Villalba  Surgery  [address] [place] [address], [facility] [address] [place] [address]  Phone: [phone]  Fax: [phone]  Follow Up Time:

## 2024-01-31 NOTE — DISCHARGE NOTE PROVIDER - HOSPITAL COURSE
A 78-year-old female with past medical history of hiatal hernia s/p repair >10 years back, hypertension, GERD, non obstructive CAD, depression, dementia, and anemia presented for evaluation of low hemoglobin, measured outpatient to be 7.4. Patient had multiple episodes of nonbloody nonbilious vomiting over the last several months with good appetite and almost 60 pound weight loss over the last few months. Patient just started following up with hematologist Dr.Yumi Hussein and had recent blood work from 3 days ago that resulted today and showed a low hemoglobin of 7.4. Patient denied fevers, chills, chest pain, shortness of breath, hematuria, melena, hematochezia. The patient presented to the ED for a blood transfusion that was recommended by the hematologist.    For her low hemoglobin, she underwent an esophagogastroduodenoscopy and colonoscopy to identify the cause. During colonoscopy 1/12, a 3 cm sigmoid colon mass was identified which was biopsied and resulted as adenocarcinoma. She was therefore planned to undergo a laparoscopic sigmoid resection on 1/26/23. During diagnostic laparoscopy, multiple metastatic implants were identified on the omentum, liver, and small bowel which were biopsied, procedure aborted, gastrostomy tube removed, and robotic gastropexy performed. Postoperatively, she was admitted to surgical ICU for further hemodynamic monitoring.    Spoke with patient's HCP Sister Sonali and Nephew Collins on the phone to discuss patient GOC. Discussed MOLST form and discussions held with palliative medicine during inpatient stay. Collins and Sonali respected patient's wishes and continued with DNR/DNI and trial on NIV. Patient was placed on BiPAP 12/6.    In SICU, patient was placed on bipap for respiratory acidosis, which improved. Patient was saturating well on NC. Patient passed bedside swallow and was tolerating diet. Patient was stable and therefore downgraded to the surgical floor. Since then, she has remained hemodynamically stable. Due to her extent of disease, hospice and palliative team talked to the family and the patient to discuss further goals of care. The family decided on home hospice for the patient.             A 78-year-old female with past medical history of hiatal hernia s/p repair >10 years back, hypertension, GERD, non obstructive CAD, depression, dementia, and anemia presented for evaluation of low hemoglobin, measured outpatient to be 7.4. Patient had multiple episodes of nonbloody nonbilious vomiting over the last several months with good appetite and almost 60 pound weight loss over the last few months. Patient just started following up with hematologist Dr.Yumi Hussein and had recent blood work from 3 days ago that resulted today and showed a low hemoglobin of 7.4. Patient denied fevers, chills, chest pain, shortness of breath, hematuria, melena, hematochezia. The patient presented to the ED for a blood transfusion that was recommended by the hematologist.    For her low hemoglobin, she underwent an esophagogastroduodenoscopy and colonoscopy to identify the cause. During colonoscopy 1/12, a 3 cm sigmoid colon mass was identified which was biopsied and resulted as adenocarcinoma. She was therefore planned to undergo a laparoscopic sigmoid resection on 1/26/23. During diagnostic laparoscopy, multiple metastatic implants were identified on the omentum, liver, and small bowel which were biopsied, procedure aborted, gastrostomy tube removed, and robotic gastropexy performed. Postoperatively, she was admitted to surgical ICU for further hemodynamic monitoring.    Spoke with patient's HCP Sister Sonali and Nephew Collins on the phone to discuss patient GOC. Discussed MOLST form and discussions held with palliative medicine during inpatient stay. Collins and Sonali respected patient's wishes and continued with DNR/DNI and trial on NIV. Patient was placed on BiPAP 12/6.    In SICU, patient was placed on bipap for respiratory acidosis, which improved. Patient was saturating well on NC. Patient passed bedside swallow and was tolerating diet. Patient was stable and therefore downgraded to the surgical floor. Since then, she has remained hemodynamically stable. Due to her extent of disease, hospice and palliative team talked to the family and the patient to discuss further goals of care. The family decided on home hospice for the patient.    On 2/1/24 the patient was deemed medically stable for discharge home with home hospice.

## 2024-01-31 NOTE — DISCHARGE NOTE PROVIDER - NSDCCPTREATMENT_GEN_ALL_CORE_FT
PRINCIPAL PROCEDURE  Procedure: Diagnostic laparoscopy  Findings and Treatment: Diet:    - Continue regular diet as tolerated.   Activity:    - You may ambulate and otherwise resume normal daily activities as tolerated.   Medications:    - You may resume your home medications.    - You may take Tylenol 650mg every 6 hours and alternate with Ibuprofen 600mg every 8 hours for pain. You may find these medications over the counter at your local pharmacy.  Follow-up:    - Please call the office to schedule a follow-up appointment with Dr. Villalba within 1-2 weeks, or follow-up as previously scheduled.  Please call the office or return to the ED with persistent fever greater than 100.4F, chest pain, shortness of breath, uncontrollable nausea/vomiting/abdominal pain, constipation, bloody bowel movements, abdominal distention, inability to tolerate oral intake.      SECONDARY PROCEDURE  Procedure: Robot-assisted gastropexy  Findings and Treatment:

## 2024-01-31 NOTE — PROGRESS NOTE ADULT - ASSESSMENT
1- anemia sig hb 7 s/p transfusion>>> hb 8   w/p outpt showed PIPO ferr at 35.>> 20   s/p transfusion   pt only po fe ..no improvement in hb.. iv venofer qd     2. w/p as inpt showed colon mass, s/p biopsy c/w colon ca   CEA  2.7   peritoneal dx and further surg aborted.    3. pt with dementia  concerning for CNS mets..  will discuss with resident for brain imaging      4. pt most likely not able to tolerate chemotherapy.  will f/p as outpt

## 2024-01-31 NOTE — PROVIDER CONTACT NOTE (OTHER) - SITUATION
patient's blood pressure elevated at 170/75, heart rate 70, after receiving 10mg IV Labetolol
patient blood pressure earlier at 173/76, heart rate 71. patient given 2200 dose of Nifedipine 60mg. blood pressure reassessed and still elevated at 181/86, heart rate 76

## 2024-01-31 NOTE — PROGRESS NOTE ADULT - SUBJECTIVE AND OBJECTIVE BOX
GENERAL SURGERY PROGRESS NOTE     DARIEN BARRERA  [address] [place] [address] day :24d    POD:5d  Procedure: Diagnostic laparoscopy    Robot-assisted gastropexy      Surgical Attending: Velasquez Villalba  Overnight events: Pt was hypertensive to SBP in 180s. She was given 10mg labetalol and repeat BP was 170/75 w/ HR of 70. Pt was then give normal Home dose of lisinopril and lasix and Repeat bp was 166/ 82    T(F): 99 (01-31-24 @ 04:14), Max: 99.3 (01-30-24 @ 16:16)  HR: 75 (01-31-24 @ 06:05) (61 - 78)  BP: 166/82 (01-31-24 @ 06:05) (151/69 - 185/78)  ABP: --  ABP(mean): --  RR: 18 (01-31-24 @ 04:14) (18 - 18)  SpO2: 94% (01-31-24 @ 04:14) (94% - 97%)    IN'S / OUT's:    01-30-24 @ 07:01  -  01-31-24 @ 07:00  --------------------------------------------------------  IN:  Total IN: 0 mL    OUT:    Voided (mL): 400 mL  Total OUT: 400 mL    Total NET: -400 mL          PHYSICAL EXAM:  GENERAL: NAD  CHEST/LUNG: Clear to auscultation bilaterally  HEART: Regular rate and rhythm  ABDOMEN: Soft, minimally tender, Nondistended;   EXTREMITIES:  No clubbing, cyanosis, or edema      LABS  Labs:  CAPILLARY BLOOD GLUCOSE                              9.3    12.62 )-----------( 280      ( 29 Jan 2024 20:00 )             30.3         01-29    137  |  101  |  14  ----------------------------<  87  4.0   |  26  |  0.9          LFTs:       ABG - ( 27 Jan 2024 05:14 )  pH: 7.36  /  pCO2: 39    /  pO2: 122   / HCO3: 22    / Base Excess: -3.2  /  SaO2: 99.1            ABG - ( 26 Jan 2024 20:15 )  pH: 7.30  /  pCO2: 46    /  pO2: 58    / HCO3: 23    / Base Excess: -3.8  /  SaO2: 90.3            ABG - ( 26 Jan 2024 17:33 )  pH: 7.27  /  pCO2: 51    /  pO2: 119   / HCO3: 23    / Base Excess: -4.0  /  SaO2: 98.0              Coags:            Urinalysis Basic - ( 29 Jan 2024 20:00 )    Color: x / Appearance: x / SG: x / pH: x  Gluc: 87 mg/dL / Ketone: x  / Bili: x / Urobili: x   Blood: x / Protein: x / Nitrite: x   Leuk Esterase: x / RBC: x / WBC x   Sq Epi: x / Non Sq Epi: x / Bacteria: x            RADIOLOGY & ADDITIONAL TESTS:      A/P:  DARIEN BARRERA is a 78y F w/ PMHx of hiatal hernia s/p repair >10 years back, L emypema s/p decortication 2018, hypertension, GERD, non obstructive CAD, depression, dementia, anemia presenting for evaluation of low hemoglobin, measured outpatient to be 7.4. The pt presented to the Ed for a blood transfusion that was recommended by the hematologist. a 3.0 cm friable sigmoid colon mass was identified on colonoscopy. She is now s/p Robotic sigmoidectomy for colonic mass, aborted due to diffuse peritoneal carcinomatosis. Procedure performed included diagnostic laparoscopy, G tube remocal, robotic gastropexy.         PLAN:   - continue to monitor BP  - continue with home BP meds  - f/u with CM for dispo home w/ Hospice  - possible discharge 1/31  - DVT and GI ppx  - encourage IS and ambulation as tolerated        #DVT ppx: heparin   Injectable 5000 Unit(s) SubCutaneous every 8 hours    #GI ppx: pantoprazole    Tablet 40 milliGRAM(s) Oral before breakfast    Disposition:  4C, Hospice    Above plan discussed with Attending Surgeon Dr. Villalba  , patient, patient family, and Primary team      TAP (Trauma, Acute care, Pediatrics) Spectra [phone]     GENERAL SURGERY PROGRESS NOTE     DARIEN BARRERA  [address] [place] [address] day :24d    POD:5d  Procedure: Diagnostic laparoscopy    Robot-assisted gastropexy      Surgical Attending: Velasquez Villalba  Overnight events: Pt was hypertensive to SBP in 180s. She was given 10mg labetalol and repeat BP was 170/75 w/ HR of 70. Pt was then give normal Home dose of lisinopril and lasix and Repeat bp was 166/ 82    T(F): 99 (01-31-24 @ 04:14), Max: 99.3 (01-30-24 @ 16:16)  HR: 75 (01-31-24 @ 06:05) (61 - 78)  BP: 166/82 (01-31-24 @ 06:05) (151/69 - 185/78)  ABP: --  ABP(mean): --  RR: 18 (01-31-24 @ 04:14) (18 - 18)  SpO2: 94% (01-31-24 @ 04:14) (94% - 97%)    IN'S / OUT's:    01-30-24 @ 07:01  -  01-31-24 @ 07:00  --------------------------------------------------------  IN:  Total IN: 0 mL    OUT:    Voided (mL): 400 mL  Total OUT: 400 mL    Total NET: -400 mL          PHYSICAL EXAM:  GENERAL: NAD  CHEST/LUNG: Clear to auscultation bilaterally  HEART: Regular rate and rhythm  ABDOMEN: Soft, minimally tender, Nondistended;   EXTREMITIES:  No clubbing, cyanosis, or edema      LABS  Labs:  CAPILLARY BLOOD GLUCOSE                              9.3    12.62 )-----------( 280      ( 29 Jan 2024 20:00 )             30.3         01-29    137  |  101  |  14  ----------------------------<  87  4.0   |  26  |  0.9          LFTs:       ABG - ( 27 Jan 2024 05:14 )  pH: 7.36  /  pCO2: 39    /  pO2: 122   / HCO3: 22    / Base Excess: -3.2  /  SaO2: 99.1            ABG - ( 26 Jan 2024 20:15 )  pH: 7.30  /  pCO2: 46    /  pO2: 58    / HCO3: 23    / Base Excess: -3.8  /  SaO2: 90.3            ABG - ( 26 Jan 2024 17:33 )  pH: 7.27  /  pCO2: 51    /  pO2: 119   / HCO3: 23    / Base Excess: -4.0  /  SaO2: 98.0              Coags:            Urinalysis Basic - ( 29 Jan 2024 20:00 )    Color: x / Appearance: x / SG: x / pH: x  Gluc: 87 mg/dL / Ketone: x  / Bili: x / Urobili: x   Blood: x / Protein: x / Nitrite: x   Leuk Esterase: x / RBC: x / WBC x   Sq Epi: x / Non Sq Epi: x / Bacteria: x            RADIOLOGY & ADDITIONAL TESTS:      A/P:  DARIEN BARRERA is a 78y F w/ PMHx of hiatal hernia s/p repair >10 years back, L emypema s/p decortication 2018, hypertension, GERD, non obstructive CAD, depression, dementia, anemia presenting for evaluation of low hemoglobin, measured outpatient to be 7.4. The pt presented to the Ed for a blood transfusion that was recommended by the hematologist. a 3.0 cm friable sigmoid colon mass was identified on colonoscopy. She is now s/p Robotic sigmoidectomy for colonic mass, aborted due to diffuse peritoneal carcinomatosis. Procedure performed included diagnostic laparoscopy, G tube remocal, robotic gastropexy.         PLAN:   - continue to monitor BP  - continue with home BP meds  - f/u with CM for dispo home w/ Hospice  - possible discharge 1/31  - DVT and GI ppx  - encourage IS and ambulation as tolerated        #DVT ppx: heparin   Injectable 5000 Unit(s) SubCutaneous every 8 hours    #GI ppx: pantoprazole    Tablet 40 milliGRAM(s) Oral before breakfast    Disposition:  4C, Hospice    Above plan discussed with Attending Surgeon Dr. Villalba, patient, and patient family      [phone]

## 2024-01-31 NOTE — DISCHARGE NOTE PROVIDER - NSDCCPCAREPLAN_GEN_ALL_CORE_FT
PRINCIPAL DISCHARGE DIAGNOSIS  Diagnosis: Colon malignancy  Assessment and Plan of Treatment:       SECONDARY DISCHARGE DIAGNOSES  Diagnosis: Acute weakness  Assessment and Plan of Treatment:

## 2024-02-01 VITALS
SYSTOLIC BLOOD PRESSURE: 120 MMHG | DIASTOLIC BLOOD PRESSURE: 61 MMHG | OXYGEN SATURATION: 95 % | TEMPERATURE: 98 F | RESPIRATION RATE: 18 BRPM | HEART RATE: 63 BPM

## 2024-02-01 RX ADMIN — Medication 40 MILLIGRAM(S): at 05:19

## 2024-02-01 RX ADMIN — PANTOPRAZOLE SODIUM 40 MILLIGRAM(S): 20 TABLET, DELAYED RELEASE ORAL at 05:19

## 2024-02-01 RX ADMIN — HEPARIN SODIUM 5000 UNIT(S): 5000 INJECTION INTRAVENOUS; SUBCUTANEOUS at 05:19

## 2024-02-01 RX ADMIN — Medication 1200 MILLIGRAM(S): at 00:45

## 2024-02-01 RX ADMIN — SERTRALINE 100 MILLIGRAM(S): 25 TABLET, FILM COATED ORAL at 11:21

## 2024-02-01 RX ADMIN — Medication 25 MILLIGRAM(S): at 05:18

## 2024-02-01 RX ADMIN — CHLORHEXIDINE GLUCONATE 1 APPLICATION(S): 213 SOLUTION TOPICAL at 08:12

## 2024-02-01 NOTE — PROGRESS NOTE ADULT - SUBJECTIVE AND OBJECTIVE BOX
GENERAL SURGERY PROGRESS NOTE     DARIEN BARRERA  78y  Female  Hospital day :24d  POD: 6  Procedure: Diagnostic laparoscopy  Robot-assisted gastropexy    OVERNIGHT EVENTS: No acute events overnight, patient tolerating diet, OOB to chair, ambulating, passing gas and bowel movements, no nausea or vomiting.     T(F): 97.6 (02-01-24 @ 08:18), Max: 98.6 (01-31-24 @ 15:52)  HR: 74 (02-01-24 @ 08:18) (65 - 81)  BP: 156/78 (02-01-24 @ 08:18) (139/68 - 156/78)  ABP: --  ABP(mean): --  RR: 18 (02-01-24 @ 08:18) (18 - 18)  SpO2: 94% (02-01-24 @ 08:18) (94% - 96%)    DIET/FLUIDS:   NG:                                                                                DRAINS:     BM:     EMESIS:     URINE:      GI proph:  pantoprazole    Tablet 40 milliGRAM(s) Oral before breakfast    AC/ proph: heparin   Injectable 5000 Unit(s) SubCutaneous every 8 hours    ABx:     PHYSICAL EXAM:  GENERAL: NAD, well-appearing  CHEST/LUNG: Equal chest rise bilaterally, no audible wheezing.   HEART: Regular rate and rhythm  ABDOMEN: Soft, Nontender, Nondistended;   EXTREMITIES:  No clubbing, cyanosis, or edema      LABS  Labs:  CAPILLARY BLOOD GLUCOSE    LFTs:    ABG - ( 27 Jan 2024 05:14 )  pH: 7.36  /  pCO2: 39    /  pO2: 122   / HCO3: 22    / Base Excess: -3.2  /  SaO2: 99.1      ABG - ( 26 Jan 2024 20:15 )  pH: 7.30  /  pCO2: 46    /  pO2: 58    / HCO3: 23    / Base Excess: -3.8  /  SaO2: 90.3      ABG - ( 26 Jan 2024 17:33 )  pH: 7.27  /  pCO2: 51    /  pO2: 119   / HCO3: 23    / Base Excess: -4.0  /  SaO2: 98.0      Coags:    RADIOLOGY & ADDITIONAL TESTS:    No new imaging

## 2024-02-01 NOTE — PROGRESS NOTE ADULT - ASSESSMENT
DARIEN BARRERA is a 78y F w/ PMHx of hiatal hernia s/p repair >10 years back, L emypema s/p decortication 2018, hypertension, GERD, non obstructive CAD, depression, dementia, anemia presenting for evaluation of low hemoglobin, measured outpatient to be 7.4. The pt presented to the Ed for a blood transfusion that was recommended by the hematologist. a 3.0 cm friable sigmoid colon mass was identified on colonoscopy. She is now s/p Robotic sigmoidectomy for colonic mass, aborted due to diffuse peritoneal carcinomatosis. Procedure performed included diagnostic laparoscopy, G tube remocal, robotic gastropexy.     PLAN:   - continue to monitor BP  - continue with home BP meds, escalating to original doses as necessary for hypertension  - f/u with CM for dispo home w/ Hospice  - possible discharge today  - DVT and GI ppx  - encourage IS and ambulation as tolerated  - F/U with Heme/onc outpatient: will likely not tolerate chemotherapy    [phone]

## 2024-02-01 NOTE — PROGRESS NOTE ADULT - ATTENDING SUPERVISION STATEMENT
Resident
Resident/Fellow/Student
Resident
Fellow
Resident
Resident

## 2024-02-01 NOTE — PROGRESS NOTE ADULT - REASON FOR ADMISSION
Anemia

## 2024-02-13 NOTE — CDI QUERY NOTE - NSCDIOTHERTXTBX_GEN_ALL_CORE_HH
DOCUMENTATION CLARIFICATION FORM FOR RESPIRATORY STATUS  Encounter #:  [id number]                  Patient’s Name:  DARIEN BARRERA  Medical Record #:  [id number]               Admit Date: 2024  : 1945                                     Discharge: 2024  CDI Specialist: Alexa Renteria            Contact #:  [phone]    Dear Dr. Reddy,                                                                               Date:   2024                Clinical documentation and/or evidence of the patient’s presentation, evaluation, and medical management, as evidenced below, may support a diagnosis that is not documented in the medical record.  In order to ensure accurate coding and accuracy of the clinical record, the documentation in this patient’s medical record requires additional clarification.      If you think the supporting documentation and/or clinical evidence supports a more specific diagnosis, please include more specific documentation of a diagnosis associated with these findings in your progress note and/or discharge summary.    Please clarify if acute respiratory distress secondary to respiratory acidosis requiring NIV can be specified as:  •	Acute on chronic respiratory acidosis associated with baseline pulmonary issues that resolved after treatment  •	Acute respiratory acidosis associated with baseline pulmonary issues that resolved after treatment  •	Chronic respiratory acidosis associated with baseline pulmonary issues that resolved after treatment  •	Other (specify)    Supporting documentation and/or clinical evidence:   •	 Anesthesia…78F PMHX…CHF, Empyema lung s/p decortication…….Hiatal hernia with GERD, Bronchiectasis, Asthma…s/p PEG…-plan for continued post-op mechanical ventilation d/t bronchiectasis and h/o empyema  •	 s/p DX: Colon Cancer…Diagnostic laparoscopy with findings of numerous malignant-appearing implants in peritoneum, liver, and small bowel. Procedure aborted…robotic gastropexy performed  •	 Anesthesia…respiratory acidosis (pCO2 of 55) - baseline pulmonary issues.  Decision made to extubate with pt maintain sats of 90-93% on RA and 95% on 4L NC. Baseline mental status.  •	 SICU… placed on bipap for respiratory acidosis, which has since improved…has been saturating well on NC….acute respiratory distress secondary to respiratory acidosis requiring NIV placed on BiPAP , repeat ABG pending….PE:…Resp…Normal expansion/effort supplemented by BiPAP…respiratory acidosis…BiPAP , trend ABG…. symbicort 160-4.5mcg/inhalation 2x q12…ABG - ( 2024 20:15 )pH, Arterial: 7.30  pH, Blood: x     /  pCO2: 46    /  pO2: 58    / HCO3: 23    / Base Excess: -3.8  /  SaO2: 90.3  Medications – Symbicort… CXR reviewed and interpreted by me - left-sided atelectasis with effusion

## 2024-02-15 NOTE — CHART NOTE - NSCHARTNOTEFT_GEN_A_CORE
This note serves as clarification to a CDI query filed for clarification regarding this patient's recent care at [facility].     The patient was admitted to the SICU for hemodynamic monitoring following an aborted laparoscopic sigmoid resection for diffuse peritoneal implants. Postoperatively the patient began to experience respiratory distress and an arterial blood gas was obtained demonstrating respiratory acidosis and the patient required BIPAP NIV, more specifically acute on chronic respiratory acidosis associated with baseline pulmonary issues that resolved after treatment. Her baseline pulmonary issues included congestive heart failure and pleural empyema s/p lung decortication. The patient was eventually weaned off of BIPAP and discharged to home hospice.

## 2024-02-15 NOTE — CHART NOTE - NSCHARTNOTESELECT_GEN_ALL_CORE
Anesthesiology/Transfer Note
Event Note
Event Note
GI Follow Up/Event Note
Post-Op Note
Transfer Note
CDI Query
Day Team
Event Note
SICU Downgrade/Transfer Note
Colonoscopy/Event Note
EGD with T-Fastener Wire and PEG tube Placement for Hiatal Hernia/Event Note

## 2024-02-16 DIAGNOSIS — F03.93 UNSPECIFIED DEMENTIA, UNSPECIFIED SEVERITY, WITH MOOD DISTURBANCE: ICD-10-CM

## 2024-02-16 DIAGNOSIS — Z66 DO NOT RESUSCITATE: ICD-10-CM

## 2024-02-16 DIAGNOSIS — N17.9 ACUTE KIDNEY FAILURE, UNSPECIFIED: ICD-10-CM

## 2024-02-16 DIAGNOSIS — E83.42 HYPOMAGNESEMIA: ICD-10-CM

## 2024-02-16 DIAGNOSIS — N18.30 CHRONIC KIDNEY DISEASE, STAGE 3 UNSPECIFIED: ICD-10-CM

## 2024-02-16 DIAGNOSIS — E87.29 OTHER ACIDOSIS: ICD-10-CM

## 2024-02-16 DIAGNOSIS — F32.9 MAJOR DEPRESSIVE DISORDER, SINGLE EPISODE, UNSPECIFIED: ICD-10-CM

## 2024-02-16 DIAGNOSIS — Z79.51 LONG TERM (CURRENT) USE OF INHALED STEROIDS: ICD-10-CM

## 2024-02-16 DIAGNOSIS — J96.02 ACUTE RESPIRATORY FAILURE WITH HYPERCAPNIA: ICD-10-CM

## 2024-02-16 DIAGNOSIS — I25.10 ATHEROSCLEROTIC HEART DISEASE OF NATIVE CORONARY ARTERY WITHOUT ANGINA PECTORIS: ICD-10-CM

## 2024-02-16 DIAGNOSIS — J45.909 UNSPECIFIED ASTHMA, UNCOMPLICATED: ICD-10-CM

## 2024-02-16 DIAGNOSIS — K64.8 OTHER HEMORRHOIDS: ICD-10-CM

## 2024-02-16 DIAGNOSIS — K21.00 GASTRO-ESOPHAGEAL REFLUX DISEASE WITH ESOPHAGITIS, WITHOUT BLEEDING: ICD-10-CM

## 2024-02-16 DIAGNOSIS — K22.10 ULCER OF ESOPHAGUS WITHOUT BLEEDING: ICD-10-CM

## 2024-02-16 DIAGNOSIS — C78.6 SECONDARY MALIGNANT NEOPLASM OF RETROPERITONEUM AND PERITONEUM: ICD-10-CM

## 2024-02-16 DIAGNOSIS — C18.7 MALIGNANT NEOPLASM OF SIGMOID COLON: ICD-10-CM

## 2024-02-16 DIAGNOSIS — I50.32 CHRONIC DIASTOLIC (CONGESTIVE) HEART FAILURE: ICD-10-CM

## 2024-02-16 DIAGNOSIS — Z79.899 OTHER LONG TERM (CURRENT) DRUG THERAPY: ICD-10-CM

## 2024-02-16 DIAGNOSIS — Z53.8 PROCEDURE AND TREATMENT NOT CARRIED OUT FOR OTHER REASONS: ICD-10-CM

## 2024-02-16 DIAGNOSIS — I13.0 HYPERTENSIVE HEART AND CHRONIC KIDNEY DISEASE WITH HEART FAILURE AND STAGE 1 THROUGH STAGE 4 CHRONIC KIDNEY DISEASE, OR UNSPECIFIED CHRONIC KIDNEY DISEASE: ICD-10-CM

## 2024-02-16 DIAGNOSIS — K29.70 GASTRITIS, UNSPECIFIED, WITHOUT BLEEDING: ICD-10-CM

## 2024-02-16 DIAGNOSIS — K44.9 DIAPHRAGMATIC HERNIA WITHOUT OBSTRUCTION OR GANGRENE: ICD-10-CM

## 2024-02-16 DIAGNOSIS — Z11.52 ENCOUNTER FOR SCREENING FOR COVID-19: ICD-10-CM

## 2024-02-16 DIAGNOSIS — D50.9 IRON DEFICIENCY ANEMIA, UNSPECIFIED: ICD-10-CM

## 2024-02-16 DIAGNOSIS — R71.0 PRECIPITOUS DROP IN HEMATOCRIT: ICD-10-CM

## 2024-02-16 DIAGNOSIS — E87.6 HYPOKALEMIA: ICD-10-CM

## 2024-02-16 DIAGNOSIS — K86.89 OTHER SPECIFIED DISEASES OF PANCREAS: ICD-10-CM

## 2024-04-30 ENCOUNTER — NON-APPOINTMENT (OUTPATIENT)
Age: 79
End: 2024-04-30

## 2024-08-14 DIAGNOSIS — C18.7 MALIGNANT NEOPLASM OF SIGMOID COLON: ICD-10-CM

## 2024-11-08 NOTE — DISCHARGE NOTE PROVIDER - NSDCQMAMI_CARD_ALL_CORE
"Diana Saoresotilia Urban was seen and treated in our emergency department on 11/8/2024.  She may return to work on 11/11/2024.       If you have any questions or concerns, please don't hesitate to call.      Supriya Johnson MD"
"Diana Soaresotilia Urban was seen and treated in our emergency department on 11/8/2024.  She may return to work on 11/11/2024.       If you have any questions or concerns, please don't hesitate to call.      Supriya Johnson MD"
No

## 2025-01-22 ENCOUNTER — EMERGENCY (EMERGENCY)
Facility: HOSPITAL | Age: 80
LOS: 0 days | Discharge: HOME | End: 2025-01-23
Attending: EMERGENCY MEDICINE
Payer: MEDICAID

## 2025-01-22 VITALS
SYSTOLIC BLOOD PRESSURE: 163 MMHG | HEART RATE: 73 BPM | RESPIRATION RATE: 16 BRPM | TEMPERATURE: 98 F | DIASTOLIC BLOOD PRESSURE: 89 MMHG | OXYGEN SATURATION: 98 %

## 2025-01-22 DIAGNOSIS — Z87.09 PERSONAL HISTORY OF OTHER DISEASES OF THE RESPIRATORY SYSTEM: Chronic | ICD-10-CM

## 2025-01-22 DIAGNOSIS — M25.512 PAIN IN LEFT SHOULDER: ICD-10-CM

## 2025-01-22 DIAGNOSIS — Z98.890 OTHER SPECIFIED POSTPROCEDURAL STATES: Chronic | ICD-10-CM

## 2025-01-22 DIAGNOSIS — I25.10 ATHEROSCLEROTIC HEART DISEASE OF NATIVE CORONARY ARTERY WITHOUT ANGINA PECTORIS: ICD-10-CM

## 2025-01-22 DIAGNOSIS — Z85.038 PERSONAL HISTORY OF OTHER MALIGNANT NEOPLASM OF LARGE INTESTINE: ICD-10-CM

## 2025-01-22 PROCEDURE — 73030 X-RAY EXAM OF SHOULDER: CPT | Mod: 26,LT

## 2025-01-22 PROCEDURE — 99283 EMERGENCY DEPT VISIT LOW MDM: CPT | Mod: 25

## 2025-01-22 PROCEDURE — 73030 X-RAY EXAM OF SHOULDER: CPT | Mod: LT

## 2025-01-22 PROCEDURE — 99284 EMERGENCY DEPT VISIT MOD MDM: CPT

## 2025-01-22 PROCEDURE — 93010 ELECTROCARDIOGRAM REPORT: CPT

## 2025-01-22 PROCEDURE — 93005 ELECTROCARDIOGRAM TRACING: CPT

## 2025-01-22 NOTE — ED PROVIDER NOTE - CLINICAL SUMMARY MEDICAL DECISION MAKING FREE TEXT BOX
Patient is a 79-year-old female presents for evaluation of a left shoulder x-ray patient has a past medical history of: CAD dementia patient on hospice care family concern as during patient's movement she has being lifted by that shoulder not complaining of pain denies aide reports no chest pain difficulty breathing fevers chills vomiting abdominal pain or diarrhea on exam patient is at baseline mental status normocephalic atraumatic pupils equal round reactive light accommodation extraocular muscles intact oropharynx clear chest clear to auscultation bilaterally abdomen soft nontender nondistended bowel sounds positive extremities radial pulse 2+ patient does have pain to palpation at the AC joint of the left shoulder worse with movement no signs of redness no signs of swelling no obvious deformity noted radial pulse 2+ patient has adequate  strength of the left hand    We obtain shoulder x-ray per my depend evaluation not consistent with fracture or dislocation prior x-ray no significantly changed from prior patient improved I have discussed risks benefits and alternatives to admission in the hospital patient aide and family do not wish to be admitted as patient is on hospice they do not want to be admitted at this time as patient has improved stable for discharge

## 2025-01-22 NOTE — ED PROVIDER NOTE - PATIENT PORTAL LINK FT
You can access the FollowMyHealth Patient Portal offered by [facility] by registering at the following website: [email or web address]. By joining [facility]’s FollowMyHealth portal, you will also be able to view your health information using other applications (apps) compatible with our system.

## 2025-01-22 NOTE — ED PROVIDER NOTE - ATTENDING APP SHARED VISIT CONTRIBUTION OF CARE
I have personally performed a history and physical exam on this patient and personally directed the management of the patient.  Patient is a 79-year-old female presents for evaluation of a left shoulder x-ray patient has a past medical history of: CAD dementia patient on hospice care family concern as during patient's movement she has being lifted by that shoulder not complaining of pain denies aide reports no chest pain difficulty breathing fevers chills vomiting abdominal pain or diarrhea on exam patient is at baseline mental status normocephalic atraumatic pupils equal round reactive light accommodation extraocular muscles intact oropharynx clear chest clear to auscultation bilaterally abdomen soft nontender nondistended bowel sounds positive extremities radial pulse 2+ patient does have pain to palpation at the AC joint of the left shoulder worse with movement no signs of redness no signs of swelling no obvious deformity noted radial pulse 2+ patient has adequate  strength of the left hand    We obtain shoulder x-ray per my depend evaluation not consistent with fracture or dislocation prior x-ray no significantly changed from prior patient improved I have discussed risks benefits and alternatives to admission in the hospital patient aide and family do not wish to be admitted as patient is on hospice they do not want to be admitted at this time as patient has improved stable for discharge

## 2025-01-22 NOTE — ED PROVIDER NOTE - OBJECTIVE STATEMENT
Patient sent by hospice for left shoulder xray, H/o gout, colon CA dementia,    C/o left shoulder pain past 2 days, no trauma

## 2025-01-22 NOTE — ED ADULT NURSE NOTE - NSFALLUNIVINTERV_ED_ALL_ED
Bed/Stretcher in lowest position, wheels locked, appropriate side rails in place/Call bell, personal items and telephone in reach/Instruct patient to call for assistance before getting out of bed/chair/stretcher/Non-slip footwear applied when patient is off stretcher/[place] to call system/Physically safe environment - no spills, clutter or unnecessary equipment/Purposeful proactive rounding/Room/bathroom lighting operational, light cord in reach

## 2025-01-22 NOTE — ED ADULT NURSE NOTE - HOW OFTEN DO YOU HAVE A DRINK CONTAINING ALCOHOL?
"  SUBJECTIVE:   Kelly Mckeon is a 5 year old female, accompanied by mother, who presents to clinic today for the following health issues:    Eye(s) Problem  Onset: x1 day    Description:   Location: right  Pain: YES  Redness: YES    Accompanying Signs & Symptoms:  Discharge/mattering: no  Swelling: YES  Visual changes: no  Fever: no  Nasal Congestion: YES- cough, nasal congestion  Bothered by bright lights: no    History:   Trauma: no   Foreign body exposure: no    Precipitating factors:   Wearing contacts: no    Alleviating factors:  Improved by: none  Therapies Tried and outcome: none    ROS:  ROS otherwise negative    OBJECTIVE:                                                    BP 94/62 (BP Location: Right arm, Cuff Size: Child)  Pulse 100  Temp 97.8  F (36.6  C) (Tympanic)  Ht 4' 4\" (1.321 m)  Wt 70 lb 6.4 oz (31.9 kg)  SpO2 98%  BMI 18.3 kg/m2  Body mass index is 18.3 kg/(m^2).   GENERAL: alert, no distress  Eyes:  Eyes grossly normal to inspection, PERRL, conjunctivae and sclerae normal and eyelids- hordeolum/sty right eye lower lid  HENT: ear canals- normal; TMs- erythemic TMs bilaterally; Nose- normal; Mouth- no ulcers, no lesions  NECK: tonsillar LAD  RESP: lungs clear to auscultation - no rales, no rhonchi, no wheezes  CV: regular rates and rhythm, normal S1 S2, no S3 or S4 and no murmur, no click or rub    Diagnostic test results:  No results found for this or any previous visit (from the past 24 hour(s)).     ASSESSMENT/PLAN:                                                    (H65.03) Bilateral acute serous otitis media, recurrence not specified  (primary encounter diagnosis)  Comment: begin antibiotics as directed.   Plan: amoxicillin (AMOXIL) 400 MG/5ML suspension            (H00.022) Hordeolum internum of right lower eyelid  Comment: conservative measures.  Plan:       Stefanie Menchaca PA-C  [facility]  "
Never

## 2025-01-23 NOTE — ED POST DISCHARGE NOTE - DETAILS
SPOKE TO SISTER KIKA, PATIENT IS ON HOSPICE CARE AT HOME. SHE HAS TO CALL HOSPICE NOW TO DISCUSS ISSUE. ADVISED TO RETURN TO ED ASAP FOR CXR AS PER RADIOLOGIST FINDINGS TODAY. PRE-ARRIVAL NOTE PLACED.

## 2025-01-23 NOTE — ED POST DISCHARGE NOTE - RESULT SUMMARY
L SHOULDER XR- DR. GROVER CALLLED/RADIOLOGIST :  POSSIBLE PNA L MID AND LOWER LUNG. RADIOLOGIST IS ADVISING THAT PATIENT RETURN TO ED FOR CXR TODAY TO R/O PNA.

## 2025-02-01 ENCOUNTER — INPATIENT (INPATIENT)
Facility: HOSPITAL | Age: 80
LOS: 3 days | Discharge: HOME | End: 2025-02-05
Attending: STUDENT IN AN ORGANIZED HEALTH CARE EDUCATION/TRAINING PROGRAM | Admitting: INTERNAL MEDICINE
Payer: MEDICAID

## 2025-02-01 VITALS
RESPIRATION RATE: 18 BRPM | DIASTOLIC BLOOD PRESSURE: 90 MMHG | OXYGEN SATURATION: 99 % | TEMPERATURE: 98 F | SYSTOLIC BLOOD PRESSURE: 158 MMHG | HEART RATE: 87 BPM

## 2025-02-01 DIAGNOSIS — Z51.5 ENCOUNTER FOR PALLIATIVE CARE: ICD-10-CM

## 2025-02-01 DIAGNOSIS — Z87.09 PERSONAL HISTORY OF OTHER DISEASES OF THE RESPIRATORY SYSTEM: Chronic | ICD-10-CM

## 2025-02-01 DIAGNOSIS — Z98.890 OTHER SPECIFIED POSTPROCEDURAL STATES: Chronic | ICD-10-CM

## 2025-02-01 LAB
ALBUMIN SERPL ELPH-MCNC: 3.2 G/DL — LOW (ref 3.5–5.2)
ALP SERPL-CCNC: 119 U/L — HIGH (ref 30–115)
ALT FLD-CCNC: 7 U/L — SIGNIFICANT CHANGE UP (ref 0–41)
ANION GAP SERPL CALC-SCNC: 11 MMOL/L — SIGNIFICANT CHANGE UP (ref 7–14)
APPEARANCE UR: ABNORMAL
AST SERPL-CCNC: 16 U/L — SIGNIFICANT CHANGE UP (ref 0–41)
BASOPHILS # BLD AUTO: 0.04 K/UL — SIGNIFICANT CHANGE UP (ref 0–0.2)
BASOPHILS NFR BLD AUTO: 0.3 % — SIGNIFICANT CHANGE UP (ref 0–1)
BILIRUB SERPL-MCNC: 0.3 MG/DL — SIGNIFICANT CHANGE UP (ref 0.2–1.2)
BILIRUB UR-MCNC: NEGATIVE — SIGNIFICANT CHANGE UP
BUN SERPL-MCNC: 21 MG/DL — HIGH (ref 10–20)
CALCIUM SERPL-MCNC: 9.3 MG/DL — SIGNIFICANT CHANGE UP (ref 8.4–10.5)
CHLORIDE SERPL-SCNC: 103 MMOL/L — SIGNIFICANT CHANGE UP (ref 98–110)
CO2 SERPL-SCNC: 27 MMOL/L — SIGNIFICANT CHANGE UP (ref 17–32)
COLOR SPEC: YELLOW — SIGNIFICANT CHANGE UP
CREAT SERPL-MCNC: 0.9 MG/DL — SIGNIFICANT CHANGE UP (ref 0.7–1.5)
DIFF PNL FLD: ABNORMAL
EGFR: 65 ML/MIN/1.73M2 — SIGNIFICANT CHANGE UP
EOSINOPHIL # BLD AUTO: 0.01 K/UL — SIGNIFICANT CHANGE UP (ref 0–0.7)
EOSINOPHIL NFR BLD AUTO: 0.1 % — SIGNIFICANT CHANGE UP (ref 0–8)
FLUAV AG NPH QL: SIGNIFICANT CHANGE UP
FLUBV AG NPH QL: SIGNIFICANT CHANGE UP
GLUCOSE SERPL-MCNC: 105 MG/DL — HIGH (ref 70–99)
GLUCOSE UR QL: NEGATIVE MG/DL — SIGNIFICANT CHANGE UP
HCT VFR BLD CALC: 35.8 % — LOW (ref 37–47)
HGB BLD-MCNC: 11.9 G/DL — LOW (ref 12–16)
IMM GRANULOCYTES NFR BLD AUTO: 0.5 % — HIGH (ref 0.1–0.3)
KETONES UR-MCNC: NEGATIVE MG/DL — SIGNIFICANT CHANGE UP
LEUKOCYTE ESTERASE UR-ACNC: ABNORMAL
LYMPHOCYTES # BLD AUTO: 15.7 % — LOW (ref 20.5–51.1)
LYMPHOCYTES # BLD AUTO: 2.09 K/UL — SIGNIFICANT CHANGE UP (ref 1.2–3.4)
MCHC RBC-ENTMCNC: 28.4 PG — SIGNIFICANT CHANGE UP (ref 27–31)
MCHC RBC-ENTMCNC: 33.2 G/DL — SIGNIFICANT CHANGE UP (ref 32–37)
MCV RBC AUTO: 85.4 FL — SIGNIFICANT CHANGE UP (ref 81–99)
MONOCYTES # BLD AUTO: 1.13 K/UL — HIGH (ref 0.1–0.6)
MONOCYTES NFR BLD AUTO: 8.5 % — SIGNIFICANT CHANGE UP (ref 1.7–9.3)
NEUTROPHILS # BLD AUTO: 9.95 K/UL — HIGH (ref 1.4–6.5)
NEUTROPHILS NFR BLD AUTO: 74.9 % — SIGNIFICANT CHANGE UP (ref 42.2–75.2)
NITRITE UR-MCNC: POSITIVE
NRBC # BLD: 0 /100 WBCS — SIGNIFICANT CHANGE UP (ref 0–0)
NRBC BLD-RTO: 0 /100 WBCS — SIGNIFICANT CHANGE UP (ref 0–0)
PH UR: 6.5 — SIGNIFICANT CHANGE UP (ref 5–8)
PLATELET # BLD AUTO: 482 K/UL — HIGH (ref 130–400)
PMV BLD: 9.5 FL — SIGNIFICANT CHANGE UP (ref 7.4–10.4)
POTASSIUM SERPL-MCNC: 3.6 MMOL/L — SIGNIFICANT CHANGE UP (ref 3.5–5)
POTASSIUM SERPL-SCNC: 3.6 MMOL/L — SIGNIFICANT CHANGE UP (ref 3.5–5)
PROT SERPL-MCNC: 6.3 G/DL — SIGNIFICANT CHANGE UP (ref 6–8)
PROT UR-MCNC: 100 MG/DL
RBC # BLD: 4.19 M/UL — LOW (ref 4.2–5.4)
RBC # FLD: 12.1 % — SIGNIFICANT CHANGE UP (ref 11.5–14.5)
RSV RNA NPH QL NAA+NON-PROBE: SIGNIFICANT CHANGE UP
SARS-COV-2 RNA SPEC QL NAA+PROBE: SIGNIFICANT CHANGE UP
SODIUM SERPL-SCNC: 141 MMOL/L — SIGNIFICANT CHANGE UP (ref 135–146)
SP GR SPEC: 1.01 — SIGNIFICANT CHANGE UP (ref 1–1.03)
UROBILINOGEN FLD QL: 1 MG/DL — SIGNIFICANT CHANGE UP (ref 0.2–1)
WBC # BLD: 13.29 K/UL — HIGH (ref 4.8–10.8)
WBC # FLD AUTO: 13.29 K/UL — HIGH (ref 4.8–10.8)

## 2025-02-01 PROCEDURE — 71045 X-RAY EXAM CHEST 1 VIEW: CPT | Mod: 26

## 2025-02-01 PROCEDURE — 99285 EMERGENCY DEPT VISIT HI MDM: CPT

## 2025-02-01 PROCEDURE — 93010 ELECTROCARDIOGRAM REPORT: CPT

## 2025-02-01 PROCEDURE — 99223 1ST HOSP IP/OBS HIGH 75: CPT

## 2025-02-01 RX ORDER — BUSPIRONE HYDROCHLORIDE 5 MG/1
5 TABLET ORAL
Refills: 0 | Status: DISCONTINUED | OUTPATIENT
Start: 2025-02-01 | End: 2025-02-05

## 2025-02-01 RX ORDER — ACETAMINOPHEN, DIPHENHYDRAMINE HCL, PHENYLEPHRINE HCL 325; 25; 5 MG/1; MG/1; MG/1
3 TABLET ORAL AT BEDTIME
Refills: 0 | Status: DISCONTINUED | OUTPATIENT
Start: 2025-02-01 | End: 2025-02-05

## 2025-02-01 RX ORDER — TIOTROPIUM BROMIDE MONOHYDRATE 18 UG/1
2 CAPSULE ORAL; RESPIRATORY (INHALATION) DAILY
Refills: 0 | Status: DISCONTINUED | OUTPATIENT
Start: 2025-02-01 | End: 2025-02-01

## 2025-02-01 RX ORDER — TRAZODONE HCL 100 MG
50 TABLET ORAL AT BEDTIME
Refills: 0 | Status: DISCONTINUED | OUTPATIENT
Start: 2025-02-01 | End: 2025-02-03

## 2025-02-01 RX ORDER — ALBUTEROL 90 MCG
2 AEROSOL REFILL (GRAM) INHALATION EVERY 6 HOURS
Refills: 0 | Status: DISCONTINUED | OUTPATIENT
Start: 2025-02-01 | End: 2025-02-01

## 2025-02-01 RX ORDER — METOPROLOL SUCCINATE 25 MG
50 TABLET, EXTENDED RELEASE 24 HR ORAL DAILY
Refills: 0 | Status: DISCONTINUED | OUTPATIENT
Start: 2025-02-01 | End: 2025-02-05

## 2025-02-01 RX ORDER — METOPROLOL SUCCINATE 25 MG
50 TABLET, EXTENDED RELEASE 24 HR ORAL
Refills: 0 | DISCHARGE

## 2025-02-01 RX ORDER — FAMOTIDINE 10 MG/ML
1 INJECTION INTRAVENOUS
Refills: 0 | DISCHARGE

## 2025-02-01 RX ORDER — SERTRALINE HCL 100 MG
150 TABLET ORAL DAILY
Refills: 0 | Status: DISCONTINUED | OUTPATIENT
Start: 2025-02-01 | End: 2025-02-03

## 2025-02-01 RX ORDER — MAGNESIUM, ALUMINUM HYDROXIDE 200-225/5
30 SUSPENSION, ORAL (FINAL DOSE FORM) ORAL EVERY 4 HOURS
Refills: 0 | Status: DISCONTINUED | OUTPATIENT
Start: 2025-02-01 | End: 2025-02-05

## 2025-02-01 RX ORDER — ACETAMINOPHEN 160 MG/5ML
650 SUSPENSION ORAL EVERY 6 HOURS
Refills: 0 | Status: DISCONTINUED | OUTPATIENT
Start: 2025-02-01 | End: 2025-02-05

## 2025-02-01 RX ORDER — FAMOTIDINE 10 MG/ML
20 INJECTION INTRAVENOUS DAILY
Refills: 0 | Status: DISCONTINUED | OUTPATIENT
Start: 2025-02-01 | End: 2025-02-05

## 2025-02-01 RX ORDER — CODEINE PHOSPHATE AND GUAIFENESIN 10; 100 MG/5ML; MG/5ML
10 SOLUTION ORAL
Refills: 0 | DISCHARGE

## 2025-02-01 RX ORDER — BUSPIRONE HYDROCHLORIDE 5 MG/1
5 TABLET ORAL
Refills: 0 | DISCHARGE

## 2025-02-01 RX ORDER — IPRATROPIUM BROMIDE AND ALBUTEROL SULFATE .5; 2.5 MG/3ML; MG/3ML
3 SOLUTION RESPIRATORY (INHALATION) EVERY 6 HOURS
Refills: 0 | Status: DISCONTINUED | OUTPATIENT
Start: 2025-02-01 | End: 2025-02-05

## 2025-02-01 RX ORDER — CEFPODOXIME PROXETIL 200 MG
100 TABLET ORAL EVERY 12 HOURS
Refills: 0 | Status: DISCONTINUED | OUTPATIENT
Start: 2025-02-01 | End: 2025-02-05

## 2025-02-01 RX ORDER — FENTANYL CITRATE 50 UG/ML
1 INJECTION INTRAMUSCULAR; INTRAVENOUS
Refills: 0 | Status: DISCONTINUED | OUTPATIENT
Start: 2025-02-01 | End: 2025-02-05

## 2025-02-01 RX ORDER — ONDANSETRON 4 MG/1
4 TABLET, ORALLY DISINTEGRATING ORAL EVERY 8 HOURS
Refills: 0 | Status: DISCONTINUED | OUTPATIENT
Start: 2025-02-01 | End: 2025-02-05

## 2025-02-01 RX ADMIN — Medication 2 MILLIGRAM(S): at 22:10

## 2025-02-01 RX ADMIN — Medication 1 MILLIGRAM(S): at 19:32

## 2025-02-01 NOTE — PATIENT PROFILE ADULT - FUNCTIONAL SCREEN CURRENT LEVEL: SWALLOWING (IF SCORE 2 OR MORE FOR ANY ITEM, CONSULT REHAB SERVICES), MLM)
Mom said Corinna has not passed a bowel movement since Friday morning and mom wants to know what she should do.   0 = swallows foods/liquids without difficulty

## 2025-02-01 NOTE — PATIENT PROFILE ADULT - FALL HARM RISK - HARM RISK INTERVENTIONS
Assistance OOB with selected safe patient handling equipment/Communicate Risk of Fall with Harm to all staff/Discuss with provider need for PT consult/Monitor for mental status changes/Monitor gait and stability/Move patient closer to nurses' station/Provide patient with walking aids - walker, cane, crutches/Reinforce activity limits and safety measures with patient and family/Reorient to person, place and time as needed/Tailored Fall Risk Interventions/Toileting schedule using arm’s reach rule for commode and bathroom/Use of alarms - bed, chair and/or voice tab/Visual Cue: Yellow wristband and red socks/Bed in lowest position, wheels locked, appropriate side rails in place/Call bell, personal items and telephone in reach/Instruct patient to call for assistance before getting out of bed or chair/Non-slip footwear when patient is out of bed/[place] to call system/Physically safe environment - no spills, clutter or unnecessary equipment/Purposeful Proactive Rounding/Room/bathroom lighting operational, light cord in reach

## 2025-02-01 NOTE — GOALS OF CARE CONVERSATION - ADVANCED CARE PLANNING - CONVERSATION DETAILS
Discussed goals of care with patient sister Sonali including current medical prognosis and diagnosis , per sister patient is on hospice care and she wishes the code status to stay the same (comfort measures only and dnr dni) at this time with no fingerstick , vitals signs or blood work. she wishes to treat infection with antibiotic since the urine sample was collected in the emergency room prior to admission, she also would like further discussions with hospice care team

## 2025-02-01 NOTE — H&P ADULT - NS ATTEND AMEND GEN_ALL_CORE FT
pt seen and examined on day of admission  2/1  changes to plan made above as necessary   plan of care discussed with support staff

## 2025-02-01 NOTE — ED PROVIDER NOTE - CLINICAL SUMMARY MEDICAL DECISION MAKING FREE TEXT BOX
.    79-year-old female, PMH as noted, here for evaluation of agitation.  Patient accompanied by healthcare aide and caregiver, notes inappropriate dosing of buspirone (timing).  No fever, cough, fever, cough, shortness of breath vomiting diarrhea or urinary symptoms.  Patient at baseline.    Additional information taken from aide at the bedside.    Exam as noted above.  Patient is NAD, + dry mucous membranes, CTAB, abdomen soft nontender, patient is awake alert at neurobaseline.    All available lab tests, imaging tests, and EKGs independently reviewed and interpreted by meYadiel.  Chest x-ray shows no focal consolidation, pneumothorax or free air.  EKG normal sinus rhythm, rate 71, no STEMI    Case discussed with hospice team recommend admission for respite care.    Impression hospice patient.  Patient admitted for further workup and management        .

## 2025-02-01 NOTE — H&P ADULT - NSHPLABSRESULTS_GEN_ALL_CORE
LABS:                        11.9   13.29 )-----------( 482      ( 2025 15:32 )             35.8         141  |  103  |  21[H]  ----------------------------<  105[H]  3.6   |  27  |  0.9    Ca    9.3      2025 15:32    TPro  6.3  /  Alb  3.2[L]  /  TBili  0.3  /  DBili  x   /  AST  16  /  ALT  7   /  AlkPhos  119[H]  02-    LIVER FUNCTIONS - ( 2025 15:32 )  Alb: 3.2 g/dL / Pro: 6.3 g/dL / ALK PHOS: 119 U/L / ALT: 7 U/L / AST: 16 U/L / GGT: x             Urinalysis Basic - ( 2025 18:00 )    Color: Yellow / Appearance: Turbid / S.014 / pH: x  Gluc: x / Ketone: Negative mg/dL  / Bili: Negative / Urobili: 1.0 mg/dL   Blood: x / Protein: 100 mg/dL / Nitrite: Positive   Leuk Esterase: Large / RBC: 10 /HPF / WBC Too Numerous to count /HPF   Sq Epi: x / Non Sq Epi: x / Bacteria: Too Numerous to count /HPF

## 2025-02-01 NOTE — ED PROVIDER NOTE - AVIAN FLU SYMPTOMS
Met with pt to discuss transferring to in [facility] hospital. She still declines and wants to stay here until surgery No

## 2025-02-01 NOTE — PATIENT PROFILE ADULT - VISION (WITH CORRECTIVE LENSES IF THE PATIENT USUALLY WEARS THEM):
Bedside report rec'd from PADMA Zaragoza, Mother of pt refused repeat vital signs due to pt waking up to place pulse ox. Normal vision: sees adequately in most situations; can see medication labels, newsprint

## 2025-02-01 NOTE — H&P ADULT - HISTORY OF PRESENT ILLNESS
Patient is an 78 y/o F with a pmhx of HTN, GERD, CAD, dementia, anemia, MDD, adenocarcinoma  Patient is an 78 y/o F with a pmhx of HTN, GERD, CAD, dementia, anemia, MDD, adenocarcinoma who presents to the ER accompanied by home health aide for hospice and respite care.

## 2025-02-01 NOTE — H&P ADULT - NSHPPHYSICALEXAM_GEN_ALL_CORE
Vital Signs Last 24 Hrs  T(C): 37.4 (01 Feb 2025 15:34), Max: 37.4 (01 Feb 2025 15:34)  T(F): 99.4 (01 Feb 2025 15:34), Max: 99.4 (01 Feb 2025 15:34)  HR: 87 (01 Feb 2025 14:46) (87 - 87)  BP: 158/90 (01 Feb 2025 14:46) (158/90 - 158/90)  BP(mean): --  RR: 18 (01 Feb 2025 14:46) (18 - 18)  SpO2: 99% (01 Feb 2025 14:46) (99% - 99%)    Parameters below as of 01 Feb 2025 14:46  Patient On (Oxygen Delivery Method): room air

## 2025-02-01 NOTE — PATIENT PROFILE ADULT - FUNCTIONAL ASSESSMENT - BASIC MOBILITY 6.
1-calculated by average/Not able to assess (calculate score using [facility] averaging method)

## 2025-02-01 NOTE — ED PROVIDER NOTE - PHYSICAL EXAMINATION
Vital Signs: I have reviewed the initial vital signs.  CONSTITUTIONAL: Pt in no acute distress laying on stretcher.  SKIN: Skin exam is warm and dry, no acute rash.  HEAD: Normocephalic; atraumatic.  EYES: PERRL, EOM intact; conjunctiva and sclera clear.  ENT: No nasal discharge; airway clear.   NECK: Supple; FROM  CARD: S1, S2 normal; no murmurs, gallops, or rubs. Regular rate and rhythm.  RESP: CTAB. No wheezes, rales or rhonchi.  ABD: soft; non-distended; non-tender; no hepatosplenomegaly.  MSK: Normal ROM. No clubbing, cyanosis or edema.  NEURO: A&Ox2, mentating at baseline per caregiver. Moving all extremities. No focal deficits.

## 2025-02-01 NOTE — ED PROVIDER NOTE - OBJECTIVE STATEMENT
79-year-old female past medical history dementia, colon CA, HTN, presents with caregiver and healthcare aide for evaluation of agitation this morning.  Caregiver also reports possible incorrect timing of buspirone dose given this morning and third of this afternoon.  Caregiver states patient was more agitated after that and due to confusion of when medications were given brought patient to ED as a precaution for evaluation.  Caregiver reports cough over the past few days.  Denies fever.  Denies chest pain, shortness of breath, abdominal pain, changes to bladder/bowel habits, focal numbness/weakness.

## 2025-02-01 NOTE — CHART NOTE - NSCHARTNOTEFT_GEN_A_CORE
**********************************  Hospice Attg - Brief Note  **********************************      This pt has been admitted to 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  She has also suffered from severe anxiety, agitated delirium/sundowning, wandering with numerous falls.    Today family called 911 after pt was having severe anxiety/agitation, was given prn medication, and then had an episode of decreased responsiveness and bluish lips.  Family was concerned whether pt was given too much prn medication; family also concerned about possible recurrent UTI (pt was treated for UTI with a course of Macrobid starting 11/13/24, and Bactrim DS starting 9/24/24).  ED staff reports that by the time pt arrived in the ED she appeared back to her baseline status.    Over the past ~1 month, home hospice team has noted pt's progressive declining status and increasing caregiver difficulty managing pt's anxiety/agitation and difficulty coping with/accepting pt's decline.  In addition, over the past year, pt's family has suffered an unexpected death as well as serious illnesses in a couple family members.  In addition, family has had much difficulty managing different Medicaid aides coming in to care for pt on a daily basis.  For all these reasons I felt strongly about recommending INPATIENT ADMISSION at this time for HOSPICE RESPITE CARE both for caregiver burnout and to ensure safe care of the pt.    I coordinated extensively this evening with ED PA and [facility] admitting physician and hospice team.  I also spoke separately with pt's sister Sonali and Sonali's son Demetri to address their concerns; at this time they are in agreement with continued Hospice care and with inpatient respite admission.      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  Pt likely has recurrent UTI as explanation for today's events. Plan to start abx and follow up urine culture results. Pt admitted to Hospice Respite Care due to progressive caregiver burnout as pt's condition has declined over the past 1 month; given the degree of burnout I have assessed, I do not think it is safe for pt to return home until family is given a short break from caregiving.    PLAN:  --This pt is an ACTIVE HOSPICE PATIENT admitted for HOSPICE RESPITE CARE up to 5 days  --Please coordinate ALL care/orders with the Hospice team (contact numbers below)  --COMFORT MEASURES ONLY  --DNR/DNI Status  --No further diagnostic testing  --Restart pt's routine home meds and prn meds  --Start a course of oral antibiotics and follow up urine culture  --Hospice team to coordinate all care with inpatient hospital team        Naveen Baumann  Hospice Attending Physician  [facility]  -Available in Teams  -my work cell: [phone] (call or text)  -[facility] main:  [phone]  -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  She has also suffered from severe anxiety, agitated delirium/sundowning, wandering with numerous falls.    Today family called 911 after pt was having severe anxiety/agitation, was given prn medication, and then had an episode of decreased responsiveness and bluish lips.  Family was concerned whether pt was given too much prn medication; family also concerned about possible recurrent UTI (pt was treated for UTI with a course of Macrobid starting 11/13/24, and Bactrim DS starting 9/24/24).  ED staff reports that by the time pt arrived in the ED she appeared back to her baseline status.    Over the past ~1 month, home hospice team has noted pt's progressive declining status and increasing caregiver difficulty managing pt's anxiety/agitation and difficulty coping with/accepting pt's decline.  In addition, over the past year, pt's family has suffered an unexpected death as well as serious illnesses in a couple family members.  In addition, family has had much difficulty managing different Medicaid aides coming in to care for pt on a daily basis.  For all these reasons I felt strongly about recommending INPATIENT ADMISSION at this time for HOSPICE RESPITE CARE both for caregiver burnout and to ensure safe care of the pt.    I coordinated extensively this evening with ED PA and [facility] admitting physician and hospice team.  I also spoke separately with pt's sister Sonali and Sonali's son Demetri to address their concerns; at this time they are in agreement with continued Hospice care and with inpatient respite admission.      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  Pt likely has recurrent UTI as explanation for today's events. Plan to start abx and follow up urine culture results. Pt admitted to Hospice Respite Care due to progressive caregiver burnout as pt's condition has declined over the past 1 month; given the degree of burnout I have assessed, I do not think it is safe for pt to return home until family is given a short break from caregiving.    PLAN:  --This pt is an ACTIVE HOSPICE PATIENT admitted for HOSPICE RESPITE CARE up to 5 days  --Please coordinate ALL care/orders with the Hospice team (contact numbers below)  --COMFORT MEASURES ONLY  --DNR/DNI Status  --Goals of Care and eMOLST decisions reviewed with family; updated eMOLST completed  --No further diagnostic testing  --Restart pt's routine home meds and prn meds  --Start a course of oral antibiotics and follow up urine culture  --Hospice team to coordinate all care with inpatient hospital team        Naveen Baumann  Hospice Attending Physician  [facility]  -Available in Teams  -my work cell: [phone] (call or text)  -[facility] main:  [phone]  -[facility] fax:  [phone]  -24-Hour Hospice On-Call #: [phone]     Time spent in total care of this pt today:  120 min **********************************  Hospice Attg - Brief Note  **********************************      This pt has been admitted to [facility] program since 2/4/24 with a primary hospice diagnosis of: Colon cancer, and secondary diagnoses of: 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  She has also suffered from severe anxiety, agitated delirium/sundowning, wandering with numerous falls.    Today family called 911 after pt was having severe anxiety/agitation, was given prn medication, and then had an episode of decreased responsiveness and bluish lips.  Family was concerned whether pt was given too much prn medication; family also concerned about possible recurrent UTI (pt was treated for UTI with a course of Macrobid starting 11/13/24, and Bactrim DS starting 9/24/24).  ED staff reports that by the time pt arrived in the ED she appeared back to her baseline status.    Over the past ~1 month, home hospice team has noted pt's progressive declining status and increasing caregiver difficulty managing pt's anxiety/agitation and difficulty coping with/accepting pt's decline.  In addition, over the past year, pt's family has suffered an unexpected death as well as serious illnesses in a couple family members.  In addition, family has had much difficulty managing different Medicaid aides coming in to care for pt on a daily basis.  For all these reasons I felt strongly about recommending INPATIENT ADMISSION at this time for HOSPICE RESPITE CARE both for caregiver burnout and to ensure safe care of the pt.    I coordinated extensively this evening with ED PA and [facility] admitting physician and hospice team.  I also spoke separately with pt's sister Sonali Wisdom ([phone]) and Sonali's son Demetri Wisdom ([phone]) to address their concerns; at this time they are in agreement with continued Hospice care and with inpatient respite admission.      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  Pt likely has recurrent UTI as explanation for today's events. Plan to start abx and follow up urine culture results. Pt admitted to Hospice Respite Care due to progressive caregiver burnout as pt's condition has declined over the past 1 month; given the degree of burnout I have assessed, I do not think it is safe for pt to return home until family is given a short break from caregiving.    PLAN:  --This pt is an ACTIVE HOSPICE PATIENT admitted for HOSPICE RESPITE CARE up to 5 days  --Please coordinate ALL care/orders with the Hospice team (contact numbers below)  --COMFORT MEASURES ONLY  --DNR/DNI Status  --Goals of Care and eMOLST decisions reviewed with family; updated eMOLST completed  --No further diagnostic testing  --Restart pt's routine home meds and prn meds  --Start a course of oral antibiotics and follow up urine culture  --Hospice team to coordinate all care with inpatient hospital team        Naveen Baumann  Hospice Attending Physician  [facility]  -Available in Teams  -my work cell: [phone] (call or text)  -[facility] main:  [phone]  -[facility] fax:  [phone]  -24-Hour Hospice On-Call #: [phone]     Time spent in total care of this pt today:  120 min

## 2025-02-01 NOTE — H&P ADULT - NSHPSOCIALHISTORY_GEN_ALL_CORE
Substance Use (street drugs): ( x ) never used  Tobacco Usage:  ( x  ) never smoked   Alcohol Usage: None

## 2025-02-01 NOTE — ED ADULT NURSE NOTE - NSFALLUNIVINTERV_ED_ALL_ED
Bed/Stretcher in lowest position, wheels locked, appropriate side rails in place/Call bell, personal items and telephone in reach/Instruct patient to call for assistance before getting out of bed/chair/stretcher/Non-slip footwear applied when patient is off stretcher/[place] to call system/Physically safe environment - no spills, clutter or unnecessary equipment/Purposeful proactive rounding/Room/bathroom lighting operational, light cord in reach

## 2025-02-01 NOTE — ED PROVIDER NOTE - PROGRESS NOTE DETAILS
KS: Patient is a hospice patient.  Hospice team and Dr. Baumann aware of patient being sent to ED, recommend admission for respite care as patient clinical status has been deteriorating over the past few weeks.

## 2025-02-01 NOTE — H&P ADULT - ASSESSMENT
Assessment:      Plan:      # Hx of worsening dementia  # Colon malignancy     Assessment:  Patient is an 80 y/o F with a pmhx of HTN, GERD, CAD, dementia, anemia, MDD, adenocarcinoma who presents to the ER accompanied by home health aide for hospice and respite care.         Plan:      # Hx of worsening dementia  # Colon malignancy  - Hospice care and Dr. Baumann made aware  - Patient CMO and respite care  - Home meds resumed  Assessment:  Patient is an 80 y/o F with a pmhx of HTN, GERD, CAD, dementia, anemia, MDD, adenocarcinoma who presents to the ER accompanied by home health aide for hospice and respite care.     # UTI   # H/O Dementia   # H/O ?  Colon malignancy w/ concern for brain mets   - Hospice care and Dr. Baumann made aware - reports patient has been under hospice care for > 1 year and there is a difficult situation with care takers at home family w unrealistic expectations for patient's prognosis   - patient admitted for hospice respite care    - CMO: NO  fingersticks, no vital signs, no bloodwork  - urine sample was sent by emergency room +  , sister wishes to tx infection  - vantin started  , urine clx pending   - cw home medications , list sent by hospice care     DNR DNI

## 2025-02-01 NOTE — ED ADULT TRIAGE NOTE - CHIEF COMPLAINT QUOTE
as per ems, home health aid states there was confusion as to what medications were given to pt by evening nurse. here for med ratnaal

## 2025-02-02 PROCEDURE — 99232 SBSQ HOSP IP/OBS MODERATE 35: CPT

## 2025-02-02 RX ADMIN — FENTANYL CITRATE 1 PATCH: 50 INJECTION INTRAMUSCULAR; INTRAVENOUS at 19:21

## 2025-02-02 RX ADMIN — Medication 150 MILLIGRAM(S): at 12:01

## 2025-02-02 RX ADMIN — FAMOTIDINE 20 MILLIGRAM(S): 10 INJECTION INTRAVENOUS at 12:01

## 2025-02-02 RX ADMIN — FENTANYL CITRATE 1 PATCH: 50 INJECTION INTRAMUSCULAR; INTRAVENOUS at 06:31

## 2025-02-02 RX ADMIN — Medication 40 MILLIGRAM(S): at 05:26

## 2025-02-02 RX ADMIN — Medication 2 MILLIGRAM(S): at 21:08

## 2025-02-02 RX ADMIN — Medication 100 MILLIGRAM(S): at 05:25

## 2025-02-02 RX ADMIN — Medication 1 MILLIGRAM(S): at 05:25

## 2025-02-02 RX ADMIN — Medication 100 MILLIGRAM(S): at 17:39

## 2025-02-02 RX ADMIN — BUSPIRONE HYDROCHLORIDE 5 MILLIGRAM(S): 5 TABLET ORAL at 17:39

## 2025-02-02 RX ADMIN — Medication 50 MILLIGRAM(S): at 05:25

## 2025-02-02 RX ADMIN — Medication 1 MILLIGRAM(S): at 17:42

## 2025-02-02 RX ADMIN — Medication 20 MILLIGRAM(S): at 05:25

## 2025-02-02 RX ADMIN — BUSPIRONE HYDROCHLORIDE 5 MILLIGRAM(S): 5 TABLET ORAL at 05:25

## 2025-02-03 VITALS
DIASTOLIC BLOOD PRESSURE: 86 MMHG | TEMPERATURE: 98 F | RESPIRATION RATE: 18 BRPM | SYSTOLIC BLOOD PRESSURE: 166 MMHG | OXYGEN SATURATION: 100 % | HEART RATE: 68 BPM

## 2025-02-03 PROCEDURE — 99232 SBSQ HOSP IP/OBS MODERATE 35: CPT

## 2025-02-03 RX ORDER — SENNOSIDES 8.6 MG
2 TABLET ORAL AT BEDTIME
Refills: 0 | Status: DISCONTINUED | OUTPATIENT
Start: 2025-02-03 | End: 2025-02-05

## 2025-02-03 RX ORDER — SERTRALINE HCL 100 MG
100 TABLET ORAL DAILY
Refills: 0 | Status: DISCONTINUED | OUTPATIENT
Start: 2025-02-03 | End: 2025-02-05

## 2025-02-03 RX ORDER — QUETIAPINE FUMARATE 300 MG/1
25 TABLET ORAL EVERY 6 HOURS
Refills: 0 | Status: DISCONTINUED | OUTPATIENT
Start: 2025-02-03 | End: 2025-02-05

## 2025-02-03 RX ORDER — CLONAZEPAM 2 MG
1 TABLET ORAL EVERY 12 HOURS
Refills: 0 | Status: DISCONTINUED | OUTPATIENT
Start: 2025-02-03 | End: 2025-02-05

## 2025-02-03 RX ADMIN — Medication 1 MILLIGRAM(S): at 17:24

## 2025-02-03 RX ADMIN — Medication 20 MILLIGRAM(S): at 05:04

## 2025-02-03 RX ADMIN — Medication 1 MILLIGRAM(S): at 21:09

## 2025-02-03 RX ADMIN — Medication 50 MILLIGRAM(S): at 05:04

## 2025-02-03 RX ADMIN — Medication 1 MILLIGRAM(S): at 05:04

## 2025-02-03 RX ADMIN — Medication 40 MILLIGRAM(S): at 05:04

## 2025-02-03 RX ADMIN — FAMOTIDINE 20 MILLIGRAM(S): 10 INJECTION INTRAVENOUS at 11:29

## 2025-02-03 RX ADMIN — BUSPIRONE HYDROCHLORIDE 5 MILLIGRAM(S): 5 TABLET ORAL at 17:03

## 2025-02-03 RX ADMIN — Medication 100 MILLIGRAM(S): at 17:03

## 2025-02-03 RX ADMIN — BUSPIRONE HYDROCHLORIDE 5 MILLIGRAM(S): 5 TABLET ORAL at 05:04

## 2025-02-03 RX ADMIN — Medication 2 MILLIGRAM(S): at 22:16

## 2025-02-03 RX ADMIN — Medication 100 MILLIGRAM(S): at 05:04

## 2025-02-03 RX ADMIN — Medication 150 MILLIGRAM(S): at 11:28

## 2025-02-03 RX ADMIN — Medication 2 TABLET(S): at 21:09

## 2025-02-03 NOTE — CHART NOTE - NSCHARTNOTEFT_GEN_A_CORE
**********************************  Hospice Attg - Brief Note  **********************************      Pt's condition and plan of care reviewed extensively with hospice team and family member Demetri Wisdom ([phone]) , and discussed with hospital RN.  Pt condition is reported as stable, comfortable, calm, appearing to be in her baseline health status.  Pt has not needed any PRN medication administration for uncontrolled pain, anxiety, or agitation.    Family was asking for pt to be discharged today; I reviewed with Demetri at great length how comfortable pt has been in the hospital (he agrees) and how frequently agitated pt has been at home.  For this reason I recommend that we utilize the full Respite Stay to give the caregivers a rest and hope that when the pt does return home we can continue to keep her comfortable as she is in the hospital.  I explained that I would have expected pt's anxiety/agitation to have increased significantly in the hospital since the environment and all the people are unfamiliar to her.  We discussed at great length my concern (based on a good degree of evidence) that there is a lot of reluctance at home to give the pt her medications as recommended by myself and the hospice team; my concern is that the pt is not being given her medications at home as recommended (for a variety of reasons) contributing to her undercontrolled symptoms persisting for a number of months, whereas in the hospital she is being given all her meds as prescribed and her condition is well managed.  We discussed at length the challenges of having multiple different medicaid aides caring for the pt; pt has stable aides who know her well from Sun to Wed, but Thurs to Sat has been a problem.  We discussed fears of opioid use and other medications at length. I recommended admission to the [facility] residence if pt's insurance covers it.  I recommended changing Ativan to Klonopin which I expect to provide better long acting symptom control than ativan; Demetri agreed to trying this change for the next couple days.    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  Pt diagnosed with Acute UTI (recurrent) as explanation for her acute change in status.  Pt was admitted to Hospice Respite Care due to progressive caregiver burnout as pt's condition has declined over the past 1 month.    PLAN:  --This pt is an ACTIVE HOSPICE PATIENT admitted for HOSPICE RESPITE CARE up to 5 days  --Please coordinate ALL care/orders with the Hospice team (contact numbers below)  --COMFORT MEASURES ONLY  --DNR/DNI Status  --Updated eMOLST completed 2/1/25  --No further diagnostic testing.  No consultations needed.  --Continue pt's routine home meds and prn meds  --Demetri asks to stop Trazodone use bc he felt it was used a few times in pt's home and lead to episodes of marked hallucination/confusion  --Demetri agrees to start trial of Klonopin q12h in place of standing doses of Ativan.  PRN Ativan orders will continue in case it is needed at any time.  --Continue a course of oral antibiotics and follow up urine culture results  --Plan for discharge back to Home Hospice on Wed morning.  Recommend discharge to [facility] residence if pt has insurance coverage for it (hospice team to investigate); unsure if family will agree to the [facility].    Please call or text me for any concerns.    Naveen Baumann  Hospice Attending Physician  [facility]  -Available in Teams  -my work cell: [phone] (call or text)  -[facility] main:  [phone]  -[facility] fax:  [phone]  -24-Hour Hospice On-Call #: [phone]     Time spent in total care of this pt today:  >90 min. **********************************  Hospice Attg - Brief Note  **********************************      Pt's condition and plan of care reviewed extensively with hospice team and family member Demetri Wisdom ([phone]) , and discussed with hospital RN.  Pt condition is reported as stable, comfortable, calm, appearing to be in her baseline health status.  Pt has not needed any PRN medication administration for uncontrolled pain, anxiety, or agitation.    Family was asking for pt to be discharged today; I reviewed with Demetri at great length how comfortable pt has been in the hospital (he agrees) and how frequently agitated pt has been at home.  For this reason I recommend that we utilize the full Respite Stay to give the caregivers a rest and hope that when the pt does return home we can continue to keep her comfortable as she is in the hospital.  I explained that I would have expected pt's anxiety/agitation to have increased significantly in the hospital since the environment and all the people are unfamiliar to her.  We discussed at great length my concern (based on a good degree of evidence) that there is a lot of reluctance at home to give the pt her medications as recommended by myself and the hospice team; my concern is that the pt is not being given her medications at home as recommended (for a variety of reasons) contributing to her undercontrolled symptoms persisting for a number of months, whereas in the hospital she is being given all her meds as prescribed and her condition is well managed.  We discussed at length the challenges of having multiple different medicaid aides caring for the pt; pt has stable aides who know her well from Sun to Wed, but Thurs to Sat has been a problem.  We discussed fears of opioid use and other medications at length. I recommended admission to the [facility] residence if pt's insurance covers it.  I recommended changing Ativan to Klonopin which I expect to provide better long acting symptom control than ativan; Demetri agreed to trying this change for the next couple days.    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  Pt diagnosed with Acute UTI (recurrent) as explanation for her acute change in status.  Pt was admitted to Hospice Respite Care due to progressive caregiver burnout as pt's condition has declined over the past 1 month.    PLAN:  --This pt is an ACTIVE HOSPICE PATIENT admitted for HOSPICE RESPITE CARE up to 5 days  --Please coordinate ALL care/orders with the Hospice team (contact numbers below)  --COMFORT MEASURES ONLY  --DNR/DNI Status  --Updated eMOLST completed 2/1/25  --No further diagnostic testing.  No consultations needed.  --Continue pt's routine home meds and prn meds  --Demetri asks to stop Trazodone use bc he felt it was used a few times in pt's home and lead to episodes of marked hallucination/confusion.  Will order Seroquel PRN instead.  --Demetri agrees to start trial of Klonopin q12h in place of standing doses of Ativan.  PRN Ativan orders will continue in case it is needed at any time.  --Continue a course of oral antibiotics and follow up urine culture results  --Plan for discharge back to Home Hospice on Wed morning.  Recommend discharge to [facility] residence if pt has insurance coverage for it (hospice team to investigate); unsure if family will agree to the [facility].    Please call or text me for any concerns.    Naveen Baumann  Hospice Attending Physician  [facility]  -Available in Teams  -my work cell: [phone] (call or text)  -[facility] main:  [phone]  -[facility] fax:  [phone]  -24-Hour Hospice On-Call #: [phone]     Time spent in total care of this pt today:  >90 min. **********************************  Hospice Attg - Brief Note  **********************************      Pt's condition and plan of care reviewed extensively with hospice team and family member Demetri Wisdom ([phone]) , and discussed with hospital RN.  Pt condition is reported as stable, comfortable, calm, appearing to be in her baseline health status.  Pt has not needed any PRN medication administration for uncontrolled pain, anxiety, or agitation.    Family was asking for pt to be discharged today; I reviewed with Demetri at great length how comfortable pt has been in the hospital (he agrees) and how frequently agitated pt has been at home.  For this reason I recommend that we utilize the full Respite Stay to give the caregivers a rest and hope that when the pt does return home we can continue to keep her comfortable as she is in the hospital.  I explained that I would have expected pt's anxiety/agitation to have increased significantly in the hospital since the environment and all the people are unfamiliar to her.  We discussed at great length my concern (based on a good degree of evidence) that there is a lot of reluctance at home to give the pt her medications as recommended by myself and the hospice team; my concern is that the pt is not being given her medications at home as recommended (for a variety of reasons) contributing to her undercontrolled symptoms persisting for a number of months, whereas in the hospital she is being given all her meds as prescribed and her condition is well managed.  We discussed at length the challenges of having multiple different medicaid aides caring for the pt; pt has stable aides who know her well from Sun to Wed, but Thurs to Sat has been a problem.  We discussed fears of opioid use and other medications at length. I recommended admission to the [facility] residence if pt's insurance covers it.  I recommended changing Ativan to Klonopin which I expect to provide better long acting symptom control than ativan; Demetri agreed to trying this change for the next couple days.    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  Pt diagnosed with Acute UTI (recurrent) as explanation for her acute change in status.  Pt was admitted to Hospice Respite Care due to progressive caregiver burnout as pt's condition has declined over the past 1 month.    PLAN:  #General:  --This pt is an ACTIVE HOSPICE PATIENT admitted for HOSPICE RESPITE CARE up to 5 days  --Please coordinate ALL care/orders with the Hospice team (contact numbers below)  --COMFORT MEASURES ONLY  --DNR/DNI Status  --Updated eMOLST completed 2/1/25  --No further diagnostic testing.  No consultations needed.  #Meds:  --Continue pt's routine home meds and prn meds  --START Trazodone use bc Demetri he felt it was used a few times in pt's home and lead to episodes of marked hallucination/confusion.  START Seroquel PRN instead.  --START trial of Klonopin q12h in place of standing doses of Ativan (Demetri in agreement).    --CONTINUE Ativan PRN orders in case it is needed at any time.  --START Senna 2 tabs qHS for prevention of opioid constipation (+BM documented today)  --Reduce Zoloft to 100mg daily and plan to wean off; we have not seen significant benefit from the medication over the past months.  --Plan for discharge back to Home Hospice on Wed morning.  Recommend discharge to [facility] if pt has insurance coverage for it (hospice team to investigate); unsure if family will agree to the [facility].    Please call or text me for any concerns.    Naveen Baumann  Hospice Attending Physician  [facility]  -Available in Teams  -my work cell: [phone] (call or text)  -[facility] main:  [phone]  -[facility] fax:  [phone]  -24-Hour Hospice On-Call #: [phone]     Time spent in total care of this pt today:  >90 min.

## 2025-02-04 PROCEDURE — 99232 SBSQ HOSP IP/OBS MODERATE 35: CPT

## 2025-02-04 RX ORDER — DEXTROMETHORPHAN HBR AND GUAIFENESIN ORAL SOLUTION 10; 100 MG/5ML; MG/5ML
10 LIQUID ORAL EVERY 6 HOURS
Refills: 0 | Status: DISCONTINUED | OUTPATIENT
Start: 2025-02-04 | End: 2025-02-05

## 2025-02-04 RX ADMIN — FENTANYL CITRATE 1 PATCH: 50 INJECTION INTRAMUSCULAR; INTRAVENOUS at 07:35

## 2025-02-04 RX ADMIN — FENTANYL CITRATE 1 PATCH: 50 INJECTION INTRAMUSCULAR; INTRAVENOUS at 19:34

## 2025-02-04 RX ADMIN — DEXTROMETHORPHAN HBR AND GUAIFENESIN ORAL SOLUTION 10 MILLILITER(S): 10; 100 LIQUID ORAL at 14:11

## 2025-02-04 RX ADMIN — Medication 2 TABLET(S): at 21:55

## 2025-02-04 RX ADMIN — Medication 1 MILLIGRAM(S): at 05:05

## 2025-02-04 RX ADMIN — Medication 1 MILLIGRAM(S): at 17:50

## 2025-02-04 RX ADMIN — Medication 40 MILLIGRAM(S): at 05:06

## 2025-02-04 RX ADMIN — FAMOTIDINE 20 MILLIGRAM(S): 10 INJECTION INTRAVENOUS at 11:57

## 2025-02-04 RX ADMIN — Medication 100 MILLIGRAM(S): at 05:06

## 2025-02-04 RX ADMIN — Medication 20 MILLIGRAM(S): at 05:05

## 2025-02-04 RX ADMIN — BUSPIRONE HYDROCHLORIDE 5 MILLIGRAM(S): 5 TABLET ORAL at 17:51

## 2025-02-04 RX ADMIN — BUSPIRONE HYDROCHLORIDE 5 MILLIGRAM(S): 5 TABLET ORAL at 05:05

## 2025-02-04 RX ADMIN — Medication 100 MILLIGRAM(S): at 11:58

## 2025-02-04 RX ADMIN — Medication 100 MILLIGRAM(S): at 17:51

## 2025-02-04 RX ADMIN — Medication 30 MILLILITER(S): at 10:34

## 2025-02-04 RX ADMIN — Medication 50 MILLIGRAM(S): at 05:06

## 2025-02-04 NOTE — PROGRESS NOTE ADULT - SUBJECTIVE AND OBJECTIVE BOX
SUBJECTIVE:    Patient is a 79y old Female who presents with a chief complaint of   Currently admitted to medicine with the primary diagnosis of Hospice care patient       Today is hospital day 3d. This morning she is resting comfortably in bed and reports no new issues or overnight events.         PAST MEDICAL & SURGICAL HISTORY  Hypertension    GERD (gastroesophageal reflux disease)    Hiatal hernia with GERD    Empyema lung    H/O CHF    History of repair of hiatal hernia    H/O pleural empyema  s/p decortication      SOCIAL HISTORY:    ALLERGIES:  No Known Allergies    MEDICATIONS:  STANDING MEDICATIONS  busPIRone 5 milliGRAM(s) Oral two times a day  cefpodoxime 100 milliGRAM(s) Oral every 12 hours  clonazePAM  Tablet 1 milliGRAM(s) Oral every 12 hours  famotidine    Tablet 20 milliGRAM(s) Oral daily  fentaNYL   Patch  12 MICROgram(s)/Hr 1 Patch Transdermal every 72 hours  furosemide    Tablet 40 milliGRAM(s) Oral daily  lisinopril 20 milliGRAM(s) Oral daily  metoprolol succinate ER 50 milliGRAM(s) Oral daily  senna 2 Tablet(s) Oral at bedtime  sertraline 100 milliGRAM(s) Oral daily    PRN MEDICATIONS  acetaminophen     Tablet .. 650 milliGRAM(s) Oral every 6 hours PRN  albuterol/ipratropium for Nebulization 3 milliLiter(s) Nebulizer every 6 hours PRN  aluminum hydroxide/magnesium hydroxide/simethicone Suspension 30 milliLiter(s) Oral every 4 hours PRN  LORazepam     Tablet 1 milliGRAM(s) Oral every 2 hours PRN  LORazepam     Tablet 2 milliGRAM(s) Oral every 4 hours PRN  melatonin 3 milliGRAM(s) Oral at bedtime PRN  ondansetron Injectable 4 milliGRAM(s) IV Push every 8 hours PRN  QUEtiapine 25 milliGRAM(s) Oral every 6 hours PRN    VITALS:   T(F): --  HR: --  BP: --  RR: --  SpO2: --    LABS:  Negative for smoking/alcohol/drug use.                     Urinalysis with Rflx Culture (collected 01 Feb 2025 18:00)    Culture - Urine (collected 01 Feb 2025 18:00)  Source: Clean Catch None  Preliminary Report (02 Feb 2025 22:52):    >100,000 CFU/ml Escherichia coli          RADIOLOGY:    PHYSICAL EXAM:  GEN: No acute distress  HEENT: normocephalic, atraumatic, aniceteric  LUNGS: bl breath sounds   HEART: S1/S2 present. RRR, no murmurs  ABD: Soft, non-tender, non-distended. Bowel sounds present  EXT: warm   NEURO: awake, follows commands       ASSESSMENT AND PLAN:    78 y/o F with a pmhx of HTN, GERD, CAD, dementia, anemia, MDD, adenocarcinoma who presents to the ER accompanied by home health aide for hospice and respite care.     # UTI   # H/O Dementia   # H/O ?  Colon malignancy w/ concern for brain mets   - Hospice care and Dr. Baumann made aware - reports patient has been under hospice care for > 1 year and there is a difficult situation with care takers at home family w unrealistic expectations for patient's prognosis   - patient admitted for hospice respite care    - CMO: NO  fingersticks, no vital signs, no bloodwork  - urine sample was sent by emergency room +  , sister wishes to tx infection  - vantin started  , fu urine clx (was sent in ed)   - medication management per hospice team - some adjustment were made as per Dr. Baumann recommendations     DNR DNI CMO  Respite Care (D 4/5) - spoke to hospice Dr. Baumann- plan is to dc on Wednesday 2/5 - he has been updating the family 
SUBJECTIVE:    Patient is a 79y old Female who presents with a chief complaint of   Currently admitted to medicine with the primary diagnosis of Hospice care patient       Today is hospital day 1d. This morning she is resting comfortably in bed         PAST MEDICAL & SURGICAL HISTORY  Hypertension    GERD (gastroesophageal reflux disease)    Hiatal hernia with GERD    Empyema lung    H/O CHF    History of repair of hiatal hernia    H/O pleural empyema  s/p decortication      SOCIAL HISTORY:    ALLERGIES:  No Known Allergies    MEDICATIONS:  STANDING MEDICATIONS  busPIRone 5 milliGRAM(s) Oral two times a day  cefpodoxime 100 milliGRAM(s) Oral every 12 hours  famotidine    Tablet 20 milliGRAM(s) Oral daily  fentaNYL   Patch  12 MICROgram(s)/Hr 1 Patch Transdermal every 72 hours  furosemide    Tablet 40 milliGRAM(s) Oral daily  lisinopril 20 milliGRAM(s) Oral daily  LORazepam     Tablet 2 milliGRAM(s) Oral at bedtime  LORazepam     Tablet 1 milliGRAM(s) Oral two times a day  metoprolol succinate ER 50 milliGRAM(s) Oral daily  sertraline 150 milliGRAM(s) Oral daily    PRN MEDICATIONS  acetaminophen     Tablet .. 650 milliGRAM(s) Oral every 6 hours PRN  albuterol/ipratropium for Nebulization 3 milliLiter(s) Nebulizer every 6 hours PRN  aluminum hydroxide/magnesium hydroxide/simethicone Suspension 30 milliLiter(s) Oral every 4 hours PRN  LORazepam     Tablet 1 milliGRAM(s) Oral every 2 hours PRN  LORazepam     Tablet 2 milliGRAM(s) Oral every 4 hours PRN  melatonin 3 milliGRAM(s) Oral at bedtime PRN  ondansetron Injectable 4 milliGRAM(s) IV Push every 8 hours PRN  traZODone 50 milliGRAM(s) Oral at bedtime PRN    VITALS:   T(F): 99.4  HR: 96  BP: 186/82  RR: 17  SpO2: 95%    LABS:  Negative for smoking/alcohol/drug use.                         11.9   13.29 )-----------( 482      ( 2025 15:32 )             35.8     02-    141  |  103  |  21[H]  ----------------------------<  105[H]  3.6   |  27  |  0.9    Ca    9.3      2025 15:32    TPro  6.3  /  Alb  3.2[L]  /  TBili  0.3  /  DBili  x   /  AST  16  /  ALT  7   /  AlkPhos  119[H]        Urinalysis Basic - ( 2025 18:00 )    Color: Yellow / Appearance: Turbid / S.014 / pH: x  Gluc: x / Ketone: Negative mg/dL  / Bili: Negative / Urobili: 1.0 mg/dL   Blood: x / Protein: 100 mg/dL / Nitrite: Positive   Leuk Esterase: Large / RBC: 10 /HPF / WBC Too Numerous to count /HPF   Sq Epi: x / Non Sq Epi: x / Bacteria: Too Numerous to count /HPF            Urinalysis with Rflx Culture (collected 2025 18:00)          RADIOLOGY:    PHYSICAL EXAM:  GEN: No acute distress  HEENT: normocephalic, atraumatic, aniceteric  LUNGS: bl breath sounds   HEART: S1/S2 present. RRR, no murmurs  ABD: Soft, non-tender, non-distended. Bowel sounds present  EXT:  warm   NEURO: awake       ASSESSMENT AND PLAN:    Patient is an 80 y/o F with a pmhx of HTN, GERD, CAD, dementia, anemia, MDD, adenocarcinoma who presents to the ER accompanied by home health aide for hospice and respite care.     # UTI   # H/O Dementia   # H/O ?  Colon malignancy w/ concern for brain mets   - Hospice care and Dr. Baumann made aware - reports patient has been under hospice care for > 1 year and there is a difficult situation with care takers at home family w unrealistic expectations for patient's prognosis   - patient admitted for hospice respite care    - CMO: NO  fingersticks, no vital signs, no bloodwork  - urine sample was sent by emergency room +  , sister wishes to tx infection  - vantin started  , fu urine clx  - cw home medications , list sent by hospice care     DNR DNI   Respite Care D2/5   
SUBJECTIVE:    Patient is a 79y old Female who presents with a chief complaint of   Currently admitted to medicine with the primary diagnosis of Hospice care patient       Today is hospital day 2d. This morning she is resting comfortably in bed         PAST MEDICAL & SURGICAL HISTORY  Hypertension    GERD (gastroesophageal reflux disease)    Hiatal hernia with GERD    Empyema lung    H/O CHF    History of repair of hiatal hernia    H/O pleural empyema  s/p decortication      SOCIAL HISTORY:    ALLERGIES:  No Known Allergies    MEDICATIONS:  STANDING MEDICATIONS  busPIRone 5 milliGRAM(s) Oral two times a day  cefpodoxime 100 milliGRAM(s) Oral every 12 hours  clonazePAM  Tablet 1 milliGRAM(s) Oral every 12 hours  famotidine    Tablet 20 milliGRAM(s) Oral daily  fentaNYL   Patch  12 MICROgram(s)/Hr 1 Patch Transdermal every 72 hours  furosemide    Tablet 40 milliGRAM(s) Oral daily  lisinopril 20 milliGRAM(s) Oral daily  metoprolol succinate ER 50 milliGRAM(s) Oral daily  senna 2 Tablet(s) Oral at bedtime  sertraline 100 milliGRAM(s) Oral daily    PRN MEDICATIONS  acetaminophen     Tablet .. 650 milliGRAM(s) Oral every 6 hours PRN  albuterol/ipratropium for Nebulization 3 milliLiter(s) Nebulizer every 6 hours PRN  aluminum hydroxide/magnesium hydroxide/simethicone Suspension 30 milliLiter(s) Oral every 4 hours PRN  LORazepam     Tablet 1 milliGRAM(s) Oral every 2 hours PRN  LORazepam     Tablet 2 milliGRAM(s) Oral every 4 hours PRN  melatonin 3 milliGRAM(s) Oral at bedtime PRN  ondansetron Injectable 4 milliGRAM(s) IV Push every 8 hours PRN  QUEtiapine 25 milliGRAM(s) Oral every 6 hours PRN    VITALS:   T(F): 98.2  HR: 68  BP: 166/86  RR: 18  SpO2: 100%    LABS:  Negative for smoking/alcohol/drug use.             Urinalysis Basic - ( 2025 18:00 )    Color: Yellow / Appearance: Turbid / S.014 / pH: x  Gluc: x / Ketone: Negative mg/dL  / Bili: Negative / Urobili: 1.0 mg/dL   Blood: x / Protein: 100 mg/dL / Nitrite: Positive   Leuk Esterase: Large / RBC: 10 /HPF / WBC Too Numerous to count /HPF   Sq Epi: x / Non Sq Epi: x / Bacteria: Too Numerous to count /HPF            Urinalysis with Rflx Culture (collected 2025 18:00)    Culture - Urine (collected 2025 18:00)  Source: Clean Catch None  Preliminary Report (2025 22:52):    >100,000 CFU/ml Escherichia coli          RADIOLOGY:    PHYSICAL EXAM:  GEN: No acute distress  HEENT: normocephalic, atraumatic, aniceteric  LUNGS: bl breath sounds   HEART: S1/S2 present. RRR, no murmurs  ABD: Soft, non-tender, non-distended. Bowel sounds present  EXT: warm   NEURO: awake       ASSESSMENT AND PLAN:    78 y/o F with a pmhx of HTN, GERD, CAD, dementia, anemia, MDD, adenocarcinoma who presents to the ER accompanied by home health aide for hospice and respite care.     # UTI   # H/O Dementia   # H/O ?  Colon malignancy w/ concern for brain mets   - Hospice care and Dr. Baumann made aware - reports patient has been under hospice care for > 1 year and there is a difficult situation with care takers at home family w unrealistic expectations for patient's prognosis   - patient admitted for hospice respite care    - CMO: NO  fingersticks, no vital signs, no bloodwork  - urine sample was sent by emergency room +  , sister wishes to tx infection  - vantin started  , fu urine clx (was sent in ed)   - cw home medications , list sent by hospice care     DNR DNI CMO  Respite Care /  - spoke to hospice Dr. Baumann- plan is to dc on

## 2025-02-05 ENCOUNTER — TRANSCRIPTION ENCOUNTER (OUTPATIENT)
Age: 80
End: 2025-02-05

## 2025-02-05 PROCEDURE — 99239 HOSP IP/OBS DSCHRG MGMT >30: CPT

## 2025-02-05 RX ORDER — IPRATROPIUM BROMIDE AND ALBUTEROL SULFATE .5; 2.5 MG/3ML; MG/3ML
3 SOLUTION RESPIRATORY (INHALATION)
Qty: 0 | Refills: 0 | DISCHARGE

## 2025-02-05 RX ORDER — SENNOSIDES 8.6 MG
2 TABLET ORAL
Qty: 0 | Refills: 0 | DISCHARGE
Start: 2025-02-05

## 2025-02-05 RX ORDER — SERTRALINE HCL 100 MG
1 TABLET ORAL
Refills: 0 | DISCHARGE

## 2025-02-05 RX ORDER — TRAZODONE HCL 100 MG
50 TABLET ORAL
Refills: 0 | DISCHARGE

## 2025-02-05 RX ORDER — FENTANYL CITRATE 50 UG/ML
1 INJECTION INTRAMUSCULAR; INTRAVENOUS
Qty: 0 | Refills: 0 | DISCHARGE

## 2025-02-05 RX ORDER — ACETAMINOPHEN 160 MG/5ML
2 SUSPENSION ORAL
Qty: 0 | Refills: 0 | DISCHARGE
Start: 2025-02-05

## 2025-02-05 RX ORDER — SERTRALINE HCL 100 MG
1 TABLET ORAL
Qty: 0 | Refills: 0 | DISCHARGE
Start: 2025-02-05

## 2025-02-05 RX ORDER — QUETIAPINE FUMARATE 300 MG/1
1 TABLET ORAL
Qty: 0 | Refills: 0 | DISCHARGE
Start: 2025-02-05

## 2025-02-05 RX ORDER — CLONAZEPAM 2 MG
1 TABLET ORAL
Qty: 0 | Refills: 0 | DISCHARGE
Start: 2025-02-05

## 2025-02-05 RX ADMIN — FENTANYL CITRATE 1 PATCH: 50 INJECTION INTRAMUSCULAR; INTRAVENOUS at 10:03

## 2025-02-05 RX ADMIN — Medication 20 MILLIGRAM(S): at 06:15

## 2025-02-05 RX ADMIN — Medication 100 MILLIGRAM(S): at 06:15

## 2025-02-05 RX ADMIN — Medication 1 MILLIGRAM(S): at 09:56

## 2025-02-05 RX ADMIN — FAMOTIDINE 20 MILLIGRAM(S): 10 INJECTION INTRAVENOUS at 11:30

## 2025-02-05 RX ADMIN — Medication 50 MILLIGRAM(S): at 06:15

## 2025-02-05 RX ADMIN — Medication 1 MILLIGRAM(S): at 06:15

## 2025-02-05 RX ADMIN — Medication 40 MILLIGRAM(S): at 06:15

## 2025-02-05 RX ADMIN — BUSPIRONE HYDROCHLORIDE 5 MILLIGRAM(S): 5 TABLET ORAL at 06:15

## 2025-02-05 RX ADMIN — FENTANYL CITRATE 1 PATCH: 50 INJECTION INTRAMUSCULAR; INTRAVENOUS at 06:14

## 2025-02-05 RX ADMIN — Medication 100 MILLIGRAM(S): at 11:30

## 2025-02-05 RX ADMIN — FENTANYL CITRATE 1 PATCH: 50 INJECTION INTRAMUSCULAR; INTRAVENOUS at 06:12

## 2025-02-05 NOTE — DISCHARGE NOTE NURSING/CASE MANAGEMENT/SOCIAL WORK - CAREGIVER NAME
Received a call from Sandro at [facility] letting you know that she is sending the patient to [facility] in [place] due to the fact the patient is having severe pain in her left leg from her calf down.  Patient states it is a 9.5 on the pain scale.  The nurse also said that she thinks if she sends her to the hospital there will be a better chance of her getting rehab because her  is unable to care for her.  
Sonali Gonsalves

## 2025-02-05 NOTE — DISCHARGE NOTE PROVIDER - CARE PROVIDERS DIRECT ADDRESSES
[email or web address]

## 2025-02-05 NOTE — DISCHARGE NOTE PROVIDER - HOSPITAL COURSE
80 y/o F with a pmhx of HTN, GERD, CAD, dementia, anemia, MDD, adenocarcinoma who presents to the ER accompanied by home health aide for hospice and respite care.     # UTI   # H/O Dementia   # H/O ?  Colon malignancy w/ concern for brain mets   - Hospice care and Dr. Baumann made aware - reports patient has been under hospice care for > 1 year and there is a difficult situation with care takers at home family w unrealistic expectations for patient's prognosis   - patient admitted for hospice respite care    - CMO: NO  fingersticks, no vital signs, no bloodwork  - urine sample was sent by emergency room +  , sister wishes to tx infection  - vantin started  , fu urine clx (was sent in ed)   - medication management per hospice team - some adjustment were made as per Dr. Baumann recommendations     DNR DNI CMO    I saw and examined the pt on the day of discharge:    Vitals: HD stable, O2 stable  Gen: appropriate affect, no acute distress  Neuro: no focal defects, no sensory deficits  HEENT: EOMI, no JVD, normocephalic, atraumatic, no scleral icterus  Cardio: RRR, S1 S2 present, no murmurs, rubs, or gallops  Resp: lungs b/l CTA, chest wall intact  Abd: soft, nondistended, nontender  MSK: no gross joint abnormalities, no obvious swelling  Skin: no rashes, no wounds  heme: no ecchymosis or petechiae     Pt was stable for DC. Pt was given strict instructions and return precautions. pt was instructed to return to the ER if symptoms worsen at any time and to return to the ER at any time for any other medical concern. Pt was counseled on continuing home medications and new prescriptions.     I have spent more than 30 minutes of time on the discharge encounter which excludes separately reported services    please note that this is a summary of the medical record. please refer to the EMR for further details.

## 2025-02-05 NOTE — DISCHARGE NOTE PROVIDER - NSDCCPCAREPLAN_GEN_ALL_CORE_FT
PRINCIPAL DISCHARGE DIAGNOSIS  Diagnosis: Hospice care patient  Assessment and Plan of Treatment: you were evaluated in the hospital to get respite care set up for you. you were evaluated and found to have a urinary tract infection and were treated for such in the hospital. respite care was able to be set up for you. you were stable for DC to home. you have hospice medications prescribed for you, take as needed. please follow up with your hospice team and follow up with your primary care doctor    
No

## 2025-02-05 NOTE — DISCHARGE NOTE PROVIDER - NSDCMRMEDTOKEN_GEN_ALL_CORE_FT
acetaminophen 325 mg oral tablet: 2 tab(s) orally every 6 hours As needed Temp greater or equal to 38C (100.4F), Mild Pain (1 - 3)  busPIRone: 5 milligram(s) orally 2 times a day  clonazePAM 1 mg oral tablet: 1 tab(s) orally every 12 hours  codeine-guaifenesin 10 mg-200 mg/5 mL oral liquid: 10 milliliter(s) orally every 8 hours  fentaNYL 12 mcg/hr transdermal film, extended release: 1 patch transdermally every 3 days  furosemide: 40 milligram(s) orally once a day  ipratropium-albuterol 0.5 mg-2.5 mg/3 mL inhalation solution: 3 milliliter(s) by nebulizer every 6 hours as needed for  shortness of breath and/or wheezing  lisinopril 20 mg oral tablet: 1 tab(s) orally once a day  LORazepam: 2 milligram(s) orally every 4 hours as needed for severe agitation  LORazepam 1 mg oral tablet: 1 tab(s) orally every 2 hours As needed moderate anxiety/distress  metoprolol: 50 milligram(s) orally once a day  Pepcid 20 mg oral tablet: 1 tab(s) orally once a day  QUEtiapine 25 mg oral tablet: 1 tab(s) orally every 6 hours As needed Agitation  senna leaf extract oral tablet: 2 tab(s) orally once a day (at bedtime)  sertraline 50 mg oral tablet: 1 tab(s) orally once a day

## 2025-02-05 NOTE — CHART NOTE - NSCHARTNOTEFT_GEN_A_CORE
HOSPICE ATTENDING -- Brief NOTE    Pt is planned for discharge today ~11am back to Home Hospice care.  Pt condition is reported as stable and ready for discharge.  Urine culture results show pansensitive bacteria.  Pt has completed 3 days of oral antibiotics and we will observe off abx back at home.      Of note, pt has insurance coverage for her to receive hospice care at [facility].  This was discussed at length yesterday with pt's family by Hospice Social Worker Renata; despite recommending pt be cared for at [facility], family declined and wishes to bring pt home today.  I spoke separately this morning with both pt's sister Sonali Wisdom ([phone]) and Sonali's son Demetri Wisdom ([phone]).  I expressed my strong concern for pt's safety in her home setting; given all best efforts to care for pt at home over the past 12 months it has remained a challenge for many reasons; in addition, Sonali is going through her own health issues over the past month and is planned for a cardiac ablation and IVC filter placement at [facility] on 2/7/25; I voiced my concerns to both Sonali and Demetri; I gently expressed my strong feeling that pt would be best cared for and safest at [facility] at this time in light of our experience over the past 12 months; I am also concerned that it would be best for Sonali's health and recovery for the pt to be cared for at [facility] for at least the next few weeks.  Despite these concerns, both Sonali and Demetri insisted to bring pt home and 'maybe if things don't go well over the next few weeks we will consider [facility]'; they did not provide a rationale for this decision in light of the many concerns I raised.  I told them that of course we respect their decision and they should not hesitate to reach out to me for any further question/concern.    Plan:  --Discharge to home hospice today  --I completed the Discharge Med Rec  --Home Hospice team to follow  --Will again discuss/recommend admission to [facility] if pt continues to have poorly managed symptoms, falls, problems with Aides in her home setting

## 2025-02-05 NOTE — DISCHARGE NOTE PROVIDER - CARE PROVIDER_API CALL
Alberto Luna  Internal Medicine  [address] [place] [address], Admin - Room [address] [place] [address]  Phone: [phone]  Fax: [phone]  Follow Up Time:

## 2025-02-05 NOTE — DISCHARGE NOTE NURSING/CASE MANAGEMENT/SOCIAL WORK - FINANCIAL ASSISTANCE
[facility] provides services at a reduced cost to those who are determined to be eligible through [facility]’s financial assistance program. Information regarding [facility]’s financial assistance program can be found by going to [email or web address] or by calling [phone].

## 2025-02-05 NOTE — DISCHARGE NOTE NURSING/CASE MANAGEMENT/SOCIAL WORK - PATIENT PORTAL LINK FT
You can access the FollowMyHealth Patient Portal offered by [facility] by registering at the following website: [email or web address]. By joining [facility]’s FollowMyHealth portal, you will also be able to view your health information using other applications (apps) compatible with our system.

## 2025-02-05 NOTE — DISCHARGE NOTE NURSING/CASE MANAGEMENT/SOCIAL WORK - NSDCPEFALRISK_GEN_ALL_CORE
For information on Fall & Injury Prevention, visit: [email or web address] OR  [email or web address] OR  https://www.cdc.gov/steadi/patient.html

## 2025-02-11 DIAGNOSIS — C78.6 SECONDARY MALIGNANT NEOPLASM OF RETROPERITONEUM AND PERITONEUM: ICD-10-CM

## 2025-02-11 DIAGNOSIS — I50.9 HEART FAILURE, UNSPECIFIED: ICD-10-CM

## 2025-02-11 DIAGNOSIS — Z51.5 ENCOUNTER FOR PALLIATIVE CARE: ICD-10-CM

## 2025-02-11 DIAGNOSIS — F03.911 UNSPECIFIED DEMENTIA, UNSPECIFIED SEVERITY, WITH AGITATION: ICD-10-CM

## 2025-02-11 DIAGNOSIS — I25.10 ATHEROSCLEROTIC HEART DISEASE OF NATIVE CORONARY ARTERY WITHOUT ANGINA PECTORIS: ICD-10-CM

## 2025-02-11 DIAGNOSIS — C79.31 SECONDARY MALIGNANT NEOPLASM OF BRAIN: ICD-10-CM

## 2025-02-11 DIAGNOSIS — C18.9 MALIGNANT NEOPLASM OF COLON, UNSPECIFIED: ICD-10-CM

## 2025-02-11 DIAGNOSIS — Z91.81 HISTORY OF FALLING: ICD-10-CM

## 2025-02-11 DIAGNOSIS — I11.0 HYPERTENSIVE HEART DISEASE WITH HEART FAILURE: ICD-10-CM

## 2025-02-11 DIAGNOSIS — K21.9 GASTRO-ESOPHAGEAL REFLUX DISEASE WITHOUT ESOPHAGITIS: ICD-10-CM

## 2025-02-11 DIAGNOSIS — Z66 DO NOT RESUSCITATE: ICD-10-CM

## 2025-02-11 DIAGNOSIS — N39.0 URINARY TRACT INFECTION, SITE NOT SPECIFIED: ICD-10-CM

## 2025-03-07 NOTE — PHYSICAL EXAM
[No Acute Distress] : no acute distress [Normal Oropharynx] : normal oropharynx [Normal Appearance] : normal appearance [No Neck Mass] : no neck mass [Normal Rate/Rhythm] : normal rate/rhythm [Normal S1, S2] : normal s1, s2 [No Murmurs] : no murmurs [No Resp Distress] : no resp distress [Clear to Auscultation Bilaterally] : clear to auscultation bilaterally [No Abnormalities] : no abnormalities [Benign] : benign [Normal Gait] : normal gait [No Clubbing] : no clubbing [No Cyanosis] : no cyanosis [No Edema] : no edema [FROM] : FROM [Normal Color/ Pigmentation] : normal color/ pigmentation [No Focal Deficits] : no focal deficits [Oriented x3] : oriented x3 [Normal Affect] : normal affect No

## 2025-07-01 NOTE — PROGRESS NOTE ADULT - ASSESSMENT
79 y/o female with PMHx HTN, GERD, non-obstructive CAD, Dementia, anemia, MDD P/W Hb 7.4 OP, episodic vomiting for few months, 60lb weight loss. Found to have Hb 7.5 on admission.     #Microcytic anemia (r/o GIB)  - Vitals on admission --> /67, , RR 19, T98, O2 98% RA  - Labs on admission --> Hb 7.5, MCV 66, Cr 1.6  - CTAP --> large hiatal hernia, b/l adrenal nodular thickening, b/l multifocal renal cortical thinning   - S/p 1u prbc in ED  - Admitted to medicine for further management  - GI Consult --> EGD/Colonoscopy done 1/12   - sigmoid mass found - biopsy taken --> identified as an adenocarcinoma  - CEA 2.7 /Ca 19-9 <2  - 1/13 discussion with sister Sonali - no surgery at present, ok for Dr. Hussein to see the patient - Sonali doesn't want pt to know about mass currently   - 1/14 Sonali instructed that she wants surgery to be done asap, colorectal was informed  - 1/15 cardio consult appreciated - TTE, EKG read--Patient is intermediate risk for intermediate risk procedure.   - 1/16 family is going to say for sure if they are ok with the surgery on 1/17  - 1/17: family wants to know the staging of the mass before making any decision  - 1/18: nephew leaning toward the surgery, will come on 01/19 to speak with the patient    #Vomiting likely 2/2 Type III hiatal hernia-resolved   - CT abdomen shows Large hiatal hernia containing partially included stomach with apparent organoaxial rotation.  - Currently no abdominal pain/nausea/vomiting. Tolerating water.     #DAWIT on CKD stage 3 vs. Progressive CKD - improving   - Serum Cr 1.4 currently. Baseline Cr 1.2 in March 2022.   - Renal US --> no hydro, echogenic kidneys can reflect medical renal disease  - Cr peaked at 1.7 then trended down to 1.5  - C/w IVF gentle hydration  - Creatinine back at baseline - no more routine BMP since 01/18    #HTN   - Lisinopril held  - C/w Nifedipine 60mg QD  - C/w metoprolol succinate 50mg QD    #HFpEF  - On lasix PO 40 BID at home  - TTE 10/1/20 --> EF 60-65%, mild concentric LVH, G2DD  - Holding lasix for now due to DAWIT and mild hypokalemia    #Asthma  - Follows Dr. Hartley who had recommended symbicort 160-4.5 2 puff BID and combivent prn but pt does not take it  - Monitor     #Depression  - C/w home meds    #MISC  DVT ppx: SCD  GI ppx: PPI  Diet: NPO  Activity: as tolerated  Pending:  Decision regarding colorectal surgery    Quality 431: Preventive Care And Screening: Unhealthy Alcohol Use - Screening: Patient not identified as an unhealthy alcohol user when screened for unhealthy alcohol use using a systematic screening method Detail Level: Detailed Quality 226: Preventive Care And Screening: Tobacco Use: Screening And Cessation Intervention: Patient screened for tobacco use and is an ex/non-smoker Quality 508: Adult Covid-19 Vaccination Status: Patients who are not up to date on their COVID-19 vaccinations as defined by CDC recommendations on current vaccination